# Patient Record
Sex: MALE | Race: WHITE | NOT HISPANIC OR LATINO | Employment: OTHER | URBAN - NONMETROPOLITAN AREA
[De-identification: names, ages, dates, MRNs, and addresses within clinical notes are randomized per-mention and may not be internally consistent; named-entity substitution may affect disease eponyms.]

---

## 2017-06-24 ENCOUNTER — HOSPITAL ENCOUNTER (EMERGENCY)
Facility: HOSPITAL | Age: 42
Discharge: HOME | End: 2017-06-24
Attending: FAMILY MEDICINE | Admitting: FAMILY MEDICINE

## 2017-06-24 VITALS
HEIGHT: 67 IN | SYSTOLIC BLOOD PRESSURE: 122 MMHG | BODY MASS INDEX: 27 KG/M2 | WEIGHT: 172 LBS | RESPIRATION RATE: 18 BRPM | TEMPERATURE: 98.5 F | DIASTOLIC BLOOD PRESSURE: 68 MMHG | OXYGEN SATURATION: 96 % | HEART RATE: 86 BPM

## 2017-06-24 DIAGNOSIS — E87.6 HYPOKALEMIA: Primary | ICD-10-CM

## 2017-06-24 LAB
ANION GAP SERPL CALCULATED.3IONS-SCNC: 4.9 MMOL/L (ref 3.6–11.2)
BASOPHILS # BLD AUTO: 0.05 10*3/MM3 (ref 0–0.3)
BASOPHILS NFR BLD AUTO: 0.6 % (ref 0–2)
BUN BLD-MCNC: 14 MG/DL (ref 7–21)
BUN/CREAT SERPL: 14 (ref 7–25)
CALCIUM SPEC-SCNC: 9 MG/DL (ref 7.7–10)
CHLORIDE SERPL-SCNC: 104 MMOL/L (ref 99–112)
CK SERPL-CCNC: 218 U/L (ref 24–204)
CO2 SERPL-SCNC: 28.1 MMOL/L (ref 24.3–31.9)
CREAT BLD-MCNC: 1 MG/DL (ref 0.43–1.29)
DEPRECATED RDW RBC AUTO: 43.8 FL (ref 37–54)
EOSINOPHIL # BLD AUTO: 0.26 10*3/MM3 (ref 0–0.7)
EOSINOPHIL NFR BLD AUTO: 3 % (ref 0–5)
ERYTHROCYTE [DISTWIDTH] IN BLOOD BY AUTOMATED COUNT: 14.2 % (ref 11.5–14.5)
GFR SERPL CREATININE-BSD FRML MDRD: 82 ML/MIN/1.73
GLUCOSE BLD-MCNC: 117 MG/DL (ref 70–110)
HCT VFR BLD AUTO: 43.6 % (ref 42–52)
HGB BLD-MCNC: 14.1 G/DL (ref 14–18)
IMM GRANULOCYTES # BLD: 0.06 10*3/MM3 (ref 0–0.03)
IMM GRANULOCYTES NFR BLD: 0.7 % (ref 0–0.5)
LYMPHOCYTES # BLD AUTO: 2.49 10*3/MM3 (ref 1–3)
LYMPHOCYTES NFR BLD AUTO: 28.5 % (ref 21–51)
MCH RBC QN AUTO: 27.6 PG (ref 27–33)
MCHC RBC AUTO-ENTMCNC: 32.3 G/DL (ref 33–37)
MCV RBC AUTO: 85.3 FL (ref 80–94)
MONOCYTES # BLD AUTO: 0.94 10*3/MM3 (ref 0.1–0.9)
MONOCYTES NFR BLD AUTO: 10.8 % (ref 0–10)
NEUTROPHILS # BLD AUTO: 4.93 10*3/MM3 (ref 1.4–6.5)
NEUTROPHILS NFR BLD AUTO: 56.4 % (ref 30–70)
OSMOLALITY SERPL CALC.SUM OF ELEC: 275.3 MOSM/KG (ref 273–305)
PLATELET # BLD AUTO: 231 10*3/MM3 (ref 130–400)
PMV BLD AUTO: 10.4 FL (ref 6–10)
POTASSIUM BLD-SCNC: 3.2 MMOL/L (ref 3.5–5.3)
RBC # BLD AUTO: 5.11 10*6/MM3 (ref 4.7–6.1)
SODIUM BLD-SCNC: 137 MMOL/L (ref 135–153)
WBC NRBC COR # BLD: 8.73 10*3/MM3 (ref 4.5–12.5)

## 2017-06-24 PROCEDURE — 36415 COLL VENOUS BLD VENIPUNCTURE: CPT

## 2017-06-24 PROCEDURE — 82550 ASSAY OF CK (CPK): CPT | Performed by: NURSE PRACTITIONER

## 2017-06-24 PROCEDURE — 99283 EMERGENCY DEPT VISIT LOW MDM: CPT

## 2017-06-24 PROCEDURE — 85025 COMPLETE CBC W/AUTO DIFF WBC: CPT | Performed by: NURSE PRACTITIONER

## 2017-06-24 PROCEDURE — 80048 BASIC METABOLIC PNL TOTAL CA: CPT | Performed by: NURSE PRACTITIONER

## 2017-06-24 RX ORDER — POTASSIUM CHLORIDE 20 MEQ/1
20 TABLET, EXTENDED RELEASE ORAL ONCE
Status: COMPLETED | OUTPATIENT
Start: 2017-06-24 | End: 2017-06-24

## 2017-06-24 RX ADMIN — POTASSIUM CHLORIDE 20 MEQ: 1500 TABLET, EXTENDED RELEASE ORAL at 03:35

## 2017-06-24 NOTE — ED PROVIDER NOTES
Subjective   Patient is a 42 y.o. male presenting with lower extremity pain.   History provided by:  Patient   used: No    Lower Extremity Issue   Location:  Leg and foot  Injury: no    Leg location:  L leg, R leg, L upper leg and R upper leg  Foot location:  L foot and R foot  Pain details:     Quality:  Aching and cramping    Severity:  Moderate    Onset quality:  Gradual    Duration:  5 days    Timing:  Intermittent    Progression:  Waxing and waning  Chronicity:  New  Dislocation: no    Foreign body present:  No foreign bodies  Tetanus status:  Unknown  Prior injury to area:  No  Relieved by:  Nothing  Worsened by:  Nothing  Ineffective treatments:  None tried  Associated symptoms: stiffness    Associated symptoms: no back pain, no fever, no neck pain, no swelling and no tingling    Risk factors: no concern for non-accidental trauma, no frequent fractures, no obesity and no recent illness        Review of Systems   Constitutional: Negative.  Negative for fever.   HENT: Negative.    Eyes: Negative.    Respiratory: Negative.    Cardiovascular: Negative.    Gastrointestinal: Negative.    Endocrine: Negative.    Genitourinary: Negative.    Musculoskeletal: Positive for arthralgias, myalgias and stiffness. Negative for back pain and neck pain.   Allergic/Immunologic: Negative.    Neurological: Negative.    Hematological: Negative.    Psychiatric/Behavioral: Negative.    All other systems reviewed and are negative.      Past Medical History:   Diagnosis Date   • Asthma    • Back pain    • COPD (chronic obstructive pulmonary disease)    • Emphysema lung    • Gastritis    • Headache    • Hypertension    • Migraine    • Substance abuse        No Known Allergies    Past Surgical History:   Procedure Laterality Date   • DENTAL PROCEDURE     • LIVER SURGERY         No family history on file.    Social History     Social History   • Marital status: Single     Spouse name: N/A   • Number of children: N/A    • Years of education: N/A     Social History Main Topics   • Smoking status: Never Smoker   • Smokeless tobacco: Current User     Types: Snuff      Comment: Pt uses vapor cigarette   • Alcohol use No      Comment: occassional    • Drug use: No   • Sexual activity: Defer     Other Topics Concern   • Not on file     Social History Narrative           Objective   Physical Exam   Constitutional: He is oriented to person, place, and time. He appears well-developed and well-nourished.   HENT:   Head: Normocephalic.   Nose: Nose normal.   Mouth/Throat: Oropharynx is clear and moist.   Eyes: EOM are normal. Pupils are equal, round, and reactive to light.   Neck: Normal range of motion. Neck supple.   Cardiovascular: Normal rate, regular rhythm, normal heart sounds and intact distal pulses.    Pulmonary/Chest: Effort normal and breath sounds normal.   Abdominal: Soft. Bowel sounds are normal.   Musculoskeletal: He exhibits tenderness.   Generalized muscle tenderness   Neurological: He is alert and oriented to person, place, and time.   Skin: Skin is warm and dry.   Psychiatric: He has a normal mood and affect. His behavior is normal. Judgment and thought content normal.   Nursing note and vitals reviewed.      Procedures         ED Course  ED Course                  MDM  Number of Diagnoses or Management Options  Hypokalemia: new and requires workup     Amount and/or Complexity of Data Reviewed  Clinical lab tests: ordered and reviewed  Tests in the medicine section of CPT®: reviewed and ordered    Risk of Complications, Morbidity, and/or Mortality  Presenting problems: moderate  Diagnostic procedures: moderate  Management options: moderate    Patient Progress  Patient progress: improved      Final diagnoses:   Hypokalemia            Allyn Bettencourt, APRN  06/24/17 0325

## 2017-07-20 ENCOUNTER — APPOINTMENT (OUTPATIENT)
Dept: GENERAL RADIOLOGY | Facility: HOSPITAL | Age: 42
End: 2017-07-20

## 2017-07-20 ENCOUNTER — HOSPITAL ENCOUNTER (EMERGENCY)
Facility: HOSPITAL | Age: 42
Discharge: HOME | End: 2017-07-20
Attending: EMERGENCY MEDICINE | Admitting: EMERGENCY MEDICINE

## 2017-07-20 VITALS
HEART RATE: 93 BPM | TEMPERATURE: 98.6 F | WEIGHT: 172 LBS | BODY MASS INDEX: 27.64 KG/M2 | HEIGHT: 66 IN | OXYGEN SATURATION: 93 % | SYSTOLIC BLOOD PRESSURE: 124 MMHG | DIASTOLIC BLOOD PRESSURE: 71 MMHG | RESPIRATION RATE: 18 BRPM

## 2017-07-20 DIAGNOSIS — J45.901 ASTHMA ATTACK: Primary | ICD-10-CM

## 2017-07-20 LAB
ALBUMIN SERPL-MCNC: 4.8 G/DL (ref 3.5–5)
ALBUMIN/GLOB SERPL: 1.7 G/DL (ref 1.5–2.5)
ALP SERPL-CCNC: 65 U/L (ref 40–129)
ALT SERPL W P-5'-P-CCNC: 21 U/L (ref 10–44)
ANION GAP SERPL CALCULATED.3IONS-SCNC: 5.6 MMOL/L (ref 3.6–11.2)
AST SERPL-CCNC: 16 U/L (ref 10–34)
BASOPHILS # BLD AUTO: 0.07 10*3/MM3 (ref 0–0.3)
BASOPHILS NFR BLD AUTO: 0.8 % (ref 0–2)
BILIRUB SERPL-MCNC: 0.2 MG/DL (ref 0.2–1.8)
BILIRUB UR QL STRIP: NEGATIVE
BUN BLD-MCNC: 21 MG/DL (ref 7–21)
BUN/CREAT SERPL: 20.4 (ref 7–25)
CALCIUM SPEC-SCNC: 9.5 MG/DL (ref 7.7–10)
CHLORIDE SERPL-SCNC: 106 MMOL/L (ref 99–112)
CLARITY UR: ABNORMAL
CO2 SERPL-SCNC: 28.4 MMOL/L (ref 24.3–31.9)
COLOR UR: YELLOW
CREAT BLD-MCNC: 1.03 MG/DL (ref 0.43–1.29)
DEPRECATED RDW RBC AUTO: 45.1 FL (ref 37–54)
EOSINOPHIL # BLD AUTO: 0.38 10*3/MM3 (ref 0–0.7)
EOSINOPHIL NFR BLD AUTO: 4.5 % (ref 0–5)
ERYTHROCYTE [DISTWIDTH] IN BLOOD BY AUTOMATED COUNT: 14.3 % (ref 11.5–14.5)
GFR SERPL CREATININE-BSD FRML MDRD: 80 ML/MIN/1.73
GLOBULIN UR ELPH-MCNC: 2.9 GM/DL
GLUCOSE BLD-MCNC: 102 MG/DL (ref 70–110)
GLUCOSE UR STRIP-MCNC: NEGATIVE MG/DL
HCT VFR BLD AUTO: 48.7 % (ref 42–52)
HGB BLD-MCNC: 15.9 G/DL (ref 14–18)
HGB UR QL STRIP.AUTO: NEGATIVE
IMM GRANULOCYTES # BLD: 0.04 10*3/MM3 (ref 0–0.03)
IMM GRANULOCYTES NFR BLD: 0.5 % (ref 0–0.5)
KETONES UR QL STRIP: ABNORMAL
LEUKOCYTE ESTERASE UR QL STRIP.AUTO: NEGATIVE
LYMPHOCYTES # BLD AUTO: 3.44 10*3/MM3 (ref 1–3)
LYMPHOCYTES NFR BLD AUTO: 40.4 % (ref 21–51)
MCH RBC QN AUTO: 28 PG (ref 27–33)
MCHC RBC AUTO-ENTMCNC: 32.6 G/DL (ref 33–37)
MCV RBC AUTO: 85.7 FL (ref 80–94)
MONOCYTES # BLD AUTO: 0.85 10*3/MM3 (ref 0.1–0.9)
MONOCYTES NFR BLD AUTO: 10 % (ref 0–10)
NEUTROPHILS # BLD AUTO: 3.74 10*3/MM3 (ref 1.4–6.5)
NEUTROPHILS NFR BLD AUTO: 43.8 % (ref 30–70)
NITRITE UR QL STRIP: NEGATIVE
OSMOLALITY SERPL CALC.SUM OF ELEC: 282.6 MOSM/KG (ref 273–305)
PH UR STRIP.AUTO: 5.5 [PH] (ref 5–8)
PLATELET # BLD AUTO: 269 10*3/MM3 (ref 130–400)
PMV BLD AUTO: 10 FL (ref 6–10)
POTASSIUM BLD-SCNC: 3.9 MMOL/L (ref 3.5–5.3)
PROT SERPL-MCNC: 7.7 G/DL (ref 6–8)
PROT UR QL STRIP: NEGATIVE
RBC # BLD AUTO: 5.68 10*6/MM3 (ref 4.7–6.1)
SODIUM BLD-SCNC: 140 MMOL/L (ref 135–153)
SP GR UR STRIP: 1.03 (ref 1–1.03)
TROPONIN I SERPL-MCNC: <0.006 NG/ML
UROBILINOGEN UR QL STRIP: ABNORMAL
WBC NRBC COR # BLD: 8.52 10*3/MM3 (ref 4.5–12.5)

## 2017-07-20 PROCEDURE — 96374 THER/PROPH/DIAG INJ IV PUSH: CPT

## 2017-07-20 PROCEDURE — 94799 UNLISTED PULMONARY SVC/PX: CPT

## 2017-07-20 PROCEDURE — 99284 EMERGENCY DEPT VISIT MOD MDM: CPT

## 2017-07-20 PROCEDURE — 81003 URINALYSIS AUTO W/O SCOPE: CPT | Performed by: PHYSICIAN ASSISTANT

## 2017-07-20 PROCEDURE — 94640 AIRWAY INHALATION TREATMENT: CPT

## 2017-07-20 PROCEDURE — 85025 COMPLETE CBC W/AUTO DIFF WBC: CPT | Performed by: PHYSICIAN ASSISTANT

## 2017-07-20 PROCEDURE — 84484 ASSAY OF TROPONIN QUANT: CPT | Performed by: PHYSICIAN ASSISTANT

## 2017-07-20 PROCEDURE — 71020 HC CHEST PA AND LATERAL: CPT

## 2017-07-20 PROCEDURE — 71020 XR CHEST 2 VW: CPT | Performed by: RADIOLOGY

## 2017-07-20 PROCEDURE — 25010000002 METHYLPREDNISOLONE PER 125 MG: Performed by: PHYSICIAN ASSISTANT

## 2017-07-20 PROCEDURE — 80053 COMPREHEN METABOLIC PANEL: CPT | Performed by: PHYSICIAN ASSISTANT

## 2017-07-20 PROCEDURE — 93005 ELECTROCARDIOGRAM TRACING: CPT | Performed by: PHYSICIAN ASSISTANT

## 2017-07-20 RX ORDER — SODIUM CHLORIDE 0.9 % (FLUSH) 0.9 %
10 SYRINGE (ML) INJECTION AS NEEDED
Status: DISCONTINUED | OUTPATIENT
Start: 2017-07-20 | End: 2017-07-20 | Stop reason: HOSPADM

## 2017-07-20 RX ORDER — METHYLPREDNISOLONE SODIUM SUCCINATE 125 MG/2ML
125 INJECTION, POWDER, LYOPHILIZED, FOR SOLUTION INTRAMUSCULAR; INTRAVENOUS ONCE
Status: COMPLETED | OUTPATIENT
Start: 2017-07-20 | End: 2017-07-20

## 2017-07-20 RX ORDER — IPRATROPIUM BROMIDE AND ALBUTEROL SULFATE 2.5; .5 MG/3ML; MG/3ML
3 SOLUTION RESPIRATORY (INHALATION) ONCE
Status: COMPLETED | OUTPATIENT
Start: 2017-07-20 | End: 2017-07-20

## 2017-07-20 RX ADMIN — IPRATROPIUM BROMIDE AND ALBUTEROL SULFATE 3 ML: .5; 3 SOLUTION RESPIRATORY (INHALATION) at 04:54

## 2017-07-20 RX ADMIN — METHYLPREDNISOLONE SODIUM SUCCINATE 125 MG: 125 INJECTION, POWDER, FOR SOLUTION INTRAMUSCULAR; INTRAVENOUS at 05:05
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
## 2017-07-20 NOTE — ED PROVIDER NOTES
Subjective   Patient is a 42 y.o. male presenting with shortness of breath.   History provided by:  Patient   used: No    Shortness of Breath   Onset quality:  Sudden  Duration:  1 hour  Timing:  Constant  Progression:  Worsening  Chronicity:  New  Relieved by:  Nothing  Worsened by:  Nothing  Ineffective treatments:  None tried  Associated symptoms: wheezing    Associated symptoms: no abdominal pain, no chest pain, no claudication, no cough, no diaphoresis, no ear pain, no fever, no headaches, no hemoptysis, no neck pain, no PND, no rash, no sore throat, no sputum production, no syncope, no swollen glands and no vomiting    Risk factors: no recent alcohol use, no family hx of DVT, no hx of cancer, no hx of PE/DVT, no obesity, no oral contraceptive use, no prolonged immobilization, no recent surgery and no tobacco use        Review of Systems   Constitutional: Negative.  Negative for diaphoresis and fever.   HENT: Negative.  Negative for ear pain and sore throat.    Eyes: Negative.    Respiratory: Positive for shortness of breath and wheezing. Negative for cough, hemoptysis and sputum production.    Cardiovascular: Negative.  Negative for chest pain, claudication, syncope and PND.   Gastrointestinal: Negative.  Negative for abdominal pain and vomiting.   Endocrine: Negative.    Genitourinary: Negative.    Musculoskeletal: Negative.  Negative for neck pain.   Skin: Negative.  Negative for rash.   Allergic/Immunologic: Negative.    Neurological: Negative.  Negative for headaches.   Hematological: Negative.    Psychiatric/Behavioral: Negative.    All other systems reviewed and are negative.      Past Medical History:   Diagnosis Date   • Asthma    • Back pain    • COPD (chronic obstructive pulmonary disease)    • Emphysema lung    • Gastritis    • Headache    • Hypertension    • Migraine    • Substance abuse        No Known Allergies    Past Surgical History:   Procedure Laterality Date   • DENTAL  PROCEDURE     • LIVER SURGERY         History reviewed. No pertinent family history.    Social History     Social History   • Marital status: Single     Spouse name: N/A   • Number of children: N/A   • Years of education: N/A     Social History Main Topics   • Smoking status: Never Smoker   • Smokeless tobacco: Current User     Types: Snuff      Comment: Pt uses vapor cigarette   • Alcohol use No      Comment: occassional    • Drug use: No   • Sexual activity: Defer     Other Topics Concern   • None     Social History Narrative           Objective   Physical Exam   Constitutional: He is oriented to person, place, and time. He appears well-developed and well-nourished.   HENT:   Head: Normocephalic and atraumatic.   Right Ear: External ear normal.   Left Ear: External ear normal.   Nose: Nose normal.   Mouth/Throat: Oropharynx is clear and moist.   Eyes: EOM are normal. Pupils are equal, round, and reactive to light.   Neck: Normal range of motion. Neck supple.   Cardiovascular: Normal rate, regular rhythm, normal heart sounds and intact distal pulses.    Pulmonary/Chest: Effort normal and breath sounds normal.   Abdominal: Soft. Bowel sounds are normal.   Musculoskeletal: Normal range of motion.   Neurological: He is alert and oriented to person, place, and time.   Skin: Skin is warm and dry.   Psychiatric: He has a normal mood and affect. His behavior is normal. Judgment and thought content normal.   Nursing note and vitals reviewed.      Procedures         ED Course  ED Course   Value Comment By Time   ECG 12 Lead 0450- Reviewed by Dr. Whitmore, rate 90, NSR, no ischemia JEAN Waite 07/20 0501    Patient presented to the ED by EMS. Patient ambulated off the truck into the ER.  Patient vitals at time of arrival 142/74 HR 93, SpO2 97%. JEAN Waite 07/20 0516    Patient feeling much better. JEAN Waite 07/20 0600                  MDM    Final diagnoses:   Asthma attack             JEAN Waite  07/20/17 0654

## 2017-07-27 ENCOUNTER — APPOINTMENT (OUTPATIENT)
Dept: GENERAL RADIOLOGY | Facility: HOSPITAL | Age: 42
End: 2017-07-27

## 2017-07-27 ENCOUNTER — HOSPITAL ENCOUNTER (EMERGENCY)
Facility: HOSPITAL | Age: 42
Discharge: HOME | End: 2017-07-27
Attending: EMERGENCY MEDICINE | Admitting: EMERGENCY MEDICINE

## 2017-07-27 VITALS
SYSTOLIC BLOOD PRESSURE: 132 MMHG | DIASTOLIC BLOOD PRESSURE: 78 MMHG | WEIGHT: 172 LBS | HEART RATE: 99 BPM | OXYGEN SATURATION: 98 % | HEIGHT: 66 IN | RESPIRATION RATE: 20 BRPM | BODY MASS INDEX: 27.64 KG/M2 | TEMPERATURE: 97.9 F

## 2017-07-27 DIAGNOSIS — J45.901 ASTHMA EXACERBATION: Primary | ICD-10-CM

## 2017-07-27 LAB
ANION GAP SERPL CALCULATED.3IONS-SCNC: 5.4 MMOL/L (ref 3.6–11.2)
BASOPHILS # BLD AUTO: 0.1 10*3/MM3 (ref 0–0.3)
BASOPHILS NFR BLD AUTO: 1.1 % (ref 0–2)
BUN BLD-MCNC: 10 MG/DL (ref 7–21)
BUN/CREAT SERPL: 9.4 (ref 7–25)
CALCIUM SPEC-SCNC: 9.6 MG/DL (ref 7.7–10)
CHLORIDE SERPL-SCNC: 108 MMOL/L (ref 99–112)
CO2 SERPL-SCNC: 28.6 MMOL/L (ref 24.3–31.9)
CREAT BLD-MCNC: 1.06 MG/DL (ref 0.43–1.29)
DEPRECATED RDW RBC AUTO: 45.6 FL (ref 37–54)
EOSINOPHIL # BLD AUTO: 0.36 10*3/MM3 (ref 0–0.7)
EOSINOPHIL NFR BLD AUTO: 3.9 % (ref 0–5)
ERYTHROCYTE [DISTWIDTH] IN BLOOD BY AUTOMATED COUNT: 14.5 % (ref 11.5–14.5)
GFR SERPL CREATININE-BSD FRML MDRD: 77 ML/MIN/1.73
GLUCOSE BLD-MCNC: 98 MG/DL (ref 70–110)
HCT VFR BLD AUTO: 50 % (ref 42–52)
HGB BLD-MCNC: 16.4 G/DL (ref 14–18)
IMM GRANULOCYTES # BLD: 0.06 10*3/MM3 (ref 0–0.03)
IMM GRANULOCYTES NFR BLD: 0.6 % (ref 0–0.5)
LYMPHOCYTES # BLD AUTO: 3.15 10*3/MM3 (ref 1–3)
LYMPHOCYTES NFR BLD AUTO: 34.1 % (ref 21–51)
MCH RBC QN AUTO: 28 PG (ref 27–33)
MCHC RBC AUTO-ENTMCNC: 32.8 G/DL (ref 33–37)
MCV RBC AUTO: 85.5 FL (ref 80–94)
MONOCYTES # BLD AUTO: 0.88 10*3/MM3 (ref 0.1–0.9)
MONOCYTES NFR BLD AUTO: 9.5 % (ref 0–10)
NEUTROPHILS # BLD AUTO: 4.7 10*3/MM3 (ref 1.4–6.5)
NEUTROPHILS NFR BLD AUTO: 50.8 % (ref 30–70)
OSMOLALITY SERPL CALC.SUM OF ELEC: 282.1 MOSM/KG (ref 273–305)
PLATELET # BLD AUTO: 284 10*3/MM3 (ref 130–400)
PMV BLD AUTO: 9.6 FL (ref 6–10)
POTASSIUM BLD-SCNC: 4.7 MMOL/L (ref 3.5–5.3)
RBC # BLD AUTO: 5.85 10*6/MM3 (ref 4.7–6.1)
SODIUM BLD-SCNC: 142 MMOL/L (ref 135–153)
WBC NRBC COR # BLD: 9.25 10*3/MM3 (ref 4.5–12.5)

## 2017-07-27 PROCEDURE — 93010 ELECTROCARDIOGRAM REPORT: CPT | Performed by: INTERNAL MEDICINE

## 2017-07-27 PROCEDURE — 93005 ELECTROCARDIOGRAM TRACING: CPT | Performed by: EMERGENCY MEDICINE

## 2017-07-27 PROCEDURE — 85025 COMPLETE CBC W/AUTO DIFF WBC: CPT | Performed by: EMERGENCY MEDICINE

## 2017-07-27 PROCEDURE — 25010000002 METHYLPREDNISOLONE PER 125 MG: Performed by: EMERGENCY MEDICINE

## 2017-07-27 PROCEDURE — 96374 THER/PROPH/DIAG INJ IV PUSH: CPT

## 2017-07-27 PROCEDURE — 94799 UNLISTED PULMONARY SVC/PX: CPT

## 2017-07-27 PROCEDURE — 71010 HC CHEST PA OR AP: CPT

## 2017-07-27 PROCEDURE — 71010 XR CHEST 1 VW: CPT | Performed by: RADIOLOGY

## 2017-07-27 PROCEDURE — 80048 BASIC METABOLIC PNL TOTAL CA: CPT | Performed by: EMERGENCY MEDICINE

## 2017-07-27 PROCEDURE — 94640 AIRWAY INHALATION TREATMENT: CPT

## 2017-07-27 PROCEDURE — 99284 EMERGENCY DEPT VISIT MOD MDM: CPT

## 2017-07-27 RX ORDER — PREDNISONE 50 MG/1
50 TABLET ORAL DAILY
Qty: 5 TABLET | Refills: 0 | Status: SHIPPED | OUTPATIENT
Start: 2017-07-27 | End: 2017-10-08

## 2017-07-27 RX ORDER — METHYLPREDNISOLONE SODIUM SUCCINATE 125 MG/2ML
125 INJECTION, POWDER, LYOPHILIZED, FOR SOLUTION INTRAMUSCULAR; INTRAVENOUS ONCE
Status: COMPLETED | OUTPATIENT
Start: 2017-07-27 | End: 2017-07-27

## 2017-07-27 RX ORDER — SODIUM CHLORIDE 0.9 % (FLUSH) 0.9 %
10 SYRINGE (ML) INJECTION AS NEEDED
Status: DISCONTINUED | OUTPATIENT
Start: 2017-07-27 | End: 2017-07-28 | Stop reason: HOSPADM

## 2017-07-27 RX ORDER — AZITHROMYCIN 250 MG/1
TABLET, FILM COATED ORAL
Qty: 6 TABLET | Refills: 0 | Status: SHIPPED | OUTPATIENT
Start: 2017-07-27 | End: 2017-10-17

## 2017-07-27 RX ORDER — IPRATROPIUM BROMIDE AND ALBUTEROL SULFATE 2.5; .5 MG/3ML; MG/3ML
3 SOLUTION RESPIRATORY (INHALATION)
Status: COMPLETED | OUTPATIENT
Start: 2017-07-27 | End: 2017-07-27

## 2017-07-27 RX ADMIN — IPRATROPIUM BROMIDE AND ALBUTEROL SULFATE 3 ML: .5; 3 SOLUTION RESPIRATORY (INHALATION) at 20:00

## 2017-07-27 RX ADMIN — METHYLPREDNISOLONE SODIUM SUCCINATE 125 MG: 125 INJECTION, POWDER, FOR SOLUTION INTRAMUSCULAR; INTRAVENOUS at 19:54

## 2017-07-27 RX ADMIN — IPRATROPIUM BROMIDE AND ALBUTEROL SULFATE 3 ML: .5; 3 SOLUTION RESPIRATORY (INHALATION) at 20:14

## 2017-07-27 RX ADMIN — IPRATROPIUM BROMIDE AND ALBUTEROL SULFATE 3 ML: .5; 3 SOLUTION RESPIRATORY (INHALATION) at 19:45

## 2017-07-27 NOTE — ED PROVIDER NOTES
Subjective   Patient is a 42 y.o. male presenting with shortness of breath.   History provided by:  Patient   used: No    Shortness of Breath   Severity:  Moderate  Onset quality:  Gradual  Duration:  5 days  Timing:  Intermittent  Progression:  Worsening  Chronicity:  Chronic  Context: weather changes    Relieved by:  Nothing  Worsened by:  Nothing  Ineffective treatments:  None tried  Associated symptoms: wheezing    Associated symptoms: no abdominal pain, no chest pain, no cough, no fever, no hemoptysis, no neck pain, no rash, no sore throat, no sputum production, no syncope and no vomiting    Risk factors: no family hx of DVT, no hx of cancer, no hx of PE/DVT, no obesity, no prolonged immobilization, no recent surgery and no tobacco use        Review of Systems   Constitutional: Negative for chills and fever.   HENT: Negative for congestion, rhinorrhea, sore throat and trouble swallowing.    Eyes: Negative for discharge and visual disturbance.   Respiratory: Positive for shortness of breath and wheezing. Negative for cough, hemoptysis, sputum production and chest tightness.    Cardiovascular: Negative for chest pain, palpitations, leg swelling and syncope.   Gastrointestinal: Negative for abdominal pain, constipation, diarrhea, nausea and vomiting.   Genitourinary: Negative for dysuria, flank pain and hematuria.   Musculoskeletal: Negative for back pain, myalgias and neck pain.   Skin: Negative for color change and rash.   Neurological: Negative for dizziness, weakness and numbness.   Psychiatric/Behavioral: Negative for self-injury and suicidal ideas.       Past Medical History:   Diagnosis Date   • Asthma    • Back pain    • COPD (chronic obstructive pulmonary disease)    • Emphysema lung    • Gastritis    • Headache    • Hypertension    • Migraine    • Substance abuse        No Known Allergies    Past Surgical History:   Procedure Laterality Date   • DENTAL PROCEDURE     • LIVER SURGERY          History reviewed. No pertinent family history.    Social History     Social History   • Marital status: Single     Spouse name: N/A   • Number of children: N/A   • Years of education: N/A     Social History Main Topics   • Smoking status: Never Smoker   • Smokeless tobacco: Current User     Types: Snuff      Comment: Pt uses vapor cigarette   • Alcohol use No      Comment: occassional    • Drug use: No   • Sexual activity: Defer     Other Topics Concern   • None     Social History Narrative           Objective   Physical Exam   Constitutional: He is oriented to person, place, and time. He appears well-developed and well-nourished.   HENT:   Head: Normocephalic and atraumatic.   Nose: Nose normal.   Mouth/Throat: Oropharynx is clear and moist.   Eyes: Conjunctivae and EOM are normal. Pupils are equal, round, and reactive to light.   Neck: Normal range of motion. Neck supple.   Cardiovascular: Normal rate, regular rhythm, normal heart sounds and intact distal pulses.    Pulmonary/Chest: Effort normal. No respiratory distress. He has wheezes. He exhibits no tenderness.   Abdominal: Soft. Bowel sounds are normal. There is no tenderness. There is no rebound and no guarding.   Musculoskeletal: Normal range of motion. He exhibits no edema, tenderness or deformity.   Neurological: He is alert and oriented to person, place, and time. No cranial nerve deficit. Coordination normal.   Skin: Skin is warm and dry. No rash noted. No erythema. No pallor.   Psychiatric: He has a normal mood and affect. His behavior is normal. Judgment and thought content normal.   Nursing note and vitals reviewed.      Procedures         ED Course  ED Course                  MDM  Number of Diagnoses or Management Options  Asthma exacerbation:   Diagnosis management comments: EK:55: Ventricular rate 120, SC interval 156, QRS duration 92, , sinus tachycardia    Patient has hx of asthma. Says he's been wheezing x5 days. It's been  worsening. Physical exam notable for wheezing. Given duoneb and solumedrol. On re-evaluation, patient doing better. Vitals stable. Will discharge home.       Final diagnoses:   Asthma exacerbation            Araceli Hanley MD  07/27/17 6698

## 2017-09-10 ENCOUNTER — HOSPITAL ENCOUNTER (EMERGENCY)
Facility: HOSPITAL | Age: 42
Discharge: HOME | End: 2017-09-10
Attending: EMERGENCY MEDICINE | Admitting: EMERGENCY MEDICINE

## 2017-09-10 ENCOUNTER — APPOINTMENT (OUTPATIENT)
Dept: GENERAL RADIOLOGY | Facility: HOSPITAL | Age: 42
End: 2017-09-10

## 2017-09-10 VITALS
OXYGEN SATURATION: 97 % | HEART RATE: 100 BPM | RESPIRATION RATE: 18 BRPM | WEIGHT: 186 LBS | DIASTOLIC BLOOD PRESSURE: 85 MMHG | HEIGHT: 67 IN | SYSTOLIC BLOOD PRESSURE: 113 MMHG | TEMPERATURE: 98.3 F | BODY MASS INDEX: 29.19 KG/M2

## 2017-09-10 DIAGNOSIS — J44.1 COPD EXACERBATION (HCC): Primary | ICD-10-CM

## 2017-09-10 LAB
ANION GAP SERPL CALCULATED.3IONS-SCNC: 3.1 MMOL/L (ref 3.6–11.2)
BASOPHILS # BLD AUTO: 0.08 10*3/MM3 (ref 0–0.3)
BASOPHILS NFR BLD AUTO: 0.8 % (ref 0–2)
BUN BLD-MCNC: 11 MG/DL (ref 7–21)
BUN/CREAT SERPL: 11.1 (ref 7–25)
CALCIUM SPEC-SCNC: 9.4 MG/DL (ref 7.7–10)
CHLORIDE SERPL-SCNC: 112 MMOL/L (ref 99–112)
CO2 SERPL-SCNC: 27.9 MMOL/L (ref 24.3–31.9)
CREAT BLD-MCNC: 0.99 MG/DL (ref 0.43–1.29)
DEPRECATED RDW RBC AUTO: 45.9 FL (ref 37–54)
EOSINOPHIL # BLD AUTO: 0.52 10*3/MM3 (ref 0–0.7)
EOSINOPHIL NFR BLD AUTO: 5.3 % (ref 0–5)
ERYTHROCYTE [DISTWIDTH] IN BLOOD BY AUTOMATED COUNT: 14.7 % (ref 11.5–14.5)
GFR SERPL CREATININE-BSD FRML MDRD: 83 ML/MIN/1.73
GLUCOSE BLD-MCNC: 123 MG/DL (ref 70–110)
HCT VFR BLD AUTO: 43.6 % (ref 42–52)
HGB BLD-MCNC: 14.3 G/DL (ref 14–18)
IMM GRANULOCYTES # BLD: 0.07 10*3/MM3 (ref 0–0.03)
IMM GRANULOCYTES NFR BLD: 0.7 % (ref 0–0.5)
LYMPHOCYTES # BLD AUTO: 3.9 10*3/MM3 (ref 1–3)
LYMPHOCYTES NFR BLD AUTO: 39.7 % (ref 21–51)
MCH RBC QN AUTO: 28.2 PG (ref 27–33)
MCHC RBC AUTO-ENTMCNC: 32.8 G/DL (ref 33–37)
MCV RBC AUTO: 86 FL (ref 80–94)
MONOCYTES # BLD AUTO: 1.1 10*3/MM3 (ref 0.1–0.9)
MONOCYTES NFR BLD AUTO: 11.2 % (ref 0–10)
NEUTROPHILS # BLD AUTO: 4.15 10*3/MM3 (ref 1.4–6.5)
NEUTROPHILS NFR BLD AUTO: 42.3 % (ref 30–70)
OSMOLALITY SERPL CALC.SUM OF ELEC: 285.7 MOSM/KG (ref 273–305)
PLATELET # BLD AUTO: 240 10*3/MM3 (ref 130–400)
PMV BLD AUTO: 10.6 FL (ref 6–10)
POTASSIUM BLD-SCNC: 3.8 MMOL/L (ref 3.5–5.3)
RBC # BLD AUTO: 5.07 10*6/MM3 (ref 4.7–6.1)
SODIUM BLD-SCNC: 143 MMOL/L (ref 135–153)
TROPONIN I SERPL-MCNC: <0.006 NG/ML
WBC NRBC COR # BLD: 9.82 10*3/MM3 (ref 4.5–12.5)

## 2017-09-10 PROCEDURE — 94799 UNLISTED PULMONARY SVC/PX: CPT

## 2017-09-10 PROCEDURE — 96374 THER/PROPH/DIAG INJ IV PUSH: CPT

## 2017-09-10 PROCEDURE — 93010 ELECTROCARDIOGRAM REPORT: CPT | Performed by: INTERNAL MEDICINE

## 2017-09-10 PROCEDURE — 25010000002 METHYLPREDNISOLONE PER 125 MG: Performed by: EMERGENCY MEDICINE

## 2017-09-10 PROCEDURE — 71010 XR CHEST 1 VW: CPT | Performed by: RADIOLOGY

## 2017-09-10 PROCEDURE — 85025 COMPLETE CBC W/AUTO DIFF WBC: CPT | Performed by: EMERGENCY MEDICINE

## 2017-09-10 PROCEDURE — 84484 ASSAY OF TROPONIN QUANT: CPT | Performed by: EMERGENCY MEDICINE

## 2017-09-10 PROCEDURE — 99284 EMERGENCY DEPT VISIT MOD MDM: CPT

## 2017-09-10 PROCEDURE — 94640 AIRWAY INHALATION TREATMENT: CPT

## 2017-09-10 PROCEDURE — 93005 ELECTROCARDIOGRAM TRACING: CPT | Performed by: EMERGENCY MEDICINE

## 2017-09-10 PROCEDURE — 80048 BASIC METABOLIC PNL TOTAL CA: CPT | Performed by: EMERGENCY MEDICINE

## 2017-09-10 PROCEDURE — 71010 HC CHEST PA OR AP: CPT

## 2017-09-10 RX ORDER — ALBUTEROL SULFATE 90 UG/1
2 AEROSOL, METERED RESPIRATORY (INHALATION)
Qty: 1 INHALER | Refills: 0 | Status: SHIPPED | OUTPATIENT
Start: 2017-09-10 | End: 2017-10-17

## 2017-09-10 RX ORDER — METHYLPREDNISOLONE SODIUM SUCCINATE 125 MG/2ML
125 INJECTION, POWDER, LYOPHILIZED, FOR SOLUTION INTRAMUSCULAR; INTRAVENOUS ONCE
Status: COMPLETED | OUTPATIENT
Start: 2017-09-10 | End: 2017-09-10

## 2017-09-10 RX ORDER — IPRATROPIUM BROMIDE AND ALBUTEROL SULFATE 2.5; .5 MG/3ML; MG/3ML
3 SOLUTION RESPIRATORY (INHALATION)
Status: COMPLETED | OUTPATIENT
Start: 2017-09-10 | End: 2017-09-10

## 2017-09-10 RX ADMIN — METHYLPREDNISOLONE SODIUM SUCCINATE 125 MG: 125 INJECTION, POWDER, FOR SOLUTION INTRAMUSCULAR; INTRAVENOUS at 03:27

## 2017-09-10 RX ADMIN — IPRATROPIUM BROMIDE AND ALBUTEROL SULFATE 3 ML: 2.5; .5 SOLUTION RESPIRATORY (INHALATION) at 03:46

## 2017-09-10 RX ADMIN — IPRATROPIUM BROMIDE AND ALBUTEROL SULFATE 3 ML: 2.5; .5 SOLUTION RESPIRATORY (INHALATION) at 03:31

## 2017-09-10 RX ADMIN — IPRATROPIUM BROMIDE AND ALBUTEROL SULFATE 3 ML: 2.5; .5 SOLUTION RESPIRATORY (INHALATION) at 04:01

## 2017-09-10 NOTE — ED PROVIDER NOTES
Subjective   Patient is a 42 y.o. male presenting with wheezing.   History provided by:  Patient   used: No    Wheezing   Severity:  Mild  Duration:  2 days  Timing:  Constant  Progression:  Worsening  Chronicity:  Recurrent  Relieved by:  Nothing  Worsened by:  Nothing  Ineffective treatments:  None tried  Associated symptoms: shortness of breath    Associated symptoms: no chest pain, no chest tightness, no cough, no fatigue, no fever, no rash, no rhinorrhea, no sore throat, no sputum production and no stridor        Review of Systems   Constitutional: Negative for chills, fatigue and fever.   HENT: Negative for congestion, rhinorrhea, sore throat and trouble swallowing.    Eyes: Negative for discharge and visual disturbance.   Respiratory: Positive for shortness of breath and wheezing. Negative for cough, sputum production, chest tightness and stridor.    Cardiovascular: Negative for chest pain, palpitations and leg swelling.   Gastrointestinal: Negative for abdominal pain, constipation, diarrhea, nausea and vomiting.   Genitourinary: Negative for dysuria, flank pain and hematuria.   Musculoskeletal: Negative for back pain, myalgias and neck pain.   Skin: Negative for color change and rash.   Neurological: Negative for dizziness, weakness and numbness.   Psychiatric/Behavioral: Negative for self-injury and suicidal ideas.       Past Medical History:   Diagnosis Date   • Asthma    • Back pain    • COPD (chronic obstructive pulmonary disease)    • Emphysema lung    • Gastritis    • Headache    • Hypertension    • Migraine    • Substance abuse        No Known Allergies    Past Surgical History:   Procedure Laterality Date   • DENTAL PROCEDURE     • LIVER SURGERY         History reviewed. No pertinent family history.    Social History     Social History   • Marital status: Single     Spouse name: N/A   • Number of children: N/A   • Years of education: N/A     Social History Main Topics   • Smoking  status: Never Smoker   • Smokeless tobacco: Current User     Types: Snuff      Comment: Pt uses vapor cigarette   • Alcohol use No      Comment: occassional    • Drug use: No   • Sexual activity: Defer     Other Topics Concern   • None     Social History Narrative           Objective   Physical Exam   Constitutional: He is oriented to person, place, and time. He appears well-developed and well-nourished.   HENT:   Head: Normocephalic and atraumatic.   Nose: Nose normal.   Mouth/Throat: Oropharynx is clear and moist.   Eyes: Conjunctivae and EOM are normal. Pupils are equal, round, and reactive to light.   Neck: Normal range of motion. Neck supple.   Cardiovascular: Normal rate, regular rhythm, normal heart sounds and intact distal pulses.    Pulmonary/Chest: Effort normal. No respiratory distress. He has wheezes. He exhibits no tenderness.   Abdominal: Soft. Bowel sounds are normal. There is no tenderness. There is no rebound and no guarding.   Musculoskeletal: Normal range of motion. He exhibits no edema, tenderness or deformity.   Neurological: He is alert and oriented to person, place, and time. No cranial nerve deficit. Coordination normal.   Skin: Skin is warm and dry. No rash noted. No erythema. No pallor.   Psychiatric: He has a normal mood and affect. His behavior is normal. Judgment and thought content normal.   Nursing note and vitals reviewed.      Procedures         ED Course  ED Course                  MDM  Number of Diagnoses or Management Options  COPD exacerbation:   Diagnosis management comments: EKG: Ventricular rate 87, KS interval 162, QRS duration 92, QTC 4:30, normal sinus rhythm.    42-year-old male with a history of COPD presents emergency department shortness of breath.  Patient states that he is currently on prednisone.  He denies being on any current antibiotics.  He's been using his DuoNeb and albuterol inhaler with minimal relief of symptoms.  Patient states that he is currently out of  his albuterol inhaler.  Patient denies any fever, chills, productive cough.  Physical exam is remarkable for diffuse wheezing.  Labs and imaging obtained.  No acute process identified on the chest x-ray.  We will discharge patient home with a prescription for purulent inhaler.  Patient told to take medication prescribed as directed, to follow-up with his primary care physician this week, and to return to emergency department for worsening symptoms.      Final diagnoses:   COPD exacerbation            Araceli Hanley MD  09/10/17 0432

## 2017-09-15 ENCOUNTER — HOSPITAL ENCOUNTER (EMERGENCY)
Facility: HOSPITAL | Age: 42
Discharge: HOME | End: 2017-09-15
Attending: EMERGENCY MEDICINE | Admitting: EMERGENCY MEDICINE

## 2017-09-15 VITALS
BODY MASS INDEX: 29.19 KG/M2 | HEIGHT: 67 IN | WEIGHT: 186 LBS | OXYGEN SATURATION: 96 % | SYSTOLIC BLOOD PRESSURE: 114 MMHG | HEART RATE: 98 BPM | DIASTOLIC BLOOD PRESSURE: 69 MMHG | RESPIRATION RATE: 20 BRPM | TEMPERATURE: 98.2 F

## 2017-09-15 DIAGNOSIS — J45.41 MODERATE PERSISTENT ASTHMA WITH ACUTE EXACERBATION: Primary | ICD-10-CM

## 2017-09-15 LAB
A-A DO2: 23.4 MMHG (ref 0–300)
ARTERIAL PATENCY WRIST A: ABNORMAL
ATMOSPHERIC PRESS: 728 MMHG
BASE EXCESS BLDA CALC-SCNC: -1.5 MMOL/L
BDY SITE: ABNORMAL
BODY TEMPERATURE: 98.6 C
COHGB MFR BLD: 0.9 % (ref 0–5)
HCO3 BLDA-SCNC: 23.3 MMOL/L (ref 22–26)
HCT VFR BLD CALC: 48 % (ref 42–52)
HGB BLDA-MCNC: 16.3 G/DL (ref 12–16)
HOROWITZ INDEX BLD+IHG-RTO: 21 %
METHGB BLD QL: 0.6 % (ref 0–3)
MODALITY: ABNORMAL
OXYHGB MFR BLDV: 93.1 % (ref 85–100)
PCO2 BLDA: 40.1 MM HG (ref 35–45)
PH BLDA: 7.38 PH UNITS (ref 7.35–7.45)
PO2 BLDA: 71.6 MM HG (ref 80–100)
SAO2 % BLDCOA: 94.5 % (ref 90–100)

## 2017-09-15 PROCEDURE — 94640 AIRWAY INHALATION TREATMENT: CPT

## 2017-09-15 PROCEDURE — 83050 HGB METHEMOGLOBIN QUAN: CPT | Performed by: EMERGENCY MEDICINE

## 2017-09-15 PROCEDURE — 94799 UNLISTED PULMONARY SVC/PX: CPT

## 2017-09-15 PROCEDURE — 99283 EMERGENCY DEPT VISIT LOW MDM: CPT

## 2017-09-15 PROCEDURE — 82375 ASSAY CARBOXYHB QUANT: CPT | Performed by: EMERGENCY MEDICINE

## 2017-09-15 PROCEDURE — 82805 BLOOD GASES W/O2 SATURATION: CPT | Performed by: EMERGENCY MEDICINE

## 2017-09-15 PROCEDURE — 36600 WITHDRAWAL OF ARTERIAL BLOOD: CPT | Performed by: EMERGENCY MEDICINE

## 2017-09-15 RX ORDER — PREDNISONE 20 MG/1
20 TABLET ORAL 2 TIMES DAILY
Qty: 6 TABLET | Refills: 0 | Status: ON HOLD | OUTPATIENT
Start: 2017-09-15 | End: 2018-01-18

## 2017-09-15 RX ORDER — ALBUTEROL SULFATE 2.5 MG/3ML
SOLUTION RESPIRATORY (INHALATION)
Status: DISCONTINUED
Start: 2017-09-15 | End: 2017-09-15 | Stop reason: HOSPADM

## 2017-09-15 RX ORDER — ALBUTEROL SULFATE 2.5 MG/3ML
2.5 SOLUTION RESPIRATORY (INHALATION) ONCE
Status: COMPLETED | OUTPATIENT
Start: 2017-09-15 | End: 2017-09-15

## 2017-09-15 RX ADMIN — ALBUTEROL SULFATE 2.5 MG: 2.5 SOLUTION RESPIRATORY (INHALATION) at 16:24

## 2017-09-15 NOTE — ED PROVIDER NOTES
Subjective   Patient is a 42 y.o. male presenting with shortness of breath.   History provided by:  Patient  Shortness of Breath   Severity:  Moderate  Onset quality:  Gradual  Duration:  2 days  Progression:  Worsening  Chronicity:  Chronic  Context: activity and weather changes    Relieved by:  Rest  Worsened by:  Deep breathing, exertion and movement  Associated symptoms: wheezing    Associated symptoms: no abdominal pain, no chest pain and no fever        Review of Systems   Constitutional: Negative.  Negative for fever.   HENT: Negative.    Respiratory: Positive for shortness of breath and wheezing.    Cardiovascular: Negative.  Negative for chest pain.   Gastrointestinal: Negative.  Negative for abdominal pain.   Endocrine: Negative.    Genitourinary: Negative.  Negative for dysuria.   Skin: Negative.    Neurological: Negative.    Psychiatric/Behavioral: Negative.    All other systems reviewed and are negative.      Past Medical History:   Diagnosis Date   • Asthma    • Back pain    • COPD (chronic obstructive pulmonary disease)    • Emphysema lung    • Gastritis    • Headache    • Hypertension    • Migraine    • Substance abuse        No Known Allergies    Past Surgical History:   Procedure Laterality Date   • DENTAL PROCEDURE     • LIVER SURGERY         History reviewed. No pertinent family history.    Social History     Social History   • Marital status: Single     Spouse name: N/A   • Number of children: N/A   • Years of education: N/A     Social History Main Topics   • Smoking status: Never Smoker   • Smokeless tobacco: Current User     Types: Snuff      Comment: Pt uses vapor cigarette   • Alcohol use No      Comment: occassional    • Drug use: No   • Sexual activity: Defer     Other Topics Concern   • None     Social History Narrative           Objective   Physical Exam   Constitutional: He is oriented to person, place, and time. He appears well-developed and well-nourished. No distress.   HENT:   Head:  Normocephalic and atraumatic.   Right Ear: External ear normal.   Left Ear: External ear normal.   Nose: Nose normal.   Eyes: Conjunctivae and EOM are normal. Pupils are equal, round, and reactive to light.   Neck: Normal range of motion. Neck supple. No JVD present. No tracheal deviation present.   Cardiovascular: Normal rate, regular rhythm and normal heart sounds.    No murmur heard.  Pulmonary/Chest: Effort normal. No respiratory distress. He has wheezes. He exhibits tenderness.   Abdominal: Soft. Bowel sounds are normal. There is no tenderness.   Musculoskeletal: Normal range of motion. He exhibits no edema or deformity.   Neurological: He is alert and oriented to person, place, and time. No cranial nerve deficit.   Skin: Skin is warm and dry. No rash noted. He is not diaphoretic. No erythema. No pallor.   Psychiatric: He has a normal mood and affect. His behavior is normal. Thought content normal.   Nursing note and vitals reviewed.      Procedures         ED Course  ED Course                  MDM    Final diagnoses:   Moderate persistent asthma with acute exacerbation            Gopal Qiu MD  09/18/17 0758

## 2017-10-08 ENCOUNTER — APPOINTMENT (OUTPATIENT)
Dept: GENERAL RADIOLOGY | Facility: HOSPITAL | Age: 42
End: 2017-10-08

## 2017-10-08 ENCOUNTER — HOSPITAL ENCOUNTER (EMERGENCY)
Facility: HOSPITAL | Age: 42
Discharge: HOME | End: 2017-10-08
Attending: INTERNAL MEDICINE | Admitting: INTERNAL MEDICINE

## 2017-10-08 VITALS
TEMPERATURE: 98 F | SYSTOLIC BLOOD PRESSURE: 130 MMHG | RESPIRATION RATE: 20 BRPM | DIASTOLIC BLOOD PRESSURE: 77 MMHG | OXYGEN SATURATION: 96 % | BODY MASS INDEX: 29.73 KG/M2 | WEIGHT: 185 LBS | HEIGHT: 66 IN | HEART RATE: 74 BPM

## 2017-10-08 DIAGNOSIS — J45.41 MODERATE PERSISTENT ASTHMA WITH EXACERBATION: Primary | ICD-10-CM

## 2017-10-08 PROCEDURE — 94799 UNLISTED PULMONARY SVC/PX: CPT

## 2017-10-08 PROCEDURE — 71020 HC CHEST PA AND LATERAL: CPT

## 2017-10-08 PROCEDURE — 96365 THER/PROPH/DIAG IV INF INIT: CPT

## 2017-10-08 PROCEDURE — 94640 AIRWAY INHALATION TREATMENT: CPT

## 2017-10-08 PROCEDURE — 71020 XR CHEST 2 VW: CPT | Performed by: RADIOLOGY

## 2017-10-08 PROCEDURE — 99284 EMERGENCY DEPT VISIT MOD MDM: CPT

## 2017-10-08 PROCEDURE — 25010000002 AZITHROMYCIN: Performed by: INTERNAL MEDICINE

## 2017-10-08 RX ORDER — GUAIFENESIN AND DEXTROMETHORPHAN HYDROBROMIDE 600; 30 MG/1; MG/1
1 TABLET, EXTENDED RELEASE ORAL 2 TIMES DAILY PRN
Qty: 20 TABLET | Refills: 0 | Status: SHIPPED | OUTPATIENT
Start: 2017-10-08 | End: 2017-10-17

## 2017-10-08 RX ORDER — PREDNISONE 50 MG/1
50 TABLET ORAL DAILY
Qty: 5 TABLET | Refills: 0 | Status: SHIPPED | OUTPATIENT
Start: 2017-10-08 | End: 2017-10-17

## 2017-10-08 RX ORDER — ALBUTEROL SULFATE 2.5 MG/3ML
2.5 SOLUTION RESPIRATORY (INHALATION)
Status: COMPLETED | OUTPATIENT
Start: 2017-10-08 | End: 2017-10-08

## 2017-10-08 RX ORDER — AZITHROMYCIN 250 MG/1
TABLET, FILM COATED ORAL
Qty: 6 TABLET | Refills: 0 | Status: SHIPPED | OUTPATIENT
Start: 2017-10-08 | End: 2017-10-17

## 2017-10-08 RX ADMIN — ALBUTEROL SULFATE 2.5 MG: 2.5 SOLUTION RESPIRATORY (INHALATION) at 04:56

## 2017-10-08 RX ADMIN — ALBUTEROL SULFATE 2.5 MG: 2.5 SOLUTION RESPIRATORY (INHALATION) at 04:38

## 2017-10-08 RX ADMIN — AZITHROMYCIN 500 MG: 500 INJECTION, POWDER, LYOPHILIZED, FOR SOLUTION INTRAVENOUS at 04:47

## 2017-10-08 NOTE — ED PROVIDER NOTES
Subjective   Patient is a 42 y.o. male presenting with cough.   Cough   Cough characteristics:  Productive and barking  Sputum characteristics:  White and green  Severity:  Moderate  Onset quality:  Gradual  Duration:  1 week  Timing:  Constant  Progression:  Worsening  Chronicity:  Recurrent  Smoker: no    Context: upper respiratory infection and weather changes    Context: not sick contacts and not smoke exposure    Relieved by:  Home nebulizer  Worsened by:  Nothing  Ineffective treatments:  Beta-agonist inhaler  Associated symptoms: shortness of breath    Associated symptoms: no chest pain, no chills, no diaphoresis, no fever, no sinus congestion, no sore throat and no weight loss        Review of Systems   Constitutional: Negative for chills, diaphoresis, fever and weight loss.   HENT: Negative for sore throat.    Eyes: Negative.    Respiratory: Positive for cough and shortness of breath.    Cardiovascular: Negative for chest pain.   Endocrine: Negative.    Genitourinary: Negative.    Skin: Negative.    Allergic/Immunologic: Negative.    Psychiatric/Behavioral: Negative.    All other systems reviewed and are negative.      Past Medical History:   Diagnosis Date   • Asthma    • Back pain    • COPD (chronic obstructive pulmonary disease)    • Emphysema lung    • Gastritis    • Headache    • Hypertension    • Migraine    • Substance abuse        No Known Allergies    Past Surgical History:   Procedure Laterality Date   • DENTAL PROCEDURE     • LIVER SURGERY         History reviewed. No pertinent family history.    Social History     Social History   • Marital status: Single     Spouse name: N/A   • Number of children: N/A   • Years of education: N/A     Social History Main Topics   • Smoking status: Never Smoker   • Smokeless tobacco: Current User     Types: Snuff      Comment: Pt uses vapor cigarette   • Alcohol use No      Comment: occassional    • Drug use: No   • Sexual activity: Defer     Other Topics Concern    • None     Social History Narrative           Objective   Physical Exam   Constitutional: He appears well-developed.   HENT:   Head: Normocephalic.   Eyes: Conjunctivae and EOM are normal. Pupils are equal, round, and reactive to light.   Cardiovascular: Normal rate and regular rhythm.    Pulmonary/Chest: Effort normal. He has decreased breath sounds. He has no wheezes. He has no rhonchi. He has no rales.   Abdominal: Normal appearance and bowel sounds are normal. There is no tenderness.   Skin: Skin is warm.   Psychiatric: He has a normal mood and affect.   Nursing note and vitals reviewed.      Procedures         ED Course  ED Course   Comment By Time   Patient received a DuoNeb and Solu-Medrol in route.  He has improved. Berto Rodriguez MD 10/08 3877                  [facility]    Final diagnoses:   Moderate persistent asthma with exacerbation            Berto Rodriguez MD  10/08/17 8683

## 2017-10-08 NOTE — ED NOTES
Patient noted to be sitting up in bed at this time watching television. No cough noted at this time. Resps even and unlabored. Skin warm and dry to touch. Updated on POC and patient was up for discharge. Appreciative of care. Call light within reach.      Alissa Gusman RN  10/08/17 1861

## 2017-10-16 ENCOUNTER — APPOINTMENT (OUTPATIENT)
Dept: GENERAL RADIOLOGY | Facility: HOSPITAL | Age: 42
End: 2017-10-16

## 2017-10-16 ENCOUNTER — HOSPITAL ENCOUNTER (INPATIENT)
Facility: HOSPITAL | Age: 42
LOS: 5 days | Discharge: ANOTHER HOSPITAL | End: 2017-10-22
Attending: FAMILY MEDICINE | Admitting: INTERNAL MEDICINE

## 2017-10-16 ENCOUNTER — HOSPITAL ENCOUNTER (EMERGENCY)
Facility: HOSPITAL | Age: 42
Discharge: HOME | End: 2017-10-16
Attending: FAMILY MEDICINE | Admitting: FAMILY MEDICINE

## 2017-10-16 VITALS
WEIGHT: 183 LBS | RESPIRATION RATE: 18 BRPM | TEMPERATURE: 98.1 F | HEIGHT: 67 IN | BODY MASS INDEX: 28.72 KG/M2 | DIASTOLIC BLOOD PRESSURE: 66 MMHG | SYSTOLIC BLOOD PRESSURE: 104 MMHG | OXYGEN SATURATION: 100 % | HEART RATE: 83 BPM

## 2017-10-16 DIAGNOSIS — R77.8 ELEVATED TROPONIN I LEVEL: ICD-10-CM

## 2017-10-16 DIAGNOSIS — R77.8 ELEVATED TROPONIN: ICD-10-CM

## 2017-10-16 DIAGNOSIS — J44.1 ASTHMA EXACERBATION IN COPD (HCC): Primary | ICD-10-CM

## 2017-10-16 DIAGNOSIS — J45.901 ASTHMA EXACERBATION IN COPD (HCC): Primary | ICD-10-CM

## 2017-10-16 DIAGNOSIS — J45.41 MODERATE PERSISTENT ASTHMA WITH EXACERBATION: Primary | ICD-10-CM

## 2017-10-16 LAB
A-A DO2: 40.9 MMHG (ref 0–300)
ALBUMIN SERPL-MCNC: 4.3 G/DL (ref 3.5–5)
ALBUMIN SERPL-MCNC: 4.4 G/DL (ref 3.5–5)
ALBUMIN/GLOB SERPL: 1.4 G/DL (ref 1.5–2.5)
ALBUMIN/GLOB SERPL: 1.5 G/DL (ref 1.5–2.5)
ALP SERPL-CCNC: 59 U/L (ref 40–129)
ALP SERPL-CCNC: 61 U/L (ref 40–129)
ALT SERPL W P-5'-P-CCNC: 32 U/L (ref 10–44)
ALT SERPL W P-5'-P-CCNC: 48 U/L (ref 10–44)
ANION GAP SERPL CALCULATED.3IONS-SCNC: 15.4 MMOL/L (ref 3.6–11.2)
ANION GAP SERPL CALCULATED.3IONS-SCNC: 8 MMOL/L (ref 3.6–11.2)
ARTERIAL PATENCY WRIST A: POSITIVE
ARTERIAL PATENCY WRIST A: POSITIVE
AST SERPL-CCNC: 31 U/L (ref 10–34)
AST SERPL-CCNC: 33 U/L (ref 10–34)
ATMOSPHERIC PRESS: 728 MMHG
ATMOSPHERIC PRESS: 728 MMHG
BASE EXCESS BLDA CALC-SCNC: -2.6 MMOL/L
BASE EXCESS BLDA CALC-SCNC: -8.5 MMOL/L
BASOPHILS # BLD AUTO: 0.03 10*3/MM3 (ref 0–0.3)
BASOPHILS NFR BLD AUTO: 0.2 % (ref 0–2)
BDY SITE: ABNORMAL
BDY SITE: ABNORMAL
BILIRUB SERPL-MCNC: 0.2 MG/DL (ref 0.2–1.8)
BILIRUB SERPL-MCNC: 0.2 MG/DL (ref 0.2–1.8)
BODY TEMPERATURE: 98.6 C
BODY TEMPERATURE: 98.6 C
BUN BLD-MCNC: 16 MG/DL (ref 7–21)
BUN BLD-MCNC: 18 MG/DL (ref 7–21)
BUN/CREAT SERPL: 12.9 (ref 7–25)
BUN/CREAT SERPL: 17.6 (ref 7–25)
CALCIUM SPEC-SCNC: 9 MG/DL (ref 7.7–10)
CALCIUM SPEC-SCNC: 9.2 MG/DL (ref 7.7–10)
CHLORIDE SERPL-SCNC: 102 MMOL/L (ref 99–112)
CHLORIDE SERPL-SCNC: 106 MMOL/L (ref 99–112)
CO2 SERPL-SCNC: 22.6 MMOL/L (ref 24.3–31.9)
CO2 SERPL-SCNC: 26 MMOL/L (ref 24.3–31.9)
COHGB MFR BLD: 0.7 % (ref 0–5)
COHGB MFR BLD: 1.1 % (ref 0–5)
CREAT BLD-MCNC: 0.91 MG/DL (ref 0.43–1.29)
CREAT BLD-MCNC: 1.4 MG/DL (ref 0.43–1.29)
D-LACTATE SERPL-SCNC: 1.8 MMOL/L (ref 0.5–2)
DEPRECATED RDW RBC AUTO: 47.3 FL (ref 37–54)
DEPRECATED RDW RBC AUTO: 48.1 FL (ref 37–54)
EOSINOPHIL # BLD AUTO: 0.03 10*3/MM3 (ref 0–0.7)
EOSINOPHIL # BLD MANUAL: 0.18 10*3/MM3 (ref 0–0.7)
EOSINOPHIL NFR BLD AUTO: 0.2 % (ref 0–5)
EOSINOPHIL NFR BLD MANUAL: 1 % (ref 0–5)
ERYTHROCYTE [DISTWIDTH] IN BLOOD BY AUTOMATED COUNT: 14.7 % (ref 11.5–14.5)
ERYTHROCYTE [DISTWIDTH] IN BLOOD BY AUTOMATED COUNT: 14.9 % (ref 11.5–14.5)
GFR SERPL CREATININE-BSD FRML MDRD: 56 ML/MIN/1.73
GFR SERPL CREATININE-BSD FRML MDRD: 92 ML/MIN/1.73
GLOBULIN UR ELPH-MCNC: 2.9 GM/DL
GLOBULIN UR ELPH-MCNC: 3 GM/DL
GLUCOSE BLD-MCNC: 139 MG/DL (ref 70–110)
GLUCOSE BLD-MCNC: 319 MG/DL (ref 70–110)
HCO3 BLDA-SCNC: 20 MMOL/L (ref 22–26)
HCO3 BLDA-SCNC: 24 MMOL/L (ref 22–26)
HCT VFR BLD AUTO: 47.5 % (ref 42–52)
HCT VFR BLD AUTO: 49.1 % (ref 42–52)
HCT VFR BLD CALC: 44 % (ref 42–52)
HCT VFR BLD CALC: 47 % (ref 42–52)
HGB BLD-MCNC: 15.3 G/DL (ref 14–18)
HGB BLD-MCNC: 15.8 G/DL (ref 14–18)
HGB BLDA-MCNC: 14.9 G/DL (ref 12–16)
HGB BLDA-MCNC: 16.1 G/DL (ref 12–16)
HOROWITZ INDEX BLD+IHG-RTO: 21 %
HOROWITZ INDEX BLD+IHG-RTO: 28 %
IMM GRANULOCYTES # BLD: 0.16 10*3/MM3 (ref 0–0.03)
IMM GRANULOCYTES NFR BLD: 0.9 % (ref 0–0.5)
LYMPHOCYTES # BLD AUTO: 3.11 10*3/MM3 (ref 1–3)
LYMPHOCYTES # BLD MANUAL: 8.6 10*3/MM3 (ref 1–3)
LYMPHOCYTES NFR BLD AUTO: 17.2 % (ref 21–51)
LYMPHOCYTES NFR BLD MANUAL: 48 % (ref 21–51)
LYMPHOCYTES NFR BLD MANUAL: 5 % (ref 0–10)
MCH RBC QN AUTO: 27.7 PG (ref 27–33)
MCH RBC QN AUTO: 28.5 PG (ref 27–33)
MCHC RBC AUTO-ENTMCNC: 32.2 G/DL (ref 33–37)
MCHC RBC AUTO-ENTMCNC: 32.2 G/DL (ref 33–37)
MCV RBC AUTO: 86.1 FL (ref 80–94)
MCV RBC AUTO: 88.6 FL (ref 80–94)
METHGB BLD QL: 0.5 % (ref 0–3)
METHGB BLD QL: 0.5 % (ref 0–3)
MODALITY: ABNORMAL
MODALITY: ABNORMAL
MONOCYTES # BLD AUTO: 0.9 10*3/MM3 (ref 0.1–0.9)
MONOCYTES # BLD AUTO: 1.9 10*3/MM3 (ref 0.1–0.9)
MONOCYTES NFR BLD AUTO: 10.5 % (ref 0–10)
NEUTROPHILS # BLD AUTO: 12.88 10*3/MM3 (ref 1.4–6.5)
NEUTROPHILS # BLD AUTO: 8.24 10*3/MM3 (ref 1.4–6.5)
NEUTROPHILS NFR BLD AUTO: 71 % (ref 30–70)
NEUTROPHILS NFR BLD MANUAL: 42 % (ref 30–70)
NEUTS BAND NFR BLD MANUAL: 4 % (ref 4–12)
OSMOLALITY SERPL CALC.SUM OF ELEC: 282.8 MOSM/KG (ref 273–305)
OSMOLALITY SERPL CALC.SUM OF ELEC: 293.6 MOSM/KG (ref 273–305)
OXYHGB MFR BLDV: 93.5 % (ref 85–100)
OXYHGB MFR BLDV: 95.7 % (ref 85–100)
PCO2 BLDA: 47.9 MM HG (ref 35–45)
PCO2 BLDA: 52.8 MM HG (ref 35–45)
PH BLDA: 7.2 PH UNITS (ref 7.35–7.45)
PH BLDA: 7.32 PH UNITS (ref 7.35–7.45)
PLAT MORPH BLD: NORMAL
PLATELET # BLD AUTO: 274 10*3/MM3 (ref 130–400)
PLATELET # BLD AUTO: 289 10*3/MM3 (ref 130–400)
PMV BLD AUTO: 10.3 FL (ref 6–10)
PMV BLD AUTO: 10.9 FL (ref 6–10)
PO2 BLDA: 82.4 MM HG (ref 80–100)
PO2 BLDA: 93.3 MM HG (ref 80–100)
POTASSIUM BLD-SCNC: 3.5 MMOL/L (ref 3.5–5.3)
POTASSIUM BLD-SCNC: 3.7 MMOL/L (ref 3.5–5.3)
PROT SERPL-MCNC: 7.3 G/DL (ref 6–8)
PROT SERPL-MCNC: 7.3 G/DL (ref 6–8)
RBC # BLD AUTO: 5.52 10*6/MM3 (ref 4.7–6.1)
RBC # BLD AUTO: 5.54 10*6/MM3 (ref 4.7–6.1)
RBC MORPH BLD: NORMAL
SAO2 % BLDCOA: 94.6 % (ref 90–100)
SAO2 % BLDCOA: 97.3 % (ref 90–100)
SCAN SLIDE: NORMAL
SODIUM BLD-SCNC: 140 MMOL/L (ref 135–153)
SODIUM BLD-SCNC: 140 MMOL/L (ref 135–153)
TROPONIN I SERPL-MCNC: 0.05 NG/ML
TROPONIN I SERPL-MCNC: 0.06 NG/ML
TROPONIN I SERPL-MCNC: <0.006 NG/ML
WBC NRBC COR # BLD: 17.92 10*3/MM3 (ref 4.5–12.5)
WBC NRBC COR # BLD: 18.11 10*3/MM3 (ref 4.5–12.5)

## 2017-10-16 PROCEDURE — 36415 COLL VENOUS BLD VENIPUNCTURE: CPT

## 2017-10-16 PROCEDURE — 83605 ASSAY OF LACTIC ACID: CPT | Performed by: FAMILY MEDICINE

## 2017-10-16 PROCEDURE — 71010 XR CHEST 1 VW: CPT | Performed by: RADIOLOGY

## 2017-10-16 PROCEDURE — 94799 UNLISTED PULMONARY SVC/PX: CPT

## 2017-10-16 PROCEDURE — 99284 EMERGENCY DEPT VISIT MOD MDM: CPT

## 2017-10-16 PROCEDURE — 83880 ASSAY OF NATRIURETIC PEPTIDE: CPT | Performed by: FAMILY MEDICINE

## 2017-10-16 PROCEDURE — 84484 ASSAY OF TROPONIN QUANT: CPT | Performed by: FAMILY MEDICINE

## 2017-10-16 PROCEDURE — 87040 BLOOD CULTURE FOR BACTERIA: CPT | Performed by: FAMILY MEDICINE

## 2017-10-16 PROCEDURE — 71010 HC CHEST PA OR AP: CPT

## 2017-10-16 PROCEDURE — 94640 AIRWAY INHALATION TREATMENT: CPT

## 2017-10-16 PROCEDURE — 80053 COMPREHEN METABOLIC PANEL: CPT | Performed by: FAMILY MEDICINE

## 2017-10-16 PROCEDURE — 93005 ELECTROCARDIOGRAM TRACING: CPT | Performed by: FAMILY MEDICINE

## 2017-10-16 PROCEDURE — 85025 COMPLETE CBC W/AUTO DIFF WBC: CPT | Performed by: FAMILY MEDICINE

## 2017-10-16 PROCEDURE — 93010 ELECTROCARDIOGRAM REPORT: CPT | Performed by: INTERNAL MEDICINE

## 2017-10-16 RX ORDER — IPRATROPIUM BROMIDE AND ALBUTEROL SULFATE 2.5; .5 MG/3ML; MG/3ML
3 SOLUTION RESPIRATORY (INHALATION) ONCE
Status: COMPLETED | OUTPATIENT
Start: 2017-10-16 | End: 2017-10-16

## 2017-10-16 RX ORDER — METHYLPREDNISOLONE SODIUM SUCCINATE 125 MG/2ML
125 INJECTION, POWDER, LYOPHILIZED, FOR SOLUTION INTRAMUSCULAR; INTRAVENOUS ONCE
Status: COMPLETED | OUTPATIENT
Start: 2017-10-16 | End: 2017-10-16

## 2017-10-16 RX ORDER — PREDNISONE 20 MG/1
20 TABLET ORAL 2 TIMES DAILY
Qty: 10 TABLET | Refills: 0 | Status: SHIPPED | OUTPATIENT
Start: 2017-10-16 | End: 2017-10-17

## 2017-10-16 RX ORDER — METHYLPREDNISOLONE SODIUM SUCCINATE 125 MG/2ML
125 INJECTION, POWDER, LYOPHILIZED, FOR SOLUTION INTRAMUSCULAR; INTRAVENOUS ONCE
Status: COMPLETED | OUTPATIENT
Start: 2017-10-16 | End: 2017-10-17

## 2017-10-16 RX ORDER — SODIUM CHLORIDE 0.9 % (FLUSH) 0.9 %
10 SYRINGE (ML) INJECTION AS NEEDED
Status: DISCONTINUED | OUTPATIENT
Start: 2017-10-16 | End: 2017-10-22 | Stop reason: HOSPADM

## 2017-10-16 RX ORDER — SODIUM CHLORIDE 0.9 % (FLUSH) 0.9 %
10 SYRINGE (ML) INJECTION AS NEEDED
Status: DISCONTINUED | OUTPATIENT
Start: 2017-10-16 | End: 2017-10-16 | Stop reason: HOSPADM

## 2017-10-16 RX ORDER — METHYLPREDNISOLONE SODIUM SUCCINATE 125 MG/2ML
INJECTION, POWDER, LYOPHILIZED, FOR SOLUTION INTRAMUSCULAR; INTRAVENOUS
Status: COMPLETED
Start: 2017-10-16 | End: 2017-10-16

## 2017-10-16 RX ORDER — FAMOTIDINE 10 MG/ML
20 INJECTION, SOLUTION INTRAVENOUS ONCE
Status: COMPLETED | OUTPATIENT
Start: 2017-10-16 | End: 2017-10-17

## 2017-10-16 RX ADMIN — IPRATROPIUM BROMIDE AND ALBUTEROL SULFATE 3 ML: .5; 3 SOLUTION RESPIRATORY (INHALATION) at 23:55

## 2017-10-16 RX ADMIN — IPRATROPIUM BROMIDE AND ALBUTEROL SULFATE 3 ML: .5; 3 SOLUTION RESPIRATORY (INHALATION) at 08:07

## 2017-10-16 RX ADMIN — IPRATROPIUM BROMIDE AND ALBUTEROL SULFATE 3 ML: .5; 3 SOLUTION RESPIRATORY (INHALATION) at 03:33

## 2017-10-16 RX ADMIN — SODIUM CHLORIDE 500 ML: 9 INJECTION, SOLUTION INTRAVENOUS at 02:53

## 2017-10-16 RX ADMIN — IPRATROPIUM BROMIDE AND ALBUTEROL SULFATE 3 ML: .5; 3 SOLUTION RESPIRATORY (INHALATION) at 01:52

## 2017-10-16 RX ADMIN — METHYLPREDNISOLONE SODIUM SUCCINATE 125 MG: 125 INJECTION, POWDER, FOR SOLUTION INTRAMUSCULAR; INTRAVENOUS at 01:47

## 2017-10-16 RX ADMIN — METHYLPREDNISOLONE SODIUM SUCCINATE 125 MG: 125 INJECTION, POWDER, LYOPHILIZED, FOR SOLUTION INTRAMUSCULAR; INTRAVENOUS at 01:47

## 2017-10-16 NOTE — ED PROVIDER NOTES
Subjective   Patient is a 42 y.o. male presenting with wheezing.   History provided by:  Patient   used: No    Wheezing   Severity:  Severe  Severity compared to prior episodes:  More severe  Onset quality:  Gradual  Timing:  Intermittent  Progression:  Waxing and waning  Chronicity:  Recurrent  Context: not animal exposure, not dust, not emotional upset, not exposure to allergen and not fumes    Relieved by:  Nothing  Worsened by:  Nothing  Ineffective treatments:  Beta-agonist inhaler  Associated symptoms: chest tightness, cough, shortness of breath and stridor    Risk factors: not exposed to toxic fumes, no prior hospitalizations, no prior ICU admissions, no prior intubations and no smoke inhalation    Risk factors comment:  Asthma      Review of Systems   Constitutional: Negative.    Eyes: Negative.    Respiratory: Positive for cough, chest tightness, shortness of breath, wheezing and stridor.    Cardiovascular: Negative.    Gastrointestinal: Negative.    Endocrine: Negative.    Genitourinary: Negative.    Musculoskeletal: Negative.    Skin: Negative.    Allergic/Immunologic: Negative.    Neurological: Negative.    Hematological: Negative.    Psychiatric/Behavioral: Negative.    All other systems reviewed and are negative.      Past Medical History:   Diagnosis Date   • Asthma    • Back pain    • COPD (chronic obstructive pulmonary disease)    • Emphysema lung    • Gastritis    • Headache    • Hypertension    • Migraine    • Substance abuse        No Known Allergies    Past Surgical History:   Procedure Laterality Date   • DENTAL PROCEDURE     • LIVER SURGERY         History reviewed. No pertinent family history.    Social History     Social History   • Marital status: Single     Spouse name: N/A   • Number of children: N/A   • Years of education: N/A     Social History Main Topics   • Smoking status: Never Smoker   • Smokeless tobacco: Current User     Types: Snuff      Comment: Pt uses  vapor cigarette   • Alcohol use No      Comment: occassional    • Drug use: No   • Sexual activity: Defer     Other Topics Concern   • None     Social History Narrative           Objective   Physical Exam   Constitutional: He appears well-developed and well-nourished. No distress.   HENT:   Head: Normocephalic and atraumatic.   Nose: Nose normal.   Eyes: Conjunctivae and EOM are normal. Pupils are equal, round, and reactive to light.   Neck: Normal range of motion. Neck supple. No JVD present. No tracheal deviation present.   Cardiovascular: Normal rate, regular rhythm and normal heart sounds.    No murmur heard.  Pulmonary/Chest: He is in respiratory distress. He has wheezes.   Abdominal: Soft. Bowel sounds are normal. There is no tenderness.   Musculoskeletal: Normal range of motion. He exhibits no edema or deformity.   Neurological: No cranial nerve deficit.   Decreased alertness, lethargic   Skin: Skin is warm and dry. No rash noted. He is not diaphoretic. No erythema. No pallor.   Nursing note and vitals reviewed.      Procedures         ED Course  ED Course   Comment By Time   Patient more alert, says he feels much better. Alert and oriented x 3 Allyn Bettencourt, APRN 10/16 0216   Patient alert and oriented, says he feels much better. Denies any pain. Exhibits readiness to go home. Elsyjasmin HardinBernie Sg, APRN 10/16 9900                  MDM  Number of Diagnoses or Management Options  Elevated troponin: new and requires workup  Moderate persistent asthma with exacerbation: new and requires workup  Diagnosis management comments: The patient has been reevaluated. Wheezing improved significantly. The patient is is no respiratory distress. His symptoms have resolved. I discussed case with Dr. Brooks who says to follow up outpatient and return to ED as needed.        Amount and/or Complexity of Data Reviewed  Clinical lab tests: ordered and reviewed  Tests in the radiology section of CPT®: reviewed and  ordered  Tests in the medicine section of CPT®: ordered and reviewed  Decide to obtain previous medical records or to obtain history from someone other than the patient: yes    Risk of Complications, Morbidity, and/or Mortality  Presenting problems: moderate  Diagnostic procedures: moderate  Management options: moderate    Patient Progress  Patient progress: improved      Final diagnoses:   Moderate persistent asthma with exacerbation   Elevated troponin            Allyn Bettencourt, DION  10/16/17 0750       Allyn Bettencourt, DION  10/16/17 0751       Allyn Bettencourt, APRN  10/16/17 0754

## 2017-10-16 NOTE — ED NOTES
"Pt presents to ED with shortness of breath, wheezes with inspiration and expiration. Pt is pale and diaphoretic upon arrival. EMS states that they were called by the pt for an \"asthma attack.\" EMS reports that the pt oxygen saturation was 33% upon arrival to the pts home and he was unconcious. States that they gave him one duo neb in route.      Parvin Hall RN  10/16/17 0642    "

## 2017-10-16 NOTE — ED NOTES
Provided pt with lunch box as requested after made okay with provider. Pt remains on 2 liters nasal canula. NADN. VS stable.      Parvin Hall RN  10/16/17 1420

## 2017-10-16 NOTE — ED NOTES
EKG performed by me @ 0200 and was shown to Dr. Carmichael @ 0204.     Memo Singer  10/16/17 0203

## 2017-10-17 ENCOUNTER — APPOINTMENT (OUTPATIENT)
Dept: CARDIOLOGY | Facility: HOSPITAL | Age: 42
End: 2017-10-17
Attending: INTERNAL MEDICINE

## 2017-10-17 PROBLEM — J45.901 ASTHMA EXACERBATION IN COPD (HCC): Status: ACTIVE | Noted: 2017-10-17

## 2017-10-17 PROBLEM — J44.1 ASTHMA EXACERBATION IN COPD (HCC): Status: ACTIVE | Noted: 2017-10-17

## 2017-10-17 LAB
A-A DO2: 21.3 MMHG (ref 0–300)
ARTERIAL PATENCY WRIST A: POSITIVE
ATMOSPHERIC PRESS: 728 MMHG
BASE EXCESS BLDA CALC-SCNC: 1 MMOL/L
BDY SITE: ABNORMAL
BH CV ECHO MEAS - % IVS THICK: 27.6 %
BH CV ECHO MEAS - % LVPW THICK: 76.8 %
BH CV ECHO MEAS - ACS: 2.7 CM
BH CV ECHO MEAS - AI DEC SLOPE: 513.8 CM/SEC^2
BH CV ECHO MEAS - AI MAX PG: 78.4 MMHG
BH CV ECHO MEAS - AI MAX VEL: 442.7 CM/SEC
BH CV ECHO MEAS - AI P1/2T: 252.4 MSEC
BH CV ECHO MEAS - AO ROOT AREA (BSA CORRECTED): 2.4
BH CV ECHO MEAS - AO ROOT AREA: 17 CM^2
BH CV ECHO MEAS - AO ROOT DIAM: 4.7 CM
BH CV ECHO MEAS - BSA(HAYCOCK): 2 M^2
BH CV ECHO MEAS - BSA: 2 M^2
BH CV ECHO MEAS - BZI_BMI: 29 KILOGRAMS/M^2
BH CV ECHO MEAS - BZI_METRIC_HEIGHT: 170.2 CM
BH CV ECHO MEAS - BZI_METRIC_WEIGHT: 83.9 KG
BH CV ECHO MEAS - CONTRAST EF 4CH: 59.3 ML/M^2
BH CV ECHO MEAS - EDV(CUBED): 152.7 ML
BH CV ECHO MEAS - EDV(MOD-SP4): 118 ML
BH CV ECHO MEAS - EDV(TEICH): 138 ML
BH CV ECHO MEAS - EF(CUBED): 80.2 %
BH CV ECHO MEAS - EF(TEICH): 72.2 %
BH CV ECHO MEAS - ESV(CUBED): 30.2 ML
BH CV ECHO MEAS - ESV(MOD-SP4): 48 ML
BH CV ECHO MEAS - ESV(TEICH): 38.3 ML
BH CV ECHO MEAS - FS: 41.7 %
BH CV ECHO MEAS - IVS/LVPW: 0.89
BH CV ECHO MEAS - IVSD: 1 CM
BH CV ECHO MEAS - IVSS: 1.3 CM
BH CV ECHO MEAS - LA DIMENSION: 3.6 CM
BH CV ECHO MEAS - LA/AO: 0.77
BH CV ECHO MEAS - LV DIASTOLIC VOL/BSA (35-75): 60.3 ML/M^2
BH CV ECHO MEAS - LV MASS(C)D: 225.1 GRAMS
BH CV ECHO MEAS - LV MASS(C)DI: 115.1 GRAMS/M^2
BH CV ECHO MEAS - LV MASS(C)S: 196.7 GRAMS
BH CV ECHO MEAS - LV MASS(C)SI: 100.5 GRAMS/M^2
BH CV ECHO MEAS - LV SYSTOLIC VOL/BSA (12-30): 24.5 ML/M^2
BH CV ECHO MEAS - LVIDD: 5.3 CM
BH CV ECHO MEAS - LVIDS: 3.1 CM
BH CV ECHO MEAS - LVLD AP4: 8 CM
BH CV ECHO MEAS - LVLS AP4: 7.2 CM
BH CV ECHO MEAS - LVOT AREA (M): 4.9 CM^2
BH CV ECHO MEAS - LVOT AREA: 5 CM^2
BH CV ECHO MEAS - LVOT DIAM: 2.5 CM
BH CV ECHO MEAS - LVPWD: 1.1 CM
BH CV ECHO MEAS - LVPWS: 2 CM
BH CV ECHO MEAS - PA ACC SLOPE: 1377 CM/SEC^2
BH CV ECHO MEAS - PA ACC TIME: 0.1 SEC
BH CV ECHO MEAS - PA PR(ACCEL): 34.6 MMHG
BH CV ECHO MEAS - RVDD: 2.1 CM
BH CV ECHO MEAS - SI(CUBED): 62.6 ML/M^2
BH CV ECHO MEAS - SI(MOD-SP4): 35.8 ML/M^2
BH CV ECHO MEAS - SI(TEICH): 51 ML/M^2
BH CV ECHO MEAS - SV(CUBED): 122.5 ML
BH CV ECHO MEAS - SV(MOD-SP4): 70 ML
BH CV ECHO MEAS - SV(TEICH): 99.7 ML
BILIRUB UR QL STRIP: NEGATIVE
BNP SERPL-MCNC: 66 PG/ML (ref 0–100)
BODY TEMPERATURE: 98.6 C
CLARITY UR: CLEAR
COHGB MFR BLD: 0.9 % (ref 0–5)
COLOR UR: YELLOW
GLUCOSE BLDC GLUCOMTR-MCNC: 245 MG/DL (ref 70–130)
GLUCOSE BLDC GLUCOMTR-MCNC: 254 MG/DL (ref 70–130)
GLUCOSE BLDC GLUCOMTR-MCNC: 281 MG/DL (ref 70–130)
GLUCOSE BLDC GLUCOMTR-MCNC: 353 MG/DL (ref 70–130)
GLUCOSE UR STRIP-MCNC: ABNORMAL MG/DL
HCO3 BLDA-SCNC: 26.2 MMOL/L (ref 22–26)
HCT VFR BLD CALC: 47 % (ref 42–52)
HGB BLDA-MCNC: 15.9 G/DL (ref 12–16)
HGB UR QL STRIP.AUTO: NEGATIVE
HOLD SPECIMEN: NORMAL
HOLD SPECIMEN: NORMAL
HOROWITZ INDEX BLD+IHG-RTO: 21 %
KETONES UR QL STRIP: NEGATIVE
LEUKOCYTE ESTERASE UR QL STRIP.AUTO: NEGATIVE
M PNEUMO IGM SER QL: NEGATIVE
METHGB BLD QL: 0.5 % (ref 0–3)
MODALITY: ABNORMAL
NITRITE UR QL STRIP: NEGATIVE
OXYHGB MFR BLDV: 93.5 % (ref 85–100)
PCO2 BLDA: 43.9 MM HG (ref 35–45)
PH BLDA: 7.39 PH UNITS (ref 7.35–7.45)
PH UR STRIP.AUTO: <=5 [PH] (ref 5–8)
PO2 BLDA: 69.1 MM HG (ref 80–100)
PROT UR QL STRIP: NEGATIVE
SAO2 % BLDCOA: 94.8 % (ref 90–100)
SP GR UR STRIP: 1.03 (ref 1–1.03)
TROPONIN I SERPL-MCNC: 0.11 NG/ML
TROPONIN I SERPL-MCNC: 0.11 NG/ML
TSH SERPL DL<=0.05 MIU/L-ACNC: 0.41 MIU/ML (ref 0.55–4.78)
UROBILINOGEN UR QL STRIP: ABNORMAL
WHOLE BLOOD HOLD SPECIMEN: NORMAL
WHOLE BLOOD HOLD SPECIMEN: NORMAL

## 2017-10-17 PROCEDURE — 82785 ASSAY OF IGE: CPT | Performed by: INTERNAL MEDICINE

## 2017-10-17 PROCEDURE — 82805 BLOOD GASES W/O2 SATURATION: CPT | Performed by: FAMILY MEDICINE

## 2017-10-17 PROCEDURE — 86003 ALLG SPEC IGE CRUDE XTRC EA: CPT | Performed by: INTERNAL MEDICINE

## 2017-10-17 PROCEDURE — 93005 ELECTROCARDIOGRAM TRACING: CPT | Performed by: INTERNAL MEDICINE

## 2017-10-17 PROCEDURE — G0378 HOSPITAL OBSERVATION PER HR: HCPCS

## 2017-10-17 PROCEDURE — 94799 UNLISTED PULMONARY SVC/PX: CPT

## 2017-10-17 PROCEDURE — 25010000002 METHYLPREDNISOLONE PER 40 MG: Performed by: INTERNAL MEDICINE

## 2017-10-17 PROCEDURE — 82375 ASSAY CARBOXYHB QUANT: CPT | Performed by: FAMILY MEDICINE

## 2017-10-17 PROCEDURE — 93010 ELECTROCARDIOGRAM REPORT: CPT | Performed by: INTERNAL MEDICINE

## 2017-10-17 PROCEDURE — 82962 GLUCOSE BLOOD TEST: CPT

## 2017-10-17 PROCEDURE — 63710000001 INSULIN ASPART PER 5 UNITS: Performed by: INTERNAL MEDICINE

## 2017-10-17 PROCEDURE — 36600 WITHDRAWAL OF ARTERIAL BLOOD: CPT | Performed by: FAMILY MEDICINE

## 2017-10-17 PROCEDURE — 93306 TTE W/DOPPLER COMPLETE: CPT | Performed by: INTERNAL MEDICINE

## 2017-10-17 PROCEDURE — 25010000002 METHYLPREDNISOLONE PER 125 MG: Performed by: INTERNAL MEDICINE

## 2017-10-17 PROCEDURE — 25010000002 ENOXAPARIN PER 10 MG: Performed by: INTERNAL MEDICINE

## 2017-10-17 PROCEDURE — 25010000002 METHYLPREDNISOLONE PER 125 MG: Performed by: FAMILY MEDICINE

## 2017-10-17 PROCEDURE — 93306 TTE W/DOPPLER COMPLETE: CPT

## 2017-10-17 PROCEDURE — 87040 BLOOD CULTURE FOR BACTERIA: CPT | Performed by: FAMILY MEDICINE

## 2017-10-17 PROCEDURE — 25010000002 CEFTRIAXONE: Performed by: FAMILY MEDICINE

## 2017-10-17 PROCEDURE — 25010000002 AZITHROMYCIN: Performed by: INTERNAL MEDICINE

## 2017-10-17 PROCEDURE — 36415 COLL VENOUS BLD VENIPUNCTURE: CPT

## 2017-10-17 PROCEDURE — 84484 ASSAY OF TROPONIN QUANT: CPT | Performed by: FAMILY MEDICINE

## 2017-10-17 PROCEDURE — 81003 URINALYSIS AUTO W/O SCOPE: CPT | Performed by: FAMILY MEDICINE

## 2017-10-17 PROCEDURE — 25010000002 CEFTRIAXONE: Performed by: INTERNAL MEDICINE

## 2017-10-17 PROCEDURE — 83050 HGB METHEMOGLOBIN QUAN: CPT | Performed by: FAMILY MEDICINE

## 2017-10-17 PROCEDURE — 84443 ASSAY THYROID STIM HORMONE: CPT | Performed by: INTERNAL MEDICINE

## 2017-10-17 RX ORDER — DEXTROSE MONOHYDRATE 25 G/50ML
25 INJECTION, SOLUTION INTRAVENOUS
Status: DISCONTINUED | OUTPATIENT
Start: 2017-10-17 | End: 2017-10-22 | Stop reason: HOSPADM

## 2017-10-17 RX ORDER — IPRATROPIUM BROMIDE AND ALBUTEROL SULFATE 2.5; .5 MG/3ML; MG/3ML
3 SOLUTION RESPIRATORY (INHALATION)
Status: DISCONTINUED | OUTPATIENT
Start: 2017-10-17 | End: 2017-10-17

## 2017-10-17 RX ORDER — METHYLPREDNISOLONE SODIUM SUCCINATE 125 MG/2ML
60 INJECTION, POWDER, LYOPHILIZED, FOR SOLUTION INTRAMUSCULAR; INTRAVENOUS EVERY 6 HOURS SCHEDULED
Status: DISCONTINUED | OUTPATIENT
Start: 2017-10-17 | End: 2017-10-17

## 2017-10-17 RX ORDER — ALBUTEROL SULFATE 2.5 MG/3ML
2.5 SOLUTION RESPIRATORY (INHALATION)
Status: DISCONTINUED | OUTPATIENT
Start: 2017-10-17 | End: 2017-10-19

## 2017-10-17 RX ORDER — IPRATROPIUM BROMIDE AND ALBUTEROL SULFATE 2.5; .5 MG/3ML; MG/3ML
3 SOLUTION RESPIRATORY (INHALATION) ONCE
Status: COMPLETED | OUTPATIENT
Start: 2017-10-17 | End: 2017-10-17

## 2017-10-17 RX ORDER — ALBUTEROL SULFATE 2.5 MG/3ML
2.5 SOLUTION RESPIRATORY (INHALATION) EVERY 4 HOURS PRN
Status: ON HOLD | COMMUNITY
End: 2020-09-17

## 2017-10-17 RX ORDER — NICOTINE POLACRILEX 4 MG
15 LOZENGE BUCCAL
Status: DISCONTINUED | OUTPATIENT
Start: 2017-10-17 | End: 2017-10-22 | Stop reason: HOSPADM

## 2017-10-17 RX ORDER — FAMOTIDINE 20 MG/1
20 TABLET, FILM COATED ORAL 2 TIMES DAILY
Status: DISCONTINUED | OUTPATIENT
Start: 2017-10-17 | End: 2017-10-22 | Stop reason: HOSPADM

## 2017-10-17 RX ORDER — FAMOTIDINE 20 MG/1
40 TABLET, FILM COATED ORAL 2 TIMES DAILY
Status: CANCELLED | OUTPATIENT
Start: 2017-10-17

## 2017-10-17 RX ORDER — ALBUTEROL SULFATE 2.5 MG/3ML
2.5 SOLUTION RESPIRATORY (INHALATION)
Status: CANCELLED | OUTPATIENT
Start: 2017-10-17

## 2017-10-17 RX ORDER — PREDNISONE 20 MG/1
20 TABLET ORAL 2 TIMES DAILY WITH MEALS
Status: CANCELLED | OUTPATIENT
Start: 2017-10-17

## 2017-10-17 RX ORDER — MONTELUKAST SODIUM 10 MG/1
10 TABLET ORAL DAILY
Status: CANCELLED | OUTPATIENT
Start: 2017-10-17

## 2017-10-17 RX ORDER — SODIUM CHLORIDE 9 MG/ML
75 INJECTION, SOLUTION INTRAVENOUS CONTINUOUS
Status: DISCONTINUED | OUTPATIENT
Start: 2017-10-17 | End: 2017-10-17

## 2017-10-17 RX ORDER — RANITIDINE 300 MG/1
300 TABLET ORAL 2 TIMES DAILY
Status: ON HOLD | COMMUNITY
End: 2020-09-17

## 2017-10-17 RX ORDER — ALBUTEROL SULFATE 2.5 MG/3ML
2.5 SOLUTION RESPIRATORY (INHALATION) 2 TIMES DAILY PRN
Status: CANCELLED | OUTPATIENT
Start: 2017-10-17

## 2017-10-17 RX ORDER — IPRATROPIUM BROMIDE AND ALBUTEROL SULFATE 2.5; .5 MG/3ML; MG/3ML
3 SOLUTION RESPIRATORY (INHALATION) EVERY 4 HOURS PRN
Status: DISCONTINUED | OUTPATIENT
Start: 2017-10-17 | End: 2017-10-22 | Stop reason: HOSPADM

## 2017-10-17 RX ORDER — SODIUM CHLORIDE 0.9 % (FLUSH) 0.9 %
1-10 SYRINGE (ML) INJECTION AS NEEDED
Status: DISCONTINUED | OUTPATIENT
Start: 2017-10-17 | End: 2017-10-22 | Stop reason: HOSPADM

## 2017-10-17 RX ORDER — AZITHROMYCIN 250 MG/1
500 TABLET, FILM COATED ORAL ONCE
Status: COMPLETED | OUTPATIENT
Start: 2017-10-17 | End: 2017-10-17

## 2017-10-17 RX ORDER — PREDNISONE 20 MG/1
20 TABLET ORAL 2 TIMES DAILY WITH MEALS
Status: DISCONTINUED | OUTPATIENT
Start: 2017-10-17 | End: 2017-10-17

## 2017-10-17 RX ORDER — MONTELUKAST SODIUM 10 MG/1
10 TABLET ORAL DAILY
Status: DISCONTINUED | OUTPATIENT
Start: 2017-10-17 | End: 2017-10-22 | Stop reason: HOSPADM

## 2017-10-17 RX ORDER — METHYLPREDNISOLONE SODIUM SUCCINATE 125 MG/2ML
60 INJECTION, POWDER, LYOPHILIZED, FOR SOLUTION INTRAMUSCULAR; INTRAVENOUS EVERY 6 HOURS
Status: DISCONTINUED | OUTPATIENT
Start: 2017-10-17 | End: 2017-10-17

## 2017-10-17 RX ORDER — METHYLPREDNISOLONE SODIUM SUCCINATE 40 MG/ML
20 INJECTION, POWDER, LYOPHILIZED, FOR SOLUTION INTRAMUSCULAR; INTRAVENOUS EVERY 6 HOURS SCHEDULED
Status: DISCONTINUED | OUTPATIENT
Start: 2017-10-17 | End: 2017-10-18

## 2017-10-17 RX ORDER — ASPIRIN 81 MG/1
324 TABLET, CHEWABLE ORAL ONCE
Status: DISCONTINUED | OUTPATIENT
Start: 2017-10-17 | End: 2017-10-17

## 2017-10-17 RX ORDER — IPRATROPIUM BROMIDE AND ALBUTEROL SULFATE 2.5; .5 MG/3ML; MG/3ML
3 SOLUTION RESPIRATORY (INHALATION)
Status: DISCONTINUED | OUTPATIENT
Start: 2017-10-17 | End: 2017-10-17 | Stop reason: SDUPTHER

## 2017-10-17 RX ADMIN — ALBUTEROL SULFATE 2.5 MG: 2.5 SOLUTION RESPIRATORY (INHALATION) at 22:10

## 2017-10-17 RX ADMIN — SODIUM CHLORIDE 1000 ML: 9 INJECTION, SOLUTION INTRAVENOUS at 00:27

## 2017-10-17 RX ADMIN — METHYLPREDNISOLONE SODIUM SUCCINATE 20 MG: 40 INJECTION, POWDER, FOR SOLUTION INTRAMUSCULAR; INTRAVENOUS at 23:59

## 2017-10-17 RX ADMIN — AZITHROMYCIN 500 MG: 250 TABLET, FILM COATED ORAL at 00:42

## 2017-10-17 RX ADMIN — METHYLPREDNISOLONE SODIUM SUCCINATE 125 MG: 125 INJECTION, POWDER, FOR SOLUTION INTRAMUSCULAR; INTRAVENOUS at 00:30

## 2017-10-17 RX ADMIN — CEFTRIAXONE 1 G: 1 INJECTION, POWDER, FOR SOLUTION INTRAMUSCULAR; INTRAVENOUS at 00:41

## 2017-10-17 RX ADMIN — AZITHROMYCIN 500 MG: 500 INJECTION, POWDER, LYOPHILIZED, FOR SOLUTION INTRAVENOUS at 20:57

## 2017-10-17 RX ADMIN — IPRATROPIUM BROMIDE AND ALBUTEROL SULFATE 3 ML: .5; 3 SOLUTION RESPIRATORY (INHALATION) at 00:27

## 2017-10-17 RX ADMIN — ALBUTEROL SULFATE 2.5 MG: 2.5 SOLUTION RESPIRATORY (INHALATION) at 06:53

## 2017-10-17 RX ADMIN — INSULIN ASPART 6 UNITS: 100 INJECTION, SOLUTION INTRAVENOUS; SUBCUTANEOUS at 17:14

## 2017-10-17 RX ADMIN — IPRATROPIUM BROMIDE AND ALBUTEROL SULFATE 3 ML: .5; 3 SOLUTION RESPIRATORY (INHALATION) at 03:03

## 2017-10-17 RX ADMIN — METHYLPREDNISOLONE SODIUM SUCCINATE 60 MG: 125 INJECTION, POWDER, FOR SOLUTION INTRAMUSCULAR; INTRAVENOUS at 08:23

## 2017-10-17 RX ADMIN — METHYLPREDNISOLONE SODIUM SUCCINATE 20 MG: 40 INJECTION, POWDER, FOR SOLUTION INTRAMUSCULAR; INTRAVENOUS at 12:57

## 2017-10-17 RX ADMIN — ALBUTEROL SULFATE 2.5 MG: 2.5 SOLUTION RESPIRATORY (INHALATION) at 14:22

## 2017-10-17 RX ADMIN — ALBUTEROL SULFATE 2.5 MG: 2.5 SOLUTION RESPIRATORY (INHALATION) at 18:41

## 2017-10-17 RX ADMIN — INSULIN ASPART 4 UNITS: 100 INJECTION, SOLUTION INTRAVENOUS; SUBCUTANEOUS at 21:06

## 2017-10-17 RX ADMIN — ALBUTEROL SULFATE 2.5 MG: 2.5 SOLUTION RESPIRATORY (INHALATION) at 10:23

## 2017-10-17 RX ADMIN — METHYLPREDNISOLONE SODIUM SUCCINATE 20 MG: 40 INJECTION, POWDER, FOR SOLUTION INTRAMUSCULAR; INTRAVENOUS at 17:15

## 2017-10-17 RX ADMIN — SODIUM CHLORIDE 1000 ML: 9 INJECTION, SOLUTION INTRAVENOUS at 01:48

## 2017-10-17 RX ADMIN — IPRATROPIUM BROMIDE AND ALBUTEROL SULFATE 3 ML: .5; 3 SOLUTION RESPIRATORY (INHALATION) at 00:52

## 2017-10-17 RX ADMIN — FAMOTIDINE 20 MG: 10 INJECTION, SOLUTION INTRAVENOUS at 00:31

## 2017-10-17 RX ADMIN — MONTELUKAST SODIUM 10 MG: 10 TABLET ORAL at 08:20

## 2017-10-17 RX ADMIN — CEFTRIAXONE 1 G: 1 INJECTION, POWDER, FOR SOLUTION INTRAMUSCULAR; INTRAVENOUS at 23:03

## 2017-10-17 RX ADMIN — FAMOTIDINE 20 MG: 20 TABLET, FILM COATED ORAL at 17:15

## 2017-10-17 RX ADMIN — FAMOTIDINE 20 MG: 20 TABLET, FILM COATED ORAL at 08:20

## 2017-10-17 NOTE — H&P
Chief complaint Shortness of breath    Subjective     Patient is a 42 y.o. male presents with lifelong asthma that presented to the ER at least twice on the day of admission due to acute dyspnea. He relates several day history of increased cough and bronchospasm. He used his albuterol at home and was given steroid in the ER. Despite this, he has had ongoing symptoms and has been referred for admission. He denies productive cough, pleuritic chest pain. Recent exposures to dust or fumes or ill persons. He is unsure if he has had allergy testing or if he has had seasonal symptoms in the past. He is a nonsmoker. He previously used Advair inhaler until no longer covered by his insurance. No known cardiac disease or symptoms.    Review of Systems   Pertinent items are noted in HPI    History  Past Medical History:   Diagnosis Date   • Asthma    • Back pain    • COPD (chronic obstructive pulmonary disease)    • Emphysema lung    • Gastritis    • Headache    • Hypertension    • Migraine    • Substance abuse      Past Surgical History:   Procedure Laterality Date   • DENTAL PROCEDURE     • LIVER SURGERY       History reviewed. No pertinent family history.  Social History   Substance Use Topics   • Smoking status: Never Smoker   • Smokeless tobacco: Current User     Types: Snuff      Comment: Pt uses vapor cigarette   • Alcohol use No      Comment: occassional      Prescriptions Prior to Admission   Medication Sig Dispense Refill Last Dose   • albuterol (PROVENTIL) (2.5 MG/3ML) 0.083% nebulizer solution Take 2.5 mg by nebulization 3 (Three) Times a Day.   10/16/2017 at pm   • montelukast (SINGULAIR) 10 MG tablet Take 10 mg by mouth Daily.   10/16/2017 at am   • predniSONE (DELTASONE) 20 MG tablet Take 1 tablet by mouth 2 (Two) Times a Day. 6 tablet 0 10/16/2017 at am   • raNITIdine (ZANTAC) 300 MG tablet Take 300 mg by mouth 2 (Two) Times a Day.   10/16/2017 at 1400   • albuterol (PROVENTIL HFA;VENTOLIN HFA) 108 (90  BASE) MCG/ACT inhaler Inhale 2 puffs every 4 (four) hours as needed for wheezing. (Patient taking differently: Inhale 2 puffs 2 (Two) Times a Day As Needed for Wheezing or Shortness of Air.) 3.7 g 0 Unknown at Unknown time     Allergies:  Review of patient's allergies indicates no known allergies.    Objective     Vital Signs  Temp:  [97.7 °F (36.5 °C)-99.1 °F (37.3 °C)] 97.7 °F (36.5 °C)  Heart Rate:  [] 111  Resp:  [18-22] 20  BP: ()/(63-85) 108/67    Physical Exam:      General Appearance:    Alert, cooperative, in no acute distress   Head:    Normocephalic, without obvious abnormality, atraumatic   Eyes:            Lids and lashes normal, conjunctivae and sclerae normal, no   icterus, no pallor, corneas clear, PERRLA   Ears:    Ears appear intact with no abnormalities noted   Throat:   No oral lesions, no thrush, oral mucosa moist. Poor dentition   Neck:   No adenopathy, supple, trachea midline, no thyromegaly, no   carotid bruit, no JVD   Back:     No kyphosis present, no scoliosis present, no skin lesions,      erythema or scars, no tenderness to percussion or                   palpation,   range of motion normal   Lungs:     Bilateral wheeze in all lung fields    Heart:    Regular rhythm and normal rate, normal S1 and S2, no            murmur, no gallop, no rub, no click   Chest Wall:    No abnormalities observed   Abdomen:     Normal bowel sounds, no masses, no organomegaly, soft        non-tender, non-distended, no guarding, no rebound                tenderness   Rectal:     Deferred   Extremities:   Moves all extremities well, no edema, no cyanosis, no             redness   Pulses:   Pulses palpable and equal bilaterally   Skin:   No bleeding, bruising or rash   Lymph nodes:   No palpable adenopathy   Neurologic:   Cranial nerves 2 - 12 grossly intact, sensation intact, DTR       present and equal bilaterally       Results Review:    I reviewed the patient's clinical results from  admission:  Imaging Results (last 72 hours)     Procedure Component Value Units Date/Time    XR Chest 1 View [[id number]] Resulted:  10/17/17 0001     Updated:  10/17/17 0001        Lab Results (last 72 hours)     Procedure Component Value Units Date/Time    CBC & Differential [[id number]] Collected:  10/16/17 2255    Specimen:  Blood Updated:  10/16/17 2304    Narrative:       The following orders were created for panel order CBC & Differential.  Procedure                               Abnormality         Status                     ---------                               -----------         ------                     CBC Auto Differential[[id number]]        Abnormal            Final result                 Please view results for these tests on the individual orders.    CBC Auto Differential [[id number]]  (Abnormal) Collected:  10/16/17 2255    Specimen:  Blood from Arm, Right Updated:  10/16/17 2304     WBC 18.11 (H) 10*3/mm3      RBC 5.52 10*6/mm3      Hemoglobin 15.3 g/dL      Hematocrit 47.5 %      MCV 86.1 fL      MCH 27.7 pg      MCHC 32.2 (L) g/dL      RDW 14.9 (H) %      RDW-SD 47.3 fl      MPV 10.3 (H) fL      Platelets 274 10*3/mm3      Neutrophil % 71.0 (H) %      Lymphocyte % 17.2 (L) %      Monocyte % 10.5 (H) %      Eosinophil % 0.2 %      Basophil % 0.2 %      Immature Grans % 0.9 (H) %      Neutrophils, Absolute 12.88 (H) 10*3/mm3      Lymphocytes, Absolute 3.11 (H) 10*3/mm3      Monocytes, Absolute 1.90 (H) 10*3/mm3      Eosinophils, Absolute 0.03 10*3/mm3      Basophils, Absolute 0.03 10*3/mm3      Immature Grans, Absolute 0.16 (H) 10*3/mm3     Lactic Acid, Plasma [[id number]]  (Normal) Collected:  10/16/17 2255    Specimen:  Blood from Arm, Right Updated:  10/16/17 2326     Lactate 1.8 mmol/L     Comprehensive Metabolic Panel [[id number]]  (Abnormal) Collected:  10/16/17 2255    Specimen:  Blood from Arm, Right Updated:  10/16/17 2336     Glucose 139 (H) mg/dL      BUN 16 mg/dL      Creatinine 0.91  mg/dL      Sodium 140 mmol/L      Potassium 3.5 mmol/L      Chloride 106 mmol/L      CO2 26.0 mmol/L      Calcium 9.0 mg/dL      Total Protein 7.3 g/dL      Albumin 4.40 g/dL      ALT (SGPT) 48 (H) U/L      AST (SGOT) 33 U/L      Alkaline Phosphatase 59 U/L       Note New Reference Ranges        Total Bilirubin 0.2 mg/dL      eGFR Non African Amer 92 mL/min/1.73      Globulin 2.9 gm/dL      A/G Ratio 1.5 g/dL      BUN/Creatinine Ratio 17.6     Anion Gap 8.0 mmol/L     Osmolality, Calculated [[id number]]  (Normal) Collected:  10/16/17 2255    Specimen:  Blood from Arm, Right Updated:  10/16/17 2336     Osmolality Calc 282.8 mOsm/kg     Blood Culture - Blood, [[id number]] Collected:  10/16/17 2255    Specimen:  Blood from Arm, Right Updated:  10/16/17 2340    Lavender Top [[id number]] Collected:  10/16/17 2255    Specimen:  Blood from Arm, Right Updated:  10/17/17 0002     Extra Tube hold for add-on      Auto resulted       [place] Draw [[id number]] Collected:  10/16/17 2255    Specimen:  Blood Updated:  10/17/17 0002    Narrative:       The following orders were created for panel order [place] Draw.  Procedure                               Abnormality         Status                     ---------                               -----------         ------                     Light Blue Top[[id number]]                                   Final result               Green Top (Gel)[[id number]]                                  Final result               Lavender Top[[id number]]                                     Final result               Gold Top - SST[[id number]]                                   Final result                 Please view results for these tests on the individual orders.    Light Blue Top [[id number]] Collected:  10/16/17 2255    Specimen:  Blood from Arm, Right Updated:  10/17/17 0002     Extra Tube hold for add-on      Auto resulted       Green Top (Gel) [[id number]] Collected:  10/16/17 2255    Specimen:  Blood from  Arm, Right Updated:  10/17/17 0002     Extra Tube Hold for add-ons.      Auto resulted.       Gold Top - SST [[id number]] Collected:  10/16/17 2255    Specimen:  Blood from Arm, Right Updated:  10/17/17 0002     Extra Tube Hold for add-ons.      Auto resulted.       Blood Gas, Arterial [[id number]]  (Abnormal) Collected:  10/17/17 0009    Specimen:  Arterial Blood Updated:  10/17/17 0013     Site Arterial: right radial     Hank's Test Positive     pH, Arterial 7.394 pH units      pCO2, Arterial 43.9 mm Hg      pO2, Arterial 69.1 (L) mm Hg      HCO3, Arterial 26.2 (H) mmol/L      Base Excess, Arterial 1.0 mmol/L      O2 Saturation, Arterial 94.8 %      Hemoglobin, Blood Gas 15.9 g/dL      Hematocrit, Blood Gas 47.0 %      Oxyhemoglobin 93.5 %      Methemoglobin 0.50 %      Carboxyhemoglobin 0.9 %      A-a Gradiant 21.3 mmHg      Temperature 98.6 C      Barometric Pressure for Blood Gas 728 mmHg      Modality Room Air     FIO2 21 %     Troponin [[id number]]  (Abnormal) Collected:  10/16/17 2255    Specimen:  Blood from Arm, Right Updated:  10/17/17 0017     Troponin I 0.108 (H) ng/mL     Narrative:       Ultra Troponin I Reference Range:         <=0.039 ng/mL: Negative    0.04-0.779 ng/mL: Indeterminate Range. Suspicious of MI.  Clinical correlation required.       >=0.78  ng/mL: Consistent with myocardial injury.  Clinical correlation required.    BNP [[id number]]  (Normal) Collected:  10/16/17 2352    Specimen:  Blood Updated:  10/17/17 0031     BNP 66.0 pg/mL     Blood Culture - Blood, [[id number]] Collected:  10/17/17 0014    Specimen:  Blood from Arm, Right Updated:  10/17/17 0052    Urinalysis With / Culture If Indicated - Urine, Clean Catch [[id number]]  (Abnormal) Collected:  10/17/17 0047    Specimen:  Urine from Urine, Clean Catch Updated:  10/17/17 0053     Color, UA Yellow     Appearance, UA Clear     pH, UA <=5.0     Specific Gravity, UA 1.029     Glucose,  mg/dL (Trace) (A)     Ketones, UA Negative      Bilirubin, UA Negative     Blood, UA Negative     Protein, UA Negative     Leuk Esterase, UA Negative     Nitrite, UA Negative     Urobilinogen, UA 1.0 E.U./dL    Narrative:       Urine microscopic not indicated.    Troponin [[id number]]  (Abnormal) Collected:  10/17/17 0110    Specimen:  Blood Updated:  10/17/17 0148     Troponin I 0.114 (H) ng/mL     Narrative:       Ultra Troponin I Reference Range:         <=0.039 ng/mL: Negative    0.04-0.779 ng/mL: Indeterminate Range. Suspicious of MI.  Clinical correlation required.       >=0.78  ng/mL: Consistent with myocardial injury.  Clinical correlation required.              Assessment/Plan     Active Problems:    Asthma exacerbation in COPD          I discussed the patients findings and my recommendations with patient.     Thomas Lynch MD  10/17/17  6:04 AM    Time: More than 50% of time spent in counseling and coordination of care:  Total face-to-face/floor time 30 min.  Time spent in counseling 10 min. Counseling included the following topics: diagnosis and treatment plans.

## 2017-10-17 NOTE — PROGRESS NOTES
Discharge Planning Assessment  [facility]     Patient Name: Filemon Jj  MRN: [id number]  Today's Date: 10/17/2017    Admit Date: 10/16/2017          Discharge Needs Assessment       10/17/17 1438    Living Environment    Lives With alone   Pt lives alone at [address] [place] [address].     Transportation Available public transportation   [facility]-Carol to be arranged at discharge.     Living Environment    Quality Of Family Relationships supportive    Able to Return to Prior Living Arrangements yes    Discharge Needs Assessment    Outpatient/Agency/Support Group Needs --   Pt does not utilize home health services.     Equipment Currently Used at Home nebulizer   Pt has a nebulizer machine from [facility].     Discharge Disposition home or self-care            Discharge Plan       10/17/17 1440    Case Management/Social Work Plan    Plan Pt lives alone and plans to return home at discharge. Pt does not utilize home health services. [facility]. Pt will need [facility] [phone] arranged at discharge. SS to follow and assist as needed with discharge planning.     Patient/Family In Agreement With Plan yes                  Demographic Summary       10/17/17 1437    Referral Information    Referral Source nursing    Reason For Consult --   SS received consult patient does not have transportation. SS spoke to pt.     Primary Care Physician Information    Name Dr. Jose      Legal       10/17/17 1438    Legal    Legal Comments Pt does not have a POA or advance directive.      Mignon Garces

## 2017-10-17 NOTE — ED PROVIDER NOTES
Subjective   History of Present Illness  41 y/o M w/h/o asthma and COPD p/w continued SOA. Pt was seen earlier on day of presentation and discharged home in good condition. Pt states that he would like a breathing treatment and then to be discharged home. Pt states he did get his medication filled from earlier visit. Pt denies any CP. +wheeze and SOA. During previous ED visit pt was found to have elevated Tn but did not experience any CP.  Review of Systems   Constitutional: Negative for chills, fatigue and fever.   Eyes: Negative for photophobia and visual disturbance.   Respiratory: Positive for cough, shortness of breath and wheezing. Negative for chest tightness.    Cardiovascular: Negative for chest pain, palpitations and leg swelling.   Gastrointestinal: Negative for abdominal distention and abdominal pain.   Genitourinary: Negative for difficulty urinating.   Musculoskeletal: Negative for back pain and neck pain.   Skin: Negative for color change and pallor.   All other systems reviewed and are negative.      Past Medical History:   Diagnosis Date   • Asthma    • Back pain    • COPD (chronic obstructive pulmonary disease)    • Emphysema lung    • Gastritis    • Headache    • Hypertension    • Migraine    • Substance abuse        No Known Allergies    Past Surgical History:   Procedure Laterality Date   • DENTAL PROCEDURE     • LIVER SURGERY         No family history on file.    Social History     Social History   • Marital status: Single     Spouse name: N/A   • Number of children: N/A   • Years of education: N/A     Social History Main Topics   • Smoking status: Never Smoker   • Smokeless tobacco: Current User     Types: Snuff      Comment: Pt uses vapor cigarette   • Alcohol use No      Comment: occassional    • Drug use: No   • Sexual activity: Defer     Other Topics Concern   • Not on file     Social History Narrative           Objective   Physical Exam   Constitutional: He is oriented to person, place,  and time. He appears well-developed and well-nourished. He is active.   HENT:   Head: Normocephalic and atraumatic.   Right Ear: Hearing and external ear normal.   Left Ear: Hearing and external ear normal.   Nose: Nose normal.   Mouth/Throat: Uvula is midline, oropharynx is clear and moist and mucous membranes are normal.   Eyes: Conjunctivae, EOM and lids are normal. Pupils are equal, round, and reactive to light.   Neck: Trachea normal, normal range of motion, full passive range of motion without pain and phonation normal. Neck supple.   Cardiovascular: Normal rate, regular rhythm and normal heart sounds.    Pulmonary/Chest: Effort normal. He has no decreased breath sounds. He has wheezes.   Abdominal: Normal appearance.   Neurological: He is alert and oriented to person, place, and time. GCS eye subscore is 4. GCS verbal subscore is 5. GCS motor subscore is 6.   Skin: Skin is warm, dry and intact.   Psychiatric: He has a normal mood and affect. His speech is normal and behavior is normal. Cognition and memory are normal.   Nursing note and vitals reviewed.      Procedures         ED Course  ED Course   Value Comment By Time   ECG 12 Lead Sinus rhythm, 116 bpm. QTc 450ms. No acute ST segment abnormalities concerning for STEMI. No pathologic blocks or dysrhythmias. Frank Jefferson MD 10/17 0016      Pt improved with IV steroids and duoneb x 3. Pt denies any CP prior to or during ED visit. Pt refuses ASA despite warnings against doing so b/c he states it makes him SOA. Pt has no h/o MI. Pt states that he is willing to stay overnight for continued asthma exacerbation management.            MDM  Number of Diagnoses or Management Options  Asthma exacerbation in COPD: established and worsening  Elevated troponin I level: established and worsening     Amount and/or Complexity of Data Reviewed  Clinical lab tests: reviewed and ordered  Tests in the radiology section of CPT®: reviewed and ordered  Tests in the  medicine section of CPT®: reviewed and ordered  Decide to obtain previous medical records or to obtain history from someone other than the patient: yes  Discuss the patient with other providers: yes  Independent visualization of images, tracings, or specimens: yes    Risk of Complications, Morbidity, and/or Mortality  Presenting problems: high  Diagnostic procedures: high  Management options: high    Patient Progress  Patient progress: stable      Final diagnoses:   Asthma exacerbation in COPD   Elevated troponin I level            Frank Jefferson MD  10/17/17 0113

## 2017-10-17 NOTE — PLAN OF CARE
Problem: Patient Care Overview (Adult)  Goal: Plan of Care Review  Outcome: Ongoing (interventions implemented as appropriate)    10/17/17 0305   Coping/Psychosocial Response Interventions   Plan Of Care Reviewed With patient   Patient Care Overview   Progress no change         Problem: Asthma (Adult)  Goal: Signs and Symptoms of Listed Potential Problems Will be Absent or Manageable (Asthma)  Outcome: Ongoing (interventions implemented as appropriate)    10/17/17 0305   Asthma   Problems Assessed (Asthma) all   Problems Present (Asthma) hypoxia/hypoxemia;recurrent exacerbation         Problem: Fall Risk (Adult)  Goal: Identify Related Risk Factors and Signs and Symptoms  Outcome: Ongoing (interventions implemented as appropriate)  Goal: Absence of Falls  Outcome: Ongoing (interventions implemented as appropriate)    10/17/17 0305   Fall Risk (Adult)   Absence of Falls making progress toward outcome         Problem: Skin Integrity Impairment, Risk/Actual (Adult)  Goal: Identify Related Risk Factors and Signs and Symptoms  Outcome: Ongoing (interventions implemented as appropriate)  Goal: Skin Integrity/Wound Healing  Outcome: Ongoing (interventions implemented as appropriate)    10/17/17 0305   Skin Integrity Impairment, Risk/Actual (Adult)   Skin Integrity/Wound Healing making progress toward outcome

## 2017-10-17 NOTE — PLAN OF CARE
Problem: Patient Care Overview (Adult)  Goal: Discharge Needs Assessment  Outcome: Ongoing (interventions implemented as appropriate)  Discharge Planning Assessment   [facility]     Patient Name: Filemon Jj                     MRN: [id number]  Today's Date: 10/17/2017                   Admit Date: 10/16/2017             Discharge Needs Assessment        10/17/17 1438     Living Environment     Lives With alone   Pt lives alone at [address] [place] [address].      Transportation Available public transportation   R-Carol to be arranged at discharge.      Living Environment     Quality Of Family Relationships supportive     Able to Return to Prior Living Arrangements yes     Discharge Needs Assessment     Outpatient/Agency/Support Group Needs --   Pt does not utilize home health services.      Equipment Currently Used at Home nebulizer   Pt has a nebulizer machine from [employer] [facility].      Discharge Disposition home or self-care       [facility]       Discharge Plan        10/17/17 1440     Case Management/Social Work Plan     Plan Pt lives alone and plans to return home at discharge. Pt does not utilize home health services. Pt has a nebulizer machine from [employer] [facility]. Pt will need R-Carol [phone] arranged at discharge. SS to follow and assist as needed with discharge planning.      Patient/Family In Agreement With Plan yes       [facility]       Demographic Summary        10/17/17 1437     Referral Information     Referral Source nursing     Reason For Consult --   SS received consult patient does not have transportation. SS spoke to pt.      Primary Care Physician Information     Name Dr. Jose       Legal        10/17/17 1438     Legal     Legal Comments Pt does not have a POA or advance directive.        arcadio Garces

## 2017-10-17 NOTE — PROGRESS NOTES
Subjective     Pt feeling better overall and wheezing improved in ER labs ok steroids back to PO     Interval History:       History taken from: patient chart    Review of Systems:       Review of Systems   Constitutional: Positive for activity change.   HENT: Negative.    Eyes: Negative.    Respiratory: Positive for shortness of breath and wheezing.    Cardiovascular: Negative.    Gastrointestinal: Negative.    Endocrine: Negative.    Genitourinary: Negative.    Musculoskeletal: Negative.    Skin: Negative.    Allergic/Immunologic: Negative.    Hematological: Negative.    Psychiatric/Behavioral: Negative.           Objective     Vital Signs  Temp:  [97.7 °F (36.5 °C)-99.1 °F (37.3 °C)] 97.7 °F (36.5 °C)  Heart Rate:  [] 111  Resp:  [18-22] 20  BP: ()/(63-85) 108/67    Physical Exam:    Physical Exam   Constitutional: He is oriented to person, place, and time. He appears well-developed and well-nourished.   HENT:   Head: Normocephalic and atraumatic.   Eyes: EOM are normal. Pupils are equal, round, and reactive to light.   Neck: Normal range of motion. Neck supple.   Cardiovascular: Normal rate and regular rhythm.    Pulmonary/Chest: He has wheezes.   Abdominal: Soft. Bowel sounds are normal.   Musculoskeletal: Normal range of motion.   Neurological: He is alert and oriented to person, place, and time.   Skin: Skin is warm and dry.   Nursing note and vitals reviewed.             Results Review:     I reviewed the patient's new clinical results  : See Below    Medication Review:     Current Facility-Administered Medications:   •  albuterol (PROVENTIL) nebulizer solution 0.083% 2.5 mg/3mL, 2.5 mg, Nebulization, Q4H - RT, Kwadwo Brooks MD  •  enoxaparin (LOVENOX) syringe 40 mg, 40 mg, Subcutaneous, Daily, Kwadwo Brooks MD  •  famotidine (PEPCID) tablet 20 mg, 20 mg, Oral, BID, Kwadwo Brooks MD  •  ipratropium-albuterol (DUO-NEB) nebulizer solution 3 mL, 3 mL, Nebulization, Q4H - RT, Thomas  PREETI Lynch MD, 3 mL at 10/17/17 0303  •  ipratropium-albuterol (DUO-NEB) nebulizer solution 3 mL, 3 mL, Nebulization, Q4H PRN, Thomas Lynch MD  •  montelukast (SINGULAIR) tablet 10 mg, 10 mg, Oral, Daily, Kwadwo Brooks MD  •  predniSONE (DELTASONE) tablet 20 mg, 20 mg, Oral, BID, Kwadwo Brooks MD  •  sodium chloride 0.9 % flush 1-10 mL, 1-10 mL, Intravenous, PRN, Kwadwo Brooks MD  •  Insert peripheral IV, , , Once **AND** sodium chloride 0.9 % flush 10 mL, 10 mL, Intravenous, PRN, Frank Jefferson MD    albuterol 2.5 mg Nebulization Q4H - RT   enoxaparin 40 mg Subcutaneous Daily   famotidine 20 mg Oral BID   ipratropium-albuterol 3 mL Nebulization Q4H - RT   montelukast 10 mg Oral Daily   predniSONE 20 mg Oral BID     ipratropium-albuterol  •  sodium chloride  •  Insert peripheral IV **AND** sodium chloride    Lab Results (last 72 hours)     Procedure Component Value Units Date/Time    CBC & Differential [[id number]] Collected:  10/16/17 2255    Specimen:  Blood Updated:  10/16/17 2304    Narrative:       The following orders were created for panel order CBC & Differential.  Procedure                               Abnormality         Status                     ---------                               -----------         ------                     CBC Auto Differential[[id number]]        Abnormal            Final result                 Please view results for these tests on the individual orders.    CBC Auto Differential [[id number]]  (Abnormal) Collected:  10/16/17 2255    Specimen:  Blood from Arm, Right Updated:  10/16/17 2304     WBC 18.11 (H) 10*3/mm3      RBC 5.52 10*6/mm3      Hemoglobin 15.3 g/dL      Hematocrit 47.5 %      MCV 86.1 fL      MCH 27.7 pg      MCHC 32.2 (L) g/dL      RDW 14.9 (H) %      RDW-SD 47.3 fl      MPV 10.3 (H) fL      Platelets 274 10*3/mm3      Neutrophil % 71.0 (H) %      Lymphocyte % 17.2 (L) %      Monocyte % 10.5 (H) %      Eosinophil % 0.2 %      Basophil % 0.2 %       Immature Grans % 0.9 (H) %      Neutrophils, Absolute 12.88 (H) 10*3/mm3      Lymphocytes, Absolute 3.11 (H) 10*3/mm3      Monocytes, Absolute 1.90 (H) 10*3/mm3      Eosinophils, Absolute 0.03 10*3/mm3      Basophils, Absolute 0.03 10*3/mm3      Immature Grans, Absolute 0.16 (H) 10*3/mm3     Lactic Acid, Plasma [[id number]]  (Normal) Collected:  10/16/17 2255    Specimen:  Blood from Arm, Right Updated:  10/16/17 2326     Lactate 1.8 mmol/L     Comprehensive Metabolic Panel [[id number]]  (Abnormal) Collected:  10/16/17 2255    Specimen:  Blood from Arm, Right Updated:  10/16/17 2336     Glucose 139 (H) mg/dL      BUN 16 mg/dL      Creatinine 0.91 mg/dL      Sodium 140 mmol/L      Potassium 3.5 mmol/L      Chloride 106 mmol/L      CO2 26.0 mmol/L      Calcium 9.0 mg/dL      Total Protein 7.3 g/dL      Albumin 4.40 g/dL      ALT (SGPT) 48 (H) U/L      AST (SGOT) 33 U/L      Alkaline Phosphatase 59 U/L       Note New Reference Ranges        Total Bilirubin 0.2 mg/dL      eGFR Non African Amer 92 mL/min/1.73      Globulin 2.9 gm/dL      A/G Ratio 1.5 g/dL      BUN/Creatinine Ratio 17.6     Anion Gap 8.0 mmol/L     Osmolality, Calculated [[id number]]  (Normal) Collected:  10/16/17 2255    Specimen:  Blood from Arm, Right Updated:  10/16/17 2336     Osmolality Calc 282.8 mOsm/kg     Blood Culture - Blood, [[id number]] Collected:  10/16/17 2255    Specimen:  Blood from Arm, Right Updated:  10/16/17 2340    Lavender Top [[id number]] Collected:  10/16/17 2255    Specimen:  Blood from Arm, Right Updated:  10/17/17 0002     Extra Tube hold for add-on      Auto resulted       [place] Draw [[id number]] Collected:  10/16/17 2255    Specimen:  Blood Updated:  10/17/17 0002    Narrative:       The following orders were created for panel order [place] Draw.  Procedure                               Abnormality         Status                     ---------                               -----------         ------                      Light Blue Top[[id number]]                                   Final result               Green Top (Gel)[[id number]]                                  Final result               Lavender Top[[id number]]                                     Final result               Gold Top - SST[[id number]]                                   Final result                 Please view results for these tests on the individual orders.    Light Blue Top [[id number]] Collected:  10/16/17 2255    Specimen:  Blood from Arm, Right Updated:  10/17/17 0002     Extra Tube hold for add-on      Auto resulted       Green Top (Gel) [[id number]] Collected:  10/16/17 2255    Specimen:  Blood from Arm, Right Updated:  10/17/17 0002     Extra Tube Hold for add-ons.      Auto resulted.       Gold Top - SST [[id number]] Collected:  10/16/17 2255    Specimen:  Blood from Arm, Right Updated:  10/17/17 0002     Extra Tube Hold for add-ons.      Auto resulted.       Blood Gas, Arterial [[id number]]  (Abnormal) Collected:  10/17/17 0009    Specimen:  Arterial Blood Updated:  10/17/17 0013     Site Arterial: right radial     Hank's Test Positive     pH, Arterial 7.394 pH units      pCO2, Arterial 43.9 mm Hg      pO2, Arterial 69.1 (L) mm Hg      HCO3, Arterial 26.2 (H) mmol/L      Base Excess, Arterial 1.0 mmol/L      O2 Saturation, Arterial 94.8 %      Hemoglobin, Blood Gas 15.9 g/dL      Hematocrit, Blood Gas 47.0 %      Oxyhemoglobin 93.5 %      Methemoglobin 0.50 %      Carboxyhemoglobin 0.9 %      A-a Gradiant 21.3 mmHg      Temperature 98.6 C      Barometric Pressure for Blood Gas 728 mmHg      Modality Room Air     FIO2 21 %     Troponin [[id number]]  (Abnormal) Collected:  10/16/17 2255    Specimen:  Blood from Arm, Right Updated:  10/17/17 0017     Troponin I 0.108 (H) ng/mL     Narrative:       Ultra Troponin I Reference Range:         <=0.039 ng/mL: Negative    0.04-0.779 ng/mL: Indeterminate Range. Suspicious of MI.  Clinical correlation required.        >=0.78  ng/mL: Consistent with myocardial injury.  Clinical correlation required.    BNP [[id number]]  (Normal) Collected:  10/16/17 2352    Specimen:  Blood Updated:  10/17/17 0031     BNP 66.0 pg/mL     Blood Culture - Blood, [[id number]] Collected:  10/17/17 0014    Specimen:  Blood from Arm, Right Updated:  10/17/17 0052    Urinalysis With / Culture If Indicated - Urine, Clean Catch [[id number]]  (Abnormal) Collected:  10/17/17 0047    Specimen:  Urine from Urine, Clean Catch Updated:  10/17/17 0053     Color, UA Yellow     Appearance, UA Clear     pH, UA <=5.0     Specific Gravity, UA 1.029     Glucose,  mg/dL (Trace) (A)     Ketones, UA Negative     Bilirubin, UA Negative     Blood, UA Negative     Protein, UA Negative     Leuk Esterase, UA Negative     Nitrite, UA Negative     Urobilinogen, UA 1.0 E.U./dL    Narrative:       Urine microscopic not indicated.    Troponin [[id number]]  (Abnormal) Collected:  10/17/17 0110    Specimen:  Blood Updated:  10/17/17 0148     Troponin I 0.114 (H) ng/mL     Narrative:       Ultra Troponin I Reference Range:         <=0.039 ng/mL: Negative    0.04-0.779 ng/mL: Indeterminate Range. Suspicious of MI.  Clinical correlation required.       >=0.78  ng/mL: Consistent with myocardial injury.  Clinical correlation required.          Imaging Results (last 72 hours)     Procedure Component Value Units Date/Time    XR Chest 1 View [[id number]] Resulted:  10/17/17 0001     Updated:  10/17/17 0001          Assessment/Plan  continue present meds and tx and abx     Active Problems:    Asthma exacerbation in COPD       See orders entered.     Kwadwo Brooks MD  10/17/17  6:00 AM

## 2017-10-17 NOTE — NURSING NOTE
Patients glucose was 245 patient refused to take any insulin. Educated patient about insulin and elevated glucose levels. Despite being educated patient still refused to take the insulin.

## 2017-10-17 NOTE — PLAN OF CARE
Problem: Patient Care Overview (Adult)  Goal: Plan of Care Review  Outcome: Ongoing (interventions implemented as appropriate)  Goal: Adult Individualization and Mutuality  Outcome: Ongoing (interventions implemented as appropriate)    Problem: Asthma (Adult)  Goal: Signs and Symptoms of Listed Potential Problems Will be Absent or Manageable (Asthma)  Outcome: Ongoing (interventions implemented as appropriate)    Problem: Fall Risk (Adult)  Goal: Identify Related Risk Factors and Signs and Symptoms  Outcome: Ongoing (interventions implemented as appropriate)  Goal: Absence of Falls  Outcome: Ongoing (interventions implemented as appropriate)    Problem: Skin Integrity Impairment, Risk/Actual (Adult)  Goal: Identify Related Risk Factors and Signs and Symptoms  Outcome: Ongoing (interventions implemented as appropriate)  Goal: Skin Integrity/Wound Healing  Outcome: Ongoing (interventions implemented as appropriate)

## 2017-10-17 NOTE — H&P
Patient Identification:  Name:  Filemon Jj  Age:  42 y.o.  Sex:  male  :  1975  MRN:  [id number]   Visit Number:  [id number]  Primary Care Physician:  Macho Jose MD    Chief complaint:   Pt presents to ER in acute resp failure from asthma Pt resolved in ER with steroids and admitted for further eval     History of presenting illness:  42 y.o. male   ---------------------------------------------------------------------------------------------------------------------   Review of Systems   Constitutional: Positive for activity change.   HENT: Negative.    Eyes: Negative.    Respiratory: Positive for shortness of breath and wheezing.    Cardiovascular: Negative.    Gastrointestinal: Negative.    Endocrine: Negative.    Genitourinary: Negative.    Musculoskeletal: Negative.    Skin: Negative.    Allergic/Immunologic: Negative.    Hematological: Negative.    Psychiatric/Behavioral: Negative.       ---------------------------------------------------------------------------------------------------------------------   Past Medical History:   Diagnosis Date   • Asthma    • Back pain    • COPD (chronic obstructive pulmonary disease)    • Emphysema lung    • Gastritis    • Headache    • Hypertension    • Migraine    • Substance abuse      Past Surgical History:   Procedure Laterality Date   • DENTAL PROCEDURE     • LIVER SURGERY       History reviewed. No pertinent family history.  Social History     Social History   • Marital status: Single     Spouse name: N/A   • Number of children: N/A   • Years of education: N/A     Social History Main Topics   • Smoking status: Never Smoker   • Smokeless tobacco: Current User     Types: Snuff      Comment: Pt uses vapor cigarette   • Alcohol use No      Comment: occassional    • Drug use: No   • Sexual activity: Defer     Other Topics Concern   • None     Social History Narrative      ---------------------------------------------------------------------------------------------------------------------   Allergies:  Review of patient's allergies indicates no known allergies.  ---------------------------------------------------------------------------------------------------------------------   Prior to Admission Medications     Prescriptions Last Dose Informant Patient Reported? Taking?    albuterol (PROVENTIL) (2.5 MG/3ML) 0.083% nebulizer solution 10/16/2017 Self Yes Yes    Take 2.5 mg by nebulization 3 (Three) Times a Day.    montelukast (SINGULAIR) 10 MG tablet 10/16/2017 Self Yes Yes    Take 10 mg by mouth Daily.    predniSONE (DELTASONE) 20 MG tablet 10/16/2017 Self No Yes    Take 1 tablet by mouth 2 (Two) Times a Day.    raNITIdine (ZANTAC) 300 MG tablet 10/16/2017 Self Yes Yes    Take 300 mg by mouth 2 (Two) Times a Day.    albuterol (PROVENTIL HFA;VENTOLIN HFA) 108 (90 BASE) MCG/ACT inhaler Unknown Self No No    Inhale 2 puffs every 4 (four) hours as needed for wheezing.    Patient taking differently:  Inhale 2 puffs 2 (Two) Times a Day As Needed for Wheezing or Shortness of Air.        Hospital Scheduled Meds:    albuterol 2.5 mg Nebulization Q4H - RT   enoxaparin 40 mg Subcutaneous Daily   famotidine 20 mg Oral BID   ipratropium-albuterol 3 mL Nebulization Q4H - RT   montelukast 10 mg Oral Daily   predniSONE 20 mg Oral BID With Meals        ---------------------------------------------------------------------------------------------------------------------   Vital Signs:  Temp:  [97.7 °F (36.5 °C)-99.1 °F (37.3 °C)] 97.7 °F (36.5 °C)  Heart Rate:  [] 111  Resp:  [18-22] 20  BP: ()/(63-85) 108/67  Last 3 weights    10/16/17  2233 10/17/17  0204   Weight: 180 lb (81.6 kg) 185 lb (83.9 kg)     Body mass index is 28.98 kg/(m^2).  ---------------------------------------------------------------------------------------------------------------------   Physical exam:  Physical  Exam   Constitutional: He is oriented to person, place, and time. He appears well-developed and well-nourished.   HENT:   Head: Normocephalic and atraumatic.   Eyes: EOM are normal. Pupils are equal, round, and reactive to light.   Neck: Normal range of motion. Neck supple.   Cardiovascular: Normal rate and regular rhythm.    Pulmonary/Chest: He has wheezes.   Abdominal: Soft.   Musculoskeletal: Normal range of motion.   Neurological: He is alert and oriented to person, place, and time.   Skin: Skin is dry.   Nursing note and vitals reviewed.      ---------------------------------------------------------------------------------------------------------------------  ---------------------------------------------------------------------------------------------------------------------     Results from last 7 days  Lab Units 10/16/17  2255 10/16/17  0145   LACTATE mmol/L 1.8  --    WBC 10*3/mm3 18.11* 17.92*   HEMOGLOBIN g/dL 15.3 15.8   HEMATOCRIT % 47.5 49.1   MCV fL 86.1 88.6   MCHC g/dL 32.2* 32.2*   PLATELETS 10*3/mm3 274 289       Results from last 7 days  Lab Units 10/17/17  0009   PH, ARTERIAL pH units 7.394   PO2 ART mm Hg 69.1*   PCO2, ARTERIAL mm Hg 43.9   HCO3 ART mmol/L 26.2*       Results from last 7 days  Lab Units 10/16/17  2255 10/16/17  0145   SODIUM mmol/L 140 140   POTASSIUM mmol/L 3.5 3.7   CHLORIDE mmol/L 106 102   CO2 mmol/L 26.0 22.6*   BUN mg/dL 16 18   CREATININE mg/dL 0.91 1.40*   EGFR IF NONAFRICN AM mL/min/1.73 92 56*   CALCIUM mg/dL 9.0 9.2   GLUCOSE mg/dL 139* 319*   ALBUMIN g/dL 4.40 4.30   BILIRUBIN mg/dL 0.2 0.2   ALK PHOS U/L 59 61   AST (SGOT) U/L 33 31   ALT (SGPT) U/L 48* 32   Estimated Creatinine Clearance: 110.6 mL/min (by C-G formula based on Cr of 0.91).  No results found for: AMMONIA    Results from last 7 days  Lab Units 10/17/17  0110 10/16/17  2255 10/16/17  0509   TROPONIN I ng/mL 0.114* 0.108* 0.061*         No results found for: HGBA1C  No results found for: TSH, FREET4  No  results found for: PREGTESTUR, PREGSERUM, HCG, HCGQUANT  Pain Management Panel     Pain Management Panel Latest Ref Rng & Units 7/24/2016 1/4/2015    AMPHETAMINES SCREEN, URINE Negative Negative Negative    BARBITURATES SCREEN Negative Negative Negative    BENZODIAZEPINE SCREEN, URINE Negative Negative Negative    COCAINE SCREEN, URINE Negative Negative Negative    METHADONE SCREEN, URINE Negative Negative Negative                        ---------------------------------------------------------------------------------------------------------------------  Imaging Results (last 7 days)     Procedure Component Value Units Date/Time    XR Chest 1 View [[id number]] Resulted:  10/17/17 0001     Updated:  10/17/17 0001          ---------------------------------------------------------------------------------------------------------------------  Assessment and Plan: acute resp failure due to asthma    Continue steroids abx and pulm tx    Kwadwo Brooks MD  10/17/17  6:02 AM

## 2017-10-18 ENCOUNTER — OFFICE VISIT (OUTPATIENT)
Dept: CARDIAC SURGERY | Facility: CLINIC | Age: 42
End: 2017-10-18

## 2017-10-18 ENCOUNTER — APPOINTMENT (OUTPATIENT)
Dept: CT IMAGING | Facility: HOSPITAL | Age: 42
End: 2017-10-18

## 2017-10-18 ENCOUNTER — APPOINTMENT (OUTPATIENT)
Dept: ULTRASOUND IMAGING | Facility: HOSPITAL | Age: 42
End: 2017-10-18

## 2017-10-18 VITALS
DIASTOLIC BLOOD PRESSURE: 82 MMHG | BODY MASS INDEX: 28.25 KG/M2 | HEART RATE: 85 BPM | OXYGEN SATURATION: 93 % | WEIGHT: 180 LBS | SYSTOLIC BLOOD PRESSURE: 143 MMHG | HEIGHT: 67 IN

## 2017-10-18 DIAGNOSIS — I71.21 ASCENDING AORTIC ANEURYSM (HCC): Primary | ICD-10-CM

## 2017-10-18 LAB
CHOLEST SERPL-MCNC: 198 MG/DL (ref 0–200)
GLUCOSE BLDC GLUCOMTR-MCNC: 170 MG/DL (ref 70–130)
GLUCOSE BLDC GLUCOMTR-MCNC: 185 MG/DL (ref 70–130)
GLUCOSE BLDC GLUCOMTR-MCNC: 206 MG/DL (ref 70–130)
GLUCOSE BLDC GLUCOMTR-MCNC: 224 MG/DL (ref 70–130)
HBA1C MFR BLD: 6.1 % (ref 4.5–5.7)
HDLC SERPL-MCNC: 40 MG/DL (ref 60–100)
L PNEUMO1 AG UR QL IA: NEGATIVE
LDLC SERPL CALC-MCNC: 139 MG/DL (ref 0–100)
LDLC/HDLC SERPL: 3.48 {RATIO}
T4 SERPL-MCNC: 5.4 MCG/DL (ref 4.5–10.9)
TRIGL SERPL-MCNC: 95 MG/DL (ref 0–150)
TSH SERPL DL<=0.05 MIU/L-ACNC: 0.2 MIU/ML (ref 0.55–4.78)
VLDLC SERPL-MCNC: 19 MG/DL

## 2017-10-18 PROCEDURE — 63710000001 INSULIN ASPART PER 5 UNITS: Performed by: INTERNAL MEDICINE

## 2017-10-18 PROCEDURE — 86800 THYROGLOBULIN ANTIBODY: CPT | Performed by: INTERNAL MEDICINE

## 2017-10-18 PROCEDURE — 84443 ASSAY THYROID STIM HORMONE: CPT | Performed by: INTERNAL MEDICINE

## 2017-10-18 PROCEDURE — 25010000002 AZITHROMYCIN: Performed by: INTERNAL MEDICINE

## 2017-10-18 PROCEDURE — 94799 UNLISTED PULMONARY SVC/PX: CPT

## 2017-10-18 PROCEDURE — 84436 ASSAY OF TOTAL THYROXINE: CPT | Performed by: INTERNAL MEDICINE

## 2017-10-18 PROCEDURE — 84432 ASSAY OF THYROGLOBULIN: CPT | Performed by: INTERNAL MEDICINE

## 2017-10-18 PROCEDURE — 25010000002 METHYLPREDNISOLONE PER 40 MG: Performed by: INTERNAL MEDICINE

## 2017-10-18 PROCEDURE — 74175 CTA ABDOMEN W/CONTRAST: CPT | Performed by: RADIOLOGY

## 2017-10-18 PROCEDURE — 87899 AGENT NOS ASSAY W/OPTIC: CPT | Performed by: INTERNAL MEDICINE

## 2017-10-18 PROCEDURE — 25010000002 CEFTRIAXONE: Performed by: INTERNAL MEDICINE

## 2017-10-18 PROCEDURE — 83036 HEMOGLOBIN GLYCOSYLATED A1C: CPT | Performed by: INTERNAL MEDICINE

## 2017-10-18 PROCEDURE — 99205 OFFICE O/P NEW HI 60 MIN: CPT | Performed by: THORACIC SURGERY (CARDIOTHORACIC VASCULAR SURGERY)

## 2017-10-18 PROCEDURE — 63710000001 PREDNISONE PER 5 MG: Performed by: INTERNAL MEDICINE

## 2017-10-18 PROCEDURE — 76536 US EXAM OF HEAD AND NECK: CPT | Performed by: RADIOLOGY

## 2017-10-18 PROCEDURE — 63710000001 PREDNISONE PER 1 MG: Performed by: INTERNAL MEDICINE

## 2017-10-18 PROCEDURE — 82962 GLUCOSE BLOOD TEST: CPT

## 2017-10-18 PROCEDURE — 76536 US EXAM OF HEAD AND NECK: CPT

## 2017-10-18 PROCEDURE — 0 IOPAMIDOL PER 1 ML: Performed by: INTERNAL MEDICINE

## 2017-10-18 PROCEDURE — 74175 CTA ABDOMEN W/CONTRAST: CPT

## 2017-10-18 PROCEDURE — 80061 LIPID PANEL: CPT | Performed by: INTERNAL MEDICINE

## 2017-10-18 PROCEDURE — 86376 MICROSOMAL ANTIBODY EACH: CPT | Performed by: INTERNAL MEDICINE

## 2017-10-18 RX ADMIN — CEFTRIAXONE 1 G: 1 INJECTION, POWDER, FOR SOLUTION INTRAMUSCULAR; INTRAVENOUS at 21:23

## 2017-10-18 RX ADMIN — ALBUTEROL SULFATE 2.5 MG: 2.5 SOLUTION RESPIRATORY (INHALATION) at 02:30

## 2017-10-18 RX ADMIN — ALBUTEROL SULFATE 2.5 MG: 2.5 SOLUTION RESPIRATORY (INHALATION) at 14:25

## 2017-10-18 RX ADMIN — INSULIN ASPART 2 UNITS: 100 INJECTION, SOLUTION INTRAVENOUS; SUBCUTANEOUS at 13:10

## 2017-10-18 RX ADMIN — ALBUTEROL SULFATE 2.5 MG: 2.5 SOLUTION RESPIRATORY (INHALATION) at 06:39

## 2017-10-18 RX ADMIN — MONTELUKAST SODIUM 10 MG: 10 TABLET ORAL at 08:13

## 2017-10-18 RX ADMIN — IOPAMIDOL 100 ML: 755 INJECTION, SOLUTION INTRAVENOUS at 13:28

## 2017-10-18 RX ADMIN — PREDNISONE 30 MG: 10 TABLET ORAL at 08:13

## 2017-10-18 RX ADMIN — FAMOTIDINE 20 MG: 20 TABLET, FILM COATED ORAL at 08:13

## 2017-10-18 RX ADMIN — INSULIN ASPART 3 UNITS: 100 INJECTION, SOLUTION INTRAVENOUS; SUBCUTANEOUS at 20:20

## 2017-10-18 RX ADMIN — ALBUTEROL SULFATE 2.5 MG: 2.5 SOLUTION RESPIRATORY (INHALATION) at 19:29

## 2017-10-18 RX ADMIN — AZITHROMYCIN 500 MG: 500 INJECTION, POWDER, LYOPHILIZED, FOR SOLUTION INTRAVENOUS at 20:20

## 2017-10-18 RX ADMIN — INSULIN ASPART 3 UNITS: 100 INJECTION, SOLUTION INTRAVENOUS; SUBCUTANEOUS at 17:10

## 2017-10-18 RX ADMIN — METHYLPREDNISOLONE SODIUM SUCCINATE 20 MG: 40 INJECTION, POWDER, FOR SOLUTION INTRAMUSCULAR; INTRAVENOUS at 05:05

## 2017-10-18 RX ADMIN — ALBUTEROL SULFATE 2.5 MG: 2.5 SOLUTION RESPIRATORY (INHALATION) at 23:22

## 2017-10-18 RX ADMIN — FAMOTIDINE 20 MG: 20 TABLET, FILM COATED ORAL at 17:24

## 2017-10-18 NOTE — PROGRESS NOTES
10/18/2017  Patient Information  Filemon Jj                                                                                          [address]  [place] [address]   1975  'PCP/Referring Physician'  Macho Jose MD  [phone]  No ref. provider found    Chief Complaint   Patient presents with   • Aortic Aneurysm     Patient is admitted to [facility]. Had echo which showed a thoracic aortic aneurysm   • Cough       History of Present Illness:  Mr. Jj is a 42 year old  male with a history of asthma, hypertension and anxiety who comes in with shortness of breath.  The patient was admitted for an asthma exacerbation and was found to have an incidental ascending aortic aneurysm on echo then CT.  He uses an albuterol nebulizer at home with some improvement in his dyspnea.  The patient describes severe anxiety that limits his daily interactions.  He mostly stays at home alone and actually dropped out of school from anxiety.      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   No current outpatient prescriptions on file.    Facility-Administered Medications Ordered in Other Visits:   •  albuterol (PROVENTIL) nebulizer solution 0.083% 2.5 mg/3mL, 2.5 mg, Nebulization, Q4H - RT, Kwadwo Brooks MD, 2.5 mg at 10/18/17 0639  •  AZITHROMYCIN 500 MG/250 ML 0.9% NS IVPB (MBP), 500 mg, Intravenous, Q24H, Thomas Lynch MD, Stopped at 10/17/17 3601  •  cefTRIAXone (ROCEPHIN) 1 g/100 mL 0.9% NS (MBP), 1 g,  Intravenous, Q24H, Thomas Lynch MD, Stopped at 10/18/17 0107  •  dextrose (D50W) solution 25 g, 25 g, Intravenous, Q15 Min PRN, Thomas Lynch MD  •  dextrose (GLUTOSE) oral gel 15 g, 15 g, Oral, Q15 Min PRN, Thomas Lynch MD  •  enoxaparin (LOVENOX) syringe 40 mg, 40 mg, Subcutaneous, Daily, Kwadwo Brooks MD  •  famotidine (PEPCID) tablet 20 mg, 20 mg, Oral, BID, Kwadwo Brooks MD, 20 mg at 10/18/17 0813  •  glucagon (GLUCAGEN) injection 1 mg, 1 mg, Subcutaneous, Q15 Min PRN, Thomas Lynch MD  •  insulin aspart (novoLOG) injection 0-7 Units, 0-7 Units, Subcutaneous, 4x Daily AC & at Bedtime, Thomas Lynch MD, 4 Units at 10/17/17 2106  •  iopamidol (ISOVUE-370) 76 % injection 100 mL, 100 mL, Intravenous, Once in imaging, Macho Jose MD  •  ipratropium-albuterol (DUO-NEB) nebulizer solution 3 mL, 3 mL, Nebulization, Q4H PRN, Thomas Lynch MD  •  montelukast (SINGULAIR) tablet 10 mg, 10 mg, Oral, Daily, Kwadwo Brooks MD, 10 mg at 10/18/17 0813  •  predniSONE (DELTASONE) tablet 30 mg, 30 mg, Oral, Daily With Breakfast, Thomas Lynch MD, 30 mg at 10/18/17 0813  •  sodium chloride 0.9 % flush 1-10 mL, 1-10 mL, Intravenous, PRN, Kwadwo Brooks MD  •  Insert peripheral IV, , , Once **AND** sodium chloride 0.9 % flush 10 mL, 10 mL, Intravenous, PRN, Frank Jefferson MD  No Known Allergies  Social History     Social History   • Marital status: Single     Spouse name: N/A   • Number of children: 0   • Years of education: N/A     Occupational History   •  Disabled     Social History Main Topics   • Smoking status: Never Smoker   • Smokeless tobacco: Current User     Types: Snuff      Comment: Pt uses vapor cigarette   • Alcohol use No      Comment: occassional    • Drug use: No   • Sexual activity: Defer     Other Topics Concern   • Not on file     Social History Narrative    Lives in [place], [place] alone     Family History   Problem Relation Age of Onset   • COPD Mother      Review of  "Systems   Constitution: Positive for night sweats. Negative for chills, fever, malaise/fatigue and weight loss.   HENT: Negative for hearing loss, odynophagia and sore throat.    Cardiovascular: Positive for chest pain and leg swelling. Negative for dyspnea on exertion, orthopnea and palpitations.   Respiratory: Positive for cough, hemoptysis and shortness of breath.    Endocrine: Negative for cold intolerance, heat intolerance, polydipsia, polyphagia and polyuria.   Hematologic/Lymphatic: Does not bruise/bleed easily.   Skin: Negative for itching and rash.   Musculoskeletal: Positive for joint pain. Negative for joint swelling and myalgias.   Gastrointestinal: Positive for abdominal pain and nausea. Negative for constipation, diarrhea, hematemesis, hematochezia, melena and vomiting.   Genitourinary: Negative for dysuria, frequency and hematuria.   Neurological: Positive for light-headedness. Negative for focal weakness, headaches, numbness and seizures.   Psychiatric/Behavioral: Negative for suicidal ideas. The patient is nervous/anxious.    All other systems reviewed and are negative.    Vitals:    10/18/17 1213   BP: 143/82   BP Location: Left arm   Patient Position: Sitting   Pulse: 85   SpO2: 93%   Weight: 180 lb (81.6 kg)   Height: 67\" (170.2 cm)      Physical Exam   Constitutional: He is oriented to person, place, and time. He appears well-developed and well-nourished. No distress.   HENT:   Head: Normocephalic and atraumatic.   Poor dentition with necrotic teeth.  Extensive amount of chewing tobacco obscuring a full oral exam.     Eyes: Conjunctivae are normal. No scleral icterus.   Neck: Normal range of motion. No JVD present. Carotid bruit is not present. No tracheal deviation present.   Cardiovascular: Normal rate, regular rhythm and normal heart sounds.  Exam reveals no gallop and no friction rub.    No murmur heard.  Pulmonary/Chest: Effort normal and breath sounds normal. No stridor. No respiratory " distress. He has no wheezes. He has no rales.   Abdominal: Soft. He exhibits no distension and no mass. There is no tenderness. There is no rebound and no guarding.   Musculoskeletal: Normal range of motion. He exhibits no edema.   Neurological: He is alert and oriented to person, place, and time.   Skin: Skin is warm and dry. No rash noted. He is not diaphoretic. No erythema.   Psychiatric: He has a normal mood and affect. His behavior is normal. Judgment and thought content normal.   Very anxious       Labs/Imaging:  -CT chest performed 10/18/17, personally reviewed, demonstrates an ascending aortic aneurysm with no dissection or rupture.  This tapers down at the mid ascending aorta on coronal views.  No abdominal aortic aneurysm.    -Echocardiogram performed 10/17/17, personally reviewed, demonstrates EF 60-65%, mild aortic regurgitation, 6 cm ascending aorta and aortic root dilation.      Assessment/Plan:  42 year old  male with a history of asthma, hypertension and anxiety who presents with an asthma exacerbation and an incidental 6 cm ascending aortic aneurysm.  The patient will need to see an oral surgeon for extraction prior to any valve operation.  He will also need a full set of PFTs.  I will talk with Dr. Whiting, his cardiologist, to arrange a cardiac catheterization.  This workup will need to be completed on an outpatient basis.  He is reluctant to have surgery due to anxiety.  We discussed the future risk of aortic rupture and death without repair.  An aortic valve conduit (Bentall) procedure will be needed given his aortic root dilation.  He is higher risk with his asthma requiring hospitalization and severe anxiety.  I am concerned about his compliance with Coumadin and a mechanical valve, but the patient is very young to place a tissue conduit.  The risks and benefits of surgery were discussed with the patient including pain, bleeding, infection, renal failure, stroke, heart block requiring a  pacemaker and death.   He understood these risks.  He is agreeable to proceed with the above plan.  After his teeth extraction and heart cath, we will schedule surgery.      I, Dr. Aaron Lucas, personally performed the services described in the documentation as scribed in my presence and the documentation is both accurate and complete.        Patient Active Problem List   Diagnosis   • Asthma exacerbation in COPD     Signed by: Aaron Lucas MD    10/18/2017    CC:  Macho Jose MD    Scribed for Aaron Lucas MD by Lashanda Canchola. 10/18/2017  12:44 PM

## 2017-10-18 NOTE — PROGRESS NOTES
LOS: 0 days   Interval History: Awake and alert. Hard of hearing . Breathing better. No chest pain. No nausea or vomiting. ECHO noted and discussed with patient. He will be referr to CT surgeon veronica respiratory status permits. TSH low, will evaluate this as well. No other reported acute events.Mul;tiple labs pending.      History taken from: patient chart    Review of Systems:     Review of Systems - Negative except present illness    Objective     Vital Signs  Temp:  [97.8 °F (36.6 °C)-98.8 °F (37.1 °C)] 98 °F (36.7 °C)  Heart Rate:  [] 96  Resp:  [16-24] 18  BP: (114-142)/(62-79) 121/65    Physical Exam:     General Appearance:    Alert, cooperative, in no acute distress   Head:    Normocephalic, without obvious abnormality, atraumatic   Eyes:            Lids and lashes normal, conjunctivae and sclerae normal, no   icterus, no pallor, corneas clear, PERRLA   Ears:    Ears appear intact with no abnormalities noted   Throat:   No oral lesions, no thrush, oral mucosa moist. Poor dentition   Neck:   No adenopathy, supple, trachea midline, no thyromegaly, no   carotid bruit, no JVD   Back:     No kyphosis present, no scoliosis present, no skin lesions,      erythema or scars, no tenderness to percussion or                   palpation,   range of motion normal   Lungs:     Bilateral wheeze in all lung fields    Heart:    Regular rhythm and normal rate, normal S1 and S2, no            murmur, no gallop, no rub, no click   Chest Wall:    No abnormalities observed   Abdomen:     Normal bowel sounds, no masses, no organomegaly, soft        non-tender, non-distended, no guarding, no rebound                tenderness   Rectal:     Deferred   Extremities:   Moves all extremities well, no edema, no cyanosis, no             redness   Pulses:   Pulses palpable and equal bilaterally   Skin:   No bleeding, bruising or rash   Lymph nodes:   No palpable adenopathy   Neurologic:   Cranial nerves 2 - 12 grossly intact,  sensation intact, DTR       present and equal bilaterally              Results Review:     I reviewed the patient's new clinical results  : See Below    Medication Review:     Current Facility-Administered Medications:   •  albuterol (PROVENTIL) nebulizer solution 0.083% 2.5 mg/3mL, 2.5 mg, Nebulization, Q4H - RT, Kwadwo Brooks MD, 2.5 mg at 10/18/17 0230  •  AZITHROMYCIN 500 MG/250 ML 0.9% NS IVPB (MBP), 500 mg, Intravenous, Q24H, Thomas Lynch MD, Stopped at 10/17/17 2307  •  cefTRIAXone (ROCEPHIN) 1 g/100 mL 0.9% NS (MBP), 1 g, Intravenous, Q24H, Thomas Lynch MD, Stopped at 10/18/17 0107  •  dextrose (D50W) solution 25 g, 25 g, Intravenous, Q15 Min PRN, Thomas Lynch MD  •  dextrose (GLUTOSE) oral gel 15 g, 15 g, Oral, Q15 Min PRN, Thomas Lynch MD  •  enoxaparin (LOVENOX) syringe 40 mg, 40 mg, Subcutaneous, Daily, Kwadwo Brooks MD  •  famotidine (PEPCID) tablet 20 mg, 20 mg, Oral, BID, Kwadwo Brooks MD, 20 mg at 10/17/17 1715  •  glucagon (GLUCAGEN) injection 1 mg, 1 mg, Subcutaneous, Q15 Min PRN, Thomas Lynch MD  •  insulin aspart (novoLOG) injection 0-7 Units, 0-7 Units, Subcutaneous, 4x Daily AC & at Bedtime, Thomas Lynch MD, 4 Units at 10/17/17 2106  •  ipratropium-albuterol (DUO-NEB) nebulizer solution 3 mL, 3 mL, Nebulization, Q4H PRN, Thomas Lynch MD  •  methylPREDNISolone sodium succinate (SOLU-Medrol) injection 20 mg, 20 mg, Intravenous, Q6H, Kwadwo Brooks MD, 20 mg at 10/18/17 0505  •  montelukast (SINGULAIR) tablet 10 mg, 10 mg, Oral, Daily, Kwadwo Brooks MD, 10 mg at 10/17/17 0820  •  sodium chloride 0.9 % flush 1-10 mL, 1-10 mL, Intravenous, PRN, Kwadwo rBooks MD  •  Insert peripheral IV, , , Once **AND** sodium chloride 0.9 % flush 10 mL, 10 mL, Intravenous, PRN, Frank Jefferson MD    albuterol 2.5 mg Nebulization Q4H - RT   azithromycin 500 mg Intravenous Q24H   ceftriaxone 1 g Intravenous Q24H   enoxaparin 40 mg Subcutaneous Daily   famotidine 20  mg Oral BID   insulin aspart 0-7 Units Subcutaneous 4x Daily AC & at Bedtime   methylPREDNISolone sodium succinate 20 mg Intravenous Q6H   montelukast 10 mg Oral Daily     dextrose  •  dextrose  •  glucagon  •  ipratropium-albuterol  •  sodium chloride  •  Insert peripheral IV **AND** sodium chloride      Results from last 7 days  Lab Units 10/16/17  2255 10/16/17  0145   WBC 10*3/mm3 18.11* 17.92*   HEMOGLOBIN g/dL 15.3 15.8   PLATELETS 10*3/mm3 274 289           Results from last 7 days  Lab Units 10/16/17  2255 10/16/17  0145   SODIUM mmol/L 140 140   POTASSIUM mmol/L 3.5 3.7   CHLORIDE mmol/L 106 102   CO2 mmol/L 26.0 22.6*   BUN mg/dL 16 18   CREATININE mg/dL 0.91 1.40*   CALCIUM mg/dL 9.0 9.2   GLUCOSE mg/dL 139* 319*         Hemoglobin A1C   Date Value Ref Range Status   10/18/2017 6.10 (H) 4.50 - 5.70 % Final       Results from last 7 days  Lab Units 10/16/17  2255 10/16/17  0145   BILIRUBIN mg/dL 0.2 0.2   ALK PHOS U/L 59 61   AST (SGOT) U/L 33 31   ALT (SGPT) U/L 48* 32       Results from last 7 days  Lab Units 10/17/17  0110 10/16/17  2255 10/16/17  0509   TROPONIN I ng/mL 0.114* 0.108* 0.061*       Results from last 7 days  Lab Units 10/16/17  2352   BNP pg/mL 66.0           Results from last 7 days  Lab Units 10/17/17  0009   PH, ARTERIAL pH units 7.394   PO2 ART mm Hg 69.1*   PCO2, ARTERIAL mm Hg 43.9   HCO3 ART mmol/L 26.2*       Imaging Results (last 72 hours)     Procedure Component Value Units Date/Time    XR Chest 1 View [[id number]] Collected:  10/17/17 0741     Updated:  10/17/17 0743    Narrative:       EXAMINATION: XR CHEST 1 VW-      CLINICAL INDICATION:     Simple Sepsis triage protocol     TECHNIQUE:  XR CHEST 1 VW-      COMPARISON: 10/16/2017      FINDINGS:   Lungs are aerated. COPD is stable.  Mild cardiomegaly appears stable.   No pneumothorax.   No pleural effusion.   No acute osseous findings.            Impression:       Stable chest. No acute changes identified.     This report was  finalized on 10/17/2017 7:41 AM by Dr. Daniel Fulton MD.             Assessment/Plan    Active Problems:    Asthma exacerbation in COPD         See orders entered.     Thomas Lynch MD  10/18/17  6:33 AM

## 2017-10-18 NOTE — PROGRESS NOTES
Discharge Planning Assessment  JAZZMINE Garcia     Patient Name: Filemon Jj  MRN: [id number]  Today's Date: 10/18/2017    Admit Date: 10/16/2017       Discharge Plan       10/18/17 1113    Case Management/Social Work Plan    Plan SS received consult for medication assistance. Discharge planning. Pt has been having issues with an inhaler. SS contacted Pharmacy per Lamar Newton to look into pt's medications. Pt lives at home and plans to return home at discharge. Pt will need [employer] for transportation. SS will follow and assist as needed.    Patient/Family In Agreement With Plan yes        Discharge Placement     No information found            Maris Angulo

## 2017-10-18 NOTE — PAYOR COMM NOTE
"Contact: Lubna Camp RN @ [facility]  Phone: [phone]  Fax: [phone]    Inpatient status  Clinical       Moustapha Escamilla (42 y.o. Male)     Date of Birth Social Security Number Address Home Phone MRN    1975  [address]  [place] [address] [phone] [id number]    [employer] Marital Status          Non-Episcopal Single       Admission Date Admission Type Admitting Provider Attending Provider Department, Room/Bed    10/17/17 Emergency Thomas Lynch MD Morton, Steven E, MD [address], 3341/1S    Discharge Date Discharge Disposition Discharge Destination                      Attending Provider: Macho Jose MD     Allergies:  No Known Allergies    Isolation:  None   Infection:  None   Code Status:  FULL    Ht:  67\" (170.2 cm)   Wt:  185 lb (83.9 kg)    Admission Cmt:  None   Principal Problem:  None                Active Insurance as of 10/16/2017     Primary Coverage     Payor Plan Insurance Group Employer/Plan Group    [facility] [employer] [facility]      Payor Plan Address Payor Plan Phone Number Effective From Effective To    [address] [address]  [address]     [place] [address]       Subscriber Name Subscriber Birth Date Member ID       MOUSTAPHA ESCAMILLA 1975 [id number]                 Emergency Contacts      (Rel.) Home Phone Work Phone Mobile Phone    Alexandra Pena (Mother) [phone] -- --               History & Physical      Kwadwo Brooks MD at 10/17/2017  6:02 AM          Patient Identification:  Name:  Moustapha Escamilla  Age:  42 y.o.  Sex:  male  :  1975  MRN:  [id number]   Visit Number:  [id number]  Primary Care Physician:  Macho Jose MD    Chief complaint:   Pt presents to ER in acute resp failure from asthma Pt resolved in ER with steroids and admitted for further eval     History of presenting illness:  42 y.o. male "   ---------------------------------------------------------------------------------------------------------------------   Review of Systems   Constitutional: Positive for activity change.   HENT: Negative.    Eyes: Negative.    Respiratory: Positive for shortness of breath and wheezing.    Cardiovascular: Negative.    Gastrointestinal: Negative.    Endocrine: Negative.    Genitourinary: Negative.    Musculoskeletal: Negative.    Skin: Negative.    Allergic/Immunologic: Negative.    Hematological: Negative.    Psychiatric/Behavioral: Negative.       ---------------------------------------------------------------------------------------------------------------------   Past Medical History:   Diagnosis Date   • Asthma    • Back pain    • COPD (chronic obstructive pulmonary disease)    • Emphysema lung    • Gastritis    • Headache    • Hypertension    • Migraine    • Substance abuse      Past Surgical History:   Procedure Laterality Date   • DENTAL PROCEDURE     • LIVER SURGERY       History reviewed. No pertinent family history.  Social History     Social History   • Marital status: Single     Spouse name: N/A   • Number of children: N/A   • Years of education: N/A     Social History Main Topics   • Smoking status: Never Smoker   • Smokeless tobacco: Current User     Types: Snuff      Comment: Pt uses vapor cigarette   • Alcohol use No      Comment: occassional    • Drug use: No   • Sexual activity: Defer     Other Topics Concern   • None     Social History Narrative     ---------------------------------------------------------------------------------------------------------------------   Allergies:  Review of patient's allergies indicates no known allergies.  ---------------------------------------------------------------------------------------------------------------------   Prior to Admission Medications     Prescriptions Last Dose Informant Patient Reported? Taking?    albuterol (PROVENTIL) (2.5 MG/3ML) 0.083%  nebulizer solution 10/16/2017 Self Yes Yes    Take 2.5 mg by nebulization 3 (Three) Times a Day.    montelukast (SINGULAIR) 10 MG tablet 10/16/2017 Self Yes Yes    Take 10 mg by mouth Daily.    predniSONE (DELTASONE) 20 MG tablet 10/16/2017 Self No Yes    Take 1 tablet by mouth 2 (Two) Times a Day.    raNITIdine (ZANTAC) 300 MG tablet 10/16/2017 Self Yes Yes    Take 300 mg by mouth 2 (Two) Times a Day.    albuterol (PROVENTIL HFA;VENTOLIN HFA) 108 (90 BASE) MCG/ACT inhaler Unknown Self No No    Inhale 2 puffs every 4 (four) hours as needed for wheezing.    Patient taking differently:  Inhale 2 puffs 2 (Two) Times a Day As Needed for Wheezing or Shortness of Air.        Hospital Scheduled Meds:    albuterol 2.5 mg Nebulization Q4H - RT   enoxaparin 40 mg Subcutaneous Daily   famotidine 20 mg Oral BID   ipratropium-albuterol 3 mL Nebulization Q4H - RT   montelukast 10 mg Oral Daily   predniSONE 20 mg Oral BID With Meals        ---------------------------------------------------------------------------------------------------------------------   Vital Signs:  Temp:  [97.7 °F (36.5 °C)-99.1 °F (37.3 °C)] 97.7 °F (36.5 °C)  Heart Rate:  [] 111  Resp:  [18-22] 20  BP: ()/(63-85) 108/67  Last 3 weights    10/16/17  4503 10/17/17  0204   Weight: 180 lb (81.6 kg) 185 lb (83.9 kg)     Body mass index is 28.98 kg/(m^2).  ---------------------------------------------------------------------------------------------------------------------   Physical exam:  Physical Exam   Constitutional: He is oriented to person, place, and time. He appears well-developed and well-nourished.   HENT:   Head: Normocephalic and atraumatic.   Eyes: EOM are normal. Pupils are equal, round, and reactive to light.   Neck: Normal range of motion. Neck supple.   Cardiovascular: Normal rate and regular rhythm.    Pulmonary/Chest: He has wheezes.   Abdominal: Soft.   Musculoskeletal: Normal range of motion.   Neurological: He is alert and  oriented to person, place, and time.   Skin: Skin is dry.   Nursing note and vitals reviewed.      ---------------------------------------------------------------------------------------------------------------------  ---------------------------------------------------------------------------------------------------------------------     Results from last 7 days  Lab Units 10/16/17  2255 10/16/17  0145   LACTATE mmol/L 1.8  --    WBC 10*3/mm3 18.11* 17.92*   HEMOGLOBIN g/dL 15.3 15.8   HEMATOCRIT % 47.5 49.1   MCV fL 86.1 88.6   MCHC g/dL 32.2* 32.2*   PLATELETS 10*3/mm3 274 289       Results from last 7 days  Lab Units 10/17/17  0009   PH, ARTERIAL pH units 7.394   PO2 ART mm Hg 69.1*   PCO2, ARTERIAL mm Hg 43.9   HCO3 ART mmol/L 26.2*       Results from last 7 days  Lab Units 10/16/17  2255 10/16/17  0145   SODIUM mmol/L 140 140   POTASSIUM mmol/L 3.5 3.7   CHLORIDE mmol/L 106 102   CO2 mmol/L 26.0 22.6*   BUN mg/dL 16 18   CREATININE mg/dL 0.91 1.40*   EGFR IF NONAFRICN AM mL/min/1.73 92 56*   CALCIUM mg/dL 9.0 9.2   GLUCOSE mg/dL 139* 319*   ALBUMIN g/dL 4.40 4.30   BILIRUBIN mg/dL 0.2 0.2   ALK PHOS U/L 59 61   AST (SGOT) U/L 33 31   ALT (SGPT) U/L 48* 32   Estimated Creatinine Clearance: 110.6 mL/min (by C-G formula based on Cr of 0.91).  No results found for: AMMONIA    Results from last 7 days  Lab Units 10/17/17  0110 10/16/17  2255 10/16/17  0509   TROPONIN I ng/mL 0.114* 0.108* 0.061*         No results found for: HGBA1C  No results found for: TSH, FREET4  No results found for: PREGTESTUR, PREGSERUM, HCG, HCGQUANT  Pain Management Panel     Pain Management Panel Latest Ref Rng & Units 7/24/2016 1/4/2015    AMPHETAMINES SCREEN, URINE Negative Negative Negative    BARBITURATES SCREEN Negative Negative Negative    BENZODIAZEPINE SCREEN, URINE Negative Negative Negative    COCAINE SCREEN, URINE Negative Negative Negative    METHADONE SCREEN, URINE Negative Negative Negative                         ---------------------------------------------------------------------------------------------------------------------  Imaging Results (last 7 days)     Procedure Component Value Units Date/Time    XR Chest 1 View [[id number]] Resulted:  10/17/17 0001     Updated:  10/17/17 0001          ---------------------------------------------------------------------------------------------------------------------  Assessment and Plan: acute resp failure due to asthma    Continue steroids abx and pulm tx    Kwadwo Brooks MD  10/17/17  6:02 AM     Electronically signed by Kwadwo Brooks MD at 10/17/2017  6:07 AM      Thomas Lynch MD at 10/17/2017  6:03 AM                Chief complaint Shortness of breath    Subjective     Patient is a 42 y.o. male presents with lifelong asthma that presented to the ER at least twice on the day of admission due to acute dyspnea. He relates several day history of increased cough and bronchospasm. He used his albuterol at home and was given steroid in the ER. Despite this, he has had ongoing symptoms and has been referred for admission. He denies productive cough, pleuritic chest pain. Recent exposures to dust or fumes or ill persons. He is unsure if he has had allergy testing or if he has had seasonal symptoms in the past. He is a nonsmoker. He previously used Advair inhaler until no longer covered by his insurance. No known cardiac disease or symptoms.    Review of Systems   Pertinent items are noted in HPI    History  Past Medical History:   Diagnosis Date   • Asthma    • Back pain    • COPD (chronic obstructive pulmonary disease)    • Emphysema lung    • Gastritis    • Headache    • Hypertension    • Migraine    • Substance abuse      Past Surgical History:   Procedure Laterality Date   • DENTAL PROCEDURE     • LIVER SURGERY       History reviewed. No pertinent family history.  Social History   Substance Use Topics   • Smoking status: Never Smoker   • Smokeless tobacco: Current  User     Types: Snuff      Comment: Pt uses vapor cigarette   • Alcohol use No      Comment: occassional      Prescriptions Prior to Admission   Medication Sig Dispense Refill Last Dose   • albuterol (PROVENTIL) (2.5 MG/3ML) 0.083% nebulizer solution Take 2.5 mg by nebulization 3 (Three) Times a Day.   10/16/2017 at pm   • montelukast (SINGULAIR) 10 MG tablet Take 10 mg by mouth Daily.   10/16/2017 at am   • predniSONE (DELTASONE) 20 MG tablet Take 1 tablet by mouth 2 (Two) Times a Day. 6 tablet 0 10/16/2017 at am   • raNITIdine (ZANTAC) 300 MG tablet Take 300 mg by mouth 2 (Two) Times a Day.   10/16/2017 at 1400   • albuterol (PROVENTIL HFA;VENTOLIN HFA) 108 (90 BASE) MCG/ACT inhaler Inhale 2 puffs every 4 (four) hours as needed for wheezing. (Patient taking differently: Inhale 2 puffs 2 (Two) Times a Day As Needed for Wheezing or Shortness of Air.) 3.7 g 0 Unknown at Unknown time     Allergies:  Review of patient's allergies indicates no known allergies.    Objective     Vital Signs  Temp:  [97.7 °F (36.5 °C)-99.1 °F (37.3 °C)] 97.7 °F (36.5 °C)  Heart Rate:  [] 111  Resp:  [18-22] 20  BP: ()/(63-85) 108/67    Physical Exam:      General Appearance:    Alert, cooperative, in no acute distress   Head:    Normocephalic, without obvious abnormality, atraumatic   Eyes:            Lids and lashes normal, conjunctivae and sclerae normal, no   icterus, no pallor, corneas clear, PERRLA   Ears:    Ears appear intact with no abnormalities noted   Throat:   No oral lesions, no thrush, oral mucosa moist. Poor dentition   Neck:   No adenopathy, supple, trachea midline, no thyromegaly, no   carotid bruit, no JVD   Back:     No kyphosis present, no scoliosis present, no skin lesions,      erythema or scars, no tenderness to percussion or                   palpation,   range of motion normal   Lungs:     Bilateral wheeze in all lung fields    Heart:    Regular rhythm and normal rate, normal S1 and S2, no             murmur, no gallop, no rub, no click   Chest Wall:    No abnormalities observed   Abdomen:     Normal bowel sounds, no masses, no organomegaly, soft        non-tender, non-distended, no guarding, no rebound                tenderness   Rectal:     Deferred   Extremities:   Moves all extremities well, no edema, no cyanosis, no             redness   Pulses:   Pulses palpable and equal bilaterally   Skin:   No bleeding, bruising or rash   Lymph nodes:   No palpable adenopathy   Neurologic:   Cranial nerves 2 - 12 grossly intact, sensation intact, DTR       present and equal bilaterally       Results Review:    I reviewed the patient's clinical results from admission:  Imaging Results (last 72 hours)     Procedure Component Value Units Date/Time    XR Chest 1 View [[id number]] Resulted:  10/17/17 0001     Updated:  10/17/17 0001        Lab Results (last 72 hours)     Procedure Component Value Units Date/Time    CBC & Differential [[id number]] Collected:  10/16/17 2255    Specimen:  Blood Updated:  10/16/17 2304    Narrative:       The following orders were created for panel order CBC & Differential.  Procedure                               Abnormality         Status                     ---------                               -----------         ------                     CBC Auto Differential[[id number]]        Abnormal            Final result                 Please view results for these tests on the individual orders.    CBC Auto Differential [[id number]]  (Abnormal) Collected:  10/16/17 2255    Specimen:  Blood from Arm, Right Updated:  10/16/17 2304     WBC 18.11 (H) 10*3/mm3      RBC 5.52 10*6/mm3      Hemoglobin 15.3 g/dL      Hematocrit 47.5 %      MCV 86.1 fL      MCH 27.7 pg      MCHC 32.2 (L) g/dL      RDW 14.9 (H) %      RDW-SD 47.3 fl      MPV 10.3 (H) fL      Platelets 274 10*3/mm3      Neutrophil % 71.0 (H) %      Lymphocyte % 17.2 (L) %      Monocyte % 10.5 (H) %      Eosinophil % 0.2 %      Basophil % 0.2 %       Immature Grans % 0.9 (H) %      Neutrophils, Absolute 12.88 (H) 10*3/mm3      Lymphocytes, Absolute 3.11 (H) 10*3/mm3      Monocytes, Absolute 1.90 (H) 10*3/mm3      Eosinophils, Absolute 0.03 10*3/mm3      Basophils, Absolute 0.03 10*3/mm3      Immature Grans, Absolute 0.16 (H) 10*3/mm3     Lactic Acid, Plasma [[id number]]  (Normal) Collected:  10/16/17 2255    Specimen:  Blood from Arm, Right Updated:  10/16/17 2326     Lactate 1.8 mmol/L     Comprehensive Metabolic Panel [[id number]]  (Abnormal) Collected:  10/16/17 2255    Specimen:  Blood from Arm, Right Updated:  10/16/17 2336     Glucose 139 (H) mg/dL      BUN 16 mg/dL      Creatinine 0.91 mg/dL      Sodium 140 mmol/L      Potassium 3.5 mmol/L      Chloride 106 mmol/L      CO2 26.0 mmol/L      Calcium 9.0 mg/dL      Total Protein 7.3 g/dL      Albumin 4.40 g/dL      ALT (SGPT) 48 (H) U/L      AST (SGOT) 33 U/L      Alkaline Phosphatase 59 U/L       Note New Reference Ranges        Total Bilirubin 0.2 mg/dL      eGFR Non African Amer 92 mL/min/1.73      Globulin 2.9 gm/dL      A/G Ratio 1.5 g/dL      BUN/Creatinine Ratio 17.6     Anion Gap 8.0 mmol/L     Osmolality, Calculated [[id number]]  (Normal) Collected:  10/16/17 2255    Specimen:  Blood from Arm, Right Updated:  10/16/17 2336     Osmolality Calc 282.8 mOsm/kg     Blood Culture - Blood, [[id number]] Collected:  10/16/17 2255    Specimen:  Blood from Arm, Right Updated:  10/16/17 2340    Lavender Top [[id number]] Collected:  10/16/17 2255    Specimen:  Blood from Arm, Right Updated:  10/17/17 0002     Extra Tube hold for add-on      Auto resulted       [facility] Draw [[id number]] Collected:  10/16/17 2255    Specimen:  Blood Updated:  10/17/17 0002    Narrative:       The following orders were created for panel order [facility] Draw.  Procedure                               Abnormality         Status                     ---------                               -----------         ------                     Light  Blue Top[[id number]]                                   Final result               Green Top (Gel)[[id number]]                                  Final result               Lavender Top[[id number]]                                     Final result               Gold Top - SST[[id number]]                                   Final result                 Please view results for these tests on the individual orders.    Light Blue Top [[id number]] Collected:  10/16/17 2255    Specimen:  Blood from Arm, Right Updated:  10/17/17 0002     Extra Tube hold for add-on      Auto resulted       Green Top (Gel) [[id number]] Collected:  10/16/17 2255    Specimen:  Blood from Arm, Right Updated:  10/17/17 0002     Extra Tube Hold for add-ons.      Auto resulted.       Gold Top - SST [[id number]] Collected:  10/16/17 2255    Specimen:  Blood from Arm, Right Updated:  10/17/17 0002     Extra Tube Hold for add-ons.      Auto resulted.       Blood Gas, Arterial [[id number]]  (Abnormal) Collected:  10/17/17 0009    Specimen:  Arterial Blood Updated:  10/17/17 0013     Site Arterial: right radial     Hank's Test Positive     pH, Arterial 7.394 pH units      pCO2, Arterial 43.9 mm Hg      pO2, Arterial 69.1 (L) mm Hg      HCO3, Arterial 26.2 (H) mmol/L      Base Excess, Arterial 1.0 mmol/L      O2 Saturation, Arterial 94.8 %      Hemoglobin, Blood Gas 15.9 g/dL      Hematocrit, Blood Gas 47.0 %      Oxyhemoglobin 93.5 %      Methemoglobin 0.50 %      Carboxyhemoglobin 0.9 %      A-a Gradiant 21.3 mmHg      Temperature 98.6 C      Barometric Pressure for Blood Gas 728 mmHg      Modality Room Air     FIO2 21 %     Troponin [[id number]]  (Abnormal) Collected:  10/16/17 2255    Specimen:  Blood from Arm, Right Updated:  10/17/17 0017     Troponin I 0.108 (H) ng/mL     Narrative:       Ultra Troponin I Reference Range:         <=0.039 ng/mL: Negative    0.04-0.779 ng/mL: Indeterminate Range. Suspicious of MI.  Clinical correlation required.       >=0.78   ng/mL: Consistent with myocardial injury.  Clinical correlation required.    BNP [[id number]]  (Normal) Collected:  10/16/17 2352    Specimen:  Blood Updated:  10/17/17 0031     BNP 66.0 pg/mL     Blood Culture - Blood, [[id number]] Collected:  10/17/17 0014    Specimen:  Blood from Arm, Right Updated:  10/17/17 0052    Urinalysis With / Culture If Indicated - Urine, Clean Catch [[id number]]  (Abnormal) Collected:  10/17/17 0047    Specimen:  Urine from Urine, Clean Catch Updated:  10/17/17 0053     Color, UA Yellow     Appearance, UA Clear     pH, UA <=5.0     Specific Gravity, UA 1.029     Glucose,  mg/dL (Trace) (A)     Ketones, UA Negative     Bilirubin, UA Negative     Blood, UA Negative     Protein, UA Negative     Leuk Esterase, UA Negative     Nitrite, UA Negative     Urobilinogen, UA 1.0 E.U./dL    Narrative:       Urine microscopic not indicated.    Troponin [[id number]]  (Abnormal) Collected:  10/17/17 0110    Specimen:  Blood Updated:  10/17/17 0148     Troponin I 0.114 (H) ng/mL     Narrative:       Ultra Troponin I Reference Range:         <=0.039 ng/mL: Negative    0.04-0.779 ng/mL: Indeterminate Range. Suspicious of MI.  Clinical correlation required.       >=0.78  ng/mL: Consistent with myocardial injury.  Clinical correlation required.              Assessment/Plan     Active Problems:    Asthma exacerbation in COPD          I discussed the patients findings and my recommendations with patient.     Thomas Lynch MD  10/17/17  6:04 AM    Time: More than 50% of time spent in counseling and coordination of care:  Total face-to-face/floor time 30 min.  Time spent in counseling 10 min. Counseling included the following topics: diagnosis and treatment plans.     Electronically signed by Thomas Lynch MD at 10/17/2017  6:08 AM           Emergency Department Notes      Frank Jefferson MD at 10/16/2017 11:46 PM          Subjective   History of Present Illness  43 y/o M w/h/o asthma and COPD  p/w continued SOA. Pt was seen earlier on day of presentation and discharged home in good condition. Pt states that he would like a breathing treatment and then to be discharged home. Pt states he did get his medication filled from earlier visit. Pt denies any CP. +wheeze and SOA. During previous ED visit pt was found to have elevated Tn but did not experience any CP.  Review of Systems   Constitutional: Negative for chills, fatigue and fever.   Eyes: Negative for photophobia and visual disturbance.   Respiratory: Positive for cough, shortness of breath and wheezing. Negative for chest tightness.    Cardiovascular: Negative for chest pain, palpitations and leg swelling.   Gastrointestinal: Negative for abdominal distention and abdominal pain.   Genitourinary: Negative for difficulty urinating.   Musculoskeletal: Negative for back pain and neck pain.   Skin: Negative for color change and pallor.   All other systems reviewed and are negative.      Past Medical History:   Diagnosis Date   • Asthma    • Back pain    • COPD (chronic obstructive pulmonary disease)    • Emphysema lung    • Gastritis    • Headache    • Hypertension    • Migraine    • Substance abuse        No Known Allergies    Past Surgical History:   Procedure Laterality Date   • DENTAL PROCEDURE     • LIVER SURGERY         No family history on file.    Social History     Social History   • Marital status: Single     Spouse name: N/A   • Number of children: N/A   • Years of education: N/A     Social History Main Topics   • Smoking status: Never Smoker   • Smokeless tobacco: Current User     Types: Snuff      Comment: Pt uses vapor cigarette   • Alcohol use No      Comment: occassional    • Drug use: No   • Sexual activity: Defer     Other Topics Concern   • Not on file     Social History Narrative           Objective   Physical Exam   Constitutional: He is oriented to person, place, and time. He appears well-developed and well-nourished. He is active.    HENT:   Head: Normocephalic and atraumatic.   Right Ear: Hearing and external ear normal.   Left Ear: Hearing and external ear normal.   Nose: Nose normal.   Mouth/Throat: Uvula is midline, oropharynx is clear and moist and mucous membranes are normal.   Eyes: Conjunctivae, EOM and lids are normal. Pupils are equal, round, and reactive to light.   Neck: Trachea normal, normal range of motion, full passive range of motion without pain and phonation normal. Neck supple.   Cardiovascular: Normal rate, regular rhythm and normal heart sounds.    Pulmonary/Chest: Effort normal. He has no decreased breath sounds. He has wheezes.   Abdominal: Normal appearance.   Neurological: He is alert and oriented to person, place, and time. GCS eye subscore is 4. GCS verbal subscore is 5. GCS motor subscore is 6.   Skin: Skin is warm, dry and intact.   Psychiatric: He has a normal mood and affect. His speech is normal and behavior is normal. Cognition and memory are normal.   Nursing note and vitals reviewed.      Procedures        ED Course  ED Course   Value Comment By Time   ECG 12 Lead Sinus rhythm, 116 bpm. QTc 450ms. No acute ST segment abnormalities concerning for STEMI. No pathologic blocks or dysrhythmias. Frank Jefferson MD 10/17 0016      Pt improved with IV steroids and duoneb x 3. Pt denies any CP prior to or during ED visit. Pt refuses ASA despite warnings against doing so b/c he states it makes him SOA. Pt has no h/o MI. Pt states that he is willing to stay overnight for continued asthma exacerbation management.            MDM  Number of Diagnoses or Management Options  Asthma exacerbation in COPD: established and worsening  Elevated troponin I level: established and worsening     Amount and/or Complexity of Data Reviewed  Clinical lab tests: reviewed and ordered  Tests in the radiology section of CPT®:  reviewed and ordered  Tests in the medicine section of CPT®:  reviewed and ordered  Decide to obtain  previous medical records or to obtain history from someone other than the patient: yes  Discuss the patient with other providers: yes  Independent visualization of images, tracings, or specimens: yes    Risk of Complications, Morbidity, and/or Mortality  Presenting problems: high  Diagnostic procedures: high  Management options: high    Patient Progress  Patient progress: stable      Final diagnoses:   Asthma exacerbation in COPD   Elevated troponin I level            Frank Jefferson MD  10/17/17 0113       Electronically signed by Frank Jefferson MD at 10/17/2017  1:13 AM      Miguelangel Amezquita at 10/17/2017 12:16 AM          EKG performed by tech @ 00:11. Given to Dr. Ronny Amezquita  10/17/17 0016       Electronically signed by Miguelangel Amezquita at 10/17/2017 12:16 AM           Physician Progress Notes (last 24 hours) (Notes from 10/17/2017  1:38 PM through 10/18/2017  1:38 PM)      Thomas Lynch MD at 10/18/2017  6:33 AM  Version 1 of 1              LOS: 0 days   Interval History: Awake and alert. Hard of hearing . Breathing better. No chest pain. No nausea or vomiting. ECHO noted and discussed with patient. He will be referr to CT surgeon veronica respiratory status permits. TSH low, will evaluate this as well. No other reported acute events.Mul;tiple labs pending.      History taken from: patient chart    Review of Systems:     Review of Systems - Negative except present illness    Objective     Vital Signs  Temp:  [97.8 °F (36.6 °C)-98.8 °F (37.1 °C)] 98 °F (36.7 °C)  Heart Rate:  [] 96  Resp:  [16-24] 18  BP: (114-142)/(62-79) 121/65    Physical Exam:     General Appearance:    Alert, cooperative, in no acute distress   Head:    Normocephalic, without obvious abnormality, atraumatic   Eyes:            Lids and lashes normal, conjunctivae and sclerae normal, no   icterus, no pallor, corneas clear, PERRLA   Ears:    Ears appear intact with no abnormalities noted   Throat:   No  oral lesions, no thrush, oral mucosa moist. Poor dentition   Neck:   No adenopathy, supple, trachea midline, no thyromegaly, no   carotid bruit, no JVD   Back:     No kyphosis present, no scoliosis present, no skin lesions,      erythema or scars, no tenderness to percussion or                   palpation,   range of motion normal   Lungs:     Bilateral wheeze in all lung fields    Heart:    Regular rhythm and normal rate, normal S1 and S2, no            murmur, no gallop, no rub, no click   Chest Wall:    No abnormalities observed   Abdomen:     Normal bowel sounds, no masses, no organomegaly, soft        non-tender, non-distended, no guarding, no rebound                tenderness   Rectal:     Deferred   Extremities:   Moves all extremities well, no edema, no cyanosis, no             redness   Pulses:   Pulses palpable and equal bilaterally   Skin:   No bleeding, bruising or rash   Lymph nodes:   No palpable adenopathy   Neurologic:   Cranial nerves 2 - 12 grossly intact, sensation intact, DTR       present and equal bilaterally              Results Review:     I reviewed the patient's new clinical results  : See Below    Medication Review:     Current Facility-Administered Medications:   •  albuterol (PROVENTIL) nebulizer solution 0.083% 2.5 mg/3mL, 2.5 mg, Nebulization, Q4H - RT, Kwadwo Brooks MD, 2.5 mg at 10/18/17 0230  •  AZITHROMYCIN 500 MG/250 ML 0.9% NS IVPB (MBP), 500 mg, Intravenous, Q24H, Thomas Lynch MD, Stopped at 10/17/17 2307  •  cefTRIAXone (ROCEPHIN) 1 g/100 mL 0.9% NS (MBP), 1 g, Intravenous, Q24H, Thomas Lynch MD, Stopped at 10/18/17 0107  •  dextrose (D50W) solution 25 g, 25 g, Intravenous, Q15 Min PRN, Thomas Lynch MD  •  dextrose (GLUTOSE) oral gel 15 g, 15 g, Oral, Q15 Min PRN, Thomas Lynch MD  •  enoxaparin (LOVENOX) syringe 40 mg, 40 mg, Subcutaneous, Daily, Kwadwo Brooks MD  •  famotidine (PEPCID) tablet 20 mg, 20 mg, Oral, BID, Kwadwo Brooks MD, 20 mg at 10/17/17  1715  •  glucagon (GLUCAGEN) injection 1 mg, 1 mg, Subcutaneous, Q15 Min PRN, Thomas Lynch MD  •  insulin aspart (novoLOG) injection 0-7 Units, 0-7 Units, Subcutaneous, 4x Daily AC & at Bedtime, Thomas Lynch MD, 4 Units at 10/17/17 2106  •  ipratropium-albuterol (DUO-NEB) nebulizer solution 3 mL, 3 mL, Nebulization, Q4H PRN, Thomas Lynch MD  •  methylPREDNISolone sodium succinate (SOLU-Medrol) injection 20 mg, 20 mg, Intravenous, Q6H, Kwadwo Brooks MD, 20 mg at 10/18/17 0505  •  montelukast (SINGULAIR) tablet 10 mg, 10 mg, Oral, Daily, Kwadwo Brooks MD, 10 mg at 10/17/17 0820  •  sodium chloride 0.9 % flush 1-10 mL, 1-10 mL, Intravenous, PRN, Kwadwo Brooks MD  •  Insert peripheral IV, , , Once **AND** sodium chloride 0.9 % flush 10 mL, 10 mL, Intravenous, PRN, Frank Jefferson MD    albuterol 2.5 mg Nebulization Q4H - RT   azithromycin 500 mg Intravenous Q24H   ceftriaxone 1 g Intravenous Q24H   enoxaparin 40 mg Subcutaneous Daily   famotidine 20 mg Oral BID   insulin aspart 0-7 Units Subcutaneous 4x Daily AC & at Bedtime   methylPREDNISolone sodium succinate 20 mg Intravenous Q6H   montelukast 10 mg Oral Daily     dextrose  •  dextrose  •  glucagon  •  ipratropium-albuterol  •  sodium chloride  •  Insert peripheral IV **AND** sodium chloride      Results from last 7 days  Lab Units 10/16/17  2255 10/16/17  0145   WBC 10*3/mm3 18.11* 17.92*   HEMOGLOBIN g/dL 15.3 15.8   PLATELETS 10*3/mm3 274 289           Results from last 7 days  Lab Units 10/16/17  2255 10/16/17  0145   SODIUM mmol/L 140 140   POTASSIUM mmol/L 3.5 3.7   CHLORIDE mmol/L 106 102   CO2 mmol/L 26.0 22.6*   BUN mg/dL 16 18   CREATININE mg/dL 0.91 1.40*   CALCIUM mg/dL 9.0 9.2   GLUCOSE mg/dL 139* 319*         Hemoglobin A1C   Date Value Ref Range Status   10/18/2017 6.10 (H) 4.50 - 5.70 % Final       Results from last 7 days  Lab Units 10/16/17  9925 10/16/17  0145   BILIRUBIN mg/dL 0.2 0.2   ALK PHOS U/L 59 61   AST  (SGOT) U/L 33 31   ALT (SGPT) U/L 48* 32       Results from last 7 days  Lab Units 10/17/17  0110 10/16/17  2255 10/16/17  0509   TROPONIN I ng/mL 0.114* 0.108* 0.061*       Results from last 7 days  Lab Units 10/16/17  2352   BNP pg/mL 66.0           Results from last 7 days  Lab Units 10/17/17  0009   PH, ARTERIAL pH units 7.394   PO2 ART mm Hg 69.1*   PCO2, ARTERIAL mm Hg 43.9   HCO3 ART mmol/L 26.2*       Imaging Results (last 72 hours)     Procedure Component Value Units Date/Time    XR Chest 1 View [[id number]] Collected:  10/17/17 0741     Updated:  10/17/17 0743    Narrative:       EXAMINATION: XR CHEST 1 VW-      CLINICAL INDICATION:     Simple Sepsis triage protocol     TECHNIQUE:  XR CHEST 1 VW-      COMPARISON: 10/16/2017      FINDINGS:   Lungs are aerated. COPD is stable.  Mild cardiomegaly appears stable.   No pneumothorax.   No pleural effusion.   No acute osseous findings.            Impression:       Stable chest. No acute changes identified.     This report was finalized on 10/17/2017 7:41 AM by Dr. Daniel Fulton MD.             Assessment/Plan    Active Problems:    Asthma exacerbation in COPD         See orders entered.     Thomas Lynch MD  10/18/17  6:33 AM           Electronically signed by Thomas Lynch MD at 10/18/2017  6:35 AM        All medication doses during the admission are shown, including meds that are no longer on order.     Scheduled Meds Sorted by Name for Parviz Filemon as of 10/18/17 1338       1 Day 3 Days 7 Days 10 Days < Today >    Legend:                           Inactive     Active     Other Encounter    Linked               Medications 10/09 10/10 10/11 10/12 10/13 10/14 10/15 10/16 10/17 10/18   albuterol (PROVENTIL) nebulizer solution 0.083% 2.5 mg/3mL  Dose: 2.5 mg Freq: Every 4 Hours - RT Route: NEBULIZATION  Start: 10/17/17 0730            0653       1023       1130       1422       1530       1841       2210        0230       0639       1130       1530        1930       2300          aspirin chewable tablet 324 mg  Dose: 324 mg Freq: Once Route: PO  Start: 10/17/17 0021 End: 10/17/17 0106    Admin Instructions:   Herbal/drug interaction: Avoid use with ginkgo biloba.  Do not exceed 4 grams of aspirin in a 24 hr period.    If given for pain, use the following pain scale:   Mild Pain = Pain Score of 1-3, CPOT 1-2  Moderate Pain = Pain Score of 4-6, CPOT 3-4  Severe Pain = Pain Score of 7-10, CPOT 5-8            (0039) [C]       0106-D/C'd       azithromycin (ZITHROMAX) tablet 500 mg  Dose: 500 mg Freq: Once Route: PO  Indications of Use: SEPSIS  Start: 10/17/17 0022 End: 10/17/17 0042    Admin Instructions:   Do not administer concurrently with aluminum or magnesium antacids. Take with food if GI upset occurs.            0042           AZITHROMYCIN 500 MG/250 ML 0.9% NS IVPB (MBP)  Dose: 500 mg Freq: Every 24 Hours Route: IV  Indications of Use: UPPER RESPIRATORY TRACT INFECTION  Last Dose: Stopped (10/17/17 2307)  Start: 10/17/17 2100            2057       2307        2100          cefTRIAXone (ROCEPHIN) 1 g/100 mL 0.9% NS (MBP)  Dose: 1 g Freq: Every 24 Hours Route: IV  Indications of Use: UPPER RESPIRATORY TRACT INFECTION  Last Dose: Stopped (10/18/17 0107)  Start: 10/17/17 2200    Admin Instructions:   Activate vial before using.            2303 0107 2200          cefTRIAXone (ROCEPHIN) 1 g/100 mL 0.9% NS (MBP)  Dose: 1 g Freq: Once Route: IV  Indications of Use: SEPSIS  Last Dose: Stopped (10/17/17 0142)  Start: 10/17/17 0022 End: 10/17/17 0142    Admin Instructions:   Activate vial before using.            0041       0142           enoxaparin (LOVENOX) syringe 40 mg  Dose: 40 mg Freq: Daily Route: SC  Start: 10/17/17 0900    Admin Instructions:   Give subcutaneous in abdomen only. Do not massage site after injection. Do not change dose time until after 48 hrs.            (6122)        (0814)          enoxaparin (LOVENOX) syringe 60 mg  Dose: 60 mg  Freq: Daily Route: SC  Start: 10/17/17 0900 End: 10/17/17 0600    Admin Instructions:   Give subcutaneous in abdomen only. Do not massage site after injection. Do not change dose time until after 48 hrs.            0600-D/C'd       famotidine (PEPCID) injection 20 mg  Dose: 20 mg Freq: Once Route: IV  Start: 10/16/17 2353 End: 10/17/17 0031    Admin Instructions:   Dilute to 10 mL total volume and give IV push over 2 minutes.  Dilute to 10 mL total volume with 0.9% NaCl and give IV push over 2 minutes.            0031           famotidine (PEPCID) tablet 20 mg  Dose: 20 mg Freq: 2 Times Daily Route: PO  Start: 10/17/17 0900            0820       1715        0813       1800          insulin aspart (novoLOG) injection 0-7 Units  Dose: 0-7 Units Freq: 4 Times Daily Before Meals & Nightly Route: SC  Start: 10/17/17 0730    Admin Instructions:   Correction - Low Dose.  Less than 40 units/day total insulin dose or lean, elderly, renal patients    Blood glucose 150-199 mg/dL - 2 units  Blood glucose 200-249 mg/dL - 3 units  Blood glucose 250-299 mg/dL - 4 units  Blood glucose 300-349 mg/dL - 5 units  Blood glucose 350-400 mg/dL - 6 units  Blood glucose greater than 400 mg/dL - 7 units and call provider              (5382) (6495) [C]       3439 0359 (2497) 0180 1526       2100          iopamidol (ISOVUE-370) 76 % injection 100 mL  Dose: 100 mL Freq: Once in Imaging Route: IV  Start: 10/18/17 1145 End: 10/18/17 1328             1328 [C]          ipratropium-albuterol (DUO-NEB) nebulizer solution 3 mL  Dose: 3 mL Freq: 4 Times Daily - RT Route: NEBULIZATION  Start: 10/17/17 0830 End: 10/17/17 0755            0755-D/C'd  0830           ipratropium-albuterol (DUO-NEB) nebulizer solution 3 mL  Dose: 3 mL Freq: Every 4 Hours - RT Route: NEBULIZATION  Start: 10/17/17 0330 End: 10/17/17 0618            0303       0618-D/C'd       ipratropium-albuterol (DUO-NEB) nebulizer solution 3 mL  Dose: 3 mL  Freq: Once Route: NEBULIZATION  Start: 10/17/17 0052 End: 10/17/17 0052            0052           ipratropium-albuterol (DUO-NEB) nebulizer solution 3 mL  Dose: 3 mL Freq: Once Route: NEBULIZATION  Start: 10/17/17 0022 End: 10/17/17 0027            0027           ipratropium-albuterol (DUO-NEB) nebulizer solution 3 mL  Dose: 3 mL Freq: Once Route: NEBULIZATION  Start: 10/16/17 2353 End: 10/16/17 2355           2355            ipratropium-albuterol (DUO-NEB) nebulizer solution 3 mL  Dose: 3 mL Freq: Once Route: NEBULIZATION  Start: 10/16/17 0750 End: 10/16/17 0807           0807            ipratropium-albuterol (DUO-NEB) nebulizer solution 3 mL  Dose: 3 mL Freq: Once Route: NEBULIZATION  Start: 10/16/17 0324 End: 10/16/17 0333           0333            ipratropium-albuterol (DUO-NEB) nebulizer solution 3 mL  Dose: 3 mL Freq: Once Route: NEBULIZATION  Start: 10/16/17 0148 End: 10/16/17 0152           0152            methylPREDNISolone sodium succinate (SOLU-Medrol) injection 125 mg  Dose: 125 mg Freq: Once Route: IV  Start: 10/16/17 2353 End: 10/17/17 0030            0030           methylPREDNISolone sodium succinate (SOLU-Medrol) injection 125 mg  Dose: 125 mg Freq: Once Route: IV  Start: 10/16/17 0148 End: 10/16/17 0147           0147            methylPREDNISolone sodium succinate (SOLU-Medrol) injection 20 mg  Dose: 20 mg Freq: Every 6 Hours Scheduled Route: IV  Start: 10/17/17 1200 End: 10/18/17 0644            1257       1715       2359        0505       0644-D/C'd      methylPREDNISolone sodium succinate (SOLU-Medrol) injection 60 mg  Dose: 60 mg Freq: Every 6 Hours Scheduled Route: IV  Start: 10/17/17 0730 End: 10/17/17 0749            0749-D/C'd  0823           methylPREDNISolone sodium succinate (SOLU-Medrol) injection 60 mg  Dose: 60 mg Freq: Every 6 Hours Route: IV  Start: 10/17/17 0600 End: 10/17/17 0600            (0600)       0600-D/C'd       montelukast (SINGULAIR) tablet 10 mg  Dose: 10 mg Freq: Daily  Route: PO  Start: 10/17/17 0900            0820        0813          predniSONE (DELTASONE) tablet 20 mg  Dose: 20 mg Freq: 2 Times Daily With Meals Route: PO  Start: 10/17/17 0800 End: 10/17/17 0612    Admin Instructions:   Take with food.            0612-D/C'd       predniSONE (DELTASONE) tablet 30 mg  Dose: 30 mg Freq: Daily With Breakfast Route: PO  Start: 10/18/17 0800    Admin Instructions:   Take with food.             0813          sodium chloride 0.9 % bolus 1,000 mL  Dose: 1,000 mL Freq: Once Route: IV  Last Dose: Stopped (10/17/17 0230)  Start: 10/17/17 0053 End: 10/17/17 0230            0148       0230           sodium chloride 0.9 % bolus 1,000 mL  Dose: 1,000 mL Freq: Once Route: IV  Last Dose: Stopped (10/17/17 0142)  Start: 10/16/17 2353 End: 10/17/17 0142            0027       0142           sodium chloride 0.9 % bolus 500 mL  Dose: 500 mL Freq: Once Route: IV  Last Dose: Stopped (10/16/17 0401)  Start: 10/16/17 0244 End: 10/16/17 0401           0253       0401            Medications 10/09 10/10 10/11 10/12 10/13 10/14 10/15 10/16 10/17 10/18         Continuous Meds Sorted by Name for Filemon Jj as of 10/18/17 1338      Legend:         Inactive     Active     Other Encounter    Linked               Medications 10/09 10/10 10/11 10/12 10/13 10/14 10/15 10/16 10/17 10/18   sodium chloride 0.9 % infusion  Rate: 75 mL/hr Freq: Continuous Route: IV  Last Dose: Stopped (10/17/17 0605)  Start: 10/17/17 0315 End: 10/17/17 0600            0315       0600-D/C'd  0605                 PRN Meds Sorted by Name for Filemon Jj as of 10/18/17 1338      Legend:         Inactive     Active     Other Encounter    Linked               Medications 10/09 10/10 10/11 10/12 10/13 10/14 10/15 10/16 10/17 10/18   dextrose (D50W) solution 25 g  Dose: 25 g Freq: Every 15 Minutes PRN Route: IV  PRN Reason: Low Blood Sugar  PRN Comment: Blood Sugar Less Than 70, Patient Has IV Access - Unresponsive, NPO or Unable To  Safely Swallow  Start: 10/17/17 0612                dextrose (GLUTOSE) oral gel 15 g  Dose: 15 g Freq: Every 15 Minutes PRN Route: PO  PRN Reason: Low Blood Sugar  PRN Comment: Blood Sugar Less Than 70, Patient Alert, Is Not NPO & Can Safely Swallow  Start: 10/17/17 0612                glucagon (GLUCAGEN) injection 1 mg  Dose: 1 mg Freq: Every 15 Minutes PRN Route: SC  PRN Comment: Blood Glucose Less Than 70 - Patient Without IV Access - Unresponsive, NPO or Unable To Safely Swallow  Start: 10/17/17 0619                glucagon (human recombinant) (GLUCAGEN DIAGNOSTIC) injection 1 mg  Dose: 1 mg Freq: Every 15 Minutes PRN Route: SC  PRN Comment: Blood Glucose Less Than 70 - Patient Without IV Access - Unresponsive, NPO or Unable To Safely Swallow  Start: 10/17/17 0612 End: 10/17/17 0618            0618-D/C'd       ipratropium-albuterol (DUO-NEB) nebulizer solution 3 mL  Dose: 3 mL Freq: Every 4 Hours PRN Route: NEBULIZATION  PRN Reason: Shortness of Air  Start: 10/17/17 0239                sodium chloride 0.9 % flush 1-10 mL  Dose: 1-10 mL Freq: As Needed Route: IV  PRN Reason: Line Care  Start: 10/17/17 0558                sodium chloride 0.9 % flush 10 mL  Dose: 10 mL Freq: As Needed Route: IV  PRN Reason: Line Care  Start: 10/16/17 2350                sodium chloride 0.9 % flush 10 mL  Dose: 10 mL Freq: As Needed Route: IV  PRN Reason: Line Care  Start: 10/16/17 0145 End: 10/16/17 1043           1043-D/C'd        Medications 10/09 10/10 10/11 10/12 10/13 10/14 10/15 10/16 10/17 10/18           Consult Notes (last 24 hours) (Notes from 10/17/2017  1:38 PM through 10/18/2017  1:38 PM)     No notes of this type exist for this encounter.

## 2017-10-18 NOTE — PROGRESS NOTES
Dr. Jose agreed to sending a script for Breo inhaler.  Patient asked for script to be sent to apothecary and to be signed up for meds to bed because he doesn't have transportation after he gets home from discharge.    Lamar Anthony [facility]  10/18/17  3:41 PM

## 2017-10-18 NOTE — PLAN OF CARE
Problem: Asthma (Adult)  Intervention: Support Effective Ventilation/Oxygenation    10/18/17 1300 10/18/17 1401   Safety Interventions   Medication Review/Management --  medications reviewed   Activity   Activity Type up ad maria e --        Intervention: Support/Optimize Psychosocial Response to Condition    10/17/17 2000 10/18/17 0800   Coping Strategies   Family/Support System Care --  support provided   Supportive Measures --  active listening utilized;decision-making supported;positive reinforcement provided;verbalization of feelings encouraged   Coping/Psychosocial Interventions   Environmental Support calm environment promoted;distractions minimized --        Intervention: Prevent Recurrent Asthma Exacerbation    10/17/17 2000   Cognitive Interventions   Sensory Stimulation Regulation care clustered;lighting decreased         Goal: Signs and Symptoms of Listed Potential Problems Will be Absent or Manageable (Asthma)  Outcome: Ongoing (interventions implemented as appropriate)    10/18/17 1401   Asthma   Problems Assessed (Asthma) all         Problem: Fall Risk (Adult)  Intervention: Monitor/Assist with Self Care    10/17/17 2000 10/18/17 0800 10/18/17 1300   Monitor/Assist with Self Care   Ambulation --  0-->independent --    Transferring --  0-->independent --    Toileting --  0-->independent --    Bathing --  0-->independent --    Dressing --  0-->independent --    Eating --  0-->independent --    Communication --  0-->understands/communicates without difficulty --    Swallowing (if score 2 or more for any item, consult Rehab Services) --  0-->swallows foods/liquids without difficulty --    Activity   Activity Type --  --  up ad maria e   Activity Level of Assistance --  --  independent   Musculoskeletal Interventions   Self-Care Promotion independence encouraged --  --        Intervention: Reduce Risk/Promote Restraint Free Environment    10/18/17 1300   Safety Interventions   Environmental Safety Modification  assistive device/personal items within reach;clutter free environment maintained   Restraint Interventions   Safety Promotion/Fall Prevention safety round/check completed;fall prevention program maintained;nonskid shoes/slippers when out of bed       Intervention: Review Medications/Identify Contributors to Fall Risk    10/18/17 1401   Safety Interventions   Medication Review/Management medications reviewed         Goal: Identify Related Risk Factors and Signs and Symptoms  Outcome: Ongoing (interventions implemented as appropriate)    10/17/17 1505   Fall Risk   Fall Risk: Related Risk Factors environment unfamiliar   Fall Risk: Signs and Symptoms presence of risk factors       Goal: Absence of Falls  Outcome: Ongoing (interventions implemented as appropriate)    10/18/17 1401   Fall Risk (Adult)   Absence of Falls making progress toward outcome         Problem: Skin Integrity Impairment, Risk/Actual (Adult)  Intervention: Promote/Optimize Nutrition    10/17/17 2000   Nutrition Interventions   Oral Nutrition Promotion rest periods promoted       Intervention: Prevent/Manage Excess Moisture    10/18/17 0800   Skin Interventions   Skin Protection adhesive use limited         Goal: Identify Related Risk Factors and Signs and Symptoms  Outcome: Ongoing (interventions implemented as appropriate)  Goal: Skin Integrity/Wound Healing  Outcome: Ongoing (interventions implemented as appropriate)    Problem: Diabetes, Type 2 (Adult)  Intervention: Support/Optimize Psychosocial Response to Condition    10/17/17 2000 10/18/17 0800   Coping Strategies   Family/Support System Care --  support provided   Supportive Measures --  active listening utilized;decision-making supported;positive reinforcement provided;verbalization of feelings encouraged   Coping/Psychosocial Interventions   Environmental Support calm environment promoted;distractions minimized --        Intervention: Optimize Glycemic Control    10/18/17 1401   Nutrition  Interventions   Glycemic Management blood glucose monitoring         Goal: Signs and Symptoms of Listed Potential Problems Will be Absent or Manageable (Diabetes, Type 2)  Outcome: Ongoing (interventions implemented as appropriate)

## 2017-10-18 NOTE — PHARMACY PATIENT ASSISTANCE
Pharmacy was consulted on medication assistance for Mr. Jj and his inhaler.  He had a prescription for advair 500/50 that was not covered on his insurance.  His insurance will cover Breo inhaler without a copay.  I will discuss with Dr. Jose and see if I can send a script in for the alternative to be covered.    Thank You;  Lamar Anthony RPH  10/18/17  1:56 PM

## 2017-10-18 NOTE — PLAN OF CARE
Problem: Patient Care Overview (Adult)  Goal: Plan of Care Review  Outcome: Ongoing (interventions implemented as appropriate)  Goal: Discharge Needs Assessment  Outcome: Ongoing (interventions implemented as appropriate)    Problem: Asthma (Adult)  Goal: Signs and Symptoms of Listed Potential Problems Will be Absent or Manageable (Asthma)  Outcome: Ongoing (interventions implemented as appropriate)    Problem: Fall Risk (Adult)  Goal: Identify Related Risk Factors and Signs and Symptoms  Outcome: Ongoing (interventions implemented as appropriate)  Goal: Absence of Falls  Outcome: Ongoing (interventions implemented as appropriate)    Problem: Skin Integrity Impairment, Risk/Actual (Adult)  Goal: Identify Related Risk Factors and Signs and Symptoms  Outcome: Ongoing (interventions implemented as appropriate)  Goal: Skin Integrity/Wound Healing  Outcome: Ongoing (interventions implemented as appropriate)

## 2017-10-19 LAB
ANION GAP SERPL CALCULATED.3IONS-SCNC: 3.4 MMOL/L (ref 3.6–11.2)
BUN BLD-MCNC: 16 MG/DL (ref 7–21)
BUN/CREAT SERPL: 18 (ref 7–25)
CALCIUM SPEC-SCNC: 8.9 MG/DL (ref 7.7–10)
CHLORIDE SERPL-SCNC: 109 MMOL/L (ref 99–112)
CO2 SERPL-SCNC: 28.6 MMOL/L (ref 24.3–31.9)
CREAT BLD-MCNC: 0.89 MG/DL (ref 0.43–1.29)
DEPRECATED RDW RBC AUTO: 50.3 FL (ref 37–54)
ERYTHROCYTE [DISTWIDTH] IN BLOOD BY AUTOMATED COUNT: 15.7 % (ref 11.5–14.5)
GFR SERPL CREATININE-BSD FRML MDRD: 94 ML/MIN/1.73
GLUCOSE BLD-MCNC: 94 MG/DL (ref 70–110)
GLUCOSE BLDC GLUCOMTR-MCNC: 112 MG/DL (ref 70–130)
GLUCOSE BLDC GLUCOMTR-MCNC: 134 MG/DL (ref 70–130)
GLUCOSE BLDC GLUCOMTR-MCNC: 168 MG/DL (ref 70–130)
GLUCOSE BLDC GLUCOMTR-MCNC: 80 MG/DL (ref 70–130)
GLUCOSE BLDC GLUCOMTR-MCNC: 99 MG/DL (ref 70–130)
HCT VFR BLD AUTO: 42.5 % (ref 42–52)
HGB BLD-MCNC: 13.6 G/DL (ref 14–18)
MCH RBC QN AUTO: 28.3 PG (ref 27–33)
MCHC RBC AUTO-ENTMCNC: 32 G/DL (ref 33–37)
MCV RBC AUTO: 88.4 FL (ref 80–94)
OSMOLALITY SERPL CALC.SUM OF ELEC: 282.2 MOSM/KG (ref 273–305)
PLATELET # BLD AUTO: 256 10*3/MM3 (ref 130–400)
PMV BLD AUTO: 10.1 FL (ref 6–10)
POTASSIUM BLD-SCNC: 3.8 MMOL/L (ref 3.5–5.3)
RBC # BLD AUTO: 4.81 10*6/MM3 (ref 4.7–6.1)
SODIUM BLD-SCNC: 141 MMOL/L (ref 135–153)
T4 SERPL-MCNC: 4.9 MCG/DL (ref 4.5–10.9)
THYROGLOB AB SERPL-ACNC: <1 IU/ML (ref 0–0.9)
THYROPEROXIDASE AB SERPL-ACNC: 10 IU/ML (ref 0–34)
WBC NRBC COR # BLD: 15.18 10*3/MM3 (ref 4.5–12.5)

## 2017-10-19 PROCEDURE — 25010000002 CEFTRIAXONE: Performed by: INTERNAL MEDICINE

## 2017-10-19 PROCEDURE — 63710000001 PREDNISONE PER 1 MG: Performed by: INTERNAL MEDICINE

## 2017-10-19 PROCEDURE — 86592 SYPHILIS TEST NON-TREP QUAL: CPT | Performed by: INTERNAL MEDICINE

## 2017-10-19 PROCEDURE — 25010000002 AZITHROMYCIN: Performed by: INTERNAL MEDICINE

## 2017-10-19 PROCEDURE — 25010000002 ENOXAPARIN PER 10 MG: Performed by: INTERNAL MEDICINE

## 2017-10-19 PROCEDURE — 82962 GLUCOSE BLOOD TEST: CPT

## 2017-10-19 PROCEDURE — 85027 COMPLETE CBC AUTOMATED: CPT | Performed by: INTERNAL MEDICINE

## 2017-10-19 PROCEDURE — 80048 BASIC METABOLIC PNL TOTAL CA: CPT | Performed by: INTERNAL MEDICINE

## 2017-10-19 PROCEDURE — 94799 UNLISTED PULMONARY SVC/PX: CPT

## 2017-10-19 PROCEDURE — 63710000001 INSULIN ASPART PER 5 UNITS: Performed by: INTERNAL MEDICINE

## 2017-10-19 PROCEDURE — 84436 ASSAY OF TOTAL THYROXINE: CPT | Performed by: INTERNAL MEDICINE

## 2017-10-19 PROCEDURE — 63710000001 PREDNISONE PER 5 MG: Performed by: INTERNAL MEDICINE

## 2017-10-19 RX ORDER — ALBUTEROL SULFATE 2.5 MG/3ML
2.5 SOLUTION RESPIRATORY (INHALATION)
Status: DISCONTINUED | OUTPATIENT
Start: 2017-10-19 | End: 2017-10-22 | Stop reason: HOSPADM

## 2017-10-19 RX ADMIN — PREDNISONE 30 MG: 10 TABLET ORAL at 08:14

## 2017-10-19 RX ADMIN — INSULIN ASPART 2 UNITS: 100 INJECTION, SOLUTION INTRAVENOUS; SUBCUTANEOUS at 12:02

## 2017-10-19 RX ADMIN — ALBUTEROL SULFATE 2.5 MG: 2.5 SOLUTION RESPIRATORY (INHALATION) at 13:57

## 2017-10-19 RX ADMIN — ALBUTEROL SULFATE 2.5 MG: 2.5 SOLUTION RESPIRATORY (INHALATION) at 19:33

## 2017-10-19 RX ADMIN — ENOXAPARIN SODIUM 40 MG: 40 INJECTION SUBCUTANEOUS at 08:14

## 2017-10-19 RX ADMIN — AZITHROMYCIN 500 MG: 500 INJECTION, POWDER, LYOPHILIZED, FOR SOLUTION INTRAVENOUS at 20:43

## 2017-10-19 RX ADMIN — ALBUTEROL SULFATE 2.5 MG: 2.5 SOLUTION RESPIRATORY (INHALATION) at 07:24

## 2017-10-19 RX ADMIN — FAMOTIDINE 20 MG: 20 TABLET, FILM COATED ORAL at 16:59

## 2017-10-19 RX ADMIN — FAMOTIDINE 20 MG: 20 TABLET, FILM COATED ORAL at 08:14

## 2017-10-19 RX ADMIN — CEFTRIAXONE 1 G: 1 INJECTION, POWDER, FOR SOLUTION INTRAMUSCULAR; INTRAVENOUS at 21:39

## 2017-10-19 RX ADMIN — MONTELUKAST SODIUM 10 MG: 10 TABLET ORAL at 08:14

## 2017-10-19 NOTE — PLAN OF CARE
Problem: Asthma (Adult)  Intervention: Support Effective Ventilation/Oxygenation    10/18/17 1401 10/19/17 0700   Safety Interventions   Medication Review/Management medications reviewed --    Activity   Activity Type --  activity adjusted per tolerance;up ad maria e       Intervention: Support/Optimize Psychosocial Response to Condition    10/19/17 0800   Coping Strategies   Family/Support System Care support provided   Supportive Measures active listening utilized   Coping/Psychosocial Interventions   Environmental Support calm environment promoted       Intervention: Prevent Recurrent Asthma Exacerbation    10/17/17 2000   Cognitive Interventions   Sensory Stimulation Regulation care clustered;lighting decreased         Goal: Signs and Symptoms of Listed Potential Problems Will be Absent or Manageable (Asthma)  Outcome: Ongoing (interventions implemented as appropriate)    10/17/17 1505 10/18/17 1401   Asthma   Problems Assessed (Asthma) --  all   Problems Present (Asthma) none --          Problem: Fall Risk (Adult)  Intervention: Monitor/Assist with Self Care    10/19/17 0700 10/19/17 0800   Monitor/Assist with Self Care   Ambulation --  0-->independent   Transferring --  0-->independent   Toileting --  0-->independent   Bathing --  0-->independent   Dressing --  0-->independent   Eating --  0-->independent   Communication --  0-->understands/communicates without difficulty   Swallowing (if score 2 or more for any item, consult Rehab Services) --  0-->swallows foods/liquids without difficulty   Activity   Activity Type activity adjusted per tolerance;up ad maria e --    Activity Level of Assistance independent --    Musculoskeletal Interventions   Self-Care Promotion --  independence encouraged       Intervention: Reduce Risk/Promote Restraint Free Environment    10/19/17 0800 10/19/17 1000   Safety Interventions   Environmental Safety Modification assistive device/personal items within reach --    Restraint  Interventions   Safety Promotion/Fall Prevention --  safety round/check completed       Intervention: Review Medications/Identify Contributors to Fall Risk    10/18/17 1401   Safety Interventions   Medication Review/Management medications reviewed         Goal: Identify Related Risk Factors and Signs and Symptoms  Outcome: Ongoing (interventions implemented as appropriate)    10/17/17 1505 10/19/17 1108   Fall Risk   Fall Risk: Related Risk Factors environment unfamiliar --    Fall Risk: Signs and Symptoms --  presence of risk factors       Goal: Absence of Falls  Outcome: Ongoing (interventions implemented as appropriate)    10/19/17 1108   Fall Risk (Adult)   Absence of Falls making progress toward outcome         Problem: Skin Integrity Impairment, Risk/Actual (Adult)  Intervention: Promote/Optimize Nutrition    10/17/17 1456 10/17/17 2000   Nutrition Interventions   Oral Nutrition Promotion --  rest periods promoted   Hygiene Care Assistance   Oral Care oral care provided --        Intervention: Prevent/Manage Excess Moisture    10/19/17 0800   Skin Interventions   Skin Protection adhesive use limited       Intervention: Prevent/Minimize Sheer/Friction Injuries    10/17/17 2000 10/19/17 0800   Positioning   Positioning/Transfer Devices pillows;in use --    Skin Interventions   Pressure Reduction Techniques --  frequent weight shift encouraged   Pressure Reduction Devices --  pressure-redistributing mattress utilized         Goal: Identify Related Risk Factors and Signs and Symptoms  Outcome: Ongoing (interventions implemented as appropriate)    10/17/17 0305   Skin Integrity Impairment, Risk/Actual   Skin Integrity Impairment, Risk/Actual: Related Risk Factors moisture       Goal: Skin Integrity/Wound Healing  Outcome: Ongoing (interventions implemented as appropriate)    10/19/17 1108   Skin Integrity Impairment, Risk/Actual (Adult)   Skin Integrity/Wound Healing making progress toward outcome         Problem:  Diabetes, Type 2 (Adult)  Intervention: Support/Optimize Psychosocial Response to Condition    10/19/17 0800   Coping Strategies   Family/Support System Care support provided   Supportive Measures active listening utilized   Coping/Psychosocial Interventions   Environmental Support calm environment promoted       Intervention: Optimize Glycemic Control    10/18/17 2020   Nutrition Interventions   Glycemic Management blood glucose monitoring         Goal: Signs and Symptoms of Listed Potential Problems Will be Absent or Manageable (Diabetes, Type 2)  Outcome: Ongoing (interventions implemented as appropriate)    10/19/17 1108   Diabetes, Type 2   Problems Assessed (Type 2 Diabetes) all

## 2017-10-19 NOTE — PROGRESS NOTES
LOS: 2 days   Interval History: Awake and alert. Breathing better, but still with cough and wheeze and dyspnea. CTA reviewed. Discussed with patient need for referral to CT surgery. He says he has no transportation at present and wishes to wait until respiratory status improves further. Aware of potential risks.TSH remains low and thyroid ultrasound neg. Thyroid antibodies pending.      History taken from: patient chart    Review of Systems:     Review of Systems - Negative except present illness    Objective     Vital Signs  Temp:  [97.7 °F (36.5 °C)-98.9 °F (37.2 °C)] 97.7 °F (36.5 °C)  Heart Rate:  [] 79  Resp:  [18-20] 20  BP: (104-144)/(64-91) 104/64    Physical Exam:     General Appearance:    Alert, cooperative, in no acute distress   Head:    Normocephalic, without obvious abnormality, atraumatic   Eyes:            Lids and lashes normal, conjunctivae and sclerae normal, no   icterus, no pallor, corneas clear, PERRLA   Ears:    Ears appear intact with no abnormalities noted   Throat:   No oral lesions, no thrush, oral mucosa moist. Poor dentition   Neck:   No adenopathy, supple, trachea midline, no thyromegaly, no   carotid bruit, no JVD   Back:     No kyphosis present, no scoliosis present, no skin lesions,      erythema or scars, no tenderness to percussion or                   palpation,   range of motion normal   Lungs:     Bilateral wheeze in all lung fields    Heart:    Regular rhythm and normal rate, normal S1 and S2, no            murmur, no gallop, no rub, no click   Chest Wall:    No abnormalities observed   Abdomen:     Normal bowel sounds, no masses, no organomegaly, soft        non-tender, non-distended, no guarding, no rebound                tenderness   Rectal:     Deferred   Extremities:   Moves all extremities well, no edema, no cyanosis, no             redness   Pulses:   Pulses palpable and equal bilaterally   Skin:   No bleeding, bruising or rash   Lymph nodes:   No palpable  adenopathy   Neurologic:   Cranial nerves 2 - 12 grossly intact, sensation intact, DTR       present and equal bilaterally               Results Review:     I reviewed the patient's new clinical results  : See Below    Medication Review:     Current Facility-Administered Medications:   •  albuterol (PROVENTIL) nebulizer solution 0.083% 2.5 mg/3mL, 2.5 mg, Nebulization, Q6H - RT, Macho Jose MD  •  AZITHROMYCIN 500 MG/250 ML 0.9% NS IVPB (MBP), 500 mg, Intravenous, Q24H, Thomas Lynch MD, Stopped at 10/18/17 2123  •  cefTRIAXone (ROCEPHIN) 1 g/100 mL 0.9% NS (MBP), 1 g, Intravenous, Q24H, Thomas Lynch MD, Stopped at 10/18/17 2200  •  dextrose (D50W) solution 25 g, 25 g, Intravenous, Q15 Min PRN, Thomas Lynch MD  •  dextrose (GLUTOSE) oral gel 15 g, 15 g, Oral, Q15 Min PRN, Thomas Lynch MD  •  enoxaparin (LOVENOX) syringe 40 mg, 40 mg, Subcutaneous, Daily, Kwadwo Brooks MD  •  famotidine (PEPCID) tablet 20 mg, 20 mg, Oral, BID, Kwadwo Brooks MD, 20 mg at 10/18/17 1724  •  glucagon (GLUCAGEN) injection 1 mg, 1 mg, Subcutaneous, Q15 Min PRN, Thomas Lynch MD  •  insulin aspart (novoLOG) injection 0-7 Units, 0-7 Units, Subcutaneous, 4x Daily AC & at Bedtime, Thomas Lynch MD, 3 Units at 10/18/17 2020  •  ipratropium-albuterol (DUO-NEB) nebulizer solution 3 mL, 3 mL, Nebulization, Q4H PRN, Thomas Lynch MD  •  montelukast (SINGULAIR) tablet 10 mg, 10 mg, Oral, Daily, Kwadwo Brooks MD, 10 mg at 10/18/17 0813  •  predniSONE (DELTASONE) tablet 30 mg, 30 mg, Oral, Daily With Breakfast, Thomas Lynch MD, 30 mg at 10/18/17 0813  •  sodium chloride 0.9 % flush 1-10 mL, 1-10 mL, Intravenous, PRN, Kwadwo Brooks MD  •  Insert peripheral IV, , , Once **AND** sodium chloride 0.9 % flush 10 mL, 10 mL, Intravenous, PRN, Frank Jefferson MD    albuterol 2.5 mg Nebulization Q6H - RT   azithromycin 500 mg Intravenous Q24H   ceftriaxone 1 g Intravenous Q24H   enoxaparin 40 mg Subcutaneous  Daily   famotidine 20 mg Oral BID   insulin aspart 0-7 Units Subcutaneous 4x Daily AC & at Bedtime   montelukast 10 mg Oral Daily   predniSONE 30 mg Oral Daily With Breakfast     dextrose  •  dextrose  •  glucagon  •  ipratropium-albuterol  •  sodium chloride  •  Insert peripheral IV **AND** sodium chloride      Results from last 7 days  Lab Units 10/16/17  2255 10/16/17  0145   WBC 10*3/mm3 18.11* 17.92*   HEMOGLOBIN g/dL 15.3 15.8   PLATELETS 10*3/mm3 274 289           Results from last 7 days  Lab Units 10/16/17  2255 10/16/17  0145   SODIUM mmol/L 140 140   POTASSIUM mmol/L 3.5 3.7   CHLORIDE mmol/L 106 102   CO2 mmol/L 26.0 22.6*   BUN mg/dL 16 18   CREATININE mg/dL 0.91 1.40*   CALCIUM mg/dL 9.0 9.2   GLUCOSE mg/dL 139* 319*         Hemoglobin A1C   Date Value Ref Range Status   10/18/2017 6.10 (H) 4.50 - 5.70 % Final       Results from last 7 days  Lab Units 10/16/17  2255 10/16/17  0145   BILIRUBIN mg/dL 0.2 0.2   ALK PHOS U/L 59 61   AST (SGOT) U/L 33 31   ALT (SGPT) U/L 48* 32       Results from last 7 days  Lab Units 10/17/17  0110 10/16/17  2255 10/16/17  0509   TROPONIN I ng/mL 0.114* 0.108* 0.061*       Results from last 7 days  Lab Units 10/16/17  2352   BNP pg/mL 66.0           Results from last 7 days  Lab Units 10/17/17  0009   PH, ARTERIAL pH units 7.394   PO2 ART mm Hg 69.1*   PCO2, ARTERIAL mm Hg 43.9   HCO3 ART mmol/L 26.2*       Imaging Results (last 72 hours)     Procedure Component Value Units Date/Time    XR Chest 1 View [[id number]] Collected:  10/17/17 0741     Updated:  10/17/17 0743    Narrative:       EXAMINATION: XR CHEST 1 VW-      CLINICAL INDICATION:     Simple Sepsis triage protocol     TECHNIQUE:  XR CHEST 1 VW-      COMPARISON: 10/16/2017      FINDINGS:   Lungs are aerated. COPD is stable.  Mild cardiomegaly appears stable.   No pneumothorax.   No pleural effusion.   No acute osseous findings.            Impression:       Stable chest. No acute changes identified.     This report  was finalized on 10/17/2017 7:41 AM by Dr. Daniel Fulton MD.       US Thyroid [[id number]] Collected:  10/18/17 1114     Updated:  10/18/17 1117    Narrative:       EXAMINATION: US THYROID-      CLINICAL INDICATION:  hyperthyroid; J44.1-Chronic obstructive pulmonary  disease with (acute) exacerbation; J45.901-Unspecified asthma with  (acute) exacerbation; R74.8-Abnormal levels of other serum enzymes      TECHNIQUE: Multiplanar, bilateral gray scale ultrasound of the thyroid  and neck, with limited Doppler vascular ultrasound.      COMPARISON: None.      FINDINGS:   The thyroid is normal in size and echogenicity.     Right Lobe:  3.9 x 1.4 x 1.4 cm. No discrete nodules or masses.     Left Lobe:  1.3 x 3.5 x 1.3 cm. No discrete nodules or masses.     Isthmus is 0.2 cm.     No adenopathy identified.      Doppler imaging is unremarkable.        Impression:       Unremarkable thyroid ultrasound.      This report was finalized on 10/18/2017 11:15 AM by Dr. Daniel Fulton MD.       CT Angiogram Aorta [[id number]] Collected:  10/18/17 1308     Updated:  10/18/17 1317    Narrative:       EXAMINATION: CT ANGIOGRAM AORTA-      CLINICAL INDICATION:          TECHNIQUE: Multiple axial CT images were obtained through chest,  abdomen, pelvis following administration of IV contrast. Reformatted CTA  images in the coronal and sagittal planes were generated from the axial  data set to facilitate diagnostic accuracy and/or surgical planning.  Volume Rendered 3D or MIP images performed.  Stenosis measurements if  obtained, were performed by the NASCET or similar method.     Radiation dose reduction techniques were utilized per ALARA protocol.  Automated exposure control was initiated through either or [employer] or  [employer] software packages by  protocol.       1731.88 mGy.cm              COMPARISON: None      FINDINGS:      VASCULAR:      6.0 cm aneurysm of the ascending thoracic aorta begins at the level of  sinus of  Valsalva and tapers to normal diameter prior to brachiocephalic  artery origin. There is normal caliber of the aortic arch and descending  thoracic aorta.     The abdominal aorta is of normal caliber. No significant plaque. No  occlusion or dissection.     Celiac axis origin is unremarkable. SMA origin is unremarkable. Solitary  renal artery origins are within normal limits. The CINTHIA is patent. The  aortic bifurcation is unremarkable with no aneurysmal dilatation or  occlusion of the iliac arteries.        NONVASCULAR:      CHEST:   Moderate-advanced cardiomegaly. No pleural or pericardial effusion. No  thoracic adenopathy by CT size criteria. Lung windows demonstrate right  lower lobe airspace disease likely atelectasis. No consolidation. No  fibrosis. No suspicious nodules or masses. No pneumothorax. Bone windows  demonstrate no acute osseous findings.     ABDOMEN: Liver, spleen, and adrenals are unremarkable. Mild fatty  replacement is noted of the more distal portions of the pancreas.  Kidneys are symmetric with no perfusion abnormalities. Moderate stool  burden throughout colon consistent with constipation. No bowel  obstruction. No intra-abdominal free fluid or free air. No adenopathy.  Bone windows demonstrate no acute osseous findings.     PELVIS: Mild urinary bladder wall thickening likely due to incomplete  distention versus secondary changes of underlying prostate hypertrophy.  Pelvic viscera are otherwise unremarkable. Pelvic portions of GI tract  are within normal limits. No free fluid or adenopathy. Bone windows  demonstrate no acute osseous findings.        Impression:       1. 6.0 cm aneurysm of the ascending thoracic aorta. No occlusion and no  levels of significant stenosis are noted.  2. Unremarkable appearance of the major abdominal arterial vasculature.  No abdominal aneurysm.  3. Right lower lobe airspace disease likely atelectasis.  4. Marked cardiomegaly.  5. Moderate constipation. No acute  findings identified within chest,  abdomen, or pelvis.        This report was finalized on 10/18/2017 1:15 PM by Dr. Daniel Fulton MD.             Assessment/Plan    Active Problems:    Asthma exacerbation in COPD         See orders entered.     Thomas Lynch MD  10/19/17  5:56 AM

## 2017-10-19 NOTE — PLAN OF CARE
Problem: Patient Care Overview (Adult)  Goal: Plan of Care Review  Outcome: Ongoing (interventions implemented as appropriate)    10/17/17 1505 10/18/17 2020   Coping/Psychosocial Response Interventions   Plan Of Care Reviewed With --  patient   Patient Care Overview   Progress no change --          Problem: Asthma (Adult)  Goal: Signs and Symptoms of Listed Potential Problems Will be Absent or Manageable (Asthma)  Outcome: Ongoing (interventions implemented as appropriate)    Problem: Fall Risk (Adult)  Goal: Identify Related Risk Factors and Signs and Symptoms  Outcome: Ongoing (interventions implemented as appropriate)    10/17/17 1505   Fall Risk   Fall Risk: Related Risk Factors environment unfamiliar   Fall Risk: Signs and Symptoms presence of risk factors         Problem: Diabetes, Type 2 (Adult)  Goal: Signs and Symptoms of Listed Potential Problems Will be Absent or Manageable (Diabetes, Type 2)  Outcome: Ongoing (interventions implemented as appropriate)    10/19/17 0040   Diabetes, Type 2   Problems Assessed (Type 2 Diabetes) all

## 2017-10-20 ENCOUNTER — APPOINTMENT (OUTPATIENT)
Dept: RESPIRATORY THERAPY | Facility: HOSPITAL | Age: 42
End: 2017-10-20
Attending: INTERNAL MEDICINE

## 2017-10-20 LAB
ARTERIAL PATENCY WRIST A: ABNORMAL
ATMOSPHERIC PRESS: 733 MMHG
BASE EXCESS BLDA CALC-SCNC: 1.1 MMOL/L
BDY SITE: ABNORMAL
BODY TEMPERATURE: 98.6 C
COHGB MFR BLD: 1.1 % (ref 0–5)
GLUCOSE BLDC GLUCOMTR-MCNC: 123 MG/DL (ref 70–130)
GLUCOSE BLDC GLUCOMTR-MCNC: 135 MG/DL (ref 70–130)
GLUCOSE BLDC GLUCOMTR-MCNC: 147 MG/DL (ref 70–130)
GLUCOSE BLDC GLUCOMTR-MCNC: 78 MG/DL (ref 70–130)
HCO3 BLDA-SCNC: 27.4 MMOL/L (ref 22–26)
HCT VFR BLD CALC: 43 % (ref 42–52)
HGB BLDA-MCNC: 14.5 G/DL (ref 12–16)
HOROWITZ INDEX BLD+IHG-RTO: 21 %
METHGB BLD QL: 0.3 % (ref 0–3)
MODALITY: ABNORMAL
OXYHGB MFR BLDV: 95.2 % (ref 85–100)
PCO2 BLDA: 49.7 MM HG (ref 35–45)
PH BLDA: 7.36 PH UNITS (ref 7.35–7.45)
PO2 BLDA: 86.1 MM HG (ref 80–100)
RPR SER QL: NON REACTIVE
SAO2 % BLDCOA: 96.6 % (ref 90–100)

## 2017-10-20 PROCEDURE — 94060 EVALUATION OF WHEEZING: CPT

## 2017-10-20 PROCEDURE — 63710000001 PREDNISONE PER 1 MG: Performed by: INTERNAL MEDICINE

## 2017-10-20 PROCEDURE — 82375 ASSAY CARBOXYHB QUANT: CPT | Performed by: INTERNAL MEDICINE

## 2017-10-20 PROCEDURE — 94799 UNLISTED PULMONARY SVC/PX: CPT

## 2017-10-20 PROCEDURE — 36600 WITHDRAWAL OF ARTERIAL BLOOD: CPT | Performed by: INTERNAL MEDICINE

## 2017-10-20 PROCEDURE — 25010000002 ENOXAPARIN PER 10 MG: Performed by: INTERNAL MEDICINE

## 2017-10-20 PROCEDURE — 83050 HGB METHEMOGLOBIN QUAN: CPT | Performed by: INTERNAL MEDICINE

## 2017-10-20 PROCEDURE — 82805 BLOOD GASES W/O2 SATURATION: CPT | Performed by: INTERNAL MEDICINE

## 2017-10-20 PROCEDURE — 82962 GLUCOSE BLOOD TEST: CPT

## 2017-10-20 PROCEDURE — 94060 EVALUATION OF WHEEZING: CPT | Performed by: INTERNAL MEDICINE

## 2017-10-20 PROCEDURE — 63710000001 PREDNISONE PER 5 MG: Performed by: INTERNAL MEDICINE

## 2017-10-20 RX ORDER — CEFDINIR 300 MG/1
300 CAPSULE ORAL EVERY 12 HOURS SCHEDULED
Status: DISCONTINUED | OUTPATIENT
Start: 2017-10-20 | End: 2017-10-22 | Stop reason: HOSPADM

## 2017-10-20 RX ORDER — AZITHROMYCIN 250 MG/1
500 TABLET, FILM COATED ORAL
Status: DISCONTINUED | OUTPATIENT
Start: 2017-10-20 | End: 2017-10-22 | Stop reason: HOSPADM

## 2017-10-20 RX ADMIN — FAMOTIDINE 20 MG: 20 TABLET, FILM COATED ORAL at 08:44

## 2017-10-20 RX ADMIN — ENOXAPARIN SODIUM 40 MG: 40 INJECTION SUBCUTANEOUS at 08:44

## 2017-10-20 RX ADMIN — FAMOTIDINE 20 MG: 20 TABLET, FILM COATED ORAL at 17:27

## 2017-10-20 RX ADMIN — ALBUTEROL SULFATE 2.5 MG: 2.5 SOLUTION RESPIRATORY (INHALATION) at 13:35

## 2017-10-20 RX ADMIN — CEFDINIR 300 MG: 300 CAPSULE ORAL at 08:44

## 2017-10-20 RX ADMIN — ALBUTEROL SULFATE 2.5 MG: 2.5 SOLUTION RESPIRATORY (INHALATION) at 19:10

## 2017-10-20 RX ADMIN — MONTELUKAST SODIUM 10 MG: 10 TABLET ORAL at 08:44

## 2017-10-20 RX ADMIN — AZITHROMYCIN 500 MG: 250 TABLET, FILM COATED ORAL at 08:44

## 2017-10-20 RX ADMIN — ALBUTEROL SULFATE 2.5 MG: 2.5 SOLUTION RESPIRATORY (INHALATION) at 00:47

## 2017-10-20 RX ADMIN — CEFDINIR 300 MG: 300 CAPSULE ORAL at 20:04

## 2017-10-20 RX ADMIN — PREDNISONE 30 MG: 10 TABLET ORAL at 08:44

## 2017-10-20 NOTE — PROGRESS NOTES
Antimicrobial Length of Therapy:    Day 4 azithromycin  Day 4 cefdinir (with Rocephin)    Thank you,    Deena Bernstein RPH

## 2017-10-20 NOTE — PAYOR COMM NOTE
"[facility]  NPI:[id number]    Utilization Review  Contact: Diana Youngblood RN  Phone: [phone]  Fax:[phone]    INITIATE INPATIENT AUTHORIZATION  CLINICAL UPDATE    Moustapha Escamilla (42 y.o. Male)     Date of Birth Social Security Number Address Home Phone MRN    1975  [address]  [facility] [address] [phone] [id number]    L[employer]-day Marital Status          Non-Congregational Single       Admission Date Admission Type Admitting Provider Attending Provider Department, Room/Bed    10/16/17 Emergency Thomas Lynch MD Morton, Steven E, MD [address], 3341/1S    Discharge Date Discharge Disposition Discharge Destination                      Attending Provider: Macho Jose MD     Allergies:  No Known Allergies    Isolation:  None   Infection:  None   Code Status:  FULL    Ht:  67\" (170.2 cm)   Wt:  185 lb (83.9 kg)    Admission Cmt:  None   Principal Problem:  None                Active Insurance as of 10/16/2017     Primary Coverage     Payor Plan Insurance Group Employer/Plan Group    [employer] [place] [facility]      Payor Plan Address Payor Plan Phone Number Effective From Effective To    [address] [address]  [address]     [place] [address]       Subscriber Name Subscriber Birth Date Member ID       MOUSTAPHA ESCAMILLA 1975 [id number]                 Emergency Contacts      (Rel.) Home Phone Work Phone Mobile Phone    Alexandra Pena (Mother) [phone] -- --            [address]  [facility] [address] [phone]                 Moustapha Escamilla   Echocardiogram   Order# [id number]    Reading physician: Osman Whiting MD   Ordering physician: Thomas Lynch MD   Study date: 10/17/17         Patient Information      Patient Name MRN Sex  (Age)     Moustapha Escamilla [id number] Male 1975 (42 y.o.)       Admission Information      Admission Date/Time Discharge Date/Time Room/Bed     10/16/17  [facility]  3341/1S       Sedation Narrator " "Report      Sedation Narrator Report       Interpretation Summary      · Normal left ventricular cavity size and wall thickness noted. All left ventricular wall segments contract normally.  · Estimated EF appears to be in the range of 61 - 65%  · The aortic valve is grossly normal in structure. Mild aortic valve regurgitation is present. No aortic valve stenosis is present.  · Moderate dilation of the aortic root is present  · The mitral valve is normal in structure. No mitral valve regurgitation is present. No significant mitral valve stenosis is present  · The tricuspid valve is normal. No tricuspid valve stenosis is present. No tricuspid valve regurgitation is present.  · The pulmonic valve is structurally normal. There is no significant pulmonic valve stenosis present. There is no pulmonic valve regurgitation present.  · Severe dilation of the ascending aorta is present measuring 6.0 cm in it's maximum dimension.  · Reccomendation: Referal to a cardiothoracic surgeon.           Patient Height & Weight      Height Weight BSA (Calculated - sq m) BMI (kg/m2) Pulse     67\" (170.2 cm) 180 lb (81.6 kg) 1.93 sq meters 28.25 109       Patient Vitals      BP Pulse     120/68 109       Blood Pressures       Right Arm Left Arm     Blood Pressure               Reason For Exam      Dyspnea       Cardiac History      Diagnosis Date Comment     Hypertension         Study Description      The study is technically good for diagnosis.       Echocardiogram Findings      Left Ventricle  Estimated EF appears to be in the range of 61 - 65%. Normal left ventricular cavity size and wall thickness noted. All left ventricular wall segments contract normally.     Right Ventricle  Normal right ventricular cavity size, wall thickness, systolic function and septal motion noted.     Left Atrium  Normal left atrial size and volume noted.     Right Atrium  Normal right atrial size noted.     Aortic Valve  The aortic valve is grossly normal in " structure. Mild aortic valve regurgitation is present. No aortic valve stenosis is present.     Mitral Valve  The mitral valve is normal in structure. No mitral valve regurgitation is present. No significant mitral valve stenosis is present.     Tricuspid Valve  The tricuspid valve is normal. No tricuspid valve stenosis is present. No tricuspid valve regurgitation is present.     Pulmonic Valve  The pulmonic valve is structurally normal. There is no significant pulmonic valve stenosis present. There is no pulmonic valve regurgitation present.     Greater Vessels  Moderate dilation of the aortic root is present. Severe dilation of the ascending aorta is present. 6.0cm.. There is mild plaque in the ascending aorta present.          LV Measurements      Dimensions   LVIDd 5.3 cm      LVIDs 3.1 cm      IVSd 1.0 cm      LVPWd 1.1 cm      FS 41.7 %      IVS/LVPW 0.89       ESV(cubed) 30.2 ml      LV Systolic corrected for BSA 24.5 ml/m^2      EDV(cubed) 152.7 ml      LV Diastolic corrected for BSA 60.3 ml/m^2      LVOT area 5.0 cm^2       Dimensions   LV mass(C)d 225.1 grams      LVOT diam 2.5 cm      EDV(MOD-sp4) 118.0 ml      ESV(MOD-sp4) 48.0 ml      SV(MOD-sp4) 70.0 ml             LA Measurements      Measurements   LA dimension 3.6 cm      LA/Ao 0.77              Aortic Valve Measurements      Stenosis   LVOT diam 2.5 cm       Regurgitation   AI P1/2t 252.4 msec             Pulmonic Valve Measurements      PA acc slope 1377 cm/sec^2      PA acc time 0.1 sec      PA pr(Accel) 34.6 mmHg             Greater Vessels Measurements      Ao root diam 4.7 cm      Ao root area (BSA corrected) 2.4       ACS 2.7 cm             Study Information      Physician Technologist Supporting Staff      Lillian Barrientos        PACS Images      Show images for Adult Transthoracic Echo Complete W/ Cont if Necessary Per Protocol       Moderate Sedation Charge Report      Open Hard Copy Result Report (Order #[id number] - Adult Transthoracic  Echo Complete W/ Cont if Necessary Per Protocol)       Signed      Electronically signed by Osman Whiting MD on 10/17/17 at 1549 EDT       Printable Result Report      Result Report        Encounter      View Encounter          Results Routing Tracking      View Results Routing Information                Date/Time    Temp    Pulse    Resp    BP    O2 Device    SpO2       10/20/17 1220  98 (36.7)  109  20  120/68  room air  93       10/20/17 0851  --  --  --  --  room air  --       10/20/17 0730  98.2 (36.8)  77  18  102/64  room air  95       10/20/17 0500  --  90  18  132/78  --  --       10/20/17 0047  --  98  18  --  room air  93       10/20/17 0030  98.8 (37.1)  88  18  131/84  --  --       10/19/17 2043  --  --  --  --  room air  --       10/19/17 1943  --  --  --  --  room air  96       10/19/17 1933  --  90  19  --  room air  95       10/19/17 1900  99.2 (37.3)  78  18  127/70  --  --       10/19/17 1500  98.7 (37.1)  97  18  113/65  room air  94       10/19/17 1357  --  95  18  --  room air  98       10/19/17 1130  98.1 (36.7)  84  18  112/69  room air  93       10/19/17 0800  98.4 (36.9)  91  18  131/68  room air  95       10/19/17 0724  --  83  --  --  room air  96       10/19/17 0500  97.7 (36.5)  79  20  104/64  --  --       10/19/17 0030  98 (36.7)  92  20  120/81  --  --       10/18/17 2322  --  94  20  --  room air  95       10/18/17 2020  --  --  --  --  room air  --       10/18/17 1929  --  87  20  --  room air  97       10/18/17 1900  98.4 (36.9)  93  18  118/76  --  --       10/18/17 1513  98.9 (37.2)  109  18  144/91  --  --       10/18/17 1434  --  --  --  --  --  98       10/18/17 1425  --  102  18  --  room air  95       10/18/17 1114  97.9 (36.6)  105  18  133/81  --  --       10/18/17 0713  97.7 (36.5)  70  18  110/69  --  --       10/18/17 0650  --  --  --  --  --  99       10/18/17 0639  --  108  18  --  room air  96       10/18/17 0409  98 (36.7)  96  18  121/65  --  --       10/18/17  0235  --  --  --  --  --  99       10/18/17 0230  --  88  16  --  nasal cannula  96

## 2017-10-20 NOTE — PLAN OF CARE
Problem: Patient Care Overview (Adult)  Goal: Plan of Care Review  Outcome: Ongoing (interventions implemented as appropriate)    10/17/17 1505 10/19/17 2043   Coping/Psychosocial Response Interventions   Plan Of Care Reviewed With --  patient   Patient Care Overview   Progress no change --          Problem: Asthma (Adult)  Goal: Signs and Symptoms of Listed Potential Problems Will be Absent or Manageable (Asthma)  Outcome: Ongoing (interventions implemented as appropriate)    10/20/17 0217   Asthma   Problems Assessed (Asthma) all   Problems Present (Asthma) none         Problem: Fall Risk (Adult)  Goal: Identify Related Risk Factors and Signs and Symptoms  Outcome: Ongoing (interventions implemented as appropriate)    10/17/17 1505 10/19/17 1108   Fall Risk   Fall Risk: Related Risk Factors environment unfamiliar --    Fall Risk: Signs and Symptoms --  presence of risk factors       Goal: Absence of Falls  Outcome: Ongoing (interventions implemented as appropriate)    10/20/17 0217   Fall Risk (Adult)   Absence of Falls making progress toward outcome         Problem: Skin Integrity Impairment, Risk/Actual (Adult)  Goal: Identify Related Risk Factors and Signs and Symptoms  Outcome: Ongoing (interventions implemented as appropriate)  Goal: Skin Integrity/Wound Healing  Outcome: Ongoing (interventions implemented as appropriate)    10/20/17 0217   Skin Integrity Impairment, Risk/Actual (Adult)   Skin Integrity/Wound Healing making progress toward outcome         Problem: Diabetes, Type 2 (Adult)  Goal: Signs and Symptoms of Listed Potential Problems Will be Absent or Manageable (Diabetes, Type 2)  Outcome: Ongoing (interventions implemented as appropriate)    10/20/17 0217   Diabetes, Type 2   Problems Assessed (Type 2 Diabetes) all

## 2017-10-20 NOTE — PROGRESS NOTES
Discharge Planning Assessment   Jose     Patient Name: Filemon Jj  MRN: [id number]  Today's Date: 10/20/2017    Admit Date: 10/16/2017       Discharge Plan       10/20/17 1320    Case Management/Social Work Plan    Plan SS spoke to pt on this date. Pt lives alone in an apartment and plans to return home at discharge. Pt does not utilize home health services. Pt has a nebulizer machine from [facility]. Pt will need [facility] [phone] arranged at discharge. SS to follow.              Expected Discharge Date and Time     Expected Discharge Date Expected Discharge Time    Oct 20, 2017           Mignon Garces

## 2017-10-20 NOTE — PLAN OF CARE
Problem: Patient Care Overview (Adult)  Goal: Plan of Care Review  Outcome: Ongoing (interventions implemented as appropriate)    10/17/17 1505 10/20/17 0851   Coping/Psychosocial Response Interventions   Plan Of Care Reviewed With --  patient   Patient Care Overview   Progress no change --        Goal: Adult Individualization and Mutuality  Outcome: Ongoing (interventions implemented as appropriate)  Goal: Discharge Needs Assessment  Outcome: Ongoing (interventions implemented as appropriate)    Problem: Asthma (Adult)  Goal: Signs and Symptoms of Listed Potential Problems Will be Absent or Manageable (Asthma)  Outcome: Ongoing (interventions implemented as appropriate)    10/20/17 0217   Asthma   Problems Assessed (Asthma) all   Problems Present (Asthma) none         Problem: Fall Risk (Adult)  Goal: Identify Related Risk Factors and Signs and Symptoms  Outcome: Ongoing (interventions implemented as appropriate)    10/17/17 1505 10/19/17 1108   Fall Risk   Fall Risk: Related Risk Factors environment unfamiliar --    Fall Risk: Signs and Symptoms --  presence of risk factors       Goal: Absence of Falls  Outcome: Ongoing (interventions implemented as appropriate)    10/20/17 1013   Fall Risk (Adult)   Absence of Falls making progress toward outcome         Problem: Skin Integrity Impairment, Risk/Actual (Adult)  Goal: Identify Related Risk Factors and Signs and Symptoms  Outcome: Ongoing (interventions implemented as appropriate)    10/17/17 0305   Skin Integrity Impairment, Risk/Actual   Skin Integrity Impairment, Risk/Actual: Related Risk Factors moisture       Goal: Skin Integrity/Wound Healing  Outcome: Ongoing (interventions implemented as appropriate)    10/20/17 1013   Skin Integrity Impairment, Risk/Actual (Adult)   Skin Integrity/Wound Healing making progress toward outcome         Problem: Diabetes, Type 2 (Adult)  Goal: Signs and Symptoms of Listed Potential Problems Will be Absent or Manageable  (Diabetes, Type 2)  Outcome: Ongoing (interventions implemented as appropriate)    10/20/17 0217   Diabetes, Type 2   Problems Assessed (Type 2 Diabetes) all

## 2017-10-20 NOTE — PROGRESS NOTES
LOS: 3 days   Interval History: Awake and alert. Breathing improved. No chest pain. No nausea or vomiting. Transiting to oral meds. SS helping to arrange transportation for CT evaluation. No other complaints. Home soon.      History taken from: patient chart    Review of Systems:     Review of Systems - Negative except present illness    Objective     Vital Signs  Temp:  [98.1 °F (36.7 °C)-99.2 °F (37.3 °C)] 98.8 °F (37.1 °C)  Heart Rate:  [78-98] 90  Resp:  [18-19] 18  BP: (112-132)/(65-84) 132/78    Physical Exam:         General Appearance:    Alert, cooperative, in no acute distress   Head:    Normocephalic, without obvious abnormality, atraumatic   Eyes:            Lids and lashes normal, conjunctivae and sclerae normal, no   icterus, no pallor, corneas clear, PERRLA   Ears:    Ears appear intact with no abnormalities noted   Throat:   No oral lesions, no thrush, oral mucosa moist. Poor dentition   Neck:   No adenopathy, supple, trachea midline, no thyromegaly, no   carotid bruit, no JVD   Back:     No kyphosis present, no scoliosis present, no skin lesions,      erythema or scars, no tenderness to percussion or                   palpation,   range of motion normal   Lungs:     Bilateral wheeze in all lung fields    Heart:    Regular rhythm and normal rate, normal S1 and S2, no            murmur, no gallop, no rub, no click   Chest Wall:    No abnormalities observed   Abdomen:     Normal bowel sounds, no masses, no organomegaly, soft        non-tender, non-distended, no guarding, no rebound                tenderness   Rectal:     Deferred   Extremities:   Moves all extremities well, no edema, no cyanosis, no             redness   Pulses:   Pulses palpable and equal bilaterally   Skin:   No bleeding, bruising or rash   Lymph nodes:   No palpable adenopathy   Neurologic:   Cranial nerves 2 - 12 grossly intact, sensation intact, DTR       present and equal bilaterally              Results Review:     I  reviewed the patient's new clinical results  : See Below    Medication Review:     Current Facility-Administered Medications:   •  albuterol (PROVENTIL) nebulizer solution 0.083% 2.5 mg/3mL, 2.5 mg, Nebulization, Q6H - RT, Macho Jose MD, 2.5 mg at 10/20/17 0047  •  AZITHROMYCIN 500 MG/250 ML 0.9% NS IVPB (MBP), 500 mg, Intravenous, Q24H, Thomas Lynch MD, Last Rate: 0 mL/hr at 10/18/17 2123, 500 mg at 10/19/17 2043  •  cefTRIAXone (ROCEPHIN) 1 g/100 mL 0.9% NS (MBP), 1 g, Intravenous, Q24H, Thomas Lynch MD, Last Rate: 0 mL/hr at 10/18/17 2200, 1 g at 10/19/17 2139  •  dextrose (D50W) solution 25 g, 25 g, Intravenous, Q15 Min PRN, Thomas Lynch MD  •  dextrose (GLUTOSE) oral gel 15 g, 15 g, Oral, Q15 Min PRN, Thomas Lynch MD  •  enoxaparin (LOVENOX) syringe 40 mg, 40 mg, Subcutaneous, Daily, Kwadwo Brooks MD, 40 mg at 10/19/17 0814  •  famotidine (PEPCID) tablet 20 mg, 20 mg, Oral, BID, Kwadwo Brooks MD, 20 mg at 10/19/17 1659  •  glucagon (GLUCAGEN) injection 1 mg, 1 mg, Subcutaneous, Q15 Min PRN, Thomas Lynch MD  •  insulin aspart (novoLOG) injection 0-7 Units, 0-7 Units, Subcutaneous, 4x Daily AC & at Bedtime, Thomas Lynch MD, 2 Units at 10/19/17 1202  •  ipratropium-albuterol (DUO-NEB) nebulizer solution 3 mL, 3 mL, Nebulization, Q4H PRN, Thomas Lynch MD  •  montelukast (SINGULAIR) tablet 10 mg, 10 mg, Oral, Daily, Kwadwo Brooks MD, 10 mg at 10/19/17 0814  •  predniSONE (DELTASONE) tablet 30 mg, 30 mg, Oral, Daily With Breakfast, Thomas Lynch MD, 30 mg at 10/19/17 0814  •  sodium chloride 0.9 % flush 1-10 mL, 1-10 mL, Intravenous, PRN, Kwadwo Brooks MD  •  Insert peripheral IV, , , Once **AND** sodium chloride 0.9 % flush 10 mL, 10 mL, Intravenous, PRN, Frank Jefferson MD    albuterol 2.5 mg Nebulization Q6H - RT   azithromycin 500 mg Intravenous Q24H   ceftriaxone 1 g Intravenous Q24H   enoxaparin 40 mg Subcutaneous Daily   famotidine 20 mg Oral BID   insulin  aspart 0-7 Units Subcutaneous 4x Daily AC & at Bedtime   montelukast 10 mg Oral Daily   predniSONE 30 mg Oral Daily With Breakfast     dextrose  •  dextrose  •  glucagon  •  ipratropium-albuterol  •  sodium chloride  •  Insert peripheral IV **AND** sodium chloride      Results from last 7 days  Lab Units 10/19/17  0714 10/16/17  2255 10/16/17  0145   WBC 10*3/mm3 15.18* 18.11* 17.92*   HEMOGLOBIN g/dL 13.6* 15.3 15.8   PLATELETS 10*3/mm3 256 274 289           Results from last 7 days  Lab Units 10/19/17  0714 10/16/17  2255 10/16/17  0145   SODIUM mmol/L 141 140 140   POTASSIUM mmol/L 3.8 3.5 3.7   CHLORIDE mmol/L 109 106 102   CO2 mmol/L 28.6 26.0 22.6*   BUN mg/dL 16 16 18   CREATININE mg/dL 0.89 0.91 1.40*   CALCIUM mg/dL 8.9 9.0 9.2   GLUCOSE mg/dL 94 139* 319*         Hemoglobin A1C   Date Value Ref Range Status   10/18/2017 6.10 (H) 4.50 - 5.70 % Final       Results from last 7 days  Lab Units 10/16/17  2255 10/16/17  0145   BILIRUBIN mg/dL 0.2 0.2   ALK PHOS U/L 59 61   AST (SGOT) U/L 33 31   ALT (SGPT) U/L 48* 32       Results from last 7 days  Lab Units 10/17/17  0110 10/16/17  2255 10/16/17  0509   TROPONIN I ng/mL 0.114* 0.108* 0.061*       Results from last 7 days  Lab Units 10/16/17  2352   BNP pg/mL 66.0           Results from last 7 days  Lab Units 10/20/17  0443   PH, ARTERIAL pH units 7.359   PO2 ART mm Hg 86.1   PCO2, ARTERIAL mm Hg 49.7*   HCO3 ART mmol/L 27.4*       Imaging Results (last 72 hours)     Procedure Component Value Units Date/Time    XR Chest 1 View [[id number]] Collected:  10/17/17 0741     Updated:  10/17/17 0743    Narrative:       EXAMINATION: XR CHEST 1 VW-      CLINICAL INDICATION:     Simple Sepsis triage protocol     TECHNIQUE:  XR CHEST 1 VW-      COMPARISON: 10/16/2017      FINDINGS:   Lungs are aerated. COPD is stable.  Mild cardiomegaly appears stable.   No pneumothorax.   No pleural effusion.   No acute osseous findings.            Impression:       Stable chest. No acute  changes identified.     This report was finalized on 10/17/2017 7:41 AM by Dr. Daniel Fulton MD.       US Thyroid [[id number]] Collected:  10/18/17 1114     Updated:  10/18/17 1117    Narrative:       EXAMINATION: US THYROID-      CLINICAL INDICATION:  hyperthyroid; J44.1-Chronic obstructive pulmonary  disease with (acute) exacerbation; J45.901-Unspecified asthma with  (acute) exacerbation; R74.8-Abnormal levels of other serum enzymes      TECHNIQUE: Multiplanar, bilateral gray scale ultrasound of the thyroid  and neck, with limited Doppler vascular ultrasound.      COMPARISON: None.      FINDINGS:   The thyroid is normal in size and echogenicity.     Right Lobe:  3.9 x 1.4 x 1.4 cm. No discrete nodules or masses.     Left Lobe:  1.3 x 3.5 x 1.3 cm. No discrete nodules or masses.     Isthmus is 0.2 cm.     No adenopathy identified.      Doppler imaging is unremarkable.        Impression:       Unremarkable thyroid ultrasound.      This report was finalized on 10/18/2017 11:15 AM by Dr. Daniel Fulton MD.       CT Angiogram Aorta [[id number]] Collected:  10/18/17 1308     Updated:  10/18/17 1317    Narrative:       EXAMINATION: CT ANGIOGRAM AORTA-      CLINICAL INDICATION:          TECHNIQUE: Multiple axial CT images were obtained through chest,  abdomen, pelvis following administration of IV contrast. Reformatted CTA  images in the coronal and sagittal planes were generated from the axial  data set to facilitate diagnostic accuracy and/or surgical planning.  Volume Rendered 3D or MIP images performed.  Stenosis measurements if  obtained, were performed by the NASCET or similar method.     Radiation dose reduction techniques were utilized per ALARA protocol.  Automated exposure control was initiated through either or CareDoFerevo or  DoseRight software packages by  protocol.       1731.88 mGy.cm              COMPARISON: None      FINDINGS:      VASCULAR:      6.0 cm aneurysm of the ascending thoracic aorta  begins at the level of  sinus of Valsalva and tapers to normal diameter prior to brachiocephalic  artery origin. There is normal caliber of the aortic arch and descending  thoracic aorta.     The abdominal aorta is of normal caliber. No significant plaque. No  occlusion or dissection.     Celiac axis origin is unremarkable. SMA origin is unremarkable. Solitary  renal artery origins are within normal limits. The CINTHIA is patent. The  aortic bifurcation is unremarkable with no aneurysmal dilatation or  occlusion of the iliac arteries.        NONVASCULAR:      CHEST:   Moderate-advanced cardiomegaly. No pleural or pericardial effusion. No  thoracic adenopathy by CT size criteria. Lung windows demonstrate right  lower lobe airspace disease likely atelectasis. No consolidation. No  fibrosis. No suspicious nodules or masses. No pneumothorax. Bone windows  demonstrate no acute osseous findings.     ABDOMEN: Liver, spleen, and adrenals are unremarkable. Mild fatty  replacement is noted of the more distal portions of the pancreas.  Kidneys are symmetric with no perfusion abnormalities. Moderate stool  burden throughout colon consistent with constipation. No bowel  obstruction. No intra-abdominal free fluid or free air. No adenopathy.  Bone windows demonstrate no acute osseous findings.     PELVIS: Mild urinary bladder wall thickening likely due to incomplete  distention versus secondary changes of underlying prostate hypertrophy.  Pelvic viscera are otherwise unremarkable. Pelvic portions of GI tract  are within normal limits. No free fluid or adenopathy. Bone windows  demonstrate no acute osseous findings.        Impression:       1. 6.0 cm aneurysm of the ascending thoracic aorta. No occlusion and no  levels of significant stenosis are noted.  2. Unremarkable appearance of the major abdominal arterial vasculature.  No abdominal aneurysm.  3. Right lower lobe airspace disease likely atelectasis.  4. Marked cardiomegaly.  5.  Moderate constipation. No acute findings identified within chest,  abdomen, or pelvis.        This report was finalized on 10/18/2017 1:15 PM by Dr. Daniel Fulton MD.             Assessment/Plan    Active Problems:    Asthma exacerbation in COPD         See orders entered.     Thomas Lynch MD  10/20/17  6:20 AM

## 2017-10-21 LAB
A ALTERNATA IGE QN: <0.1 KU/L
A FUMIGATUS IGE QN: <0.1 KU/L
BACTERIA SPEC AEROBE CULT: NORMAL
BERMUDA GRASS IGE QN: 0.19 KU/L
BOXELDER IGE QN: 0.16 KU/L
C HERBARUM IGE QN: <0.1 KU/L
CAT DANDER IGG QN: 4 KU/L
CMN PIGWEED IGE QN: <0.1 KU/L
COMMON RAGWEED IGE QN: 0.78 KU/L
CONV CLASS DESCRIPTION: ABNORMAL
COTTONWOOD IGE QN: <0.1 KU/L
D FARINAE IGE QN: 0.93 KU/L
D PTERONYSS IGE QN: 1.6 KU/L
DEPRECATED RDW RBC AUTO: 48.7 FL (ref 37–54)
DOG DANDER IGE QN: 0.92 KU/L
ERYTHROCYTE [DISTWIDTH] IN BLOOD BY AUTOMATED COUNT: 15.2 % (ref 11.5–14.5)
GLUCOSE BLDC GLUCOMTR-MCNC: 106 MG/DL (ref 70–130)
GLUCOSE BLDC GLUCOMTR-MCNC: 130 MG/DL (ref 70–130)
GLUCOSE BLDC GLUCOMTR-MCNC: 137 MG/DL (ref 70–130)
GLUCOSE BLDC GLUCOMTR-MCNC: 138 MG/DL (ref 70–130)
GLUCOSE BLDC GLUCOMTR-MCNC: 86 MG/DL (ref 70–130)
HCT VFR BLD AUTO: 46.1 % (ref 42–52)
HGB BLD-MCNC: 14.9 G/DL (ref 14–18)
MCH RBC QN AUTO: 28.4 PG (ref 27–33)
MCHC RBC AUTO-ENTMCNC: 32.3 G/DL (ref 33–37)
MCV RBC AUTO: 87.8 FL (ref 80–94)
MOUSE URINE PROT IGE QN: 2.95 KU/L
MT JUNIPER IGE QN: <0.1 KU/L
P NOTATUM IGE QN: <0.1 KU/L
PECAN/HICK TREE IGE QN: <0.1 KU/L
PLATELET # BLD AUTO: 273 10*3/MM3 (ref 130–400)
PMV BLD AUTO: 10.4 FL (ref 6–10)
RBC # BLD AUTO: 5.25 10*6/MM3 (ref 4.7–6.1)
ROACH IGE QN: 0.25 KU/L
SALTWORT IGE QN: <0.1 KU/L
SHEEP SORREL IGE QN: <0.1 KU/L
T003-IGE SILVER BIRCH: <0.1 KU/L
T011-IGE MAPLE LEAF SYCAMORE: <0.1 KU/L
TIMOTHY IGE QN: 0.94 KU/L
TOTAL IGE SMQN RAST: 66 IU/ML (ref 0–100)
WALNUT IGE QN: <0.1 KU/L
WBC NRBC COR # BLD: 17.46 10*3/MM3 (ref 4.5–12.5)
WHITE ASH IGE QN: <0.1 KU/L
WHITE ELM IGE QN: <0.1 KU/L
WHITE MULBERRY IGE QN: <0.1 KU/L
WHITE OAK IGE QN: <0.1 KU/L

## 2017-10-21 PROCEDURE — 82962 GLUCOSE BLOOD TEST: CPT

## 2017-10-21 PROCEDURE — 63710000001 PREDNISONE PER 1 MG: Performed by: INTERNAL MEDICINE

## 2017-10-21 PROCEDURE — 85027 COMPLETE CBC AUTOMATED: CPT | Performed by: INTERNAL MEDICINE

## 2017-10-21 PROCEDURE — 63710000001 PREDNISONE PER 5 MG: Performed by: INTERNAL MEDICINE

## 2017-10-21 PROCEDURE — 94799 UNLISTED PULMONARY SVC/PX: CPT

## 2017-10-21 PROCEDURE — 25010000002 ENOXAPARIN PER 10 MG: Performed by: INTERNAL MEDICINE

## 2017-10-21 RX ORDER — CEFDINIR 300 MG/1
300 CAPSULE ORAL EVERY 12 HOURS SCHEDULED
Qty: 10 CAPSULE | Refills: 0 | Status: ON HOLD | OUTPATIENT
Start: 2017-10-21 | End: 2018-01-18

## 2017-10-21 RX ORDER — AZITHROMYCIN 250 MG/1
TABLET, FILM COATED ORAL
Qty: 3 TABLET | Refills: 0 | Status: ON HOLD | OUTPATIENT
Start: 2017-10-21 | End: 2018-01-18

## 2017-10-21 RX ADMIN — CEFDINIR 300 MG: 300 CAPSULE ORAL at 19:57

## 2017-10-21 RX ADMIN — ALBUTEROL SULFATE 2.5 MG: 2.5 SOLUTION RESPIRATORY (INHALATION) at 18:52

## 2017-10-21 RX ADMIN — CEFDINIR 300 MG: 300 CAPSULE ORAL at 09:25

## 2017-10-21 RX ADMIN — ALBUTEROL SULFATE 2.5 MG: 2.5 SOLUTION RESPIRATORY (INHALATION) at 00:44

## 2017-10-21 RX ADMIN — ENOXAPARIN SODIUM 40 MG: 40 INJECTION SUBCUTANEOUS at 09:25

## 2017-10-21 RX ADMIN — PREDNISONE 30 MG: 10 TABLET ORAL at 09:24

## 2017-10-21 RX ADMIN — MONTELUKAST SODIUM 10 MG: 10 TABLET ORAL at 09:25

## 2017-10-21 RX ADMIN — ALBUTEROL SULFATE 2.5 MG: 2.5 SOLUTION RESPIRATORY (INHALATION) at 06:32

## 2017-10-21 RX ADMIN — FAMOTIDINE 20 MG: 20 TABLET, FILM COATED ORAL at 09:25

## 2017-10-21 RX ADMIN — FAMOTIDINE 20 MG: 20 TABLET, FILM COATED ORAL at 17:01

## 2017-10-21 RX ADMIN — ALBUTEROL SULFATE 2.5 MG: 2.5 SOLUTION RESPIRATORY (INHALATION) at 14:09

## 2017-10-21 RX ADMIN — AZITHROMYCIN 500 MG: 250 TABLET, FILM COATED ORAL at 09:25

## 2017-10-21 NOTE — PLAN OF CARE
Problem: Patient Care Overview (Adult)  Goal: Plan of Care Review  Outcome: Ongoing (interventions implemented as appropriate)    10/17/17 1505 10/20/17 1905   Coping/Psychosocial Response Interventions   Plan Of Care Reviewed With --  patient   Patient Care Overview   Progress no change --        Goal: Adult Individualization and Mutuality  Outcome: Ongoing (interventions implemented as appropriate)  Goal: Discharge Needs Assessment  Outcome: Ongoing (interventions implemented as appropriate)    Problem: Asthma (Adult)  Goal: Signs and Symptoms of Listed Potential Problems Will be Absent or Manageable (Asthma)  Outcome: Ongoing (interventions implemented as appropriate)    10/20/17 0217   Asthma   Problems Assessed (Asthma) all   Problems Present (Asthma) none         Problem: Fall Risk (Adult)  Goal: Identify Related Risk Factors and Signs and Symptoms  Outcome: Ongoing (interventions implemented as appropriate)    10/17/17 1505 10/19/17 1108   Fall Risk   Fall Risk: Related Risk Factors environment unfamiliar --    Fall Risk: Signs and Symptoms --  presence of risk factors       Goal: Absence of Falls  Outcome: Ongoing (interventions implemented as appropriate)    10/21/17 1045   Fall Risk (Adult)   Absence of Falls making progress toward outcome         Problem: Skin Integrity Impairment, Risk/Actual (Adult)  Goal: Identify Related Risk Factors and Signs and Symptoms  Outcome: Ongoing (interventions implemented as appropriate)    10/17/17 0305   Skin Integrity Impairment, Risk/Actual   Skin Integrity Impairment, Risk/Actual: Related Risk Factors moisture       Goal: Skin Integrity/Wound Healing  Outcome: Ongoing (interventions implemented as appropriate)    10/21/17 1045   Skin Integrity Impairment, Risk/Actual (Adult)   Skin Integrity/Wound Healing making progress toward outcome         Problem: Diabetes, Type 2 (Adult)  Goal: Signs and Symptoms of Listed Potential Problems Will be Absent or Manageable  (Diabetes, Type 2)  Outcome: Ongoing (interventions implemented as appropriate)    10/20/17 0217   Diabetes, Type 2   Problems Assessed (Type 2 Diabetes) all

## 2017-10-21 NOTE — PROGRESS NOTES
LOS: 4 days   Interval History:   Mr. Jj is alert and talkative this morning.  He notes his breathing seems to be back at his baseline.  He denies any chest pain, fever, chills, nausea or vomiting.  He continues with some intermittent congestion.  He has no other complaints.  He is walking in the halls.  He is eating well.  He is asking to go home.  Nurses have described an uneventful night with no acute changes or events.  He is asking about the arrangements that was initiated by Dr. Lynch in regards to cardiothoracic surgery evaluation.     History taken from: patient chart    Review of Systems:     Review of Systems - Negative except present illness    Objective     Vital Signs  Temp:  [98 °F (36.7 °C)-98.4 °F (36.9 °C)] 98.4 °F (36.9 °C)  Heart Rate:  [] 94  Resp:  [18-20] 20  BP: (105-127)/(62-75) 127/75    Physical Exam:         General Appearance:    Alert, cooperative, in no acute distress   Head:    Normocephalic, without obvious abnormality, atraumatic   Eyes:            Lids and lashes normal, conjunctivae and sclerae normal, no   icterus, no pallor, corneas clear, PERRLA   Ears:    Ears appear intact with no abnormalities noted   Throat:   No oral lesions, no thrush, oral mucosa moist. Poor dentition   Neck:   No adenopathy, supple, trachea midline, no thyromegaly, no   carotid bruit, no JVD   Back:     No kyphosis present, no scoliosis present, no skin lesions,      erythema or scars, no tenderness to percussion or                   palpation,   range of motion normal   Lungs:     Trace wheezing bilaterally.  Breasts sounds are somewhat diminished bilaterally.  No respiratory distress.      Heart:    Regular rhythm and normal rate, normal S1 and S2, no            murmur, no gallop, no rub, no click   Chest Wall:    No abnormalities observed   Abdomen:     Normal bowel sounds, no masses, no organomegaly, soft        non-tender, non-distended, no guarding, no rebound                 tenderness   Rectal:     Deferred   Extremities:   Moves all extremities well, no edema, no cyanosis, no             redness   Pulses:   Pulses palpable and equal bilaterally   Skin:   No bleeding, bruising or rash   Lymph nodes:   No palpable adenopathy   Neurologic:   Cranial nerves 2 - 12 grossly intact, sensation intact, DTR       present and equal bilaterally              Results Review:     I reviewed the patient's new clinical results  : See Below    Medication Review:     Current Facility-Administered Medications:   •  albuterol (PROVENTIL) nebulizer solution 0.083% 2.5 mg/3mL, 2.5 mg, Nebulization, Q6H - RT, Macho Jose MD, 2.5 mg at 10/21/17 0632  •  azithromycin (ZITHROMAX) tablet 500 mg, 500 mg, Oral, Q24H, Thomas Lynch MD, 500 mg at 10/20/17 0844  •  cefdinir (OMNICEF) capsule 300 mg, 300 mg, Oral, Q12H, Thomas Lynch MD, 300 mg at 10/20/17 2004  •  dextrose (D50W) solution 25 g, 25 g, Intravenous, Q15 Min PRN, Thomas Lynch MD  •  dextrose (GLUTOSE) oral gel 15 g, 15 g, Oral, Q15 Min PRN, Thomas Lynch MD  •  enoxaparin (LOVENOX) syringe 40 mg, 40 mg, Subcutaneous, Daily, Kwadwo Brooks MD, 40 mg at 10/20/17 0844  •  famotidine (PEPCID) tablet 20 mg, 20 mg, Oral, BID, Kwadwo Brooks MD, 20 mg at 10/20/17 1727  •  glucagon (GLUCAGEN) injection 1 mg, 1 mg, Subcutaneous, Q15 Min PRN, Thomas Lynch MD  •  insulin aspart (novoLOG) injection 0-7 Units, 0-7 Units, Subcutaneous, 4x Daily AC & at Bedtime, Thomas Lynch MD, 2 Units at 10/19/17 1202  •  ipratropium-albuterol (DUO-NEB) nebulizer solution 3 mL, 3 mL, Nebulization, Q4H PRN, Thomas Lynch MD  •  montelukast (SINGULAIR) tablet 10 mg, 10 mg, Oral, Daily, Kwadwo Brooks MD, 10 mg at 10/20/17 0844  •  predniSONE (DELTASONE) tablet 30 mg, 30 mg, Oral, Daily With Breakfast, Thomas Lynch MD, 30 mg at 10/20/17 0844  •  sodium chloride 0.9 % flush 1-10 mL, 1-10 mL, Intravenous, PRN, Kwadwo Brooks MD  •   Insert peripheral IV, , , Once **AND** sodium chloride 0.9 % flush 10 mL, 10 mL, Intravenous, PRN, Frank Jefferson MD    albuterol 2.5 mg Nebulization Q6H - RT   azithromycin 500 mg Oral Q24H   cefdinir 300 mg Oral Q12H   enoxaparin 40 mg Subcutaneous Daily   famotidine 20 mg Oral BID   insulin aspart 0-7 Units Subcutaneous 4x Daily AC & at Bedtime   montelukast 10 mg Oral Daily   predniSONE 30 mg Oral Daily With Breakfast     dextrose  •  dextrose  •  glucagon  •  ipratropium-albuterol  •  sodium chloride  •  Insert peripheral IV **AND** sodium chloride      Results from last 7 days  Lab Units 10/21/17  0056 10/19/17  0714 10/16/17  2255 10/16/17  0145   WBC 10*3/mm3 17.46* 15.18* 18.11* 17.92*   HEMOGLOBIN g/dL 14.9 13.6* 15.3 15.8   PLATELETS 10*3/mm3 273 256 274 289           Results from last 7 days  Lab Units 10/19/17  0714 10/16/17  2255 10/16/17  0145   SODIUM mmol/L 141 140 140   POTASSIUM mmol/L 3.8 3.5 3.7   CHLORIDE mmol/L 109 106 102   CO2 mmol/L 28.6 26.0 22.6*   BUN mg/dL 16 16 18   CREATININE mg/dL 0.89 0.91 1.40*   CALCIUM mg/dL 8.9 9.0 9.2   GLUCOSE mg/dL 94 139* 319*         No results found for: HGBA1C    Results from last 7 days  Lab Units 10/16/17  2255 10/16/17  0145   BILIRUBIN mg/dL 0.2 0.2   ALK PHOS U/L 59 61   AST (SGOT) U/L 33 31   ALT (SGPT) U/L 48* 32       Results from last 7 days  Lab Units 10/17/17  0110 10/16/17  2255 10/16/17  0509   TROPONIN I ng/mL 0.114* 0.108* 0.061*       Results from last 7 days  Lab Units 10/16/17  2352   BNP pg/mL 66.0           Results from last 7 days  Lab Units 10/20/17  0443   PH, ARTERIAL pH units 7.359   PO2 ART mm Hg 86.1   PCO2, ARTERIAL mm Hg 49.7*   HCO3 ART mmol/L 27.4*       Imaging Results (last 72 hours)     Procedure Component Value Units Date/Time    XR Chest 1 View [[id number]] Collected:  10/17/17 0741     Updated:  10/17/17 0743    Narrative:       EXAMINATION: XR CHEST 1 VW-      CLINICAL INDICATION:     Simple Sepsis triage  protocol     TECHNIQUE:  XR CHEST 1 VW-      COMPARISON: 10/16/2017      FINDINGS:   Lungs are aerated. COPD is stable.  Mild cardiomegaly appears stable.   No pneumothorax.   No pleural effusion.   No acute osseous findings.            Impression:       Stable chest. No acute changes identified.     This report was finalized on 10/17/2017 7:41 AM by Dr. Daniel Fulton MD.       US Thyroid [[id number]] Collected:  10/18/17 1114     Updated:  10/18/17 1117    Narrative:       EXAMINATION: US THYROID-      CLINICAL INDICATION:  hyperthyroid; J44.1-Chronic obstructive pulmonary  disease with (acute) exacerbation; J45.901-Unspecified asthma with  (acute) exacerbation; R74.8-Abnormal levels of other serum enzymes      TECHNIQUE: Multiplanar, bilateral gray scale ultrasound of the thyroid  and neck, with limited Doppler vascular ultrasound.      COMPARISON: None.      FINDINGS:   The thyroid is normal in size and echogenicity.     Right Lobe:  3.9 x 1.4 x 1.4 cm. No discrete nodules or masses.     Left Lobe:  1.3 x 3.5 x 1.3 cm. No discrete nodules or masses.     Isthmus is 0.2 cm.     No adenopathy identified.      Doppler imaging is unremarkable.        Impression:       Unremarkable thyroid ultrasound.      This report was finalized on 10/18/2017 11:15 AM by Dr. Daniel Fulton MD.       CT Angiogram Aorta [[id number]] Collected:  10/18/17 1308     Updated:  10/18/17 1317    Narrative:       EXAMINATION: CT ANGIOGRAM AORTA-      CLINICAL INDICATION:          TECHNIQUE: Multiple axial CT images were obtained through chest,  abdomen, pelvis following administration of IV contrast. Reformatted CTA  images in the coronal and sagittal planes were generated from the axial  data set to facilitate diagnostic accuracy and/or surgical planning.  Volume Rendered 3D or MIP images performed.  Stenosis measurements if  obtained, were performed by the NASCET or similar method.     Radiation dose reduction techniques were utilized per  ALARA protocol.  Automated exposure control was initiated through either or CareDoSwipeClock or  DoseRight software packages by  protocol.       1731.88 mGy.cm              COMPARISON: None      FINDINGS:      VASCULAR:      6.0 cm aneurysm of the ascending thoracic aorta begins at the level of  sinus of Valsalva and tapers to normal diameter prior to brachiocephalic  artery origin. There is normal caliber of the aortic arch and descending  thoracic aorta.     The abdominal aorta is of normal caliber. No significant plaque. No  occlusion or dissection.     Celiac axis origin is unremarkable. SMA origin is unremarkable. Solitary  renal artery origins are within normal limits. The CINTHIA is patent. The  aortic bifurcation is unremarkable with no aneurysmal dilatation or  occlusion of the iliac arteries.        NONVASCULAR:      CHEST:   Moderate-advanced cardiomegaly. No pleural or pericardial effusion. No  thoracic adenopathy by CT size criteria. Lung windows demonstrate right  lower lobe airspace disease likely atelectasis. No consolidation. No  fibrosis. No suspicious nodules or masses. No pneumothorax. Bone windows  demonstrate no acute osseous findings.     ABDOMEN: Liver, spleen, and adrenals are unremarkable. Mild fatty  replacement is noted of the more distal portions of the pancreas.  Kidneys are symmetric with no perfusion abnormalities. Moderate stool  burden throughout colon consistent with constipation. No bowel  obstruction. No intra-abdominal free fluid or free air. No adenopathy.  Bone windows demonstrate no acute osseous findings.     PELVIS: Mild urinary bladder wall thickening likely due to incomplete  distention versus secondary changes of underlying prostate hypertrophy.  Pelvic viscera are otherwise unremarkable. Pelvic portions of GI tract  are within normal limits. No free fluid or adenopathy. Bone windows  demonstrate no acute osseous findings.        Impression:       1. 6.0 cm aneurysm of  the ascending thoracic aorta. No occlusion and no  levels of significant stenosis are noted.  2. Unremarkable appearance of the major abdominal arterial vasculature.  No abdominal aneurysm.  3. Right lower lobe airspace disease likely atelectasis.  4. Marked cardiomegaly.  5. Moderate constipation. No acute findings identified within chest,  abdomen, or pelvis.        This report was finalized on 10/18/2017 1:15 PM by Dr. Daniel Fulton MD.             Assessment/Plan    Active Problems:   1) Asthma exacerbation -improving  2) bronchitis  3) ascending thoracic aneurysm 6.0 cm in diameter.  4)  ·  abnormal echocardiogram results:Normal left ventricular cavity size and wall thickness noted. All left ventricular wall segments contract normally.  · Estimated EF appears to be in the range of 61 - 65%  · The aortic valve is grossly normal in structure. Mild aortic valve regurgitation is present. No aortic valve stenosis is present.  · Moderate dilation of the aortic root is present  · The mitral valve is normal in structure. No mitral valve regurgitation is present. No significant mitral valve stenosis is present  · The tricuspid valve is normal. No tricuspid valve stenosis is present. No tricuspid valve regurgitation is present.  · The pulmonic valve is structurally normal. There is no significant pulmonic valve stenosis present. There is no pulmonic valve regurgitation present.  · Severe dilation of the ascending aorta is present measuring 6.0 cm in it's maximum dimension.  Reccomendation: Referal to a cardiothoracic surgeon.    I discussed with Dr. Pena on October 21-8:33 AM.  We discussed the need for heart catheterization prior to surgery.  He is recommended patient see him in his office on October 23 at 8:30 AM.         See orders entered.     Macho Jose MD  10/21/17  8:22 AM

## 2017-10-21 NOTE — DISCHARGE SUMMARY
Date of Admission:   10/16/2017 11:45 PM    Date of Discharge:   The 21st 2017     Discharge Diagnosis:   Active Problems:   1) Asthma exacerbation -improving  2) bronchitis  3) ascending thoracic aneurysm 6.0 cm in diameter.  4)  ·  abnormal echocardiogram results:Normal left ventricular cavity size and wall thickness noted. All left ventricular wall segments contract normally.  · Estimated EF appears to be in the range of 61 - 65%  · The aortic valve is grossly normal in structure. Mild aortic valve regurgitation is present. No aortic valve stenosis is present.  · Moderate dilation of the aortic root is present  · The mitral valve is normal in structure. No mitral valve regurgitation is present. No significant mitral valve stenosis is present  · The tricuspid valve is normal. No tricuspid valve stenosis is present. No tricuspid valve regurgitation is present.  · The pulmonic valve is structurally normal. There is no significant pulmonic valve stenosis present. There is no pulmonic valve regurgitation present.  · Severe dilation of the ascending aorta is present measuring 6.0 cm in it's maximum dimension.  Reccomendation: Referal to a cardiothoracic surgeon.     I discussed with Dr. Pena on October 21-8:33 AM.  We discussed the need for heart catheterization prior to surgery.  He is recommended patient see him in his office on October 23 at 8:30 AM.        Presenting Problem/History of Present Illness  See History and Physical for Presenting Problems / Illnesses.       Hospital Course  Patient is a 42 y.o. male presented with asthma exacerbation on 10/16/2017.  He was treated with antibiotics for bronchitis as well as steroids for asthma.  His also received nebulizer treatments.  Due to his ongoing symptoms of congestion he did have a CT scan the chest performed.  CT scan was consistent with ascending aortic aneurysm 6.0 cm in diameter.  Follow-up echocardiogram showed some mild aortic insufficiency related to  the dilation of the aortic root.  I discussed with Dr. Pena.  He is agreed to accept the patient in transfer.  Arrangements are being made..      Procedures Performed         Consults:   Consults     Date and Time Order Name Status Description    10/18/2017 1003 Inpatient Consult to Cardiothoracic Surgery      10/17/2017 0118 Internal Medicine (Syed/Eda/Damon)            Condition on Discharge:   Fair    Vital Signs  Temp:  [98 °F (36.7 °C)-98.4 °F (36.9 °C)] 98.4 °F (36.9 °C)  Heart Rate:  [] 94  Resp:  [18-20] 20  BP: (105-127)/(62-75) 127/75    Physical Exam:   Refer to examination performed in progress of this date    Discharge Disposition  Another Health Care Institution Not Defined    Discharge Medications   Filemon Jj   [facility]ale Medication Instructions JASON:[id number]    Printed on:10/21/17 0846   Medication Information                      albuterol (PROVENTIL HFA;VENTOLIN HFA) 108 (90 BASE) MCG/ACT inhaler  Inhale 2 puffs every 4 (four) hours as needed for wheezing.             albuterol (PROVENTIL) (2.5 MG/3ML) 0.083% nebulizer solution  Take 2.5 mg by nebulization 3 (Three) Times a Day.             azithromycin (ZITHROMAX) 250 MG tablet  Take 2 tablets the first day, then 1 tablet daily for 4 days.  Indications: Upper Respiratory Tract Infection             cefdinir (OMNICEF) 300 MG capsule  Take 1 capsule by mouth Every 12 (Twelve) Hours. Indications: Upper Respiratory Tract Infection             enoxaparin (LOVENOX) 40 MG/0.4ML solution syringe  Inject 0.4 mL under the skin Daily.             Fluticasone Furoate-Vilanterol 100-25 MCG/INH aerosol powder   Inhale 1 puff by mouth Daily.             montelukast (SINGULAIR) 10 MG tablet  Take 10 mg by mouth Daily.             predniSONE (DELTASONE) 20 MG tablet  Take 1 tablet by mouth 2 (Two) Times a Day.             raNITIdine (ZANTAC) 300 MG tablet  Take 300 mg by mouth 2 (Two) Times a Day.                 Discharge Diet:   Diet  Instructions     Advance Diet As Tolerated                     Activity at Discharge:   Activity Instructions     Activity as Tolerated                     Follow-up Appointments  Additional Instructions for the Follow-ups that You Need to Schedule     Discharge Follow-up with PCP    As directed    Follow Up Details:  Dr. Jose-2 weeks             Follow-up Information     Follow up with Macho Jose MD .    Specialty:  Internal Medicine    Why:  Dr. Jose-2 weeks    Contact information:    [address] [place] [id number]  [phone]             Macho Jose MD  10/21/17  8:46 AM    Time: Discharge 35 min

## 2017-10-21 NOTE — PLAN OF CARE
Problem: Patient Care Overview (Adult)  Goal: Plan of Care Review  Outcome: Ongoing (interventions implemented as appropriate)  Goal: Adult Individualization and Mutuality  Outcome: Ongoing (interventions implemented as appropriate)    Problem: Asthma (Adult)  Goal: Signs and Symptoms of Listed Potential Problems Will be Absent or Manageable (Asthma)  Outcome: Ongoing (interventions implemented as appropriate)    Problem: Fall Risk (Adult)  Goal: Identify Related Risk Factors and Signs and Symptoms  Outcome: Ongoing (interventions implemented as appropriate)  Goal: Absence of Falls  Outcome: Ongoing (interventions implemented as appropriate)    Problem: Skin Integrity Impairment, Risk/Actual (Adult)  Goal: Identify Related Risk Factors and Signs and Symptoms  Outcome: Ongoing (interventions implemented as appropriate)    Problem: Diabetes, Type 2 (Adult)  Goal: Signs and Symptoms of Listed Potential Problems Will be Absent or Manageable (Diabetes, Type 2)  Outcome: Ongoing (interventions implemented as appropriate)    10/20/17 0217   Diabetes, Type 2   Problems Assessed (Type 2 Diabetes) all

## 2017-10-22 ENCOUNTER — HOSPITAL ENCOUNTER (INPATIENT)
Facility: HOSPITAL | Age: 42
LOS: 2 days | Discharge: HOME | End: 2017-10-24
Attending: THORACIC SURGERY (CARDIOTHORACIC VASCULAR SURGERY) | Admitting: THORACIC SURGERY (CARDIOTHORACIC VASCULAR SURGERY)

## 2017-10-22 VITALS
BODY MASS INDEX: 29.03 KG/M2 | WEIGHT: 185 LBS | OXYGEN SATURATION: 97 % | HEIGHT: 67 IN | SYSTOLIC BLOOD PRESSURE: 123 MMHG | HEART RATE: 88 BPM | DIASTOLIC BLOOD PRESSURE: 75 MMHG | TEMPERATURE: 97.6 F | RESPIRATION RATE: 20 BRPM

## 2017-10-22 DIAGNOSIS — I71.21 ASCENDING AORTIC ANEURYSM (HCC): Primary | ICD-10-CM

## 2017-10-22 PROBLEM — I72.9 ANEURYSM (HCC): Status: ACTIVE | Noted: 2017-10-22

## 2017-10-22 PROBLEM — J44.1 ASTHMA EXACERBATION IN COPD (HCC): Status: RESOLVED | Noted: 2017-10-17 | Resolved: 2017-10-22

## 2017-10-22 PROBLEM — F79 MENTAL RETARDATION: Status: ACTIVE | Noted: 2017-10-22

## 2017-10-22 PROBLEM — J45.901 ASTHMA EXACERBATION IN COPD (HCC): Status: RESOLVED | Noted: 2017-10-17 | Resolved: 2017-10-22

## 2017-10-22 LAB
BACTERIA SPEC AEROBE CULT: NORMAL
GLUCOSE BLDC GLUCOMTR-MCNC: 89 MG/DL (ref 70–130)
THYROGLOBULIN (TG-RIA): 6.2 NG/ML

## 2017-10-22 PROCEDURE — 63710000001 PREDNISONE PER 1 MG: Performed by: PHYSICIAN ASSISTANT

## 2017-10-22 PROCEDURE — 94799 UNLISTED PULMONARY SVC/PX: CPT

## 2017-10-22 PROCEDURE — 82962 GLUCOSE BLOOD TEST: CPT

## 2017-10-22 PROCEDURE — 25010000002 ENOXAPARIN PER 10 MG: Performed by: INTERNAL MEDICINE

## 2017-10-22 PROCEDURE — 63710000001 PREDNISONE PER 5 MG: Performed by: INTERNAL MEDICINE

## 2017-10-22 PROCEDURE — 94640 AIRWAY INHALATION TREATMENT: CPT

## 2017-10-22 PROCEDURE — 63710000001 PREDNISONE PER 1 MG: Performed by: INTERNAL MEDICINE

## 2017-10-22 RX ORDER — HYDROXYZINE HYDROCHLORIDE 50 MG/ML
25 INJECTION, SOLUTION INTRAMUSCULAR EVERY 6 HOURS PRN
Status: DISCONTINUED | OUTPATIENT
Start: 2017-10-22 | End: 2017-10-22

## 2017-10-22 RX ORDER — ASPIRIN 325 MG
325 TABLET ORAL ONCE
Status: CANCELLED | OUTPATIENT
Start: 2017-10-22 | End: 2017-10-22

## 2017-10-22 RX ORDER — AZITHROMYCIN 250 MG/1
250 TABLET, FILM COATED ORAL
Status: DISCONTINUED | OUTPATIENT
Start: 2017-10-22 | End: 2017-10-22

## 2017-10-22 RX ORDER — CEFDINIR 300 MG/1
300 CAPSULE ORAL EVERY 12 HOURS SCHEDULED
Status: DISCONTINUED | OUTPATIENT
Start: 2017-10-22 | End: 2017-10-22

## 2017-10-22 RX ORDER — MONTELUKAST SODIUM 10 MG/1
10 TABLET ORAL DAILY
Status: DISCONTINUED | OUTPATIENT
Start: 2017-10-22 | End: 2017-10-24 | Stop reason: HOSPADM

## 2017-10-22 RX ORDER — ONDANSETRON 4 MG/1
4 TABLET, FILM COATED ORAL EVERY 6 HOURS PRN
Status: DISCONTINUED | OUTPATIENT
Start: 2017-10-22 | End: 2017-10-24 | Stop reason: HOSPADM

## 2017-10-22 RX ORDER — PREDNISONE 20 MG/1
20 TABLET ORAL 2 TIMES DAILY
Status: DISCONTINUED | OUTPATIENT
Start: 2017-10-22 | End: 2017-10-24 | Stop reason: HOSPADM

## 2017-10-22 RX ORDER — CEFDINIR 300 MG/1
300 CAPSULE ORAL EVERY 12 HOURS SCHEDULED
Status: DISCONTINUED | OUTPATIENT
Start: 2017-10-22 | End: 2017-10-24 | Stop reason: HOSPADM

## 2017-10-22 RX ORDER — HYDROXYZINE PAMOATE 25 MG/1
25 CAPSULE ORAL 2 TIMES DAILY PRN
COMMUNITY
End: 2019-10-14

## 2017-10-22 RX ORDER — ASPIRIN 325 MG
325 TABLET, DELAYED RELEASE (ENTERIC COATED) ORAL DAILY
Status: CANCELLED | OUTPATIENT
Start: 2017-10-23

## 2017-10-22 RX ORDER — SODIUM CHLORIDE 0.9 % (FLUSH) 0.9 %
1-10 SYRINGE (ML) INJECTION AS NEEDED
Status: DISCONTINUED | OUTPATIENT
Start: 2017-10-22 | End: 2017-10-24 | Stop reason: HOSPADM

## 2017-10-22 RX ORDER — FAMOTIDINE 20 MG/1
20 TABLET, FILM COATED ORAL 2 TIMES DAILY
Status: DISCONTINUED | OUTPATIENT
Start: 2017-10-22 | End: 2017-10-22

## 2017-10-22 RX ORDER — ONDANSETRON 2 MG/ML
4 INJECTION INTRAMUSCULAR; INTRAVENOUS EVERY 6 HOURS PRN
Status: DISCONTINUED | OUTPATIENT
Start: 2017-10-22 | End: 2017-10-24 | Stop reason: HOSPADM

## 2017-10-22 RX ORDER — AZITHROMYCIN 250 MG/1
250 TABLET, FILM COATED ORAL
Status: DISCONTINUED | OUTPATIENT
Start: 2017-10-23 | End: 2017-10-24 | Stop reason: HOSPADM

## 2017-10-22 RX ORDER — BUDESONIDE AND FORMOTEROL FUMARATE DIHYDRATE 80; 4.5 UG/1; UG/1
2 AEROSOL RESPIRATORY (INHALATION)
Status: DISCONTINUED | OUTPATIENT
Start: 2017-10-22 | End: 2017-10-24 | Stop reason: HOSPADM

## 2017-10-22 RX ORDER — FAMOTIDINE 20 MG/1
20 TABLET, FILM COATED ORAL 2 TIMES DAILY
Status: DISCONTINUED | OUTPATIENT
Start: 2017-10-22 | End: 2017-10-24 | Stop reason: HOSPADM

## 2017-10-22 RX ORDER — HYDROXYZINE HYDROCHLORIDE 25 MG/1
25 TABLET, FILM COATED ORAL 3 TIMES DAILY PRN
Status: DISCONTINUED | OUTPATIENT
Start: 2017-10-22 | End: 2017-10-24 | Stop reason: HOSPADM

## 2017-10-22 RX ORDER — ALPRAZOLAM 0.25 MG/1
0.25 TABLET ORAL EVERY 8 HOURS PRN
Status: DISCONTINUED | OUTPATIENT
Start: 2017-10-22 | End: 2017-10-24 | Stop reason: HOSPADM

## 2017-10-22 RX ADMIN — CEFDINIR 300 MG: 300 CAPSULE ORAL at 22:24

## 2017-10-22 RX ADMIN — AZITHROMYCIN 500 MG: 250 TABLET, FILM COATED ORAL at 08:09

## 2017-10-22 RX ADMIN — FAMOTIDINE 20 MG: 20 TABLET, FILM COATED ORAL at 17:06

## 2017-10-22 RX ADMIN — ALBUTEROL SULFATE 2.5 MG: 2.5 SOLUTION RESPIRATORY (INHALATION) at 00:56

## 2017-10-22 RX ADMIN — HYDROXYZINE HYDROCHLORIDE 25 MG: 25 TABLET, FILM COATED ORAL at 22:24

## 2017-10-22 RX ADMIN — PREDNISONE 20 MG: 20 TABLET ORAL at 17:06

## 2017-10-22 RX ADMIN — ALBUTEROL SULFATE 2.5 MG: 2.5 SOLUTION RESPIRATORY (INHALATION) at 07:12

## 2017-10-22 RX ADMIN — FAMOTIDINE 20 MG: 20 TABLET, FILM COATED ORAL at 08:09

## 2017-10-22 RX ADMIN — PREDNISONE 30 MG: 10 TABLET ORAL at 08:09

## 2017-10-22 RX ADMIN — CEFDINIR 300 MG: 300 CAPSULE ORAL at 08:09

## 2017-10-22 RX ADMIN — BUDESONIDE AND FORMOTEROL FUMARATE DIHYDRATE 2 PUFF: 80; 4.5 AEROSOL RESPIRATORY (INHALATION) at 19:47

## 2017-10-22 RX ADMIN — MONTELUKAST SODIUM 10 MG: 10 TABLET ORAL at 08:09

## 2017-10-22 RX ADMIN — ENOXAPARIN SODIUM 40 MG: 40 INJECTION SUBCUTANEOUS at 08:09

## 2017-10-22 NOTE — PLAN OF CARE
Problem: Patient Care Overview (Adult)  Goal: Plan of Care Review  Outcome: Ongoing (interventions implemented as appropriate)  Goal: Adult Individualization and Mutuality  Outcome: Ongoing (interventions implemented as appropriate)    Problem: Asthma (Adult)  Goal: Signs and Symptoms of Listed Potential Problems Will be Absent or Manageable (Asthma)  Outcome: Ongoing (interventions implemented as appropriate)    10/20/17 0217   Asthma   Problems Assessed (Asthma) all   Problems Present (Asthma) none         Problem: Fall Risk (Adult)  Goal: Identify Related Risk Factors and Signs and Symptoms  Outcome: Ongoing (interventions implemented as appropriate)  Goal: Absence of Falls  Outcome: Ongoing (interventions implemented as appropriate)    Problem: Skin Integrity Impairment, Risk/Actual (Adult)  Goal: Identify Related Risk Factors and Signs and Symptoms  Outcome: Ongoing (interventions implemented as appropriate)  Goal: Skin Integrity/Wound Healing  Outcome: Ongoing (interventions implemented as appropriate)    Problem: Diabetes, Type 2 (Adult)  Goal: Signs and Symptoms of Listed Potential Problems Will be Absent or Manageable (Diabetes, Type 2)  Outcome: Ongoing (interventions implemented as appropriate)    10/20/17 0217   Diabetes, Type 2   Problems Assessed (Type 2 Diabetes) all

## 2017-10-22 NOTE — NURSING NOTE
ACC REVIEW REPORT: [facility]        PATIENT NAME: Filemon Jj    PATIENT ID: [id number]    BED: S450    BED TYPE: TELEMETRY    BED GIVEN TO: NO HAYS    TIME BED GIVEN: 0600    YOB: 1975    AGE: 42 YEARS OLD    GENDER: MALE    TODAY'S DATE: 10/22/2017    TRANSFER DATE: 10/22/17    TRANSFERRING FACILITY: [facility]    TRANSFERRING FACILITY PHONE # : [phone]    DATE/TIME REQUEST RECEIVED: 10/21/2017 @ [address] [place] [address] RN: MICHELL PRESTON RN    REPORT FROM: NO HAYS    TIME REPORT TAKEN: 0600    DIAGNOSIS: AORTIC STENOSIS    REASON FOR TRANSFER TO [facility]: HIGHER LEVEL OF CARE    TRANSPORTATION: AMBULANCE    CLINICAL REASON FOR TRANSFER TO [facility]: THE PATIENT WAS ADMITTED ON THE 16TH WITH COPD/ASTHMA EXACERBATION AND WAS POSITIVE FOR SEPSIS.  HIS OXYGEN SATURATION AT NIGHT WILL SOMETIMES DROP INTO THE 70s WHEN HE TAKES HIS OXYGEN OFF.  CT OF THE CHEST SHOWED A 6 CM AORTIC ANEURYSM. HE ALSO HAD AN ECHO THAT SHOWED AORTIC STENOSIS.  THE NURSE STATED THAT HE HAS MULTIPLE DENTAL CAVITIES AND CHEWS TOBACCO.      CLINICAL INFORMATION    ALLERGIES: NKDA    LAST VITAL SIGNS:  TIME: 0600  TEMP: 99.1  PULSE: 82  B/P: 112/65  RESP: 20    LAB INFORMATION: H/H 13.6/42.5   WBC 15.18   POTASSIUM 3.8        MEDS/IV FLUIDS: 320 GAUGE IN THE RIGHT AC    RESPIRATORY SYSTEM:    OXYGEN: 2 LITERS    O2 SAT: 98%    ADMINISTRATION ROUTE: PNC    CNS/MUSCULOSKELETAL    ALERT AND ORIENTED:X3    PAST MEDICAL HISTORY: ASTHMA, BACK PAIN, COPD, EMPHYSEMA, SUBSTANCE ABUSE, HA, HTN, AND MIGRAINE    Michell Preston RN  10/22/2017  6:15 AM

## 2017-10-22 NOTE — PLAN OF CARE
Problem: Patient Care Overview (Adult)  Goal: Plan of Care Review  Outcome: Ongoing (interventions implemented as appropriate)    10/17/17 1505 10/22/17 0809   Coping/Psychosocial Response Interventions   Plan Of Care Reviewed With --  patient   Patient Care Overview   Progress no change --          Problem: Asthma (Adult)  Goal: Signs and Symptoms of Listed Potential Problems Will be Absent or Manageable (Asthma)  Outcome: Ongoing (interventions implemented as appropriate)    Problem: Fall Risk (Adult)  Goal: Identify Related Risk Factors and Signs and Symptoms  Outcome: Ongoing (interventions implemented as appropriate)  Goal: Absence of Falls  Outcome: Ongoing (interventions implemented as appropriate)    Problem: Skin Integrity Impairment, Risk/Actual (Adult)  Goal: Identify Related Risk Factors and Signs and Symptoms  Outcome: Ongoing (interventions implemented as appropriate)  Goal: Skin Integrity/Wound Healing  Outcome: Ongoing (interventions implemented as appropriate)    Problem: Diabetes, Type 2 (Adult)  Goal: Signs and Symptoms of Listed Potential Problems Will be Absent or Manageable (Diabetes, Type 2)  Outcome: Ongoing (interventions implemented as appropriate)

## 2017-10-22 NOTE — DISCHARGE SUMMARY
Date of Admission:   10/16/2017 11:45 PM    Date of Discharge:   October 22, 2017     Discharge Diagnosis:   Active Problems:   1) Asthma exacerbation -improving  2) bronchitis  3) ascending thoracic aneurysm 6.0 cm in diameter.  4)  ·  abnormal echocardiogram results:Normal left ventricular cavity size and wall thickness noted. All left ventricular wall segments contract normally.  · Estimated EF appears to be in the range of 61 - 65%  · The aortic valve is grossly normal in structure. Mild aortic valve regurgitation is present. No aortic valve stenosis is present.  · Moderate dilation of the aortic root is present  · The mitral valve is normal in structure. No mitral valve regurgitation is present. No significant mitral valve stenosis is present  · The tricuspid valve is normal. No tricuspid valve stenosis is present. No tricuspid valve regurgitation is present.  · The pulmonic valve is structurally normal. There is no significant pulmonic valve stenosis present. There is no pulmonic valve regurgitation present.  · Severe dilation of the ascending aorta is present measuring 6.0 cm in it's maximum dimension.  Reccomendation: Referal to a cardiothoracic surgeon.     I discussed with Dr. Pena on October 21-8:33 AM.  We discussed the need for heart catheterization prior to surgery.  He is recommended patient see him in his office on October 23 at 8:30 AM.        Presenting Problem/History of Present Illness  See History and Physical for Presenting Problems / Illnesses.       Hospital Course  Patient is a 42 y.o. male presented with asthma exacerbation on 10/16/2017.  He was treated with antibiotics for bronchitis as well as steroids for asthma.  His also received nebulizer treatments.  Due to his ongoing symptoms of congestion he did have a CT scan the chest performed.  CT scan was consistent with ascending aortic aneurysm 6.0 cm in diameter.  Follow-up echocardiogram showed some mild aortic insufficiency  related to the dilation of the aortic root.  I discussed with Dr. Pena.  He is agreed to accept the patient in transfer.  Arrangements are being made..     Mr. Jj has a bed available on October 22.  Ambulance is now here transporting him.      Procedures Performed         Consults:   Consults     Date and Time Order Name Status Description    10/18/2017 1003 Inpatient Consult to Cardiothoracic Surgery      10/17/2017 0118 Internal Medicine (Syed/Eda/Damon)            Condition on Discharge:   Fair    Vital Signs  Temp:  [97.6 °F (36.4 °C)-99.1 °F (37.3 °C)] 97.6 °F (36.4 °C)  Heart Rate:  [] 88  Resp:  [18-22] 20  BP: (106-129)/(65-82) 123/75    Physical Exam:     General Appearance:    Alert, cooperative, in no acute distress   Head:    Normocephalic, without obvious abnormality, atraumatic   Eyes:            Lids and lashes normal, conjunctivae and sclerae normal, no   icterus, no pallor, corneas clear, PERRLA   Ears:    Ears appear intact with no abnormalities noted   Throat:   No oral lesions, no thrush, oral mucosa moist. Poor dentition   Neck:   No adenopathy, supple, trachea midline, no thyromegaly, no   carotid bruit, no JVD   Back:     No kyphosis present, no scoliosis present, no skin lesions,      erythema or scars, no tenderness to percussion or                   palpation,   range of motion normal   Lungs:     Trace wheezing bilaterally.  Breasts sounds are somewhat diminished bilaterally.  No respiratory distress.      Heart:    Regular rhythm and normal rate, normal S1 and S2, no            murmur, no gallop, no rub, no click   Chest Wall:    No abnormalities observed   Abdomen:     Normal bowel sounds, no masses, no organomegaly, soft        non-tender, non-distended, no guarding, no rebound                tenderness   Rectal:     Deferred   Extremities:   Moves all extremities well, no edema, no cyanosis, no             redness   Pulses:   Pulses palpable and equal bilaterally    Skin:   No bleeding, bruising or rash   Lymph nodes:   No palpable adenopathy   Neurologic:   Cranial nerves 2 - 12 grossly intact, sensation intact, DTR       present and equal bilaterally           Discharge Disposition  Another Health Care Institution Not Defined    Discharge Medications   Filemon Jj   Home Medication Instructions JASON:[id number]    Printed on:10/22/17 0916   Medication Information                      albuterol (PROVENTIL HFA;VENTOLIN HFA) 108 (90 BASE) MCG/ACT inhaler  Inhale 2 puffs every 4 (four) hours as needed for wheezing.             albuterol (PROVENTIL) (2.5 MG/3ML) 0.083% nebulizer solution  Take 2.5 mg by nebulization 3 (Three) Times a Day.             azithromycin (ZITHROMAX) 250 MG tablet  Take 2 tablets the first day, then 1 tablet daily for 4 days.  Indications: Upper Respiratory Tract Infection             cefdinir (OMNICEF) 300 MG capsule  Take 1 capsule by mouth Every 12 (Twelve) Hours. Indications: Upper Respiratory Tract Infection             enoxaparin (LOVENOX) 40 MG/0.4ML solution syringe  Inject 0.4 mL under the skin Daily.             Fluticasone Furoate-Vilanterol 100-25 MCG/INH aerosol powder   Inhale 1 puff by mouth Daily.             montelukast (SINGULAIR) 10 MG tablet  Take 10 mg by mouth Daily.             predniSONE (DELTASONE) 20 MG tablet  Take 1 tablet by mouth 2 (Two) Times a Day.             raNITIdine (ZANTAC) 300 MG tablet  Take 300 mg by mouth 2 (Two) Times a Day.                 Discharge Diet:   Diet Instructions     Advance Diet As Tolerated                     Activity at Discharge:   Activity Instructions     Activity as Tolerated                     Follow-up Appointments  Additional Instructions for the Follow-ups that You Need to Schedule     Discharge Follow-up with PCP    As directed    Follow Up Details:  Dr. Jose-2 weeks             Follow-up Information     Follow up with Macho Jose MD .    Specialty:  Internal Medicine     Why:  Dr. Jose-2 weeks    Contact information:    [address] [facility] [place] [address]  [phone]             Macho Jose MD  10/22/17  9:16 AM    Time: Discharge 35 min

## 2017-10-23 ENCOUNTER — APPOINTMENT (OUTPATIENT)
Dept: GENERAL RADIOLOGY | Facility: HOSPITAL | Age: 42
End: 2017-10-23

## 2017-10-23 PROBLEM — F79 MENTAL RETARDATION: Status: RESOLVED | Noted: 2017-10-22 | Resolved: 2017-10-23

## 2017-10-23 LAB
CYTOLOGIST CVX/VAG CYTO: NORMAL
PATH INTERP BLD-IMP: NORMAL

## 2017-10-23 PROCEDURE — B2151ZZ FLUOROSCOPY OF LEFT HEART USING LOW OSMOLAR CONTRAST: ICD-10-PCS | Performed by: INTERNAL MEDICINE

## 2017-10-23 PROCEDURE — 63710000001 PREDNISONE PER 1 MG: Performed by: PHYSICIAN ASSISTANT

## 2017-10-23 PROCEDURE — 25010000002 HEPARIN (PORCINE) PER 1000 UNITS: Performed by: INTERNAL MEDICINE

## 2017-10-23 PROCEDURE — B3101ZZ FLUOROSCOPY OF THORACIC AORTA USING LOW OSMOLAR CONTRAST: ICD-10-PCS | Performed by: INTERNAL MEDICINE

## 2017-10-23 PROCEDURE — C1887 CATHETER, GUIDING: HCPCS | Performed by: INTERNAL MEDICINE

## 2017-10-23 PROCEDURE — C1894 INTRO/SHEATH, NON-LASER: HCPCS | Performed by: INTERNAL MEDICINE

## 2017-10-23 PROCEDURE — 0 IOPAMIDOL PER 1 ML: Performed by: INTERNAL MEDICINE

## 2017-10-23 PROCEDURE — 94640 AIRWAY INHALATION TREATMENT: CPT

## 2017-10-23 PROCEDURE — 99252 IP/OBS CONSLTJ NEW/EST SF 35: CPT | Performed by: INTERNAL MEDICINE

## 2017-10-23 PROCEDURE — 4A023N7 MEASUREMENT OF CARDIAC SAMPLING AND PRESSURE, LEFT HEART, PERCUTANEOUS APPROACH: ICD-10-PCS | Performed by: INTERNAL MEDICINE

## 2017-10-23 PROCEDURE — 93458 L HRT ARTERY/VENTRICLE ANGIO: CPT | Performed by: INTERNAL MEDICINE

## 2017-10-23 PROCEDURE — 93005 ELECTROCARDIOGRAM TRACING: CPT | Performed by: INTERNAL MEDICINE

## 2017-10-23 PROCEDURE — 25010000002 FENTANYL CITRATE (PF) 100 MCG/2ML SOLUTION: Performed by: INTERNAL MEDICINE

## 2017-10-23 PROCEDURE — 99231 SBSQ HOSP IP/OBS SF/LOW 25: CPT | Performed by: THORACIC SURGERY (CARDIOTHORACIC VASCULAR SURGERY)

## 2017-10-23 PROCEDURE — 25010000002 MIDAZOLAM PER 1 MG: Performed by: INTERNAL MEDICINE

## 2017-10-23 PROCEDURE — B2111ZZ FLUOROSCOPY OF MULTIPLE CORONARY ARTERIES USING LOW OSMOLAR CONTRAST: ICD-10-PCS | Performed by: INTERNAL MEDICINE

## 2017-10-23 PROCEDURE — C1769 GUIDE WIRE: HCPCS | Performed by: INTERNAL MEDICINE

## 2017-10-23 PROCEDURE — 71010 HC CHEST PA OR AP: CPT

## 2017-10-23 RX ORDER — ALPRAZOLAM 1 MG/1
1 TABLET ORAL ONCE
Status: CANCELLED | OUTPATIENT
Start: 2017-10-23 | End: 2017-10-23

## 2017-10-23 RX ORDER — MIDAZOLAM HYDROCHLORIDE 1 MG/ML
INJECTION INTRAMUSCULAR; INTRAVENOUS AS NEEDED
Status: DISCONTINUED | OUTPATIENT
Start: 2017-10-23 | End: 2017-10-23 | Stop reason: HOSPADM

## 2017-10-23 RX ORDER — ASPIRIN 81 MG/1
81 TABLET ORAL DAILY
Status: CANCELLED | OUTPATIENT
Start: 2017-10-24

## 2017-10-23 RX ORDER — SODIUM CHLORIDE 9 MG/ML
1-3 INJECTION, SOLUTION INTRAVENOUS CONTINUOUS
Status: CANCELLED | OUTPATIENT
Start: 2017-10-23

## 2017-10-23 RX ORDER — LIDOCAINE HYDROCHLORIDE 10 MG/ML
INJECTION, SOLUTION EPIDURAL; INFILTRATION; INTRACAUDAL; PERINEURAL AS NEEDED
Status: DISCONTINUED | OUTPATIENT
Start: 2017-10-23 | End: 2017-10-23 | Stop reason: HOSPADM

## 2017-10-23 RX ORDER — SODIUM CHLORIDE 9 MG/ML
1.5 INJECTION, SOLUTION INTRAVENOUS CONTINUOUS
Status: ACTIVE | OUTPATIENT
Start: 2017-10-23 | End: 2017-10-23

## 2017-10-23 RX ORDER — ASPIRIN 81 MG/1
324 TABLET, CHEWABLE ORAL ONCE
Status: CANCELLED | OUTPATIENT
Start: 2017-10-23 | End: 2017-10-23

## 2017-10-23 RX ORDER — HEPARIN SODIUM 1000 [USP'U]/ML
INJECTION, SOLUTION INTRAVENOUS; SUBCUTANEOUS AS NEEDED
Status: DISCONTINUED | OUTPATIENT
Start: 2017-10-23 | End: 2017-10-23 | Stop reason: HOSPADM

## 2017-10-23 RX ORDER — FENTANYL CITRATE 50 UG/ML
INJECTION, SOLUTION INTRAMUSCULAR; INTRAVENOUS AS NEEDED
Status: DISCONTINUED | OUTPATIENT
Start: 2017-10-23 | End: 2017-10-23 | Stop reason: HOSPADM

## 2017-10-23 RX ADMIN — BUDESONIDE AND FORMOTEROL FUMARATE DIHYDRATE 2 PUFF: 80; 4.5 AEROSOL RESPIRATORY (INHALATION) at 08:40

## 2017-10-23 RX ADMIN — MONTELUKAST SODIUM 10 MG: 10 TABLET, FILM COATED ORAL at 09:18

## 2017-10-23 RX ADMIN — FAMOTIDINE 20 MG: 20 TABLET, FILM COATED ORAL at 09:17

## 2017-10-23 RX ADMIN — CEFDINIR 300 MG: 300 CAPSULE ORAL at 20:56

## 2017-10-23 RX ADMIN — BUDESONIDE AND FORMOTEROL FUMARATE DIHYDRATE 2 PUFF: 80; 4.5 AEROSOL RESPIRATORY (INHALATION) at 21:20

## 2017-10-23 RX ADMIN — FAMOTIDINE 20 MG: 20 TABLET, FILM COATED ORAL at 17:14

## 2017-10-23 RX ADMIN — CEFDINIR 300 MG: 300 CAPSULE ORAL at 09:18

## 2017-10-23 RX ADMIN — PREDNISONE 20 MG: 20 TABLET ORAL at 17:14

## 2017-10-23 RX ADMIN — AZITHROMYCIN 250 MG: 250 TABLET, FILM COATED ORAL at 09:18

## 2017-10-23 RX ADMIN — PREDNISONE 20 MG: 20 TABLET ORAL at 09:17

## 2017-10-23 RX ADMIN — SODIUM CHLORIDE 1.5 ML/KG/HR: 9 INJECTION, SOLUTION INTRAVENOUS at 17:06

## 2017-10-23 NOTE — H&P
Cardiothoracic Surgery History & Physical           Chief complaint: Shortness of breath    HPI:   Patient is a 42-year-old  male with history of COPD and hypertension who presented to [facility] on 10/17/2017 with acute onset of shortness of breath.  Patient was admitted and underwent echocardiogram which revealed an ascending aortic aneurysm.  Patient subsequently underwent a CT of the chest revealed a 6 cm ascending aortic aneurysm.  Patient was also transferred to [facility] for further evaluation.      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      • albuterol (PROVENTIL HFA;VENTOLIN HFA) 108 (90 BASE) MCG/ACT inhaler Inhale 2 puffs every 4 (four) hours as needed for wheezing. (Patient taking differently: Inhale 2 puffs 2 (Two) Times a Day As Needed for Wheezing or Shortness of Air.) 3.7 g 0 Taking   • albuterol (PROVENTIL) (2.5 MG/3ML) 0.083% nebulizer solution Take 2.5 mg by nebulization 3 (Three) Times a Day.   Taking   • azithromycin (ZITHROMAX) 250 MG tablet Take 2 tablets the first day, then 1 tablet daily for 4 days.  Indications: Upper Respiratory  Tract Infection 3 tablet 0    • cefdinir (OMNICEF) 300 MG capsule Take 1 capsule by mouth Every 12 (Twelve) Hours. Indications: Upper Respiratory Tract Infection 10 capsule 0    • enoxaparin (LOVENOX) 40 MG/0.4ML solution syringe Inject 0.4 mL under the skin Daily. 10 syringe 0    • Fluticasone Furoate-Vilanterol 100-25 MCG/INH aerosol powder  Inhale 1 puff by mouth Daily. 60 each 1    • montelukast (SINGULAIR) 10 MG tablet Take 10 mg by mouth Daily.   Taking   • predniSONE (DELTASONE) 20 MG tablet Take 1 tablet by mouth 2 (Two) Times a Day. 6 tablet 0 Taking   • raNITIdine (ZANTAC) 300 MG tablet Take 300 mg by mouth 2 (Two) Times a Day.   Taking     Allergies:  Review of patient's allergies indicates no known allergies.    Review of Systems:    A comprehensive review of systems was negative except for:   Constitutional: positive for Fatigue  Respiratory: positive for shortness of breath astrointestinal:   Hematologic/lymphatic: Denies history of deep vein thrombosis or pulmonary embolus   Neurological: positive for history of syncope however denies any seizure disorder, transient ischemic attack or cerebral vascular accident    All other systems were reviewed and are negative.    Vital Signs:  Temp:  [98.4 °F (36.9 °C)-98.9 °F (37.2 °C)] 98.4 °F (36.9 °C)  Heart Rate:  [] 64  Resp:  [14-18] 16  BP: (115-135)/(63-80) 115/80      Physical Exam    Gen: Well-developed, well-nourished distress  HEENT: Normocephalic, atraumatic, PERRLA, EOMs intact, mucous members was no scleral icterus  Neck: Supple without bruit  Pulm: Clear to auscultation bilaterally no wheezes, rales or rhonchi  CV: Regular rate and rhythm with no murmurs, rubs or gallops.  Normal S1 and S2 with no jugular venous distention appreciated  Abd: Soft, nontender/nondistended with normoactive bowel sounds with no rebound or guarding or hepatomegaly appreciated  Ext: Warm with good color well-perfused no bilateral lower extremity edema  Neuro: Alert  and oriented ×3 with cranial nerves II-12 grossly intact no motor sensory deficits    Labs:    Results from last 7 days  Lab Units 10/21/17  0056   WBC 10*3/mm3 17.46*   HEMOGLOBIN g/dL 14.9   HEMATOCRIT % 46.1   PLATELETS 10*3/mm3 273       Results from last 7 days  Lab Units 10/19/17  0714   SODIUM mmol/L 141   POTASSIUM mmol/L 3.8   CHLORIDE mmol/L 109   CO2 mmol/L 28.6   BUN mg/dL 16   CREATININE mg/dL 0.89   GLUCOSE mg/dL 94   CALCIUM mg/dL 8.9       Results from last 7 days  Lab Units 10/20/17  0443   PH, ARTERIAL pH units 7.359   PO2 ART mm Hg 86.1   PCO2, ARTERIAL mm Hg 49.7*   HCO3 ART mmol/L 27.4*       Results from last 7 days  Lab Units 10/20/17  0443   BASE EXCESS ART mmol/L 1.1         Assessment:   6 cm thoracic aortic aneurysm  Multiple dental caries  Severe COPD    Plan:   Patient will need to undergo a cardiac workup and a left heart catheterization.  In addition, patient will need to undergo teeth extraction for dental caries.  Patient will likely need to have this done as an outpatient and follow-up for surgery.      JEAN Souza  10/23/17  9:01 AM

## 2017-10-23 NOTE — PAYOR COMM NOTE
"Initial notification of inpt status, member ID [id number]  Asuncion Young RN   Utilization Review  [phone]  (fax) [phone]    Moustapha Escamilla (42 y.o. Male)     Date of Birth Social Security Number Address Home Phone MRN    1975  [address]  [place] [address] [phone] [id number]    [employer] Marital Status          Non-Mormonism Single       Admission Date Admission Type Admitting Provider Attending Provider Department, Room/Bed    10/22/17 Elective Aaron Lucas MD Chaney, John H, MD [address], [address]/1    Discharge Date Discharge Disposition Discharge Destination                      Attending Provider: Aaron Lucas MD     Allergies:  No Known Allergies    Isolation:  None   Infection:  None   Code Status:  FULL    Ht:  67\" (170.2 cm)   Wt:  184 lb (83.5 kg)    Admission Cmt:  None   Principal Problem:  Ascending aortic aneurysm [I71.2] More...                 Active Insurance as of 10/22/2017     Primary Coverage     Payor Plan Insurance Group Employer/Plan Group    [employer] [place] [employer]      Payor Plan Address Payor Plan Phone Number Effective From Effective To    [address] [address]  6/1/2014     [place] [address]       Subscriber Name Subscriber Birth Date Member ID       MOUSTAPHA ESCAMILLA 1975 [id number]                 Emergency Contacts      (Rel.) Home Phone Work Phone Mobile Phone    Alexandra Pena (Mother) [phone] -- --            Insurance Information                [employer] [place] [place] Phone:     Subscriber: Moustapha Escamilla Subscriber#: [id number]    Group#:  Precert#:              History & Physical      JEAN Chapin at 10/23/2017  9:00 AM     Attestation signed by Aaron Lucas MD at 10/23/2017  3:20 PM        Agree with above.  42 year old presents with an asthma exacerbation and an incidental 6 cm ascending aortic aneurysm has been found.  He has poor dentition.  Plan " will be to proceed with cardiac catheterization.  Will need to be discharged for teeth extraction and will need elective ascending aortic aneurysm repair.  Optimize blood pressure control with oral antihypertensives.                                 Cardiothoracic Surgery History & Physical           Chief complaint: Shortness of breath    HPI:   Patient is a 42-year-old  male with history of COPD and hypertension who presented to [facility] on 10/17/2017 with acute onset of shortness of breath.  Patient was admitted and underwent echocardiogram which revealed an ascending aortic aneurysm.  Patient subsequently underwent a CT of the chest revealed a 6 cm ascending aortic aneurysm.  Patient was also transferred to [facility] for further evaluation.      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      • albuterol (PROVENTIL HFA;VENTOLIN HFA) 108 (90 BASE) MCG/ACT inhaler Inhale 2 puffs every 4 (four) hours as needed for wheezing. (Patient taking differently: Inhale 2 puffs 2 (Two) Times a Day As Needed for Wheezing or Shortness of Air.) 3.7 g 0 Taking   •  albuterol (PROVENTIL) (2.5 MG/3ML) 0.083% nebulizer solution Take 2.5 mg by nebulization 3 (Three) Times a Day.   Taking   • azithromycin (ZITHROMAX) 250 MG tablet Take 2 tablets the first day, then 1 tablet daily for 4 days.  Indications: Upper Respiratory Tract Infection 3 tablet 0    • cefdinir (OMNICEF) 300 MG capsule Take 1 capsule by mouth Every 12 (Twelve) Hours. Indications: Upper Respiratory Tract Infection 10 capsule 0    • enoxaparin (LOVENOX) 40 MG/0.4ML solution syringe Inject 0.4 mL under the skin Daily. 10 syringe 0    • Fluticasone Furoate-Vilanterol 100-25 MCG/INH aerosol powder  Inhale 1 puff by mouth Daily. 60 each 1    • montelukast (SINGULAIR) 10 MG tablet Take 10 mg by mouth Daily.   Taking   • predniSONE (DELTASONE) 20 MG tablet Take 1 tablet by mouth 2 (Two) Times a Day. 6 tablet 0 Taking   • raNITIdine (ZANTAC) 300 MG tablet Take 300 mg by mouth 2 (Two) Times a Day.   Taking     Allergies:  Review of patient's allergies indicates no known allergies.    Review of Systems:    A comprehensive review of systems was negative except for:   Constitutional: positive for Fatigue  Respiratory: positive for shortness of breath astrointestinal:   Hematologic/lymphatic: Denies history of deep vein thrombosis or pulmonary embolus   Neurological: positive for history of syncope however denies any seizure disorder, transient ischemic attack or cerebral vascular accident    All other systems were reviewed and are negative.    Vital Signs:  Temp:  [98.4 °F (36.9 °C)-98.9 °F (37.2 °C)] 98.4 °F (36.9 °C)  Heart Rate:  [] 64  Resp:  [14-18] 16  BP: (115-135)/(63-80) 115/80      Physical Exam    Gen: Well-developed, well-nourished distress  HEENT: Normocephalic, atraumatic, PERRLA, EOMs intact, mucous members was no scleral icterus  Neck: Supple without bruit  Pulm: Clear to auscultation bilaterally no wheezes, rales or rhonchi  CV: Regular rate and rhythm with no murmurs, rubs or gallops.  Normal S1 and S2  with no jugular venous distention appreciated  Abd: Soft, nontender/nondistended with normoactive bowel sounds with no rebound or guarding or hepatomegaly appreciated  Ext: Warm with good color well-perfused no bilateral lower extremity edema  Neuro: Alert and oriented ×3 with cranial nerves II-12 grossly intact no motor sensory deficits    Labs:    Results from last 7 days  Lab Units 10/21/17  0056   WBC 10*3/mm3 17.46*   HEMOGLOBIN g/dL 14.9   HEMATOCRIT % 46.1   PLATELETS 10*3/mm3 273       Results from last 7 days  Lab Units 10/19/17  0714   SODIUM mmol/L 141   POTASSIUM mmol/L 3.8   CHLORIDE mmol/L 109   CO2 mmol/L 28.6   BUN mg/dL 16   CREATININE mg/dL 0.89   GLUCOSE mg/dL 94   CALCIUM mg/dL 8.9       Results from last 7 days  Lab Units 10/20/17  0443   PH, ARTERIAL pH units 7.359   PO2 ART mm Hg 86.1   PCO2, ARTERIAL mm Hg 49.7*   HCO3 ART mmol/L 27.4*       Results from last 7 days  Lab Units 10/20/17  0443   BASE EXCESS ART mmol/L 1.1         Assessment:   6 cm thoracic aortic aneurysm  Multiple dental caries  Severe COPD    Plan:   Patient will need to undergo a cardiac workup and a left heart catheterization.  In addition, patient will need to undergo teeth extraction for dental caries.  Patient will likely need to have this done as an outpatient and follow-up for surgery.      JEAN Souza  10/23/17  9:01 AM             Electronically signed by Aaron Lucas MD at 10/23/2017  3:20 PM        Emergency Department Notes     No notes of this type exist for this encounter.        Vital Signs (last 72 hrs)       10/20 0700  -  10/21 0659 10/21 0700  -  10/22 0659 10/22 0700  -  10/23 0659 10/23 0700  -  10/23 1523   Most Recent    Temp (°F) 98 -  98.2    97.9 -  99.1    97.6 -  98.9      98.4     98.4 (36.9)    Heart Rate 77 -  109    81 -  105    68 -  118      64     64    Resp 18 -  20    20 -  22    14 -  20      16     16    /64 -  125/71    106/72 -  129/70    117/80 -  135/74      115/80  "    115/80    SpO2 (%) 93 -  98    95 -  98    94 -  97      92     92          Hospital Medications (all)       Dose Frequency Start End    albuterol (PROVENTIL) nebulizer solution 0.5% 2.5 mg/0.5mL 2.5 mg Every 6 Hours PRN 10/22/2017     Sig - Route: Take 0.5 mL by nebulization Every 6 (Six) Hours As Needed (sob). - Nebulization    ALPRAZolam (XANAX) tablet 0.25 mg 0.25 mg Every 8 Hours PRN 10/22/2017 11/1/2017    Sig - Route: Take 1 tablet by mouth Every 8 (Eight) Hours As Needed for Anxiety. - Oral    Cosign for Ordering: Accepted by Sonny Pena MD on 10/23/2017  6:13 AM    azithromycin (ZITHROMAX) tablet 250 mg 250 mg Every 24 Hours Scheduled 10/23/2017     Sig - Route: Take 1 tablet by mouth Daily. - Oral    budesonide-formoterol (SYMBICORT) 80-4.5 MCG/ACT inhaler 2 puff 2 puff 2 Times Daily - RT 10/22/2017     Sig - Route: Inhale 2 puffs 2 (Two) Times a Day. - Inhalation    cefdinir (OMNICEF) capsule 300 mg 300 mg Every 12 Hours Scheduled 10/22/2017     Sig - Route: Take 1 capsule by mouth Every 12 (Twelve) Hours. - Oral    famotidine (PEPCID) tablet 20 mg 20 mg 2 Times Daily 10/22/2017     Sig - Route: Take 1 tablet by mouth 2 (Two) Times a Day. - Oral    hydrOXYzine (ATARAX) tablet 25 mg 25 mg 3 Times Daily PRN 10/22/2017     Sig - Route: Take 1 tablet by mouth 3 (Three) Times a Day As Needed for Anxiety. - Oral    montelukast (SINGULAIR) tablet 10 mg 10 mg Daily 10/22/2017     Sig - Route: Take 1 tablet by mouth Daily. - Oral    ondansetron (ZOFRAN) injection 4 mg 4 mg Every 6 Hours PRN 10/22/2017     Sig - Route: Infuse 2 mL into a venous catheter Every 6 (Six) Hours As Needed for Nausea or Vomiting. - Intravenous    Linked Group 1:  \"Or\" Linked Group Details        ondansetron (ZOFRAN) tablet 4 mg 4 mg Every 6 Hours PRN 10/22/2017     Sig - Route: Take 1 tablet by mouth Every 6 (Six) Hours As Needed for Nausea or Vomiting. - Oral    Linked Group 1:  \"Or\" Linked Group Details        predniSONE " (DELTASONE) tablet 20 mg 20 mg 2 Times Daily 10/22/2017     Sig - Route: Take 1 tablet by mouth 2 (Two) Times a Day. - Oral    sodium chloride 0.9 % flush 1-10 mL 1-10 mL As Needed 10/22/2017     Sig - Route: Infuse 1-10 mL into a venous catheter As Needed for Line Care. - Intravenous    azithromycin (ZITHROMAX) tablet 250 mg (Discontinued) 250 mg Every 24 Hours Scheduled 10/22/2017 10/22/2017    Sig - Route: Take 1 tablet by mouth Daily. - Oral    cefdinir (OMNICEF) capsule 300 mg (Discontinued) 300 mg Every 12 Hours Scheduled 10/22/2017 10/22/2017    Sig - Route: Take 1 capsule by mouth Every 12 (Twelve) Hours. - Oral    famotidine (PEPCID) tablet 20 mg (Discontinued) 20 mg 2 Times Daily 10/22/2017 10/22/2017    Sig - Route: Take 1 tablet by mouth 2 (Two) Times a Day. - Oral    hydrOXYzine (VISTARIL) injection 25 mg (Discontinued) 25 mg Every 6 Hours PRN 10/22/2017 10/22/2017    Sig - Route: Inject 0.5 mL into the shoulder, thigh, or buttocks Every 6 (Six) Hours As Needed (anxiety). - Intramuscular          Lab Results (last 72 hours)     ** No results found for the last 72 hours. **        Imaging Results (last 72 hours)     Procedure Component Value Units Date/Time    XR Chest 1 View [[id number]] Collected:  10/23/17 0912     Updated:  10/23/17 0928    Narrative:       EXAMINATION: XR CHEST 1 VW-10/23/2017:      INDICATION: Postop; I71.2-Thoracic aortic aneurysm, without rupture.      COMPARISON: NONE.     FINDINGS: Portable chest reveals cardiac and mediastinal silhouettes to  be within normal limits. The lung fields are grossly clear. No focal  parenchymal opacification present.  No pleural effusion or pneumothorax.  The bony structures are unremarkable. Pulmonary vascularity is within  normal limits. Degenerative changes seen within the spine.           Impression:       No acute cardiopulmonary disease.     D:  10/23/2017  E:  10/23/2017     This report was finalized on 10/23/2017 9:26 AM by   Mony Zurita MD.                Physician Progress Notes (last 72 hours) (Notes from 10/20/2017  3:23 PM through 10/23/2017  3:23 PM)      Aaron Lucas MD at 10/23/2017  7:37 AM  Version 1 of 1         Cardiothoracic Surgery Progress Note      Chief complaint: Shortness of breath       LOS: 1 day      Subjective:  Shortness of breath somewhat improved    Objective:  Vital Signs  Temp:  [98.4 °F (36.9 °C)-98.9 °F (37.2 °C)] 98.4 °F (36.9 °C)  Heart Rate:  [] 64  Resp:  [14-18] 16  BP: (115-135)/(63-80) 115/80    Physical Exam:   General Appearance: alert, appears stated age and cooperative   Lungs: clear to auscultation, respirations regular, respirations even and respirations unlabored   Heart: regular rhythm & normal rate, normal S1, S2 and no murmur, no tameka, no rub   Poor dentition     Results:    Results from last 7 days  Lab Units 10/21/17  0056   WBC 10*3/mm3 17.46*   HEMOGLOBIN g/dL 14.9   HEMATOCRIT % 46.1   PLATELETS 10*3/mm3 273       Results from last 7 days  Lab Units 10/19/17  0714   SODIUM mmol/L 141   POTASSIUM mmol/L 3.8   CHLORIDE mmol/L 109   CO2 mmol/L 28.6   BUN mg/dL 16   CREATININE mg/dL 0.89   GLUCOSE mg/dL 94   CALCIUM mg/dL 8.9         Assessment:  42 year old with asthma and 6 cm ascending aortic aneurysm    Plan:  Heart cath as per Dr. Núñez  He will need teeth extraction prior to surgery  When optimized from respiratory standpoint with hospitalists, plan for D/C home and f/u as outpt    Aaron Lucas MD  10/23/17  7:37 AM             Electronically signed by Aaron Lucas MD at 10/23/2017  7:39 AM           Consult Notes (last 72 hours) (Notes from 10/20/2017  3:23 PM through 10/23/2017  3:23 PM)      Rodolfo Núñez MD at 10/23/2017  1:11 PM  Version 1 of 1     Consult Orders:    1. Inpatient Consult to Cardiology [[id number]] ordered by Miguelangel Sheikh PA-C at 10/22/17 1144                [facility]  Consultation  Note      Chief Complaint/Reason for service:    · Ascending aortic aneurysm    Subjective    The patient is a 42-year-old gentleman with no known coronary artery disease who presented to [facility] for recurrent asthma exacerbation.  While there, he underwent an echocardiogram which showed moderate aortic insufficiency.  A CT scan was then performed which showed a 6 cm ascending aortic aneurysm.  He was referred to [place] for further evaluation.  The patient denies chest pain symptoms.  He presently has shortness of breath but this is hard to decipher given his COPD and lung disease.    Past medical, surgical, social and family history reviewed in the patient's electronic medical record.    Review of Systems:   See admission H&P for review of systems    Objective  Vital Sign Min/Max for last 24 hours  Temp  Min: 98.4 °F (36.9 °C)  Max: 98.4 °F (36.9 °C)   BP  Min: 115/80  Max: 135/74   Pulse  Min: 64  Max: 118   Resp  Min: 14  Max: 18   SpO2  Min: 92 %  Max: 94 %   No Data Recorded    No intake or output data in the 24 hours ending 10/23/17 1320        Physical Exam   Constitutional: He is oriented to person, place, and time. He appears well-developed and well-nourished.   HENT:   Head: Normocephalic and atraumatic.   Eyes: No scleral icterus.   Neck: No JVD present.   Cardiovascular: Normal rate and regular rhythm.    Murmur heard.   Diastolic murmur is present with a grade of 2/6   Pulmonary/Chest: Effort normal and breath sounds normal.   Abdominal: Soft.   Neurological: He is alert and oriented to person, place, and time.   Skin: Skin is warm and dry.   Psychiatric: He has a normal mood and affect. His behavior is normal.       Results Review:   I reviewed the patient's recent labs in the electronic medical record.    Lab Results   Component Value Date    GLUCOSE 94 10/19/2017    BUN 16 10/19/2017    CREATININE 0.89 10/19/2017    EGFRIFNONA 94 10/19/2017    EGFRIFAFRI  09/22/2016      Comment:       <15 Indicative of kidney failure.    BCR 18.0 10/19/2017    K 3.8 10/19/2017    CO2 28.6 10/19/2017    CALCIUM 8.9 10/19/2017    ALBUMIN 4.40 10/16/2017    LABIL2 1.5 10/16/2017    AST 33 10/16/2017    ALT 48 (H) 10/16/2017     Lab Results   Component Value Date    WBC 17.46 (H) 10/21/2017    HGB 14.9 10/21/2017    HCT 46.1 10/21/2017    MCV 87.8 10/21/2017     10/21/2017       Assessment    Hospital Problem List     * (Principal)Ascending aortic aneurysm    Overview Signed 10/22/2017 11:42 AM by Rodolfo Núñez MD     · CT chest (10/17): 6 cm ascending aortic aneurysm         Aneurysm            Plan    · Left heart catheterization and ascending aortography via the left radial approach today    Rodolfo Núñez MD  10/23/2017       Electronically signed by Rodolfo Núñez MD at 10/23/2017  1:20 PM

## 2017-10-23 NOTE — PLAN OF CARE
Problem: Fall Risk (Adult)  Goal: Identify Related Risk Factors and Signs and Symptoms  Outcome: Outcome(s) achieved Date Met:  10/23/17    10/23/17 0545   Fall Risk   Fall Risk: Related Risk Factors history of falls;environment unfamiliar   Fall Risk: Signs and Symptoms presence of risk factors       Goal: Absence of Falls  Outcome: Ongoing (interventions implemented as appropriate)    10/23/17 0545   Fall Risk (Adult)   Absence of Falls making progress toward outcome         Problem: Patient Care Overview (Adult)  Goal: Plan of Care Review  Outcome: Ongoing (interventions implemented as appropriate)    10/23/17 0545   Coping/Psychosocial Response Interventions   Plan Of Care Reviewed With patient   Patient Care Overview   Progress no change   Outcome Evaluation   Outcome Summary/Follow up Plan Pt NPO currently for possible heart cath. Rested comfortably throughout night. No acute distress.

## 2017-10-23 NOTE — PAYOR COMM NOTE
"[facility]  NPI:[id number]    Utilization Review  Contact: Diana Youngblood RN  Phone: [phone]  Fax:[phone]    CLINICAL UPDATE  ADDITIONAL CLINICAL PER REQUEST WITH ADMISSION DATE CLARIFICATION AS 10/17/17  Moustapha Escamilla (42 y.o. Male)     Date of Birth Social Security Number Address Home Phone MRN    1975  [address]  [facility] [address] [phone] [id number]    Evangel[employer] Marital Status          Non-Spiritism Single       Admission Date Admission Type Admitting Provider Attending Provider Department, Room/Bed    10/16/17 Emergency Thomas Lynch MD  [address], 3341/1S    Discharge Date Discharge Disposition Discharge Destination        10/22/2017 Another Health Care Institution Not Defined             Attending Provider: (none)    Allergies:  No Known Allergies    Isolation:  None   Infection:  None   Code Status:  Prior    Ht:  67\" (170.2 cm)   Wt:  185 lb (83.9 kg)    Admission Cmt:  None   Principal Problem:  None                Active Insurance as of 10/16/2017     Primary Coverage     Payor Plan Insurance Group Employer/Plan Group    [employer] [employer] [place] [facility]      Payor Plan Address Payor Plan Phone Number Effective From Effective To    [address] [address]  6/1/2014     [place] [address]       Subscriber Name Subscriber Birth Date Member ID       MOUSTAPHA ESCAMILLA 1975 [id number]                 Emergency Contacts      (Rel.) Home Phone Work Phone Mobile Phone    Alexandra Pena (Mother) [phone] -- --            MD Damon Physician Signed Medicine Discharge Summaries Date of Service: 10/22/2017  9:16 AM         Hide copied text  Date of Admission:   10/16/2017 11:45 PM     Date of Discharge:   October 22, 2017      Discharge Diagnosis:   Active Problems:   1) Asthma exacerbation -improving  2) bronchitis  3) ascending thoracic aneurysm 6.0 cm in diameter.  4)  ·  abnormal echocardiogram results:Normal " left ventricular cavity size and wall thickness noted. All left ventricular wall segments contract normally.  · Estimated EF appears to be in the range of 61 - 65%  · The aortic valve is grossly normal in structure. Mild aortic valve regurgitation is present. No aortic valve stenosis is present.  · Moderate dilation of the aortic root is present  · The mitral valve is normal in structure. No mitral valve regurgitation is present. No significant mitral valve stenosis is present  · The tricuspid valve is normal. No tricuspid valve stenosis is present. No tricuspid valve regurgitation is present.  · The pulmonic valve is structurally normal. There is no significant pulmonic valve stenosis present. There is no pulmonic valve regurgitation present.  · Severe dilation of the ascending aorta is present measuring 6.0 cm in it's maximum dimension.  Reccomendation: Referal to a cardiothoracic surgeon.      I discussed with Dr. Pena on October 21-8:33 AM.  We discussed the need for heart catheterization prior to surgery.  He is recommended patient see him in his office on October 23 at 8:30 AM.           Presenting Problem/History of Present Illness  See History and Physical for Presenting Problems / Illnesses.         Hospital Course  Patient is a 42 y.o. male presented with asthma exacerbation on 10/16/2017.  He was treated with antibiotics for bronchitis as well as steroids for asthma.  His also received nebulizer treatments.  Due to his ongoing symptoms of congestion he did have a CT scan the chest performed.  CT scan was consistent with ascending aortic aneurysm 6.0 cm in diameter.  Follow-up echocardiogram showed some mild aortic insufficiency related to the dilation of the aortic root.  I discussed with Dr. Pena.  He is agreed to accept the patient in transfer.  Arrangements are being made..      Mr. Jj has a bed available on October 22.  Ambulance is now here transporting him.       Procedures Performed            Consults:   Consults     Date and Time Order Name Status Description     10/18/2017 1003 Inpatient Consult to Cardiothoracic Surgery         10/17/2017 0118 Internal Medicine (Syed/Eda/Damon)                Condition on Discharge:   Fair     Vital Signs  Temp:  [97.6 °F (36.4 °C)-99.1 °F (37.3 °C)] 97.6 °F (36.4 °C)  Heart Rate:  [] 88  Resp:  [18-22] 20  BP: (106-129)/(65-82) 123/75     Physical Exam:      General Appearance:    Alert, cooperative, in no acute distress   Head:    Normocephalic, without obvious abnormality, atraumatic   Eyes:            Lids and lashes normal, conjunctivae and sclerae normal, no   icterus, no pallor, corneas clear, PERRLA   Ears:    Ears appear intact with no abnormalities noted   Throat:   No oral lesions, no thrush, oral mucosa moist. Poor dentition   Neck:   No adenopathy, supple, trachea midline, no thyromegaly, no   carotid bruit, no JVD   Back:     No kyphosis present, no scoliosis present, no skin lesions,      erythema or scars, no tenderness to percussion or                   palpation,   range of motion normal   Lungs:     Trace wheezing bilaterally.  Breasts sounds are somewhat diminished bilaterally.  No respiratory distress.      Heart:    Regular rhythm and normal rate, normal S1 and S2, no            murmur, no gallop, no rub, no click   Chest Wall:    No abnormalities observed   Abdomen:     Normal bowel sounds, no masses, no organomegaly, soft        non-tender, non-distended, no guarding, no rebound                tenderness   Rectal:     Deferred   Extremities:   Moves all extremities well, no edema, no cyanosis, no             redness   Pulses:   Pulses palpable and equal bilaterally   Skin:   No bleeding, bruising or rash   Lymph nodes:   No palpable adenopathy   Neurologic:   Cranial nerves 2 - 12 grossly intact, sensation intact, DTR       present and equal bilaterally              Discharge Disposition  Another Health Care Institution Not  Defined     Discharge Medications                Filemon Jj   Home Medication Instructions JASON:[id number]     Printed on:10/22/17 2624   Medication Information                                       albuterol (PROVENTIL HFA;VENTOLIN HFA) 108 (90 BASE) MCG/ACT inhaler  Inhale 2 puffs every 4 (four) hours as needed for wheezing.                   albuterol (PROVENTIL) (2.5 MG/3ML) 0.083% nebulizer solution  Take 2.5 mg by nebulization 3 (Three) Times a Day.                   azithromycin (ZITHROMAX) 250 MG tablet  Take 2 tablets the first day, then 1 tablet daily for 4 days.  Indications: Upper Respiratory Tract Infection                   cefdinir (OMNICEF) 300 MG capsule  Take 1 capsule by mouth Every 12 (Twelve) Hours. Indications: Upper Respiratory Tract Infection                   enoxaparin (LOVENOX) 40 MG/0.4ML solution syringe  Inject 0.4 mL under the skin Daily.                   Fluticasone Furoate-Vilanterol 100-25 MCG/INH aerosol powder   Inhale 1 puff by mouth Daily.                   montelukast (SINGULAIR) 10 MG tablet  Take 10 mg by mouth Daily.                   predniSONE (DELTASONE) 20 MG tablet  Take 1 tablet by mouth 2 (Two) Times a Day.                   raNITIdine (ZANTAC) 300 MG tablet  Take 300 mg by mouth 2 (Two) Times a Day.                         Discharge Diet:       Diet Instructions      Advance Diet As Tolerated                            Activity at Discharge:       Activity Instructions      Activity as Tolerated                            Follow-up Appointments       Additional Instructions for the Follow-ups that You Need to Schedule      Discharge Follow-up with PCP    As directed     Follow Up Details:  Dr. Jose-2 weeks                         Follow-up Information      Follow up with Macho Jose MD .     Specialty:  Internal Medicine     Why:  Dr. Jose-2 weeks     Contact information:     [address] [facility] [address] [phone]               Macho Jose MD  10/22/17  9:16 AM     Time: Discharge 35 min                Routing History       Date/Time From To Method     10/22/2017  9:18 AM MD Macho Tracey MD Fax                    Physician Signed Medicine Progress Notes Date of Service: 10/21/2017  8:22 AM      Expand All Collapse All    Hide copied text         LOS: 4 days   Interval History:   Mr. Jj is alert and talkative this morning.  He notes his breathing seems to be back at his baseline.  He denies any chest pain, fever, chills, nausea or vomiting.  He continues with some intermittent congestion.  He has no other complaints.  He is walking in the halls.  He is eating well.  He is asking to go home.  Nurses have described an uneventful night with no acute changes or events.  He is asking about the arrangements that was initiated by Dr. Lynch in regards to cardiothoracic surgery evaluation.      History taken from: patient chart     Review of Systems:      Review of Systems - Negative except present illness        Objective         Vital Signs  Temp:  [98 °F (36.7 °C)-98.4 °F (36.9 °C)] 98.4 °F (36.9 °C)  Heart Rate:  [] 94  Resp:  [18-20] 20  BP: (105-127)/(62-75) 127/75     Physical Exam:          General Appearance:    Alert, cooperative, in no acute distress   Head:    Normocephalic, without obvious abnormality, atraumatic   Eyes:            Lids and lashes normal, conjunctivae and sclerae normal, no   icterus, no pallor, corneas clear, PERRLA   Ears:    Ears appear intact with no abnormalities noted   Throat:   No oral lesions, no thrush, oral mucosa moist. Poor dentition   Neck:   No adenopathy, supple, trachea midline, no thyromegaly, no   carotid bruit, no JVD   Back:     No kyphosis present, no scoliosis present, no skin lesions,      erythema or scars, no tenderness to percussion or                   palpation,   range of motion normal   Lungs:     Trace wheezing bilaterally.  Breasts sounds are  somewhat diminished bilaterally.  No respiratory distress.      Heart:    Regular rhythm and normal rate, normal S1 and S2, no            murmur, no gallop, no rub, no click   Chest Wall:    No abnormalities observed   Abdomen:     Normal bowel sounds, no masses, no organomegaly, soft        non-tender, non-distended, no guarding, no rebound                tenderness   Rectal:     Deferred   Extremities:   Moves all extremities well, no edema, no cyanosis, no             redness   Pulses:   Pulses palpable and equal bilaterally   Skin:   No bleeding, bruising or rash   Lymph nodes:   No palpable adenopathy   Neurologic:   Cranial nerves 2 - 12 grossly intact, sensation intact, DTR       present and equal bilaterally                   Results Review:                          I reviewed the patient's new clinical results  : See Below     Medication Review:      Current Facility-Administered Medications:   •  albuterol (PROVENTIL) nebulizer solution 0.083% 2.5 mg/3mL, 2.5 mg, Nebulization, Q6H - RT, Macho Jose MD, 2.5 mg at 10/21/17 0632  •  azithromycin (ZITHROMAX) tablet 500 mg, 500 mg, Oral, Q24H, Thomas Lynch MD, 500 mg at 10/20/17 0844  •  cefdinir (OMNICEF) capsule 300 mg, 300 mg, Oral, Q12H, Thomas Lynch MD, 300 mg at 10/20/17 2004  •  dextrose (D50W) solution 25 g, 25 g, Intravenous, Q15 Min PRN, Thomas Lynch MD  •  dextrose (GLUTOSE) oral gel 15 g, 15 g, Oral, Q15 Min PRN, Thomas Lynch MD  •  enoxaparin (LOVENOX) syringe 40 mg, 40 mg, Subcutaneous, Daily, Kwadwo Brooks MD, 40 mg at 10/20/17 0844  •  famotidine (PEPCID) tablet 20 mg, 20 mg, Oral, BID, Kwadwo Brooks MD, 20 mg at 10/20/17 1727  •  glucagon (GLUCAGEN) injection 1 mg, 1 mg, Subcutaneous, Q15 Min PRN, Thomas Lynch MD  •  insulin aspart (novoLOG) injection 0-7 Units, 0-7 Units, Subcutaneous, 4x Daily AC & at Bedtime, Thomas Lynch MD, 2 Units at 10/19/17 1202  •  ipratropium-albuterol (DUO-NEB) nebulizer solution 3 mL,  3 mL, Nebulization, Q4H PRN, Thomas Lynch MD  •  montelukast (SINGULAIR) tablet 10 mg, 10 mg, Oral, Daily, Kwadwo Brooks MD, 10 mg at 10/20/17 0844  •  predniSONE (DELTASONE) tablet 30 mg, 30 mg, Oral, Daily With Breakfast, Thomas Lynch MD, 30 mg at 10/20/17 0844  •  sodium chloride 0.9 % flush 1-10 mL, 1-10 mL, Intravenous, PRN, Kwadwo Brooks MD  •  Insert peripheral IV, , , Once **AND** sodium chloride 0.9 % flush 10 mL, 10 mL, Intravenous, PRN, Frank Jefferson MD     albuterol 2.5 mg Nebulization Q6H - RT   azithromycin 500 mg Oral Q24H   cefdinir 300 mg Oral Q12H   enoxaparin 40 mg Subcutaneous Daily   famotidine 20 mg Oral BID   insulin aspart 0-7 Units Subcutaneous 4x Daily AC & at Bedtime   montelukast 10 mg Oral Daily   predniSONE 30 mg Oral Daily With Breakfast      dextrose  •  dextrose  •  glucagon  •  ipratropium-albuterol  •  sodium chloride  •  Insert peripheral IV **AND** sodium chloride        Results from last 7 days  Lab Units 10/21/17  0056 10/19/17  0714 10/16/17  2255 10/16/17  0145   WBC 10*3/mm3 17.46* 15.18* 18.11* 17.92*   HEMOGLOBIN g/dL 14.9 13.6* 15.3 15.8   PLATELETS 10*3/mm3 273 256 274 289             Results from last 7 days  Lab Units 10/19/17  0714 10/16/17  2255 10/16/17  0145   SODIUM mmol/L 141 140 140   POTASSIUM mmol/L 3.8 3.5 3.7   CHLORIDE mmol/L 109 106 102   CO2 mmol/L 28.6 26.0 22.6*   BUN mg/dL 16 16 18   CREATININE mg/dL 0.89 0.91 1.40*   CALCIUM mg/dL 8.9 9.0 9.2   GLUCOSE mg/dL 94 139* 319*          No results found for: HGBA1C     Results from last 7 days  Lab Units 10/16/17  2255 10/16/17  0145   BILIRUBIN mg/dL 0.2 0.2   ALK PHOS U/L 59 61   AST (SGOT) U/L 33 31   ALT (SGPT) U/L 48* 32         Results from last 7 days  Lab Units 10/17/17  0110 10/16/17  2255 10/16/17  0509   TROPONIN I ng/mL 0.114* 0.108* 0.061*         Results from last 7 days  Lab Units 10/16/17  2352   BNP pg/mL 66.0             Results from last 7 days  Lab Units  10/20/17  0443   PH, ARTERIAL pH units 7.359   PO2 ART mm Hg 86.1   PCO2, ARTERIAL mm Hg 49.7*   HCO3 ART mmol/L 27.4*         Imaging Results (last 72 hours)     Procedure Component Value Units Date/Time     XR Chest 1 View [[id number]] Collected:  10/17/17 0741       Updated:  10/17/17 0743     Narrative:        EXAMINATION: XR CHEST 1 VW-       CLINICAL INDICATION:     Simple Sepsis triage protocol      TECHNIQUE:  XR CHEST 1 VW-       COMPARISON: 10/16/2017       FINDINGS:   Lungs are aerated. COPD is stable.  Mild cardiomegaly appears stable.   No pneumothorax.   No pleural effusion.   No acute osseous findings.             Impression:        Stable chest. No acute changes identified.      This report was finalized on 10/17/2017 7:41 AM by Dr. Daniel Fulton MD.         US Thyroid [[id number]] Collected:  10/18/17 1114       Updated:  10/18/17 1117     Narrative:        EXAMINATION: US THYROID-       CLINICAL INDICATION:  hyperthyroid; J44.1-Chronic obstructive pulmonary  disease with (acute) exacerbation; J45.901-Unspecified asthma with  (acute) exacerbation; R74.8-Abnormal levels of other serum enzymes       TECHNIQUE: Multiplanar, bilateral gray scale ultrasound of the thyroid  and neck, with limited Doppler vascular ultrasound.       COMPARISON: None.       FINDINGS:   The thyroid is normal in size and echogenicity.      Right Lobe:  3.9 x 1.4 x 1.4 cm. No discrete nodules or masses.      Left Lobe:  1.3 x 3.5 x 1.3 cm. No discrete nodules or masses.      Isthmus is 0.2 cm.      No adenopathy identified.       Doppler imaging is unremarkable.          Impression:        Unremarkable thyroid ultrasound.       This report was finalized on 10/18/2017 11:15 AM by Dr. Daniel Fulton MD.         CT Angiogram Aorta [[id number]] Collected:  10/18/17 1308       Updated:  10/18/17 1317     Narrative:        EXAMINATION: CT ANGIOGRAM AORTA-       CLINICAL INDICATION:           TECHNIQUE: Multiple axial CT images were  obtained through chest,  abdomen, pelvis following administration of IV contrast. Reformatted CTA  images in the coronal and sagittal planes were generated from the axial  data set to facilitate diagnostic accuracy and/or surgical planning.  Volume Rendered 3D or MIP images performed.  Stenosis measurements if  obtained, were performed by the NASCET or similar method.      Radiation dose reduction techniques were utilized per ALARA protocol.  Automated exposure control was initiated through either or [employer] or  [employer] software packages by  protocol.        1731.88 mGy.cm                  COMPARISON: None       FINDINGS:       VASCULAR:       6.0 cm aneurysm of the ascending thoracic aorta begins at the level of  sinus of Valsalva and tapers to normal diameter prior to brachiocephalic  artery origin. There is normal caliber of the aortic arch and descending  thoracic aorta.      The abdominal aorta is of normal caliber. No significant plaque. No  occlusion or dissection.      Celiac axis origin is unremarkable. SMA origin is unremarkable. Solitary  renal artery origins are within normal limits. The CNITHIA is patent. The  aortic bifurcation is unremarkable with no aneurysmal dilatation or  occlusion of the iliac arteries.          NONVASCULAR:       CHEST:   Moderate-advanced cardiomegaly. No pleural or pericardial effusion. No  thoracic adenopathy by CT size criteria. Lung windows demonstrate right  lower lobe airspace disease likely atelectasis. No consolidation. No  fibrosis. No suspicious nodules or masses. No pneumothorax. Bone windows  demonstrate no acute osseous findings.      ABDOMEN: Liver, spleen, and adrenals are unremarkable. Mild fatty  replacement is noted of the more distal portions of the pancreas.  Kidneys are symmetric with no perfusion abnormalities. Moderate stool  burden throughout colon consistent with constipation. No bowel  obstruction. No intra-abdominal free fluid or free air.  No adenopathy.  Bone windows demonstrate no acute osseous findings.      PELVIS: Mild urinary bladder wall thickening likely due to incomplete  distention versus secondary changes of underlying prostate hypertrophy.  Pelvic viscera are otherwise unremarkable. Pelvic portions of GI tract  are within normal limits. No free fluid or adenopathy. Bone windows  demonstrate no acute osseous findings.          Impression:        1. 6.0 cm aneurysm of the ascending thoracic aorta. No occlusion and no  levels of significant stenosis are noted.  2. Unremarkable appearance of the major abdominal arterial vasculature.  No abdominal aneurysm.  3. Right lower lobe airspace disease likely atelectasis.  4. Marked cardiomegaly.  5. Moderate constipation. No acute findings identified within chest,  abdomen, or pelvis.          This report was finalized on 10/18/2017 1:15 PM by Dr. Daniel Fulton MD.                   Assessment/Plan       Active Problems:   1) Asthma exacerbation -improving  2) bronchitis  3) ascending thoracic aneurysm 6.0 cm in diameter.  4)  ·  abnormal echocardiogram results:Normal left ventricular cavity size and wall thickness noted. All left ventricular wall segments contract normally.  · Estimated EF appears to be in the range of 61 - 65%  · The aortic valve is grossly normal in structure. Mild aortic valve regurgitation is present. No aortic valve stenosis is present.  · Moderate dilation of the aortic root is present  · The mitral valve is normal in structure. No mitral valve regurgitation is present. No significant mitral valve stenosis is present  · The tricuspid valve is normal. No tricuspid valve stenosis is present. No tricuspid valve regurgitation is present.  · The pulmonic valve is structurally normal. There is no significant pulmonic valve stenosis present. There is no pulmonic valve regurgitation present.  · Severe dilation of the ascending aorta is present measuring 6.0 cm in it's maximum  dimension.  Reccomendation: Referal to a cardiothoracic surgeon.     I discussed with Dr. Pena on October 21-8:33 AM.  We discussed the need for heart catheterization prior to surgery.  He is recommended patient see him in his office on October 23 at 8:30 AM.            See orders entered.      Macho Jose MD  10/21/17  8:22 AM                                Physician Signed Medicine Progress Notes Date of Service: 10/21/2017  8:22 AM      Expand All Collapse All    Hide copied text         LOS: 4 days   Interval History:   Mr. Jj is alert and talkative this morning.  He notes his breathing seems to be back at his baseline.  He denies any chest pain, fever, chills, nausea or vomiting.  He continues with some intermittent congestion.  He has no other complaints.  He is walking in the halls.  He is eating well.  He is asking to go home.  Nurses have described an uneventful night with no acute changes or events.  He is asking about the arrangements that was initiated by Dr. Lynch in regards to cardiothoracic surgery evaluation.      History taken from: patient chart     Review of Systems:      Review of Systems - Negative except present illness        Objective         Vital Signs  Temp:  [98 °F (36.7 °C)-98.4 °F (36.9 °C)] 98.4 °F (36.9 °C)  Heart Rate:  [] 94  Resp:  [18-20] 20  BP: (105-127)/(62-75) 127/75     Physical Exam:          General Appearance:    Alert, cooperative, in no acute distress   Head:    Normocephalic, without obvious abnormality, atraumatic   Eyes:            Lids and lashes normal, conjunctivae and sclerae normal, no   icterus, no pallor, corneas clear, PERRLA   Ears:    Ears appear intact with no abnormalities noted   Throat:   No oral lesions, no thrush, oral mucosa moist. Poor dentition   Neck:   No adenopathy, supple, trachea midline, no thyromegaly, no   carotid bruit, no JVD   Back:     No kyphosis present, no scoliosis present, no skin lesions,      erythema or  scars, no tenderness to percussion or                   palpation,   range of motion normal   Lungs:     Trace wheezing bilaterally.  Breasts sounds are somewhat diminished bilaterally.  No respiratory distress.      Heart:    Regular rhythm and normal rate, normal S1 and S2, no            murmur, no gallop, no rub, no click   Chest Wall:    No abnormalities observed   Abdomen:     Normal bowel sounds, no masses, no organomegaly, soft        non-tender, non-distended, no guarding, no rebound                tenderness   Rectal:     Deferred   Extremities:   Moves all extremities well, no edema, no cyanosis, no             redness   Pulses:   Pulses palpable and equal bilaterally   Skin:   No bleeding, bruising or rash   Lymph nodes:   No palpable adenopathy   Neurologic:   Cranial nerves 2 - 12 grossly intact, sensation intact, DTR       present and equal bilaterally                   Results Review:                          I reviewed the patient's new clinical results  : See Below     Medication Review:      Current Facility-Administered Medications:   •  albuterol (PROVENTIL) nebulizer solution 0.083% 2.5 mg/3mL, 2.5 mg, Nebulization, Q6H - RT, Macho Jose MD, 2.5 mg at 10/21/17 0632  •  azithromycin (ZITHROMAX) tablet 500 mg, 500 mg, Oral, Q24H, Thomas Lynch MD, 500 mg at 10/20/17 0844  •  cefdinir (OMNICEF) capsule 300 mg, 300 mg, Oral, Q12H, Thomas Lynch MD, 300 mg at 10/20/17 2004  •  dextrose (D50W) solution 25 g, 25 g, Intravenous, Q15 Min PRN, Thomas Lynch MD  •  dextrose (GLUTOSE) oral gel 15 g, 15 g, Oral, Q15 Min PRN, Thomas Lynch MD  •  enoxaparin (LOVENOX) syringe 40 mg, 40 mg, Subcutaneous, Daily, Kwadwo Brooks MD, 40 mg at 10/20/17 0844  •  famotidine (PEPCID) tablet 20 mg, 20 mg, Oral, BID, Kwadwo Brooks MD, 20 mg at 10/20/17 1727  •  glucagon (GLUCAGEN) injection 1 mg, 1 mg, Subcutaneous, Q15 Min PRN, Thomas Lynch MD  •  insulin aspart (novoLOG) injection 0-7 Units, 0-7  Units, Subcutaneous, 4x Daily AC & at Bedtime, Thomas Lynch MD, 2 Units at 10/19/17 1202  •  ipratropium-albuterol (DUO-NEB) nebulizer solution 3 mL, 3 mL, Nebulization, Q4H PRN, Thomas Lynch MD  •  montelukast (SINGULAIR) tablet 10 mg, 10 mg, Oral, Daily, Kwadwo Brooks MD, 10 mg at 10/20/17 0844  •  predniSONE (DELTASONE) tablet 30 mg, 30 mg, Oral, Daily With Breakfast, Thomas Lynch MD, 30 mg at 10/20/17 0844  •  sodium chloride 0.9 % flush 1-10 mL, 1-10 mL, Intravenous, PRN, Kwadwo Brooks MD  •  Insert peripheral IV, , , Once **AND** sodium chloride 0.9 % flush 10 mL, 10 mL, Intravenous, PRN, Frank Jefferson MD     albuterol 2.5 mg Nebulization Q6H - RT   azithromycin 500 mg Oral Q24H   cefdinir 300 mg Oral Q12H   enoxaparin 40 mg Subcutaneous Daily   famotidine 20 mg Oral BID   insulin aspart 0-7 Units Subcutaneous 4x Daily AC & at Bedtime   montelukast 10 mg Oral Daily   predniSONE 30 mg Oral Daily With Breakfast      dextrose  •  dextrose  •  glucagon  •  ipratropium-albuterol  •  sodium chloride  •  Insert peripheral IV **AND** sodium chloride        Results from last 7 days  Lab Units 10/21/17  0056 10/19/17  0714 10/16/17  2255 10/16/17  0145   WBC 10*3/mm3 17.46* 15.18* 18.11* 17.92*   HEMOGLOBIN g/dL 14.9 13.6* 15.3 15.8   PLATELETS 10*3/mm3 273 256 274 289             Results from last 7 days  Lab Units 10/19/17  0714 10/16/17  2255 10/16/17  0145   SODIUM mmol/L 141 140 140   POTASSIUM mmol/L 3.8 3.5 3.7   CHLORIDE mmol/L 109 106 102   CO2 mmol/L 28.6 26.0 22.6*   BUN mg/dL 16 16 18   CREATININE mg/dL 0.89 0.91 1.40*   CALCIUM mg/dL 8.9 9.0 9.2   GLUCOSE mg/dL 94 139* 319*          No results found for: HGBA1C     Results from last 7 days  Lab Units 10/16/17  2255 10/16/17  0145   BILIRUBIN mg/dL 0.2 0.2   ALK PHOS U/L 59 61   AST (SGOT) U/L 33 31   ALT (SGPT) U/L 48* 32         Results from last 7 days  Lab Units 10/17/17  0110 10/16/17  2255 10/16/17  0509   TROPONIN I ng/mL  0.114* 0.108* 0.061*         Results from last 7 days  Lab Units 10/16/17  2352   BNP pg/mL 66.0             Results from last 7 days  Lab Units 10/20/17  0443   PH, ARTERIAL pH units 7.359   PO2 ART mm Hg 86.1   PCO2, ARTERIAL mm Hg 49.7*   HCO3 ART mmol/L 27.4*         Imaging Results (last 72 hours)     Procedure Component Value Units Date/Time     XR Chest 1 View [[id number]] Collected:  10/17/17 0741       Updated:  10/17/17 0743     Narrative:        EXAMINATION: XR CHEST 1 VW-       CLINICAL INDICATION:     Simple Sepsis triage protocol      TECHNIQUE:  XR CHEST 1 VW-       COMPARISON: 10/16/2017       FINDINGS:   Lungs are aerated. COPD is stable.  Mild cardiomegaly appears stable.   No pneumothorax.   No pleural effusion.   No acute osseous findings.             Impression:        Stable chest. No acute changes identified.      This report was finalized on 10/17/2017 7:41 AM by Dr. Daniel Fulton MD.         US Thyroid [[id number]] Collected:  10/18/17 1114       Updated:  10/18/17 1117     Narrative:        EXAMINATION: US THYROID-       CLINICAL INDICATION:  hyperthyroid; J44.1-Chronic obstructive pulmonary  disease with (acute) exacerbation; J45.901-Unspecified asthma with  (acute) exacerbation; R74.8-Abnormal levels of other serum enzymes       TECHNIQUE: Multiplanar, bilateral gray scale ultrasound of the thyroid  and neck, with limited Doppler vascular ultrasound.       COMPARISON: None.       FINDINGS:   The thyroid is normal in size and echogenicity.      Right Lobe:  3.9 x 1.4 x 1.4 cm. No discrete nodules or masses.      Left Lobe:  1.3 x 3.5 x 1.3 cm. No discrete nodules or masses.      Isthmus is 0.2 cm.      No adenopathy identified.       Doppler imaging is unremarkable.          Impression:        Unremarkable thyroid ultrasound.       This report was finalized on 10/18/2017 11:15 AM by Dr. Daniel Fulton MD.         CT Angiogram Aorta [[id number]] Collected:  10/18/17 1308       Updated:   10/18/17 1317     Narrative:        EXAMINATION: CT ANGIOGRAM AORTA-       CLINICAL INDICATION:           TECHNIQUE: Multiple axial CT images were obtained through chest,  abdomen, pelvis following administration of IV contrast. Reformatted CTA  images in the coronal and sagittal planes were generated from the axial  data set to facilitate diagnostic accuracy and/or surgical planning.  Volume Rendered 3D or MIP images performed.  Stenosis measurements if  obtained, were performed by the NASCET or similar method.      Radiation dose reduction techniques were utilized per ALARA protocol.  Automated exposure control was initiated through either or [employer] or  [employer] software packages by  protocol.        1731.88 mGy.cm                  COMPARISON: None       FINDINGS:       VASCULAR:       6.0 cm aneurysm of the ascending thoracic aorta begins at the level of  sinus of Valsalva and tapers to normal diameter prior to brachiocephalic  artery origin. There is normal caliber of the aortic arch and descending  thoracic aorta.      The abdominal aorta is of normal caliber. No significant plaque. No  occlusion or dissection.      Celiac axis origin is unremarkable. SMA origin is unremarkable. Solitary  renal artery origins are within normal limits. The CINTHIA is patent. The  aortic bifurcation is unremarkable with no aneurysmal dilatation or  occlusion of the iliac arteries.          NONVASCULAR:       CHEST:   Moderate-advanced cardiomegaly. No pleural or pericardial effusion. No  thoracic adenopathy by CT size criteria. Lung windows demonstrate right  lower lobe airspace disease likely atelectasis. No consolidation. No  fibrosis. No suspicious nodules or masses. No pneumothorax. Bone windows  demonstrate no acute osseous findings.      ABDOMEN: Liver, spleen, and adrenals are unremarkable. Mild fatty  replacement is noted of the more distal portions of the pancreas.  Kidneys are symmetric with no perfusion  abnormalities. Moderate stool  burden throughout colon consistent with constipation. No bowel  obstruction. No intra-abdominal free fluid or free air. No adenopathy.  Bone windows demonstrate no acute osseous findings.      PELVIS: Mild urinary bladder wall thickening likely due to incomplete  distention versus secondary changes of underlying prostate hypertrophy.  Pelvic viscera are otherwise unremarkable. Pelvic portions of GI tract  are within normal limits. No free fluid or adenopathy. Bone windows  demonstrate no acute osseous findings.          Impression:        1. 6.0 cm aneurysm of the ascending thoracic aorta. No occlusion and no  levels of significant stenosis are noted.  2. Unremarkable appearance of the major abdominal arterial vasculature.  No abdominal aneurysm.  3. Right lower lobe airspace disease likely atelectasis.  4. Marked cardiomegaly.  5. Moderate constipation. No acute findings identified within chest,  abdomen, or pelvis.          This report was finalized on 10/18/2017 1:15 PM by Dr. Daniel Fulton MD.                   Assessment/Plan       Active Problems:   1) Asthma exacerbation -improving  2) bronchitis  3) ascending thoracic aneurysm 6.0 cm in diameter.  4)  ·  abnormal echocardiogram results:Normal left ventricular cavity size and wall thickness noted. All left ventricular wall segments contract normally.  · Estimated EF appears to be in the range of 61 - 65%  · The aortic valve is grossly normal in structure. Mild aortic valve regurgitation is present. No aortic valve stenosis is present.  · Moderate dilation of the aortic root is present  · The mitral valve is normal in structure. No mitral valve regurgitation is present. No significant mitral valve stenosis is present  · The tricuspid valve is normal. No tricuspid valve stenosis is present. No tricuspid valve regurgitation is present.  · The pulmonic valve is structurally normal. There is no significant pulmonic valve stenosis  present. There is no pulmonic valve regurgitation present.  · Severe dilation of the ascending aorta is present measuring 6.0 cm in it's maximum dimension.  Reccomendation: Referal to a cardiothoracic surgeon.     I discussed with Dr. Pena on October 21-8:33 AM.  We discussed the need for heart catheterization prior to surgery.  He is recommended patient see him in his office on October 23 at 8:30 AM.            See orders entered.      Macho Jose MD  10/21/17  8:22 AM                                 Signed Case Management Progress Notes Date of Service: 10/20/2017  1:21 PM         Hide copied text  Hover for attribution information  Discharge Planning Assessment   Jose     Patient Name: Filemon Jj                                         MRN: [id number]  Today's Date: 10/20/2017                                       Admit Date: 10/16/2017         Discharge Plan        10/20/17 1320          Case Management/Social Work Plan     Plan SS spoke to pt on this date. Pt lives alone in an apartment and plans to return home at discharge. Pt does not utilize home health services. Pt has a nebulizer machine from [facility]. Pt will need [facility] [phone] arranged at discharge. SS to follow.              Expected Discharge Date and Time     Expected Discharge Date Expected Discharge Time     Oct 20, 2017              Mignon Garces                                                         Physician Signed Medicine Progress Notes Date of Service: 10/20/2017  6:20 AM      Expand All Collapse All    Hide copied text         LOS: 3 days   Interval History: Awake and alert. Breathing improved. No chest pain. No nausea or vomiting. Transiting to oral meds. SS helping to arrange transportation for CT evaluation. No other complaints. Home soon.        History taken from: patient chart     Review of Systems:      Review of Systems - Negative except present illness        Objective         Vital  Signs  Temp:  [98.1 °F (36.7 °C)-99.2 °F (37.3 °C)] 98.8 °F (37.1 °C)  Heart Rate:  [78-98] 90  Resp:  [18-19] 18  BP: (112-132)/(65-84) 132/78     Physical Exam:          General Appearance:    Alert, cooperative, in no acute distress   Head:    Normocephalic, without obvious abnormality, atraumatic   Eyes:            Lids and lashes normal, conjunctivae and sclerae normal, no   icterus, no pallor, corneas clear, PERRLA   Ears:    Ears appear intact with no abnormalities noted   Throat:   No oral lesions, no thrush, oral mucosa moist. Poor dentition   Neck:   No adenopathy, supple, trachea midline, no thyromegaly, no   carotid bruit, no JVD   Back:     No kyphosis present, no scoliosis present, no skin lesions,      erythema or scars, no tenderness to percussion or                   palpation,   range of motion normal   Lungs:     Bilateral wheeze in all lung fields    Heart:    Regular rhythm and normal rate, normal S1 and S2, no            murmur, no gallop, no rub, no click   Chest Wall:    No abnormalities observed   Abdomen:     Normal bowel sounds, no masses, no organomegaly, soft        non-tender, non-distended, no guarding, no rebound                tenderness   Rectal:     Deferred   Extremities:   Moves all extremities well, no edema, no cyanosis, no             redness   Pulses:   Pulses palpable and equal bilaterally   Skin:   No bleeding, bruising or rash   Lymph nodes:   No palpable adenopathy   Neurologic:   Cranial nerves 2 - 12 grossly intact, sensation intact, DTR       present and equal bilaterally                   Results Review:                          I reviewed the patient's new clinical results  : See Below     Medication Review:      Current Facility-Administered Medications:   •  albuterol (PROVENTIL) nebulizer solution 0.083% 2.5 mg/3mL, 2.5 mg, Nebulization, Q6H - RT, Macho Jose MD, 2.5 mg at 10/20/17 0047  •  AZITHROMYCIN 500 MG/250 ML 0.9% NS IVPB (MBP), 500 mg, Intravenous,  Q24H, Thomas Lynch MD, Last Rate: 0 mL/hr at 10/18/17 2123, 500 mg at 10/19/17 2043  •  cefTRIAXone (ROCEPHIN) 1 g/100 mL 0.9% NS (MBP), 1 g, Intravenous, Q24H, Thomas Lynch MD, Last Rate: 0 mL/hr at 10/18/17 2200, 1 g at 10/19/17 2139  •  dextrose (D50W) solution 25 g, 25 g, Intravenous, Q15 Min PRN, Thomas Lynch MD  •  dextrose (GLUTOSE) oral gel 15 g, 15 g, Oral, Q15 Min PRN, Thomas Lynch MD  •  enoxaparin (LOVENOX) syringe 40 mg, 40 mg, Subcutaneous, Daily, Kwadwo Brooks MD, 40 mg at 10/19/17 0814  •  famotidine (PEPCID) tablet 20 mg, 20 mg, Oral, BID, Kwadwo Brooks MD, 20 mg at 10/19/17 1659  •  glucagon (GLUCAGEN) injection 1 mg, 1 mg, Subcutaneous, Q15 Min PRN, Thomas Lynch MD  •  insulin aspart (novoLOG) injection 0-7 Units, 0-7 Units, Subcutaneous, 4x Daily AC & at Bedtime, Thomas Lynch MD, 2 Units at 10/19/17 1202  •  ipratropium-albuterol (DUO-NEB) nebulizer solution 3 mL, 3 mL, Nebulization, Q4H PRN, Thomas Lynch MD  •  montelukast (SINGULAIR) tablet 10 mg, 10 mg, Oral, Daily, Kwadwo Brooks MD, 10 mg at 10/19/17 0814  •  predniSONE (DELTASONE) tablet 30 mg, 30 mg, Oral, Daily With Breakfast, Thomas Lynch MD, 30 mg at 10/19/17 0814  •  sodium chloride 0.9 % flush 1-10 mL, 1-10 mL, Intravenous, PRN, Kwadwo Brooks MD  •  Insert peripheral IV, , , Once **AND** sodium chloride 0.9 % flush 10 mL, 10 mL, Intravenous, PRN, Frank Jefferson MD     albuterol 2.5 mg Nebulization Q6H - RT   azithromycin 500 mg Intravenous Q24H   ceftriaxone 1 g Intravenous Q24H   enoxaparin 40 mg Subcutaneous Daily   famotidine 20 mg Oral BID   insulin aspart 0-7 Units Subcutaneous 4x Daily AC & at Bedtime   montelukast 10 mg Oral Daily   predniSONE 30 mg Oral Daily With Breakfast      dextrose  •  dextrose  •  glucagon  •  ipratropium-albuterol  •  sodium chloride  •  Insert peripheral IV **AND** sodium chloride        Results from last 7 days  Lab Units 10/19/17  0714 10/16/17  0918  10/16/17  0145   WBC 10*3/mm3 15.18* 18.11* 17.92*   HEMOGLOBIN g/dL 13.6* 15.3 15.8   PLATELETS 10*3/mm3 256 274 289             Results from last 7 days  Lab Units 10/19/17  0714 10/16/17  2255 10/16/17  0145   SODIUM mmol/L 141 140 140   POTASSIUM mmol/L 3.8 3.5 3.7   CHLORIDE mmol/L 109 106 102   CO2 mmol/L 28.6 26.0 22.6*   BUN mg/dL 16 16 18   CREATININE mg/dL 0.89 0.91 1.40*   CALCIUM mg/dL 8.9 9.0 9.2   GLUCOSE mg/dL 94 139* 319*                Hemoglobin A1C   Date Value Ref Range Status   10/18/2017 6.10 (H) 4.50 - 5.70 % Final         Results from last 7 days  Lab Units 10/16/17  2255 10/16/17  0145   BILIRUBIN mg/dL 0.2 0.2   ALK PHOS U/L 59 61   AST (SGOT) U/L 33 31   ALT (SGPT) U/L 48* 32         Results from last 7 days  Lab Units 10/17/17  0110 10/16/17  2255 10/16/17  0509   TROPONIN I ng/mL 0.114* 0.108* 0.061*         Results from last 7 days  Lab Units 10/16/17  2352   BNP pg/mL 66.0             Results from last 7 days  Lab Units 10/20/17  0443   PH, ARTERIAL pH units 7.359   PO2 ART mm Hg 86.1   PCO2, ARTERIAL mm Hg 49.7*   HCO3 ART mmol/L 27.4*         Imaging Results (last 72 hours)     Procedure Component Value Units Date/Time     XR Chest 1 View [[id number]] Collected:  10/17/17 0741       Updated:  10/17/17 0743     Narrative:        EXAMINATION: XR CHEST 1 VW-       CLINICAL INDICATION:     Simple Sepsis triage protocol      TECHNIQUE:  XR CHEST 1 VW-       COMPARISON: 10/16/2017       FINDINGS:   Lungs are aerated. COPD is stable.  Mild cardiomegaly appears stable.   No pneumothorax.   No pleural effusion.   No acute osseous findings.             Impression:        Stable chest. No acute changes identified.      This report was finalized on 10/17/2017 7:41 AM by Dr. Daniel Fulton MD.         US Thyroid [[id number]] Collected:  10/18/17 1114       Updated:  10/18/17 1117     Narrative:        EXAMINATION: US THYROID-       CLINICAL INDICATION:  hyperthyroid; J44.1-Chronic obstructive  pulmonary  disease with (acute) exacerbation; J45.901-Unspecified asthma with  (acute) exacerbation; R74.8-Abnormal levels of other serum enzymes       TECHNIQUE: Multiplanar, bilateral gray scale ultrasound of the thyroid  and neck, with limited Doppler vascular ultrasound.       COMPARISON: None.       FINDINGS:   The thyroid is normal in size and echogenicity.      Right Lobe:  3.9 x 1.4 x 1.4 cm. No discrete nodules or masses.      Left Lobe:  1.3 x 3.5 x 1.3 cm. No discrete nodules or masses.      Isthmus is 0.2 cm.      No adenopathy identified.       Doppler imaging is unremarkable.          Impression:        Unremarkable thyroid ultrasound.       This report was finalized on 10/18/2017 11:15 AM by Dr. Daniel Fulton MD.         CT Angiogram Aorta [[id number]] Collected:  10/18/17 1308       Updated:  10/18/17 1317     Narrative:        EXAMINATION: CT ANGIOGRAM AORTA-       CLINICAL INDICATION:           TECHNIQUE: Multiple axial CT images were obtained through chest,  abdomen, pelvis following administration of IV contrast. Reformatted CTA  images in the coronal and sagittal planes were generated from the axial  data set to facilitate diagnostic accuracy and/or surgical planning.  Volume Rendered 3D or MIP images performed.  Stenosis measurements if  obtained, were performed by the NASCET or similar method.      Radiation dose reduction techniques were utilized per ALARA protocol.  Automated exposure control was initiated through either or [employer] or  [employer] software packages by  protocol.        1731.88 mGy.cm                  COMPARISON: None       FINDINGS:       VASCULAR:       6.0 cm aneurysm of the ascending thoracic aorta begins at the level of  sinus of Valsalva and tapers to normal diameter prior to brachiocephalic  artery origin. There is normal caliber of the aortic arch and descending  thoracic aorta.      The abdominal aorta is of normal caliber. No significant plaque.  No  occlusion or dissection.      Celiac axis origin is unremarkable. SMA origin is unremarkable. Solitary  renal artery origins are within normal limits. The CINTHIA is patent. The  aortic bifurcation is unremarkable with no aneurysmal dilatation or  occlusion of the iliac arteries.          NONVASCULAR:       CHEST:   Moderate-advanced cardiomegaly. No pleural or pericardial effusion. No  thoracic adenopathy by CT size criteria. Lung windows demonstrate right  lower lobe airspace disease likely atelectasis. No consolidation. No  fibrosis. No suspicious nodules or masses. No pneumothorax. Bone windows  demonstrate no acute osseous findings.      ABDOMEN: Liver, spleen, and adrenals are unremarkable. Mild fatty  replacement is noted of the more distal portions of the pancreas.  Kidneys are symmetric with no perfusion abnormalities. Moderate stool  burden throughout colon consistent with constipation. No bowel  obstruction. No intra-abdominal free fluid or free air. No adenopathy.  Bone windows demonstrate no acute osseous findings.      PELVIS: Mild urinary bladder wall thickening likely due to incomplete  distention versus secondary changes of underlying prostate hypertrophy.  Pelvic viscera are otherwise unremarkable. Pelvic portions of GI tract  are within normal limits. No free fluid or adenopathy. Bone windows  demonstrate no acute osseous findings.          Impression:        1. 6.0 cm aneurysm of the ascending thoracic aorta. No occlusion and no  levels of significant stenosis are noted.  2. Unremarkable appearance of the major abdominal arterial vasculature.  No abdominal aneurysm.  3. Right lower lobe airspace disease likely atelectasis.  4. Marked cardiomegaly.  5. Moderate constipation. No acute findings identified within chest,  abdomen, or pelvis.          This report was finalized on 10/18/2017 1:15 PM by Dr. Daniel Fulton MD.                   Assessment/Plan       Active Problems:    Asthma  exacerbation in COPD            See orders entered.      Thomas Lynch MD  10/20/17  6:20 AM                                   LOS: 2 days   Interval History: Awake and alert. Breathing better, but still with cough and wheeze and dyspnea. CTA reviewed. Discussed with patient need for referral to CT surgery. He says he has no transportation at present and wishes to wait until respiratory status improves further. Aware of potential risks.TSH remains low and thyroid ultrasound neg. Thyroid antibodies pending.        History taken from: patient chart     Review of Systems:      Review of Systems - Negative except present illness        Objective         Vital Signs  Temp:  [97.7 °F (36.5 °C)-98.9 °F (37.2 °C)] 97.7 °F (36.5 °C)  Heart Rate:  [] 79  Resp:  [18-20] 20  BP: (104-144)/(64-91) 104/64     Physical Exam:      General Appearance:    Alert, cooperative, in no acute distress   Head:    Normocephalic, without obvious abnormality, atraumatic   Eyes:            Lids and lashes normal, conjunctivae and sclerae normal, no   icterus, no pallor, corneas clear, PERRLA   Ears:    Ears appear intact with no abnormalities noted   Throat:   No oral lesions, no thrush, oral mucosa moist. Poor dentition   Neck:   No adenopathy, supple, trachea midline, no thyromegaly, no   carotid bruit, no JVD   Back:     No kyphosis present, no scoliosis present, no skin lesions,      erythema or scars, no tenderness to percussion or                   palpation,   range of motion normal   Lungs:     Bilateral wheeze in all lung fields    Heart:    Regular rhythm and normal rate, normal S1 and S2, no            murmur, no gallop, no rub, no click   Chest Wall:    No abnormalities observed   Abdomen:     Normal bowel sounds, no masses, no organomegaly, soft        non-tender, non-distended, no guarding, no rebound                tenderness   Rectal:     Deferred   Extremities:   Moves all extremities well, no edema, no cyanosis, no              redness   Pulses:   Pulses palpable and equal bilaterally   Skin:   No bleeding, bruising or rash   Lymph nodes:   No palpable adenopathy   Neurologic:   Cranial nerves 2 - 12 grossly intact, sensation intact, DTR       present and equal bilaterally                    Results Review:                          I reviewed the patient's new clinical results  : See Below     Medication Review:      Current Facility-Administered Medications:   •  albuterol (PROVENTIL) nebulizer solution 0.083% 2.5 mg/3mL, 2.5 mg, Nebulization, Q6H - RT, Macho Jose MD  •  AZITHROMYCIN 500 MG/250 ML 0.9% NS IVPB (MBP), 500 mg, Intravenous, Q24H, Thomas Lynch MD, Stopped at 10/18/17 2123  •  cefTRIAXone (ROCEPHIN) 1 g/100 mL 0.9% NS (MBP), 1 g, Intravenous, Q24H, Thomas Lynch MD, Stopped at 10/18/17 2200  •  dextrose (D50W) solution 25 g, 25 g, Intravenous, Q15 Min PRN, Thomas Lynch MD  •  dextrose (GLUTOSE) oral gel 15 g, 15 g, Oral, Q15 Min PRN, Thomas Lynch MD  •  enoxaparin (LOVENOX) syringe 40 mg, 40 mg, Subcutaneous, Daily, Kwadwo Brooks MD  •  famotidine (PEPCID) tablet 20 mg, 20 mg, Oral, BID, Kwadwo Brooks MD, 20 mg at 10/18/17 1724  •  glucagon (GLUCAGEN) injection 1 mg, 1 mg, Subcutaneous, Q15 Min PRN, Thomas Lynch MD  •  insulin aspart (novoLOG) injection 0-7 Units, 0-7 Units, Subcutaneous, 4x Daily AC & at Bedtime, Thomas Lynch MD, 3 Units at 10/18/17 2020  •  ipratropium-albuterol (DUO-NEB) nebulizer solution 3 mL, 3 mL, Nebulization, Q4H PRN, Thomas Lynch MD  •  montelukast (SINGULAIR) tablet 10 mg, 10 mg, Oral, Daily, Kwadwo Brooks MD, 10 mg at 10/18/17 0813  •  predniSONE (DELTASONE) tablet 30 mg, 30 mg, Oral, Daily With Breakfast, Thomas Lynch MD, 30 mg at 10/18/17 0813  •  sodium chloride 0.9 % flush 1-10 mL, 1-10 mL, Intravenous, PRN, Kwadwo Brooks MD  •  Insert peripheral IV, , , Once **AND** sodium chloride 0.9 % flush 10 mL, 10 mL, Intravenous, PRN, Frank  Nadeem Jefferson MD     albuterol 2.5 mg Nebulization Q6H - RT   azithromycin 500 mg Intravenous Q24H   ceftriaxone 1 g Intravenous Q24H   enoxaparin 40 mg Subcutaneous Daily   famotidine 20 mg Oral BID   insulin aspart 0-7 Units Subcutaneous 4x Daily AC & at Bedtime   montelukast 10 mg Oral Daily   predniSONE 30 mg Oral Daily With Breakfast      dextrose  •  dextrose  •  glucagon  •  ipratropium-albuterol  •  sodium chloride  •  Insert peripheral IV **AND** sodium chloride        Results from last 7 days  Lab Units 10/16/17  2255 10/16/17  0145   WBC 10*3/mm3 18.11* 17.92*   HEMOGLOBIN g/dL 15.3 15.8   PLATELETS 10*3/mm3 274 289             Results from last 7 days  Lab Units 10/16/17  2255 10/16/17  0145   SODIUM mmol/L 140 140   POTASSIUM mmol/L 3.5 3.7   CHLORIDE mmol/L 106 102   CO2 mmol/L 26.0 22.6*   BUN mg/dL 16 18   CREATININE mg/dL 0.91 1.40*   CALCIUM mg/dL 9.0 9.2   GLUCOSE mg/dL 139* 319*                Hemoglobin A1C   Date Value Ref Range Status   10/18/2017 6.10 (H) 4.50 - 5.70 % Final         Results from last 7 days  Lab Units 10/16/17  2255 10/16/17  0145   BILIRUBIN mg/dL 0.2 0.2   ALK PHOS U/L 59 61   AST (SGOT) U/L 33 31   ALT (SGPT) U/L 48* 32         Results from last 7 days  Lab Units 10/17/17  0110 10/16/17  2255 10/16/17  0509   TROPONIN I ng/mL 0.114* 0.108* 0.061*         Results from last 7 days  Lab Units 10/16/17  2352   BNP pg/mL 66.0             Results from last 7 days  Lab Units 10/17/17  0009   PH, ARTERIAL pH units 7.394   PO2 ART mm Hg 69.1*   PCO2, ARTERIAL mm Hg 43.9   HCO3 ART mmol/L 26.2*         Imaging Results (last 72 hours)     Procedure Component Value Units Date/Time     XR Chest 1 View [[id number]] Collected:  10/17/17 0741       Updated:  10/17/17 0743     Narrative:        EXAMINATION: XR CHEST 1 VW-       CLINICAL INDICATION:     Simple Sepsis triage protocol      TECHNIQUE:  XR CHEST 1 VW-       COMPARISON: 10/16/2017       FINDINGS:   Lungs are aerated. COPD is  stable.  Mild cardiomegaly appears stable.   No pneumothorax.   No pleural effusion.   No acute osseous findings.             Impression:        Stable chest. No acute changes identified.      This report was finalized on 10/17/2017 7:41 AM by Dr. Daniel Fulton MD.         US Thyroid [[id number]] Collected:  10/18/17 1114       Updated:  10/18/17 1117     Narrative:        EXAMINATION: US THYROID-       CLINICAL INDICATION:  hyperthyroid; J44.1-Chronic obstructive pulmonary  disease with (acute) exacerbation; J45.901-Unspecified asthma with  (acute) exacerbation; R74.8-Abnormal levels of other serum enzymes       TECHNIQUE: Multiplanar, bilateral gray scale ultrasound of the thyroid  and neck, with limited Doppler vascular ultrasound.       COMPARISON: None.       FINDINGS:   The thyroid is normal in size and echogenicity.      Right Lobe:  3.9 x 1.4 x 1.4 cm. No discrete nodules or masses.      Left Lobe:  1.3 x 3.5 x 1.3 cm. No discrete nodules or masses.      Isthmus is 0.2 cm.      No adenopathy identified.       Doppler imaging is unremarkable.          Impression:        Unremarkable thyroid ultrasound.       This report was finalized on 10/18/2017 11:15 AM by Dr. Daniel Fulton MD.         CT Angiogram Aorta [[id number]] Collected:  10/18/17 1308       Updated:  10/18/17 1317     Narrative:        EXAMINATION: CT ANGIOGRAM AORTA-       CLINICAL INDICATION:           TECHNIQUE: Multiple axial CT images were obtained through chest,  abdomen, pelvis following administration of IV contrast. Reformatted CTA  images in the coronal and sagittal planes were generated from the axial  data set to facilitate diagnostic accuracy and/or surgical planning.  Volume Rendered 3D or MIP images performed.  Stenosis measurements if  obtained, were performed by the NASCET or similar method.      Radiation dose reduction techniques were utilized per ALARA protocol.  Automated exposure control was initiated through either or  CareDose or  DoseRight software packages by  protocol.        1731.88 mGy.cm                  COMPARISON: None       FINDINGS:       VASCULAR:       6.0 cm aneurysm of the ascending thoracic aorta begins at the level of  sinus of Valsalva and tapers to normal diameter prior to brachiocephalic  artery origin. There is normal caliber of the aortic arch and descending  thoracic aorta.      The abdominal aorta is of normal caliber. No significant plaque. No  occlusion or dissection.      Celiac axis origin is unremarkable. SMA origin is unremarkable. Solitary  renal artery origins are within normal limits. The CINTHIA is patent. The  aortic bifurcation is unremarkable with no aneurysmal dilatation or  occlusion of the iliac arteries.          NONVASCULAR:       CHEST:   Moderate-advanced cardiomegaly. No pleural or pericardial effusion. No  thoracic adenopathy by CT size criteria. Lung windows demonstrate right  lower lobe airspace disease likely atelectasis. No consolidation. No  fibrosis. No suspicious nodules or masses. No pneumothorax. Bone windows  demonstrate no acute osseous findings.      ABDOMEN: Liver, spleen, and adrenals are unremarkable. Mild fatty  replacement is noted of the more distal portions of the pancreas.  Kidneys are symmetric with no perfusion abnormalities. Moderate stool  burden throughout colon consistent with constipation. No bowel  obstruction. No intra-abdominal free fluid or free air. No adenopathy.  Bone windows demonstrate no acute osseous findings.      PELVIS: Mild urinary bladder wall thickening likely due to incomplete  distention versus secondary changes of underlying prostate hypertrophy.  Pelvic viscera are otherwise unremarkable. Pelvic portions of GI tract  are within normal limits. No free fluid or adenopathy. Bone windows  demonstrate no acute osseous findings.          Impression:        1. 6.0 cm aneurysm of the ascending thoracic aorta. No occlusion and no  levels  of significant stenosis are noted.  2. Unremarkable appearance of the major abdominal arterial vasculature.  No abdominal aneurysm.  3. Right lower lobe airspace disease likely atelectasis.  4. Marked cardiomegaly.  5. Moderate constipation. No acute findings identified within chest,  abdomen, or pelvis.          This report was finalized on 10/18/2017 1:15 PM by Dr. Daniel Fulton MD.                   Assessment/Plan       Active Problems:    Asthma exacerbation in COPD            See orders entered.      Thomas Lynch MD  10/19/17  5:56 AM                      Revision History       Date/Time User Provider Type Action     10/19/2017  5:59 AM Thomas Lynch MD Physician Addend     10/19/2017  5:57 AM Thomas Lynch MD Physician Sign     View Details Report

## 2017-10-23 NOTE — PROGRESS NOTES
Discharge Planning Assessment  [facility]     Patient Name: Filemon Jj  MRN: [id number]  Today's Date: 10/23/2017    Admit Date: 10/22/2017          Discharge Needs Assessment       10/23/17 1137    Living Environment    Lives With alone    Living Arrangements apartment   ground floor apartment    Home Accessibility stairs to enter home;bed and bath on same level    Number of Stairs to Enter Home 5    Stair Railings at Home outside, present at both sides    Transportation Available family or friend will provide   pt is going to have brother in law and sister to transport him home.    Living Environment Comment --   Pt lives alone in apartment in [place] but has relatives and friends that are available to assit if needed.    Living Environment    Provides Primary Care For no one, unable/limited ability to care for self    Caregiving Concerns --   pt does have supportive family.    Quality Of Family Relationships supportive    Able to Return to Prior Living Arrangements yes    Discharge Needs Assessment    Concerns To Be Addressed basic needs concerns;adjustment to diagnosis/illness concerns    Readmission Within The Last 30 Days no previous admission in last 30 days    Outpatient/Agency/Support Group Needs --   Pt has a nebulizer machine from [facility].    Equipment Currently Used at Home none;nebulizer    Equipment Needed After Discharge --   Pt states he may need a walker at discharge.    Discharge Disposition home or self-care    Discharge Contact Information if Applicable --   Alexandra Pena (mother)  [phone]            Discharge Plan       10/23/17 1144    Case Management/Social Work Plan    Plan Home.    Patient/Family In Agreement With Plan yes    Additional Comments I met with pt in room he states he lives alone in [place]. Pt was independent with ADL's and mobility prior to this admission. Pt states he may need a walker at discharge. Pt denied using HH. Pt states his brother in law and  siter will be available to transport home at discharge. Pt also has  [facility] for his medication.        Discharge Placement     No information found                Demographic Summary       10/23/17 1129    Referral Information    Admission Type inpatient    Arrived From admitted as an inpatient    Referral Source admission list    Reason For Consult discharge planning    Record Reviewed clinical discipline documentation;history and physical    Contact Information    Permission Granted to Share Information With     Primary Care Physician Information    Name Eduardo Jose            Functional Status       10/23/17 1130    Functional Status Prior    Ambulation 0-->independent    Transferring 0-->independent    Toileting 0-->independent    Bathing 0-->independent    Dressing 0-->independent    Eating 0-->independent    Communication 0-->understands/communicates without difficulty    Swallowing 0-->swallows foods/liquids without difficulty    Prior Functional Level Comment --   independent.    IADL    Medications independent    Meal Preparation independent    Housekeeping independent    Laundry independent    Shopping independent    Oral Care independent    IADL Comments --   independent    Activity Tolerance    Usual Activity Tolerance good            Psychosocial     None            Abuse/Neglect     None            Legal     None            Substance Abuse     None            Patient Forms     None          Rita Choi RN

## 2017-10-23 NOTE — CONSULTS
[facility] at [facility]  Consultation Note      Chief Complaint/Reason for service:    · Ascending aortic aneurysm         The patient is a 42-year-old gentleman with no known coronary artery disease who presented to [facility] for recurrent asthma exacerbation.  While there, he underwent an echocardiogram which showed moderate aortic insufficiency.  A CT scan was then performed which showed a 6 cm ascending aortic aneurysm.  He was referred to [place] for further evaluation.  The patient denies chest pain symptoms.  He presently has shortness of breath but this is hard to decipher given his COPD and lung disease.    Past medical, surgical, social and family history reviewed in the patient's electronic medical record.    Review of Systems:   See admission H&P for review of systems       Vital Sign Min/Max for last 24 hours  Temp  Min: 98.4 °F (36.9 °C)  Max: 98.4 °F (36.9 °C)   BP  Min: 115/80  Max: 135/74   Pulse  Min: 64  Max: 118   Resp  Min: 14  Max: 18   SpO2  Min: 92 %  Max: 94 %   No Data Recorded    No intake or output data in the 24 hours ending 10/23/17 1320        Physical Exam   Constitutional: He is oriented to person, place, and time. He appears well-developed and well-nourished.   HENT:   Head: Normocephalic and atraumatic.   Eyes: No scleral icterus.   Neck: No JVD present.   Cardiovascular: Normal rate and regular rhythm.    Murmur heard.   Diastolic murmur is present with a grade of 2/6   Pulmonary/Chest: Effort normal and breath sounds normal.   Abdominal: Soft.   Neurological: He is alert and oriented to person, place, and time.   Skin: Skin is warm and dry.   Psychiatric: He has a normal mood and affect. His behavior is normal.       Results Review:   I reviewed the patient's recent labs in the electronic medical record.    Lab Results   Component Value Date    GLUCOSE 94 10/19/2017    BUN 16 10/19/2017    CREATININE 0.89 10/19/2017    EGFRIFNONA 94  10/19/2017    EGFRIFAFRI  09/22/2016      Comment:      <15 Indicative of kidney failure.    BCR 18.0 10/19/2017    K 3.8 10/19/2017    CO2 28.6 10/19/2017    CALCIUM 8.9 10/19/2017    ALBUMIN 4.40 10/16/2017    LABIL2 1.5 10/16/2017    AST 33 10/16/2017    ALT 48 (H) 10/16/2017     Lab Results   Component Value Date    WBC 17.46 (H) 10/21/2017    HGB 14.9 10/21/2017    HCT 46.1 10/21/2017    MCV 87.8 10/21/2017     10/21/2017            Hospital Problem List     * (Principal)Ascending aortic aneurysm    Overview Signed 10/22/2017 11:42 AM by Rodolfo Núñez MD     · CT chest (10/17): 6 cm ascending aortic aneurysm         Aneurysm                 · Left heart catheterization and ascending aortography via the left radial approach today    Rodolfo Núñez MD  10/23/2017

## 2017-10-24 VITALS
SYSTOLIC BLOOD PRESSURE: 122 MMHG | HEART RATE: 89 BPM | HEIGHT: 67 IN | RESPIRATION RATE: 18 BRPM | BODY MASS INDEX: 28.88 KG/M2 | OXYGEN SATURATION: 94 % | TEMPERATURE: 97.3 F | DIASTOLIC BLOOD PRESSURE: 71 MMHG | WEIGHT: 184 LBS

## 2017-10-24 LAB — GLUCOSE BLDC GLUCOMTR-MCNC: 185 MG/DL (ref 70–130)

## 2017-10-24 PROCEDURE — 99238 HOSP IP/OBS DSCHRG MGMT 30/<: CPT | Performed by: PHYSICIAN ASSISTANT

## 2017-10-24 PROCEDURE — 63710000001 PREDNISONE PER 1 MG: Performed by: PHYSICIAN ASSISTANT

## 2017-10-24 PROCEDURE — 82962 GLUCOSE BLOOD TEST: CPT

## 2017-10-24 PROCEDURE — 94640 AIRWAY INHALATION TREATMENT: CPT

## 2017-10-24 RX ADMIN — MONTELUKAST SODIUM 10 MG: 10 TABLET, FILM COATED ORAL at 08:28

## 2017-10-24 RX ADMIN — PREDNISONE 20 MG: 20 TABLET ORAL at 08:28

## 2017-10-24 RX ADMIN — CEFDINIR 300 MG: 300 CAPSULE ORAL at 08:28

## 2017-10-24 RX ADMIN — AZITHROMYCIN 250 MG: 250 TABLET, FILM COATED ORAL at 08:28

## 2017-10-24 RX ADMIN — FAMOTIDINE 20 MG: 20 TABLET, FILM COATED ORAL at 08:28

## 2017-10-24 RX ADMIN — BUDESONIDE AND FORMOTEROL FUMARATE DIHYDRATE 2 PUFF: 80; 4.5 AEROSOL RESPIRATORY (INHALATION) at 08:23

## 2017-10-24 NOTE — PLAN OF CARE
Problem: Fall Risk (Adult)  Goal: Absence of Falls  Outcome: Ongoing (interventions implemented as appropriate)    Problem: Patient Care Overview (Adult)  Goal: Plan of Care Review  Outcome: Ongoing (interventions implemented as appropriate)  Goal: Adult Individualization and Mutuality  Outcome: Ongoing (interventions implemented as appropriate)  Goal: Discharge Needs Assessment  Outcome: Ongoing (interventions implemented as appropriate)

## 2017-10-24 NOTE — DISCHARGE INSTR - APPOINTMENTS
You have an appointment with Dieter Yoo MD on October 27, 2017 @ 12:30 PM.   Call them if you have any questions. Phone: [phone] [address] [place] [address]  [place]

## 2017-10-24 NOTE — PROGRESS NOTES
CTS Progress Note      Chief complaint - SOB     LOS: 2 days     Subjective  C yesterday revealed no coronary disease.   No acute events overnight    Objective    Vital Signs  Temp:  [97.5 °F (36.4 °C)-98.2 °F (36.8 °C)] 97.5 °F (36.4 °C)  Heart Rate:  [80-94] 83  Resp:  [14-20] 18  BP: (100-171)/(68-96) 130/72    Physical Exam:   General Appearance: alert, appears stated age and cooperative, poor dentition   Lungs: clear to auscultation     Heart: regular rhythm & normal rate, normal S1, S2 and no murmur, no tameka, no rub       Results     Results from last 7 days  Lab Units 10/21/17  0056   WBC 10*3/mm3 17.46*   HEMOGLOBIN g/dL 14.9   HEMATOCRIT % 46.1   PLATELETS 10*3/mm3 273       Results from last 7 days  Lab Units 10/19/17  0714   SODIUM mmol/L 141   POTASSIUM mmol/L 3.8   CHLORIDE mmol/L 109   CO2 mmol/L 28.6   BUN mg/dL 16   CREATININE mg/dL 0.89   GLUCOSE mg/dL 94   CALCIUM mg/dL 8.9       Imaging Results (last 24 hours)     Procedure Component Value Units Date/Time    XR Chest 1 View [[id number]] Collected:  10/23/17 0912     Updated:  10/23/17 0928    Narrative:       EXAMINATION: XR CHEST 1 VW-10/23/2017:      INDICATION: Postop; I71.2-Thoracic aortic aneurysm, without rupture.      COMPARISON: NONE.     FINDINGS: Portable chest reveals cardiac and mediastinal silhouettes to  be within normal limits. The lung fields are grossly clear. No focal  parenchymal opacification present.  No pleural effusion or pneumothorax.  The bony structures are unremarkable. Pulmonary vascularity is within  normal limits. Degenerative changes seen within the spine.           Impression:       No acute cardiopulmonary disease.     D:  10/23/2017  E:  10/23/2017     This report was finalized on 10/23/2017 9:26 AM by Dr. Mony Zurita MD.           [facility] (10/23/17)  IMPRESSION:  · Normal coronary arteries  · Normal LV systolic function  · Large ascending aortic aneurysm  · Normal aortic  pressure     RECOMMENDATIONS:  · Proceed with Bentall without bypass required.    Assessment  6cm thoracic aortic aneurysm  Multiple dental caries  Severe COPD    Plan   Needs follow-up with oral surgery for teeth extraction  Repeat full PFTs  Schedule elective JEAN Zhao  10/24/17  7:51 AM

## 2017-10-24 NOTE — NURSING NOTE
"Patient has an appointment with an oral surgeon in [place] on Monday, Oct 30 2017. Advised patient to ensure he doesn't miss this appointment and the importance of it. Discussed with the patient, his mother and brother in law. Also, completed education on following a \"DASH\" diet and the importance of smokeless tobacco cessation.       An Collins RN  "

## 2017-10-25 NOTE — PAYOR COMM NOTE
"Moustapha Escamilla (42 y.o. Male)   Id # [id number]  Kandy Mcknight RN, BSN  Phone # [phone]  Fax # [phone]    Date of Birth Social Security Number Address Home Phone MRN    1975  [address]  [facility] [address] [phone] [id number]    Yazidism Marital Status          Non-Shinto Single       Admission Date Admission Type Admitting Provider Attending Provider Department, Room/Bed    10/22/17 Elective Aaron Lucas MD  [facility] 6B, N646/1    Discharge Date Discharge Disposition Discharge Destination        10/24/2017 Home or Self Care Home            Attending Provider: (none)    Allergies:  No Known Allergies    Isolation:  None   Infection:  None   Code Status:  Prior    Ht:  67\" (170.2 cm)   Wt:  184 lb (83.5 kg)    Admission Cmt:  None   Principal Problem:  Ascending aortic aneurysm [I71.2] More...                 Active Insurance as of 10/22/2017     Primary Coverage     Payor Plan Insurance Group Employer/Plan Group    [employer] [facility] [employer] [place]      Payor Plan Address Payor Plan Phone Number Effective From Effective To    [address] [address]  6/1/2014     [place] [address]       Subscriber Name Subscriber Birth Date Member ID       MOUSTAPHA ESCAMILLA 1975 [id number]                 Emergency Contacts      (Rel.) Home Phone Work Phone Mobile Phone    Alexandra Pena (Mother) [phone] -- --            Discharge Summary     No notes of this type exist for this encounter.        Discharge Order     Start     Ordered    10/24/17 1408  Discharge patient  Once     Expected Discharge Date:  10/24/17    Discharge Disposition:  Home or Self Care        10/24/17 1407          "

## 2017-10-26 NOTE — DISCHARGE SUMMARY
CTS Discharge Summary    Patient Care Team:  Macho Jose MD as PCP - General  Macho Jose MD as PCP - Family Medicine      Date of Admission: 10/22/2017 10:55 AM  Date of Discharge:  10/24/17    Discharge Diagnosis  Past Medical History:   Diagnosis Date   • Anxiety    • Asthma    • Back pain    • COPD (chronic obstructive pulmonary disease)    • Emphysema lung    • Gastritis    • GERD (gastroesophageal reflux disease)    • Headache    • Hypertension    • Migraine    • Substance abuse        Principal Problem:    Ascending aortic aneurysm  Active Problems:    Aneurysm      History of Present Illness  Patient is a 42-year-old  male with history of COPD and hypertension who presented to [facility] on 10/17/2017 with acute onset of shortness of breath.  Patient was admitted and underwent echocardiogram which revealed an ascending aortic aneurysm.  Patient subsequently underwent a CT of the chest revealed a 6 cm ascending aortic aneurysm.  Patient was also transferred to [facility] for further evaluation.      Hospital Course  Patient is a 42 y.o. male admitted on 10/23/17 from [facility], where he presented with SOB and diagnosed with incidental ascending aortic aneurysm. Transferred to [facility] for surgical evaluation. Cardiology consulted for LHC, underwent LHC 10/23/17 that revealed no coronary disease. He was discharged home with instructions to follow up with oral surgeon to remove dental caries and repeat PFTs in 30 days, and will be electively scheduled for Bentall procedure.     Procedures Performed  Procedure(s): 10/23/17  Left Heart Cath       Consults:   Consults     Date and Time Order Name Status Description    10/22/2017 1144 Inpatient Consult to Cardiology Completed             Discharge Medications   Filemon Jj   Home Medication Instructions JASON:[id number]    Printed on:10/26/17 0959   Medication Information                      albuterol  (PROVENTIL HFA;VENTOLIN HFA) 108 (90 BASE) MCG/ACT inhaler  Inhale 2 puffs every 4 (four) hours as needed for wheezing.             albuterol (PROVENTIL) (2.5 MG/3ML) 0.083% nebulizer solution  Take 2.5 mg by nebulization 3 (Three) Times a Day.             azithromycin (ZITHROMAX) 250 MG tablet  Take 2 tablets the first day, then 1 tablet daily for 4 days.  Indications: Upper Respiratory Tract Infection             cefdinir (OMNICEF) 300 MG capsule  Take 1 capsule by mouth Every 12 (Twelve) Hours. Indications: Upper Respiratory Tract Infection             Fluticasone Furoate-Vilanterol 100-25 MCG/INH aerosol powder   Inhale 1 puff by mouth Daily.             hydrOXYzine (VISTARIL) 25 MG capsule  Take 25 mg by mouth 2 (Two) Times a Day As Needed for Anxiety (for anxiety).             montelukast (SINGULAIR) 10 MG tablet  Take 10 mg by mouth Daily.             predniSONE (DELTASONE) 20 MG tablet  Take 1 tablet by mouth 2 (Two) Times a Day.             raNITIdine (ZANTAC) 300 MG tablet  Take 300 mg by mouth 2 (Two) Times a Day.                 Discharge Diet:    Cardiac    Activity at Discharge:    Do not drive while taking narcotics    Follow-up Appointments  Future Appointments  Date Time Provider Department Center   11/29/2017 10:20 AM Nabor Aburto MD [facility] None          JEAN Teague  10/26/17  9:59 AM

## 2017-11-15 ENCOUNTER — APPOINTMENT (OUTPATIENT)
Dept: GENERAL RADIOLOGY | Facility: HOSPITAL | Age: 42
End: 2017-11-15

## 2017-11-15 ENCOUNTER — HOSPITAL ENCOUNTER (EMERGENCY)
Facility: HOSPITAL | Age: 42
Discharge: HOME | End: 2017-11-15
Admitting: FAMILY MEDICINE

## 2017-11-15 VITALS
OXYGEN SATURATION: 97 % | SYSTOLIC BLOOD PRESSURE: 131 MMHG | HEIGHT: 67 IN | RESPIRATION RATE: 16 BRPM | HEART RATE: 104 BPM | WEIGHT: 187 LBS | BODY MASS INDEX: 29.35 KG/M2 | TEMPERATURE: 98.2 F | DIASTOLIC BLOOD PRESSURE: 74 MMHG

## 2017-11-15 PROCEDURE — 72110 X-RAY EXAM L-2 SPINE 4/>VWS: CPT | Performed by: RADIOLOGY

## 2017-11-15 PROCEDURE — 99283 EMERGENCY DEPT VISIT LOW MDM: CPT

## 2017-11-15 PROCEDURE — 72110 X-RAY EXAM L-2 SPINE 4/>VWS: CPT

## 2017-11-15 PROCEDURE — 96372 THER/PROPH/DIAG INJ SC/IM: CPT

## 2017-11-15 PROCEDURE — 25010000002 KETOROLAC TROMETHAMINE PER 15 MG

## 2017-11-21 ENCOUNTER — OFFICE VISIT (OUTPATIENT)
Dept: CARDIOLOGY | Facility: CLINIC | Age: 42
End: 2017-11-21

## 2017-11-21 VITALS
WEIGHT: 189 LBS | SYSTOLIC BLOOD PRESSURE: 123 MMHG | OXYGEN SATURATION: 94 % | HEART RATE: 102 BPM | HEIGHT: 67 IN | RESPIRATION RATE: 18 BRPM | DIASTOLIC BLOOD PRESSURE: 74 MMHG | BODY MASS INDEX: 29.66 KG/M2

## 2017-11-21 DIAGNOSIS — R07.89 OTHER CHEST PAIN: ICD-10-CM

## 2017-11-21 DIAGNOSIS — I71.21 ASCENDING AORTIC ANEURYSM (HCC): Primary | ICD-10-CM

## 2017-11-21 PROCEDURE — 99203 OFFICE O/P NEW LOW 30 MIN: CPT | Performed by: INTERNAL MEDICINE

## 2017-11-21 PROCEDURE — 93000 ELECTROCARDIOGRAM COMPLETE: CPT | Performed by: INTERNAL MEDICINE

## 2017-11-21 RX ORDER — IBUPROFEN 800 MG/1
800 TABLET ORAL EVERY 6 HOURS PRN
COMMUNITY
End: 2019-08-14

## 2017-11-21 RX ORDER — CYCLOBENZAPRINE HCL 10 MG
10 TABLET ORAL 3 TIMES DAILY PRN
COMMUNITY
End: 2019-08-14 | Stop reason: SDUPTHER

## 2017-12-19 ENCOUNTER — OFFICE VISIT (OUTPATIENT)
Dept: CARDIOLOGY | Facility: CLINIC | Age: 42
End: 2017-12-19

## 2017-12-19 VITALS
WEIGHT: 183 LBS | HEART RATE: 96 BPM | DIASTOLIC BLOOD PRESSURE: 70 MMHG | HEIGHT: 67 IN | RESPIRATION RATE: 16 BRPM | SYSTOLIC BLOOD PRESSURE: 129 MMHG | BODY MASS INDEX: 28.72 KG/M2

## 2017-12-19 DIAGNOSIS — R07.89 OTHER CHEST PAIN: ICD-10-CM

## 2017-12-19 DIAGNOSIS — I71.21 ASCENDING AORTIC ANEURYSM (HCC): Primary | ICD-10-CM

## 2017-12-19 PROCEDURE — 99213 OFFICE O/P EST LOW 20 MIN: CPT | Performed by: INTERNAL MEDICINE

## 2017-12-19 NOTE — PROGRESS NOTES
Macho Jose MD  Filemon Jj  : 1975  DATE:2017    Patient Active Problem List   Diagnosis   • Ascending aortic aneurysm   • Aneurysm   • Other chest pain       Dear Macho Jose MD:    Nile Jj is a 42 y.o. male with the above medical problems who is being seen for  Follow-up.  According to the patient he has been doing fairly well since his last visit.  He denies any chest pain, shortness of breath, palpitations, orthopnea, PND, lower extremity edema, dizziness or syncope.  He is scheduled to have surgery at [facility] for an ascending aortic aneurysm that was recently diagnosed as being 6 cm in diameter.  The patient is yet to complete the surgery because this has been delayed in order to remove the patient's tachycardia.      Current Outpatient Prescriptions:   •  albuterol (PROVENTIL HFA;VENTOLIN HFA) 108 (90 BASE) MCG/ACT inhaler, Inhale 2 puffs every 4 (four) hours as needed for wheezing. (Patient taking differently: Inhale 2 puffs 2 (Two) Times a Day As Needed for Wheezing or Shortness of Air.), Disp: 3.7 g, Rfl: 0  •  albuterol (PROVENTIL) (2.5 MG/3ML) 0.083% nebulizer solution, Take 2.5 mg by nebulization 3 (Three) Times a Day., Disp: , Rfl:   •  cyclobenzaprine (FLEXERIL) 10 MG tablet, Take 10 mg by mouth 3 (Three) Times a Day As Needed for Muscle Spasms., Disp: , Rfl:   •  hydrOXYzine (VISTARIL) 25 MG capsule, Take 25 mg by mouth 2 (Two) Times a Day As Needed for Anxiety (for anxiety)., Disp: , Rfl:   •  ibuprofen (ADVIL,MOTRIN) 800 MG tablet, Take 800 mg by mouth Every 6 (Six) Hours As Needed for Mild Pain ., Disp: , Rfl:   •  montelukast (SINGULAIR) 10 MG tablet, Take 10 mg by mouth Daily., Disp: , Rfl:   •  predniSONE (DELTASONE) 20 MG tablet, Take 1 tablet by mouth 2 (Two) Times a Day., Disp: 6 tablet, Rfl: 0  •  raNITIdine (ZANTAC) 300 MG tablet, Take 300 mg by mouth 2 (Two) Times a Day., Disp: , Rfl:   •  azithromycin (ZITHROMAX) 250 MG  "tablet, Take 2 tablets the first day, then 1 tablet daily for 4 days.  Indications: Upper Respiratory Tract Infection, Disp: 3 tablet, Rfl: 0  •  cefdinir (OMNICEF) 300 MG capsule, Take 1 capsule by mouth Every 12 (Twelve) Hours. Indications: Upper Respiratory Tract Infection, Disp: 10 capsule, Rfl: 0  •  Fluticasone Furoate-Vilanterol 100-25 MCG/INH aerosol powder , Inhale 1 puff by mouth Daily., Disp: 60 each, Rfl: 1    The following portions of the patient's history were reviewed and updated as appropriate: allergies, current medications, past family history, past medical history, past social history, past surgical history and problem list.    Review of Systems   Constitution: Negative for chills, diaphoresis and fever.   HENT: Negative for congestion, ear pain and sore throat.    Eyes: Negative for blurred vision, pain and redness.   Cardiovascular: Positive for chest pain. Negative for leg swelling, orthopnea, palpitations, paroxysmal nocturnal dyspnea and syncope.   Respiratory: Positive for shortness of breath. Negative for cough and hemoptysis.    Endocrine: Negative for cold intolerance and heat intolerance.   Hematologic/Lymphatic: Does not bruise/bleed easily.   Skin: Negative for rash.   Musculoskeletal: Negative for arthritis, back pain, joint pain, joint swelling, myalgias and stiffness.   Gastrointestinal: Negative for abdominal pain, constipation, diarrhea, hematemesis, hematochezia, melena, nausea and vomiting.   Genitourinary: Negative for dysuria and hematuria.   Neurological: Negative for dizziness, focal weakness and numbness.   Psychiatric/Behavioral: Negative for depression. The patient is nervous/anxious.        Objective   Blood pressure 129/70, pulse 96, resp. rate 16, height 170.2 cm (67\"), weight 83 kg (183 lb).    Physical Exam   Constitutional: He is oriented to person, place, and time. He appears well-developed and well-nourished.   Disheveled WM sitting comfortably on chair.   HENT: "   Mouth/Throat: Oropharynx is clear and moist.   Eyes: EOM are normal. Pupils are equal, round, and reactive to light.   Neck: Neck supple. No JVD present. No tracheal deviation present. No thyromegaly present.   Cardiovascular: Normal rate, regular rhythm, S1 normal and S2 normal.  Exam reveals no gallop and no friction rub.    No murmur heard.  Pulmonary/Chest: Effort normal and breath sounds normal. No respiratory distress. He has no wheezes. He has no rales.   Abdominal: Soft. Bowel sounds are normal. He exhibits no mass. There is no tenderness.   Musculoskeletal: Normal range of motion. He exhibits no edema.   Lymphadenopathy:     He has no cervical adenopathy.   Neurological: He is alert and oriented to person, place, and time.   Skin: Skin is warm and dry. No rash noted.   Psychiatric: He has a normal mood and affect.       Procedures    Assessment/Plan      1.  Ascending aortic aneurysm: Patient with a large ascending aortic aneurysm which is to undergo management by surgery.  I once again explained to the patient the risks of an untreated ascending aortic aneurysm the size of his.  I urged him of the importance of completing the surgery as soon as possible.  Return to the patient the surgery has been scheduled for January 15.  I once again advised him to limit exertion until the surgeries done.  He is also to go to the ER for any significant chest pain or shortness of breath.    2.  Chest pain: Patient with occasional episodes of sharp chest pain that lasts for a few seconds and resolve spontaneously.  These are unchanged from previous.  They do not appear to be related to coronary artery disease.  He recently underwent a cardiac catheterization which showed no evidence of coronary artery disease.       Diagnosis Plan   1. Ascending aortic aneurysm     2. Other chest pain          Return in about 2 months (around 2/19/2018).    I appreciate the opportunity to participate in this patient's cardiovascular  care.    Best Regards    Nabor Jacobsen

## 2018-01-14 ENCOUNTER — APPOINTMENT (OUTPATIENT)
Dept: PREADMISSION TESTING | Facility: HOSPITAL | Age: 43
End: 2018-01-14

## 2018-01-16 ENCOUNTER — APPOINTMENT (OUTPATIENT)
Dept: GENERAL RADIOLOGY | Facility: HOSPITAL | Age: 43
End: 2018-01-16

## 2018-01-16 ENCOUNTER — HOSPITAL ENCOUNTER (INPATIENT)
Facility: HOSPITAL | Age: 43
LOS: 6 days | Discharge: HOME | End: 2018-01-22
Attending: THORACIC SURGERY (CARDIOTHORACIC VASCULAR SURGERY) | Admitting: THORACIC SURGERY (CARDIOTHORACIC VASCULAR SURGERY)

## 2018-01-16 ENCOUNTER — TELEPHONE (OUTPATIENT)
Dept: CARDIAC SURGERY | Facility: CLINIC | Age: 43
End: 2018-01-16

## 2018-01-16 DIAGNOSIS — I71.21 ASCENDING AORTIC ANEURYSM (HCC): ICD-10-CM

## 2018-01-16 DIAGNOSIS — Q25.43 ANEURYSM OF AORTIC SINUS OF VALSALVA WITHOUT RUPTURE: ICD-10-CM

## 2018-01-16 DIAGNOSIS — E78.2 MIXED HYPERLIPIDEMIA: ICD-10-CM

## 2018-01-16 DIAGNOSIS — J44.9 CHRONIC OBSTRUCTIVE PULMONARY DISEASE, UNSPECIFIED COPD TYPE (HCC): ICD-10-CM

## 2018-01-16 DIAGNOSIS — Z74.09 IMPAIRED FUNCTIONAL MOBILITY, BALANCE, GAIT, AND ENDURANCE: Primary | ICD-10-CM

## 2018-01-16 LAB
ABO GROUP BLD: NORMAL
ALBUMIN SERPL-MCNC: 4.1 G/DL (ref 3.2–4.8)
ALBUMIN/GLOB SERPL: 1.8 G/DL (ref 1.5–2.5)
ALP SERPL-CCNC: 75 U/L (ref 25–100)
ALT SERPL W P-5'-P-CCNC: 27 U/L (ref 7–40)
AMPHET+METHAMPHET UR QL: NEGATIVE
AMPHETAMINES UR QL: NEGATIVE
ANION GAP SERPL CALCULATED.3IONS-SCNC: 6 MMOL/L (ref 3–11)
APTT PPP: 28.2 SECONDS (ref 24–31)
ARTICHOKE IGE QN: 163 MG/DL (ref 0–130)
AST SERPL-CCNC: 19 U/L (ref 0–33)
BARBITURATES UR QL SCN: NEGATIVE
BENZODIAZ UR QL SCN: NEGATIVE
BILIRUB SERPL-MCNC: 0.2 MG/DL (ref 0.3–1.2)
BILIRUB UR QL STRIP: NEGATIVE
BLD GP AB SCN SERPL QL: NEGATIVE
BUN BLD-MCNC: 11 MG/DL (ref 9–23)
BUN/CREAT SERPL: 12.2 (ref 7–25)
BUPRENORPHINE SERPL-MCNC: NEGATIVE NG/ML
CALCIUM SPEC-SCNC: 9.4 MG/DL (ref 8.7–10.4)
CANNABINOIDS SERPL QL: NEGATIVE
CHLORIDE SERPL-SCNC: 108 MMOL/L (ref 99–109)
CHOLEST SERPL-MCNC: 206 MG/DL (ref 0–200)
CLARITY UR: CLEAR
CO2 SERPL-SCNC: 24 MMOL/L (ref 20–31)
COCAINE UR QL: NEGATIVE
COLOR UR: YELLOW
CREAT BLD-MCNC: 0.9 MG/DL (ref 0.6–1.3)
DEPRECATED RDW RBC AUTO: 43.8 FL (ref 37–54)
ERYTHROCYTE [DISTWIDTH] IN BLOOD BY AUTOMATED COUNT: 13.8 % (ref 11.3–14.5)
GFR SERPL CREATININE-BSD FRML MDRD: 93 ML/MIN/1.73
GLOBULIN UR ELPH-MCNC: 2.3 GM/DL
GLUCOSE BLD-MCNC: 97 MG/DL (ref 70–100)
GLUCOSE BLDC GLUCOMTR-MCNC: 84 MG/DL (ref 70–130)
GLUCOSE UR STRIP-MCNC: NEGATIVE MG/DL
HBA1C MFR BLD: 6.2 % (ref 4.8–5.6)
HCT VFR BLD AUTO: 45.3 % (ref 38.9–50.9)
HDLC SERPL-MCNC: 33 MG/DL (ref 40–60)
HGB BLD-MCNC: 14.4 G/DL (ref 13.1–17.5)
HGB UR QL STRIP.AUTO: NEGATIVE
INR PPP: 0.96
KETONES UR QL STRIP: NEGATIVE
LEUKOCYTE ESTERASE UR QL STRIP.AUTO: NEGATIVE
MCH RBC QN AUTO: 27.6 PG (ref 27–31)
MCHC RBC AUTO-ENTMCNC: 31.8 G/DL (ref 32–36)
MCV RBC AUTO: 86.9 FL (ref 80–99)
METHADONE UR QL SCN: NEGATIVE
NITRITE UR QL STRIP: NEGATIVE
OPIATES UR QL: NEGATIVE
OXYCODONE UR QL SCN: NEGATIVE
PA ADP PRP-ACNC: 210 PRU
PCP UR QL SCN: NEGATIVE
PH UR STRIP.AUTO: 7 [PH] (ref 5–8)
PLATELET # BLD AUTO: 245 10*3/MM3 (ref 150–450)
PMV BLD AUTO: 10.1 FL (ref 6–12)
POTASSIUM BLD-SCNC: 3.9 MMOL/L (ref 3.5–5.5)
PROPOXYPH UR QL: NEGATIVE
PROT SERPL-MCNC: 6.4 G/DL (ref 5.7–8.2)
PROT UR QL STRIP: NEGATIVE
PROTHROMBIN TIME: 10.5 SECONDS (ref 9.6–11.5)
RBC # BLD AUTO: 5.21 10*6/MM3 (ref 4.2–5.76)
RH BLD: POSITIVE
SODIUM BLD-SCNC: 138 MMOL/L (ref 132–146)
SP GR UR STRIP: 1.01 (ref 1–1.03)
TRICYCLICS UR QL SCN: NEGATIVE
TRIGL SERPL-MCNC: 202 MG/DL (ref 0–150)
UROBILINOGEN UR QL STRIP: NORMAL
WBC NRBC COR # BLD: 6.47 10*3/MM3 (ref 3.5–10.8)

## 2018-01-16 PROCEDURE — 94799 UNLISTED PULMONARY SVC/PX: CPT

## 2018-01-16 PROCEDURE — 85610 PROTHROMBIN TIME: CPT | Performed by: PHYSICIAN ASSISTANT

## 2018-01-16 PROCEDURE — 86923 COMPATIBILITY TEST ELECTRIC: CPT

## 2018-01-16 PROCEDURE — 80061 LIPID PANEL: CPT | Performed by: PHYSICIAN ASSISTANT

## 2018-01-16 PROCEDURE — 81003 URINALYSIS AUTO W/O SCOPE: CPT | Performed by: PHYSICIAN ASSISTANT

## 2018-01-16 PROCEDURE — 71045 X-RAY EXAM CHEST 1 VIEW: CPT

## 2018-01-16 PROCEDURE — 86850 RBC ANTIBODY SCREEN: CPT | Performed by: THORACIC SURGERY (CARDIOTHORACIC VASCULAR SURGERY)

## 2018-01-16 PROCEDURE — 86850 RBC ANTIBODY SCREEN: CPT | Performed by: PHYSICIAN ASSISTANT

## 2018-01-16 PROCEDURE — 86901 BLOOD TYPING SEROLOGIC RH(D): CPT | Performed by: PHYSICIAN ASSISTANT

## 2018-01-16 PROCEDURE — 85576 BLOOD PLATELET AGGREGATION: CPT | Performed by: PHYSICIAN ASSISTANT

## 2018-01-16 PROCEDURE — 86901 BLOOD TYPING SEROLOGIC RH(D): CPT | Performed by: THORACIC SURGERY (CARDIOTHORACIC VASCULAR SURGERY)

## 2018-01-16 PROCEDURE — 85027 COMPLETE CBC AUTOMATED: CPT | Performed by: PHYSICIAN ASSISTANT

## 2018-01-16 PROCEDURE — 80306 DRUG TEST PRSMV INSTRMNT: CPT | Performed by: PHYSICIAN ASSISTANT

## 2018-01-16 PROCEDURE — 85730 THROMBOPLASTIN TIME PARTIAL: CPT | Performed by: PHYSICIAN ASSISTANT

## 2018-01-16 PROCEDURE — 80053 COMPREHEN METABOLIC PANEL: CPT | Performed by: PHYSICIAN ASSISTANT

## 2018-01-16 PROCEDURE — 83036 HEMOGLOBIN GLYCOSYLATED A1C: CPT | Performed by: PHYSICIAN ASSISTANT

## 2018-01-16 PROCEDURE — 82962 GLUCOSE BLOOD TEST: CPT

## 2018-01-16 PROCEDURE — 86900 BLOOD TYPING SEROLOGIC ABO: CPT | Performed by: PHYSICIAN ASSISTANT

## 2018-01-16 PROCEDURE — 86900 BLOOD TYPING SEROLOGIC ABO: CPT | Performed by: THORACIC SURGERY (CARDIOTHORACIC VASCULAR SURGERY)

## 2018-01-16 RX ORDER — CYCLOBENZAPRINE HCL 10 MG
10 TABLET ORAL 3 TIMES DAILY PRN
Status: DISCONTINUED | OUTPATIENT
Start: 2018-01-16 | End: 2018-01-22 | Stop reason: HOSPADM

## 2018-01-16 RX ORDER — MONTELUKAST SODIUM 10 MG/1
10 TABLET ORAL DAILY
Status: DISCONTINUED | OUTPATIENT
Start: 2018-01-16 | End: 2018-01-22 | Stop reason: HOSPADM

## 2018-01-16 RX ORDER — ALBUTEROL SULFATE 2.5 MG/3ML
2.5 SOLUTION RESPIRATORY (INHALATION)
Status: DISCONTINUED | OUTPATIENT
Start: 2018-01-16 | End: 2018-01-19

## 2018-01-16 RX ORDER — CHLORHEXIDINE GLUCONATE 0.12 MG/ML
15 RINSE ORAL EVERY 12 HOURS
Status: COMPLETED | OUTPATIENT
Start: 2018-01-16 | End: 2018-01-17

## 2018-01-16 RX ORDER — SODIUM CHLORIDE 0.9 % (FLUSH) 0.9 %
1-10 SYRINGE (ML) INJECTION AS NEEDED
Status: DISCONTINUED | OUTPATIENT
Start: 2018-01-16 | End: 2018-01-18

## 2018-01-16 RX ORDER — HYDROXYZINE HYDROCHLORIDE 25 MG/1
25 TABLET, FILM COATED ORAL 2 TIMES DAILY PRN
Status: DISCONTINUED | OUTPATIENT
Start: 2018-01-16 | End: 2018-01-22 | Stop reason: HOSPADM

## 2018-01-16 RX ORDER — CHLORHEXIDINE GLUCONATE 500 MG/1
1 CLOTH TOPICAL EVERY 12 HOURS PRN
Status: DISCONTINUED | OUTPATIENT
Start: 2018-01-16 | End: 2018-01-17

## 2018-01-16 RX ORDER — IPRATROPIUM BROMIDE AND ALBUTEROL SULFATE 2.5; .5 MG/3ML; MG/3ML
3 SOLUTION RESPIRATORY (INHALATION) EVERY 4 HOURS PRN
Status: DISCONTINUED | OUTPATIENT
Start: 2018-01-16 | End: 2018-01-18

## 2018-01-16 RX ADMIN — MUPIROCIN 1 APPLICATION: 20 OINTMENT TOPICAL at 21:41

## 2018-01-16 RX ADMIN — ALBUTEROL SULFATE 2.5 MG: 2.5 SOLUTION RESPIRATORY (INHALATION) at 20:54

## 2018-01-16 RX ADMIN — HYDROXYZINE HYDROCHLORIDE 25 MG: 25 TABLET, FILM COATED ORAL at 21:38

## 2018-01-16 RX ADMIN — CYCLOBENZAPRINE HYDROCHLORIDE 10 MG: 10 TABLET, FILM COATED ORAL at 21:38

## 2018-01-16 RX ADMIN — MONTELUKAST SODIUM 10 MG: 10 TABLET, FILM COATED ORAL at 21:38

## 2018-01-16 RX ADMIN — CHLORHEXIDINE GLUCONATE 15 ML: 1.2 RINSE ORAL at 21:41

## 2018-01-16 NOTE — TELEPHONE ENCOUNTER
I called Enrique Kelley back in regards to Mr. Jj Direct admit for 1/16/18. I advised patient is scheduled for inpatient surgery tomorrow 1/17/18.    Enrique stated since patient is having chest pains and syncope they are treating his Direct Admit as Emergent even though he had a prior scheduled surgery for a Bentall.     Enrique advised that he spoke with his supervisor and double checked this information. So Mr. Jj Direct Admit will not required authorization it is being treated as emergent, and enrique will follow-up with [facility] 1/17/18 to get further information.     I have scanned into media, a document Enrique sent me in regards to Mr. Jj.

## 2018-01-17 ENCOUNTER — ANESTHESIA EVENT (OUTPATIENT)
Dept: PERIOP | Facility: HOSPITAL | Age: 43
End: 2018-01-17

## 2018-01-17 ENCOUNTER — TELEPHONE (OUTPATIENT)
Dept: CARDIAC SURGERY | Facility: CLINIC | Age: 43
End: 2018-01-17

## 2018-01-17 ENCOUNTER — ANESTHESIA (OUTPATIENT)
Dept: PERIOP | Facility: HOSPITAL | Age: 43
End: 2018-01-17

## 2018-01-17 ENCOUNTER — APPOINTMENT (OUTPATIENT)
Dept: GENERAL RADIOLOGY | Facility: HOSPITAL | Age: 43
End: 2018-01-17

## 2018-01-17 ENCOUNTER — APPOINTMENT (OUTPATIENT)
Dept: PERIOP | Facility: HOSPITAL | Age: 43
End: 2018-01-17

## 2018-01-17 PROBLEM — F17.200 SMOKER: Status: ACTIVE | Noted: 2018-01-17

## 2018-01-17 PROBLEM — J44.9 COPD (CHRONIC OBSTRUCTIVE PULMONARY DISEASE) (HCC): Status: ACTIVE | Noted: 2018-01-17

## 2018-01-17 PROBLEM — Z86.79 S/P AAA REPAIR: Status: ACTIVE | Noted: 2018-01-17

## 2018-01-17 PROBLEM — Z98.890 S/P AAA REPAIR: Status: ACTIVE | Noted: 2018-01-17

## 2018-01-17 PROBLEM — I10 ESSENTIAL HYPERTENSION: Status: ACTIVE | Noted: 2018-01-17

## 2018-01-17 LAB
ABO GROUP BLD: NORMAL
ACT BLD: 125 SECONDS (ref 82–152)
ACT BLD: 125 SECONDS (ref 82–152)
ACT BLD: 610 SECONDS (ref 82–152)
ACT BLD: 637 SECONDS (ref 82–152)
ACT BLD: 758 SECONDS (ref 82–152)
ACT BLD: 780 SECONDS (ref 82–152)
ACT BLD: 934 SECONDS (ref 82–152)
ALBUMIN SERPL-MCNC: 3.7 G/DL (ref 3.2–4.8)
ALBUMIN SERPL-MCNC: 4.2 G/DL (ref 3.2–4.8)
ANION GAP SERPL CALCULATED.3IONS-SCNC: 10 MMOL/L (ref 3–11)
ANION GAP SERPL CALCULATED.3IONS-SCNC: 8 MMOL/L (ref 3–11)
APTT PPP: 27.4 SECONDS (ref 24–31)
ARTERIAL PATENCY WRIST A: ABNORMAL
ARTERIAL PATENCY WRIST A: ABNORMAL
ATMOSPHERIC PRESS: ABNORMAL MMHG
ATMOSPHERIC PRESS: ABNORMAL MMHG
BACTERIA UR QL AUTO: ABNORMAL /HPF
BASE EXCESS BLDA CALC-SCNC: -1 MMOL/L (ref -5–5)
BASE EXCESS BLDA CALC-SCNC: -1.3 MMOL/L (ref 0–2)
BASE EXCESS BLDA CALC-SCNC: -1.7 MMOL/L (ref 0–2)
BASE EXCESS BLDA CALC-SCNC: 0 MMOL/L (ref -5–5)
BASE EXCESS BLDA CALC-SCNC: 1 MMOL/L (ref -5–5)
BASE EXCESS BLDA CALC-SCNC: 1 MMOL/L (ref -5–5)
BASE EXCESS BLDA CALC-SCNC: 2 MMOL/L (ref -5–5)
BDY SITE: ABNORMAL
BDY SITE: ABNORMAL
BILIRUB UR QL STRIP: NEGATIVE
BLD GP AB SCN SERPL QL: NEGATIVE
BUN BLD-MCNC: 13 MG/DL (ref 9–23)
BUN BLD-MCNC: 14 MG/DL (ref 9–23)
BUN/CREAT SERPL: 11.8 (ref 7–25)
BUN/CREAT SERPL: 14 (ref 7–25)
CA-I BLDA-SCNC: 0.96 MMOL/L (ref 1.2–1.32)
CA-I BLDA-SCNC: 0.98 MMOL/L (ref 1.2–1.32)
CA-I BLDA-SCNC: 1 MMOL/L (ref 1.2–1.32)
CA-I BLDA-SCNC: 1.04 MMOL/L (ref 1.2–1.32)
CA-I BLDA-SCNC: 1.16 MMOL/L (ref 1.2–1.32)
CA-I BLDA-SCNC: 1.16 MMOL/L (ref 1.2–1.32)
CA-I BLDA-SCNC: 1.3 MMOL/L (ref 1.2–1.32)
CA-I SERPL ISE-MCNC: 1.16 MMOL/L (ref 1.12–1.32)
CALCIUM SPEC-SCNC: 8 MG/DL (ref 8.7–10.4)
CALCIUM SPEC-SCNC: 8.2 MG/DL (ref 8.7–10.4)
CHLORIDE SERPL-SCNC: 110 MMOL/L (ref 99–109)
CHLORIDE SERPL-SCNC: 110 MMOL/L (ref 99–109)
CLARITY UR: CLEAR
CO2 BLDA-SCNC: 25 MMOL/L (ref 24–29)
CO2 BLDA-SCNC: 25.3 MMOL/L (ref 22–33)
CO2 BLDA-SCNC: 26 MMOL/L (ref 24–29)
CO2 BLDA-SCNC: 26 MMOL/L (ref 24–29)
CO2 BLDA-SCNC: 26.5 MMOL/L (ref 22–33)
CO2 BLDA-SCNC: 27 MMOL/L (ref 24–29)
CO2 BLDA-SCNC: 27 MMOL/L (ref 24–29)
CO2 BLDA-SCNC: 29 MMOL/L (ref 24–29)
CO2 BLDA-SCNC: 29 MMOL/L (ref 24–29)
CO2 SERPL-SCNC: 24 MMOL/L (ref 20–31)
CO2 SERPL-SCNC: 25 MMOL/L (ref 20–31)
COHGB MFR BLD: 0.8 % (ref 0–2)
COHGB MFR BLD: 0.8 % (ref 0–2)
COLOR UR: YELLOW
CREAT BLD-MCNC: 1 MG/DL (ref 0.6–1.3)
CREAT BLD-MCNC: 1.1 MG/DL (ref 0.6–1.3)
DEPRECATED RDW RBC AUTO: 44.6 FL (ref 37–54)
DEPRECATED RDW RBC AUTO: 45.9 FL (ref 37–54)
ERYTHROCYTE [DISTWIDTH] IN BLOOD BY AUTOMATED COUNT: 14 % (ref 11.3–14.5)
ERYTHROCYTE [DISTWIDTH] IN BLOOD BY AUTOMATED COUNT: 14.2 % (ref 11.3–14.5)
FIBRINOGEN PPP-MCNC: 189 MG/DL (ref 200–400)
FSP PPP LA-ACNC: NORMAL
GFR SERPL CREATININE-BSD FRML MDRD: 74 ML/MIN/1.73
GFR SERPL CREATININE-BSD FRML MDRD: 82 ML/MIN/1.73
GLUCOSE BLD-MCNC: 131 MG/DL (ref 70–100)
GLUCOSE BLD-MCNC: 181 MG/DL (ref 70–100)
GLUCOSE BLDC GLUCOMTR-MCNC: 103 MG/DL (ref 70–130)
GLUCOSE BLDC GLUCOMTR-MCNC: 108 MG/DL (ref 70–130)
GLUCOSE BLDC GLUCOMTR-MCNC: 113 MG/DL (ref 70–130)
GLUCOSE BLDC GLUCOMTR-MCNC: 115 MG/DL (ref 70–130)
GLUCOSE BLDC GLUCOMTR-MCNC: 124 MG/DL (ref 70–130)
GLUCOSE BLDC GLUCOMTR-MCNC: 124 MG/DL (ref 70–130)
GLUCOSE BLDC GLUCOMTR-MCNC: 130 MG/DL (ref 70–130)
GLUCOSE BLDC GLUCOMTR-MCNC: 134 MG/DL (ref 70–130)
GLUCOSE BLDC GLUCOMTR-MCNC: 147 MG/DL (ref 70–130)
GLUCOSE BLDC GLUCOMTR-MCNC: 155 MG/DL (ref 70–130)
GLUCOSE BLDC GLUCOMTR-MCNC: 160 MG/DL (ref 70–130)
GLUCOSE BLDC GLUCOMTR-MCNC: 164 MG/DL (ref 70–130)
GLUCOSE BLDC GLUCOMTR-MCNC: 173 MG/DL (ref 70–130)
GLUCOSE UR STRIP-MCNC: NEGATIVE MG/DL
HCO3 BLDA-SCNC: 23.8 MMOL/L (ref 22–26)
HCO3 BLDA-SCNC: 24.1 MMOL/L (ref 20–26)
HCO3 BLDA-SCNC: 24.9 MMOL/L (ref 20–26)
HCO3 BLDA-SCNC: 25 MMOL/L (ref 22–26)
HCO3 BLDA-SCNC: 25.2 MMOL/L (ref 22–26)
HCO3 BLDA-SCNC: 25.6 MMOL/L (ref 22–26)
HCO3 BLDA-SCNC: 25.7 MMOL/L (ref 22–26)
HCO3 BLDA-SCNC: 27.1 MMOL/L (ref 22–26)
HCO3 BLDA-SCNC: 27.4 MMOL/L (ref 22–26)
HCT VFR BLD AUTO: 31.1 % (ref 38.9–50.9)
HCT VFR BLD AUTO: 32.1 % (ref 38.9–50.9)
HCT VFR BLD AUTO: 32.5 % (ref 38.9–50.9)
HCT VFR BLD CALC: 30.7 %
HCT VFR BLD CALC: 32.6 %
HCT VFR BLDA CALC: 27 % (ref 38–51)
HCT VFR BLDA CALC: 27 % (ref 38–51)
HCT VFR BLDA CALC: 30 % (ref 38–51)
HCT VFR BLDA CALC: 33 % (ref 38–51)
HCT VFR BLDA CALC: 33 % (ref 38–51)
HCT VFR BLDA CALC: 38 % (ref 38–51)
HCT VFR BLDA CALC: 42 % (ref 38–51)
HGB BLD-MCNC: 10 G/DL (ref 13.1–17.5)
HGB BLD-MCNC: 10.2 G/DL (ref 13.1–17.5)
HGB BLD-MCNC: 10.4 G/DL (ref 13.1–17.5)
HGB BLDA-MCNC: 10 G/DL (ref 13.5–17.5)
HGB BLDA-MCNC: 10.2 G/DL (ref 12–17)
HGB BLDA-MCNC: 10.6 G/DL (ref 13.5–17.5)
HGB BLDA-MCNC: 11.2 G/DL (ref 12–17)
HGB BLDA-MCNC: 11.2 G/DL (ref 12–17)
HGB BLDA-MCNC: 12.9 G/DL (ref 12–17)
HGB BLDA-MCNC: 14.3 G/DL (ref 12–17)
HGB BLDA-MCNC: 9.2 G/DL (ref 12–17)
HGB BLDA-MCNC: 9.2 G/DL (ref 12–17)
HGB UR QL STRIP.AUTO: ABNORMAL
HOROWITZ INDEX BLD+IHG-RTO: 100 %
HOROWITZ INDEX BLD+IHG-RTO: 40 %
HYALINE CASTS UR QL AUTO: ABNORMAL /LPF
INR PPP: 1.09
KETONES UR QL STRIP: NEGATIVE
LEUKOCYTE ESTERASE UR QL STRIP.AUTO: NEGATIVE
MAGNESIUM SERPL-MCNC: 2.7 MG/DL (ref 1.3–2.7)
MAGNESIUM SERPL-MCNC: 3.1 MG/DL (ref 1.3–2.7)
MCH RBC QN AUTO: 28 PG (ref 27–31)
MCH RBC QN AUTO: 28.2 PG (ref 27–31)
MCHC RBC AUTO-ENTMCNC: 32.2 G/DL (ref 32–36)
MCHC RBC AUTO-ENTMCNC: 32.4 G/DL (ref 32–36)
MCV RBC AUTO: 86.5 FL (ref 80–99)
MCV RBC AUTO: 87.9 FL (ref 80–99)
METHGB BLD QL: 1.3 % (ref 0–1.5)
METHGB BLD QL: 1.3 % (ref 0–1.5)
MODALITY: ABNORMAL
MODALITY: ABNORMAL
NITRITE UR QL STRIP: NEGATIVE
OXYHGB MFR BLDV: 97.2 % (ref 94–99)
OXYHGB MFR BLDV: 98.2 % (ref 94–99)
PCO2 BLDA: 39.8 MM HG (ref 35–45)
PCO2 BLDA: 40.5 MM HG (ref 35–45)
PCO2 BLDA: 41.9 MM HG (ref 35–48)
PCO2 BLDA: 43.5 MM HG (ref 35–45)
PCO2 BLDA: 43.9 MM HG (ref 35–45)
PCO2 BLDA: 44.1 MM HG (ref 35–45)
PCO2 BLDA: 50 MM HG (ref 35–45)
PCO2 BLDA: 50 MM HG (ref 35–45)
PCO2 BLDA: 50.2 MM HG (ref 35–48)
PH BLDA: 7.3 PH UNITS (ref 7.35–7.45)
PH BLDA: 7.34 PH UNITS (ref 7.35–7.6)
PH BLDA: 7.35 PH UNITS (ref 7.35–7.6)
PH BLDA: 7.37 PH UNITS (ref 7.35–7.45)
PH BLDA: 7.37 PH UNITS (ref 7.35–7.6)
PH BLDA: 7.37 PH UNITS (ref 7.35–7.6)
PH BLDA: 7.38 PH UNITS (ref 7.35–7.6)
PH BLDA: 7.38 PH UNITS (ref 7.35–7.6)
PH BLDA: 7.4 PH UNITS (ref 7.35–7.6)
PH UR STRIP.AUTO: <=5 [PH] (ref 5–8)
PHOSPHATE SERPL-MCNC: 1.9 MG/DL (ref 2.4–5.1)
PHOSPHATE SERPL-MCNC: 3.6 MG/DL (ref 2.4–5.1)
PLATELET # BLD AUTO: 190 10*3/MM3 (ref 150–450)
PLATELET # BLD AUTO: 213 10*3/MM3 (ref 150–450)
PMV BLD AUTO: 10 FL (ref 6–12)
PMV BLD AUTO: 9.4 FL (ref 6–12)
PO2 BLDA: 163 MM HG (ref 83–108)
PO2 BLDA: 233 MMHG (ref 80–105)
PO2 BLDA: 285 MMHG (ref 80–105)
PO2 BLDA: 435 MMHG (ref 80–105)
PO2 BLDA: 453 MM HG (ref 83–108)
PO2 BLDA: 457 MMHG (ref 80–105)
PO2 BLDA: 473 MMHG (ref 80–105)
PO2 BLDA: 481 MMHG (ref 80–105)
PO2 BLDA: 516 MMHG (ref 80–105)
POTASSIUM BLD-SCNC: 3.7 MMOL/L (ref 3.5–5.5)
POTASSIUM BLD-SCNC: 4.6 MMOL/L (ref 3.5–5.5)
POTASSIUM BLD-SCNC: 4.6 MMOL/L (ref 3.5–5.5)
POTASSIUM BLDA-SCNC: 3.6 MMOL/L (ref 3.5–4.9)
POTASSIUM BLDA-SCNC: 3.7 MMOL/L (ref 3.5–4.9)
POTASSIUM BLDA-SCNC: 4.1 MMOL/L (ref 3.5–4.9)
POTASSIUM BLDA-SCNC: 4.1 MMOL/L (ref 3.5–4.9)
POTASSIUM BLDA-SCNC: 4.3 MMOL/L (ref 3.5–4.9)
POTASSIUM BLDA-SCNC: 4.6 MMOL/L (ref 3.5–4.9)
POTASSIUM BLDA-SCNC: 4.9 MMOL/L (ref 3.5–4.9)
PROT UR QL STRIP: NEGATIVE
PROTHROMBIN TIME: 11.9 SECONDS (ref 9.6–11.5)
RBC # BLD AUTO: 3.54 10*6/MM3 (ref 4.2–5.76)
RBC # BLD AUTO: 3.71 10*6/MM3 (ref 4.2–5.76)
RBC # UR: ABNORMAL /HPF
REF LAB TEST METHOD: ABNORMAL
RH BLD: POSITIVE
SAO2 % BLDA: 100 % (ref 95–98)
SAO2 % BLDCOA: 97 %
SODIUM BLD-SCNC: 143 MMOL/L (ref 132–146)
SODIUM BLD-SCNC: 144 MMOL/L (ref 132–146)
SODIUM BLDA-SCNC: 136 MMOL/L (ref 138–146)
SODIUM BLDA-SCNC: 137 MMOL/L (ref 138–146)
SODIUM BLDA-SCNC: 138 MMOL/L (ref 138–146)
SODIUM BLDA-SCNC: 139 MMOL/L (ref 138–146)
SODIUM BLDA-SCNC: 139 MMOL/L (ref 138–146)
SODIUM BLDA-SCNC: 141 MMOL/L (ref 138–146)
SODIUM BLDA-SCNC: 142 MMOL/L (ref 138–146)
SP GR UR STRIP: 1.02 (ref 1–1.03)
SQUAMOUS #/AREA URNS HPF: ABNORMAL /HPF
UROBILINOGEN UR QL STRIP: ABNORMAL
WBC NRBC COR # BLD: 9.72 10*3/MM3 (ref 3.5–10.8)
WBC NRBC COR # BLD: 9.82 10*3/MM3 (ref 3.5–10.8)
WBC UR QL AUTO: ABNORMAL /HPF

## 2018-01-17 PROCEDURE — 94799 UNLISTED PULMONARY SVC/PX: CPT

## 2018-01-17 PROCEDURE — 87086 URINE CULTURE/COLONY COUNT: CPT | Performed by: PHYSICIAN ASSISTANT

## 2018-01-17 PROCEDURE — 84295 ASSAY OF SERUM SODIUM: CPT

## 2018-01-17 PROCEDURE — 85610 PROTHROMBIN TIME: CPT | Performed by: THORACIC SURGERY (CARDIOTHORACIC VASCULAR SURGERY)

## 2018-01-17 PROCEDURE — 93010 ELECTROCARDIOGRAM REPORT: CPT | Performed by: INTERNAL MEDICINE

## 2018-01-17 PROCEDURE — C1751 CATH, INF, PER/CENT/MIDLINE: HCPCS | Performed by: ANESTHESIOLOGY

## 2018-01-17 PROCEDURE — 25010000002 FENTANYL CITRATE (PF) 100 MCG/2ML SOLUTION: Performed by: THORACIC SURGERY (CARDIOTHORACIC VASCULAR SURGERY)

## 2018-01-17 PROCEDURE — 25010000002 FUROSEMIDE PER 20 MG

## 2018-01-17 PROCEDURE — 94640 AIRWAY INHALATION TREATMENT: CPT

## 2018-01-17 PROCEDURE — 02RF08Z REPLACEMENT OF AORTIC VALVE WITH ZOOPLASTIC TISSUE, OPEN APPROACH: ICD-10-PCS | Performed by: THORACIC SURGERY (CARDIOTHORACIC VASCULAR SURGERY)

## 2018-01-17 PROCEDURE — 82805 BLOOD GASES W/O2 SATURATION: CPT | Performed by: PHYSICIAN ASSISTANT

## 2018-01-17 PROCEDURE — 93005 ELECTROCARDIOGRAM TRACING: CPT | Performed by: PHYSICIAN ASSISTANT

## 2018-01-17 PROCEDURE — 25010000003 CEFAZOLIN IN DEXTROSE 2-4 GM/100ML-% SOLUTION: Performed by: PHYSICIAN ASSISTANT

## 2018-01-17 PROCEDURE — 93318 ECHO TRANSESOPHAGEAL INTRAOP: CPT | Performed by: ANESTHESIOLOGY

## 2018-01-17 PROCEDURE — 86927 PLASMA FRESH FROZEN: CPT

## 2018-01-17 PROCEDURE — C1751 CATH, INF, PER/CENT/MIDLINE: HCPCS | Performed by: THORACIC SURGERY (CARDIOTHORACIC VASCULAR SURGERY)

## 2018-01-17 PROCEDURE — 25010000002 MIDAZOLAM PER 1 MG: Performed by: ANESTHESIOLOGY

## 2018-01-17 PROCEDURE — 85014 HEMATOCRIT: CPT

## 2018-01-17 PROCEDURE — 82330 ASSAY OF CALCIUM: CPT | Performed by: PHYSICIAN ASSISTANT

## 2018-01-17 PROCEDURE — C1894 INTRO/SHEATH, NON-LASER: HCPCS | Performed by: THORACIC SURGERY (CARDIOTHORACIC VASCULAR SURGERY)

## 2018-01-17 PROCEDURE — 82803 BLOOD GASES ANY COMBINATION: CPT

## 2018-01-17 PROCEDURE — 36430 TRANSFUSION BLD/BLD COMPNT: CPT

## 2018-01-17 PROCEDURE — 25010000003 CEFAZOLIN PER 500 MG: Performed by: PHYSICIAN ASSISTANT

## 2018-01-17 PROCEDURE — 94002 VENT MGMT INPAT INIT DAY: CPT

## 2018-01-17 PROCEDURE — 25010000002 ALBUMIN HUMAN 5% PER 50 ML

## 2018-01-17 PROCEDURE — 82805 BLOOD GASES W/O2 SATURATION: CPT | Performed by: THORACIC SURGERY (CARDIOTHORACIC VASCULAR SURGERY)

## 2018-01-17 PROCEDURE — P9041 ALBUMIN (HUMAN),5%, 50ML: HCPCS

## 2018-01-17 PROCEDURE — 88305 TISSUE EXAM BY PATHOLOGIST: CPT | Performed by: THORACIC SURGERY (CARDIOTHORACIC VASCULAR SURGERY)

## 2018-01-17 PROCEDURE — P9041 ALBUMIN (HUMAN),5%, 50ML: HCPCS | Performed by: PHYSICIAN ASSISTANT

## 2018-01-17 PROCEDURE — 25010000002 PROPOFOL 10 MG/ML EMULSION: Performed by: ANESTHESIOLOGY

## 2018-01-17 PROCEDURE — 25010000002 INSULIN REGULAR HUMAN PER 5 UNITS: Performed by: PHYSICIAN ASSISTANT

## 2018-01-17 PROCEDURE — 25810000003 DEXTROSE 5 % WITH KCL 20 MEQ 20-5 MEQ/L-% SOLUTION: Performed by: PHYSICIAN ASSISTANT

## 2018-01-17 PROCEDURE — 85018 HEMOGLOBIN: CPT | Performed by: THORACIC SURGERY (CARDIOTHORACIC VASCULAR SURGERY)

## 2018-01-17 PROCEDURE — 71045 X-RAY EXAM CHEST 1 VIEW: CPT

## 2018-01-17 PROCEDURE — 80069 RENAL FUNCTION PANEL: CPT | Performed by: PHYSICIAN ASSISTANT

## 2018-01-17 PROCEDURE — 85362 FIBRIN DEGRADATION PRODUCTS: CPT | Performed by: THORACIC SURGERY (CARDIOTHORACIC VASCULAR SURGERY)

## 2018-01-17 PROCEDURE — 33863 ASCENDING AORTIC GRAFT: CPT | Performed by: THORACIC SURGERY (CARDIOTHORACIC VASCULAR SURGERY)

## 2018-01-17 PROCEDURE — 88304 TISSUE EXAM BY PATHOLOGIST: CPT | Performed by: THORACIC SURGERY (CARDIOTHORACIC VASCULAR SURGERY)

## 2018-01-17 PROCEDURE — P9017 PLASMA 1 DONOR FRZ W/IN 8 HR: HCPCS

## 2018-01-17 PROCEDURE — 84132 ASSAY OF SERUM POTASSIUM: CPT

## 2018-01-17 PROCEDURE — 93318 ECHO TRANSESOPHAGEAL INTRAOP: CPT

## 2018-01-17 PROCEDURE — 85347 COAGULATION TIME ACTIVATED: CPT

## 2018-01-17 PROCEDURE — P9035 PLATELET PHERES LEUKOREDUCED: HCPCS

## 2018-01-17 PROCEDURE — 85384 FIBRINOGEN ACTIVITY: CPT | Performed by: THORACIC SURGERY (CARDIOTHORACIC VASCULAR SURGERY)

## 2018-01-17 PROCEDURE — 85014 HEMATOCRIT: CPT | Performed by: THORACIC SURGERY (CARDIOTHORACIC VASCULAR SURGERY)

## 2018-01-17 PROCEDURE — 81001 URINALYSIS AUTO W/SCOPE: CPT | Performed by: PHYSICIAN ASSISTANT

## 2018-01-17 PROCEDURE — 25010000002 PHENYLEPHRINE PER 1 ML

## 2018-01-17 PROCEDURE — 84132 ASSAY OF SERUM POTASSIUM: CPT | Performed by: THORACIC SURGERY (CARDIOTHORACIC VASCULAR SURGERY)

## 2018-01-17 PROCEDURE — 99291 CRITICAL CARE FIRST HOUR: CPT | Performed by: INTERNAL MEDICINE

## 2018-01-17 PROCEDURE — 25010000002 MAGNESIUM SULFATE PER 500 MG OF MAGNESIUM

## 2018-01-17 PROCEDURE — 5A1221Z PERFORMANCE OF CARDIAC OUTPUT, CONTINUOUS: ICD-10-PCS | Performed by: THORACIC SURGERY (CARDIOTHORACIC VASCULAR SURGERY)

## 2018-01-17 PROCEDURE — 25010000002 PROTAMINE SULFATE PER 10 MG: Performed by: ANESTHESIOLOGY

## 2018-01-17 PROCEDURE — 25010000002 ALBUMIN HUMAN 5% PER 50 ML: Performed by: PHYSICIAN ASSISTANT

## 2018-01-17 PROCEDURE — 82947 ASSAY GLUCOSE BLOOD QUANT: CPT

## 2018-01-17 PROCEDURE — 83735 ASSAY OF MAGNESIUM: CPT | Performed by: PHYSICIAN ASSISTANT

## 2018-01-17 PROCEDURE — C1887 CATHETER, GUIDING: HCPCS | Performed by: THORACIC SURGERY (CARDIOTHORACIC VASCULAR SURGERY)

## 2018-01-17 PROCEDURE — 85027 COMPLETE CBC AUTOMATED: CPT | Performed by: PHYSICIAN ASSISTANT

## 2018-01-17 PROCEDURE — 82330 ASSAY OF CALCIUM: CPT

## 2018-01-17 PROCEDURE — 25010000002 HEPARIN (PORCINE) PER 1000 UNITS: Performed by: ANESTHESIOLOGY

## 2018-01-17 PROCEDURE — 25010000002 MORPHINE SULFATE (PF) 2 MG/ML SOLUTION: Performed by: THORACIC SURGERY (CARDIOTHORACIC VASCULAR SURGERY)

## 2018-01-17 PROCEDURE — 99253 IP/OBS CNSLTJ NEW/EST LOW 45: CPT | Performed by: NURSE PRACTITIONER

## 2018-01-17 PROCEDURE — 25010000002 POTASSIUM CHLORIDE PER 2 MEQ OF POTASSIUM

## 2018-01-17 PROCEDURE — 25010000002 PROPOFOL 1000 MG/ML EMULSION: Performed by: THORACIC SURGERY (CARDIOTHORACIC VASCULAR SURGERY)

## 2018-01-17 PROCEDURE — 85730 THROMBOPLASTIN TIME PARTIAL: CPT | Performed by: THORACIC SURGERY (CARDIOTHORACIC VASCULAR SURGERY)

## 2018-01-17 PROCEDURE — 25010000002 MANNITOL PER 50 ML

## 2018-01-17 PROCEDURE — 25010000002 HEPARIN (PORCINE) PER 1000 UNITS

## 2018-01-17 DEVICE — ADHS SURG BIOGLUE PREF STD/TP 10ML: Type: IMPLANTABLE DEVICE | Status: FUNCTIONAL

## 2018-01-17 DEVICE — IMPLANTABLE DEVICE: Type: IMPLANTABLE DEVICE | Site: AORTA | Status: FUNCTIONAL

## 2018-01-17 RX ORDER — METOPROLOL TARTRATE 5 MG/5ML
2.5 INJECTION INTRAVENOUS EVERY 6 HOURS SCHEDULED
Status: DISCONTINUED | OUTPATIENT
Start: 2018-01-17 | End: 2018-01-18

## 2018-01-17 RX ORDER — DOCUSATE SODIUM 100 MG/1
100 CAPSULE, LIQUID FILLED ORAL 2 TIMES DAILY PRN
Status: DISCONTINUED | OUTPATIENT
Start: 2018-01-17 | End: 2018-01-22 | Stop reason: HOSPADM

## 2018-01-17 RX ORDER — MAGNESIUM SULFATE HEPTAHYDRATE 40 MG/ML
4 INJECTION, SOLUTION INTRAVENOUS AS NEEDED
Status: DISCONTINUED | OUTPATIENT
Start: 2018-01-17 | End: 2018-01-19

## 2018-01-17 RX ORDER — ALBUTEROL SULFATE 2.5 MG/3ML
2.5 SOLUTION RESPIRATORY (INHALATION) EVERY 4 HOURS PRN
Status: ACTIVE | OUTPATIENT
Start: 2018-01-17 | End: 2018-01-18

## 2018-01-17 RX ORDER — FAMOTIDINE 20 MG/1
20 TABLET, FILM COATED ORAL 2 TIMES DAILY
Status: DISCONTINUED | OUTPATIENT
Start: 2018-01-17 | End: 2018-01-22 | Stop reason: HOSPADM

## 2018-01-17 RX ORDER — BISACODYL 10 MG
10 SUPPOSITORY, RECTAL RECTAL DAILY PRN
Status: DISCONTINUED | OUTPATIENT
Start: 2018-01-18 | End: 2018-01-19

## 2018-01-17 RX ORDER — POTASSIUM CHLORIDE 29.8 MG/ML
20 INJECTION INTRAVENOUS
Status: DISCONTINUED | OUTPATIENT
Start: 2018-01-17 | End: 2018-01-19

## 2018-01-17 RX ORDER — CEFAZOLIN SODIUM 2 G/100ML
2 INJECTION, SOLUTION INTRAVENOUS EVERY 8 HOURS
Status: DISCONTINUED | OUTPATIENT
Start: 2018-01-17 | End: 2018-01-17 | Stop reason: SDUPTHER

## 2018-01-17 RX ORDER — AMINOCAPROIC ACID 250 MG/ML
INJECTION, SOLUTION INTRAVENOUS AS NEEDED
Status: DISCONTINUED | OUTPATIENT
Start: 2018-01-17 | End: 2018-01-17 | Stop reason: SURG

## 2018-01-17 RX ORDER — MAGNESIUM HYDROXIDE 1200 MG/15ML
LIQUID ORAL AS NEEDED
Status: DISCONTINUED | OUTPATIENT
Start: 2018-01-17 | End: 2018-01-17 | Stop reason: HOSPADM

## 2018-01-17 RX ORDER — CHLORHEXIDINE GLUCONATE 0.12 MG/ML
15 RINSE ORAL EVERY 12 HOURS SCHEDULED
Status: DISCONTINUED | OUTPATIENT
Start: 2018-01-17 | End: 2018-01-18

## 2018-01-17 RX ORDER — SODIUM CHLORIDE, SODIUM LACTATE, POTASSIUM CHLORIDE, CALCIUM CHLORIDE 600; 310; 30; 20 MG/100ML; MG/100ML; MG/100ML; MG/100ML
9 INJECTION, SOLUTION INTRAVENOUS CONTINUOUS
Status: DISCONTINUED | OUTPATIENT
Start: 2018-01-17 | End: 2018-01-17

## 2018-01-17 RX ORDER — PROPOFOL 10 MG/ML
VIAL (ML) INTRAVENOUS CONTINUOUS PRN
Status: DISCONTINUED | OUTPATIENT
Start: 2018-01-17 | End: 2018-01-17 | Stop reason: SURG

## 2018-01-17 RX ORDER — SENNA AND DOCUSATE SODIUM 50; 8.6 MG/1; MG/1
2 TABLET, FILM COATED ORAL NIGHTLY PRN
Status: DISCONTINUED | OUTPATIENT
Start: 2018-01-17 | End: 2018-01-17

## 2018-01-17 RX ORDER — ATORVASTATIN CALCIUM 40 MG/1
40 TABLET, FILM COATED ORAL NIGHTLY
Status: DISCONTINUED | OUTPATIENT
Start: 2018-01-17 | End: 2018-01-18

## 2018-01-17 RX ORDER — CEFAZOLIN SODIUM 2 G/100ML
2 INJECTION, SOLUTION INTRAVENOUS EVERY 8 HOURS
Status: COMPLETED | OUTPATIENT
Start: 2018-01-17 | End: 2018-01-19

## 2018-01-17 RX ORDER — VECURONIUM BROMIDE 1 MG/ML
INJECTION, POWDER, LYOPHILIZED, FOR SOLUTION INTRAVENOUS AS NEEDED
Status: DISCONTINUED | OUTPATIENT
Start: 2018-01-17 | End: 2018-01-17 | Stop reason: SURG

## 2018-01-17 RX ORDER — SODIUM CHLORIDE 9 MG/ML
INJECTION, SOLUTION INTRAVENOUS AS NEEDED
Status: DISCONTINUED | OUTPATIENT
Start: 2018-01-17 | End: 2018-01-17 | Stop reason: HOSPADM

## 2018-01-17 RX ORDER — MIDAZOLAM HYDROCHLORIDE 1 MG/ML
INJECTION INTRAMUSCULAR; INTRAVENOUS AS NEEDED
Status: DISCONTINUED | OUTPATIENT
Start: 2018-01-17 | End: 2018-01-17 | Stop reason: SURG

## 2018-01-17 RX ORDER — SODIUM CHLORIDE 0.9 % (FLUSH) 0.9 %
1-10 SYRINGE (ML) INJECTION AS NEEDED
Status: DISCONTINUED | OUTPATIENT
Start: 2018-01-17 | End: 2018-01-17 | Stop reason: HOSPADM

## 2018-01-17 RX ORDER — ROCURONIUM BROMIDE 10 MG/ML
INJECTION, SOLUTION INTRAVENOUS AS NEEDED
Status: DISCONTINUED | OUTPATIENT
Start: 2018-01-17 | End: 2018-01-17 | Stop reason: SURG

## 2018-01-17 RX ORDER — OXYCODONE HYDROCHLORIDE AND ACETAMINOPHEN 5; 325 MG/1; MG/1
2 TABLET ORAL EVERY 4 HOURS PRN
Status: DISCONTINUED | OUTPATIENT
Start: 2018-01-17 | End: 2018-01-22 | Stop reason: HOSPADM

## 2018-01-17 RX ORDER — FAMOTIDINE 10 MG/ML
20 INJECTION, SOLUTION INTRAVENOUS 2 TIMES DAILY
Status: DISCONTINUED | OUTPATIENT
Start: 2018-01-17 | End: 2018-01-18

## 2018-01-17 RX ORDER — SODIUM CHLORIDE 9 MG/ML
INJECTION, SOLUTION INTRAVENOUS CONTINUOUS PRN
Status: DISCONTINUED | OUTPATIENT
Start: 2018-01-17 | End: 2018-01-17 | Stop reason: SURG

## 2018-01-17 RX ORDER — FAMOTIDINE 20 MG/1
20 TABLET, FILM COATED ORAL ONCE
Status: COMPLETED | OUTPATIENT
Start: 2018-01-17 | End: 2018-01-17

## 2018-01-17 RX ORDER — BISACODYL 5 MG/1
10 TABLET, DELAYED RELEASE ORAL DAILY PRN
Status: DISCONTINUED | OUTPATIENT
Start: 2018-01-17 | End: 2018-01-22 | Stop reason: HOSPADM

## 2018-01-17 RX ORDER — FAMOTIDINE 10 MG/ML
20 INJECTION, SOLUTION INTRAVENOUS ONCE
Status: DISCONTINUED | OUTPATIENT
Start: 2018-01-17 | End: 2018-01-17 | Stop reason: HOSPADM

## 2018-01-17 RX ORDER — ACETAMINOPHEN 325 MG/1
650 TABLET ORAL EVERY 4 HOURS PRN
Status: DISCONTINUED | OUTPATIENT
Start: 2018-01-17 | End: 2018-01-22 | Stop reason: HOSPADM

## 2018-01-17 RX ORDER — SODIUM CHLORIDE 9 MG/ML
30 INJECTION, SOLUTION INTRAVENOUS CONTINUOUS PRN
Status: DISCONTINUED | OUTPATIENT
Start: 2018-01-17 | End: 2018-01-18

## 2018-01-17 RX ORDER — NITROGLYCERIN 20 MG/100ML
5-200 INJECTION INTRAVENOUS CONTINUOUS PRN
Status: DISCONTINUED | OUTPATIENT
Start: 2018-01-17 | End: 2018-01-19

## 2018-01-17 RX ORDER — MAGNESIUM SULFATE HEPTAHYDRATE 40 MG/ML
2 INJECTION, SOLUTION INTRAVENOUS AS NEEDED
Status: DISCONTINUED | OUTPATIENT
Start: 2018-01-17 | End: 2018-01-19

## 2018-01-17 RX ORDER — SODIUM CHLORIDE 0.9 % (FLUSH) 0.9 %
30 SYRINGE (ML) INJECTION ONCE AS NEEDED
Status: DISCONTINUED | OUTPATIENT
Start: 2018-01-17 | End: 2018-01-18

## 2018-01-17 RX ORDER — MORPHINE SULFATE 2 MG/ML
2 INJECTION, SOLUTION INTRAMUSCULAR; INTRAVENOUS
Status: DISCONTINUED | OUTPATIENT
Start: 2018-01-17 | End: 2018-01-18

## 2018-01-17 RX ORDER — ALBUMIN, HUMAN INJ 5% 5 %
SOLUTION INTRAVENOUS
Status: DISPENSED
Start: 2018-01-17 | End: 2018-01-18

## 2018-01-17 RX ORDER — SUFENTANIL CITRATE 50 UG/ML
INJECTION EPIDURAL; INTRAVENOUS AS NEEDED
Status: DISCONTINUED | OUTPATIENT
Start: 2018-01-17 | End: 2018-01-17 | Stop reason: SURG

## 2018-01-17 RX ORDER — POTASSIUM CHLORIDE 1.5 G/1.77G
40 POWDER, FOR SOLUTION ORAL AS NEEDED
Status: DISCONTINUED | OUTPATIENT
Start: 2018-01-17 | End: 2018-01-19

## 2018-01-17 RX ORDER — POTASSIUM CHLORIDE, DEXTROSE MONOHYDRATE 150; 5 MG/100ML; G/100ML
30 INJECTION, SOLUTION INTRAVENOUS CONTINUOUS
Status: DISCONTINUED | OUTPATIENT
Start: 2018-01-17 | End: 2018-01-18

## 2018-01-17 RX ORDER — PROPOFOL 10 MG/ML
VIAL (ML) INTRAVENOUS AS NEEDED
Status: DISCONTINUED | OUTPATIENT
Start: 2018-01-17 | End: 2018-01-17 | Stop reason: SURG

## 2018-01-17 RX ORDER — PHENYLEPHRINE HCL IN 0.9% NACL 0.5 MG/5ML
.5-3 SYRINGE (ML) INTRAVENOUS CONTINUOUS PRN
Status: DISCONTINUED | OUTPATIENT
Start: 2018-01-17 | End: 2018-01-19

## 2018-01-17 RX ORDER — LIDOCAINE HYDROCHLORIDE 10 MG/ML
0.5 INJECTION, SOLUTION EPIDURAL; INFILTRATION; INTRACAUDAL; PERINEURAL ONCE AS NEEDED
Status: DISCONTINUED | OUTPATIENT
Start: 2018-01-17 | End: 2018-01-17 | Stop reason: HOSPADM

## 2018-01-17 RX ORDER — PROTAMINE SULFATE 10 MG/ML
50 INJECTION, SOLUTION INTRAVENOUS ONCE
Status: DISCONTINUED | OUTPATIENT
Start: 2018-01-17 | End: 2018-01-17

## 2018-01-17 RX ORDER — DOPAMINE HYDROCHLORIDE 160 MG/100ML
2-20 INJECTION, SOLUTION INTRAVENOUS CONTINUOUS PRN
Status: DISCONTINUED | OUTPATIENT
Start: 2018-01-17 | End: 2018-01-19

## 2018-01-17 RX ORDER — POTASSIUM CHLORIDE 750 MG/1
40 CAPSULE, EXTENDED RELEASE ORAL AS NEEDED
Status: DISCONTINUED | OUTPATIENT
Start: 2018-01-17 | End: 2018-01-19

## 2018-01-17 RX ORDER — DOBUTAMINE HYDROCHLORIDE 100 MG/100ML
2-20 INJECTION INTRAVENOUS CONTINUOUS PRN
Status: DISCONTINUED | OUTPATIENT
Start: 2018-01-17 | End: 2018-01-18

## 2018-01-17 RX ORDER — NALOXONE HCL 0.4 MG/ML
0.4 VIAL (ML) INJECTION
Status: DISCONTINUED | OUTPATIENT
Start: 2018-01-17 | End: 2018-01-18

## 2018-01-17 RX ORDER — MEPERIDINE HYDROCHLORIDE 25 MG/ML
25 INJECTION INTRAMUSCULAR; INTRAVENOUS; SUBCUTANEOUS EVERY 4 HOURS PRN
Status: DISCONTINUED | OUTPATIENT
Start: 2018-01-17 | End: 2018-01-18

## 2018-01-17 RX ORDER — HEPARIN SODIUM 1000 [USP'U]/ML
INJECTION, SOLUTION INTRAVENOUS; SUBCUTANEOUS AS NEEDED
Status: DISCONTINUED | OUTPATIENT
Start: 2018-01-17 | End: 2018-01-17 | Stop reason: SURG

## 2018-01-17 RX ORDER — PROTAMINE SULFATE 10 MG/ML
INJECTION, SOLUTION INTRAVENOUS AS NEEDED
Status: DISCONTINUED | OUTPATIENT
Start: 2018-01-17 | End: 2018-01-17 | Stop reason: SURG

## 2018-01-17 RX ORDER — HYDROCODONE BITARTRATE AND ACETAMINOPHEN 7.5; 325 MG/1; MG/1
1 TABLET ORAL EVERY 4 HOURS PRN
Status: DISCONTINUED | OUTPATIENT
Start: 2018-01-17 | End: 2018-01-22 | Stop reason: HOSPADM

## 2018-01-17 RX ORDER — DEXMEDETOMIDINE HYDROCHLORIDE 4 UG/ML
.2-1.5 INJECTION, SOLUTION INTRAVENOUS CONTINUOUS PRN
Status: DISCONTINUED | OUTPATIENT
Start: 2018-01-17 | End: 2018-01-18

## 2018-01-17 RX ORDER — ALBUMIN, HUMAN INJ 5% 5 %
500 SOLUTION INTRAVENOUS AS NEEDED
Status: COMPLETED | OUTPATIENT
Start: 2018-01-17 | End: 2018-01-17

## 2018-01-17 RX ORDER — SENNA AND DOCUSATE SODIUM 50; 8.6 MG/1; MG/1
2 TABLET, FILM COATED ORAL 2 TIMES DAILY
Status: DISCONTINUED | OUTPATIENT
Start: 2018-01-17 | End: 2018-01-22 | Stop reason: HOSPADM

## 2018-01-17 RX ORDER — FENTANYL CITRATE 50 UG/ML
25 INJECTION, SOLUTION INTRAMUSCULAR; INTRAVENOUS
Status: DISCONTINUED | OUTPATIENT
Start: 2018-01-17 | End: 2018-01-18

## 2018-01-17 RX ORDER — MIDAZOLAM HYDROCHLORIDE 1 MG/ML
2 INJECTION INTRAMUSCULAR; INTRAVENOUS
Status: DISCONTINUED | OUTPATIENT
Start: 2018-01-17 | End: 2018-01-17 | Stop reason: HOSPADM

## 2018-01-17 RX ORDER — ONDANSETRON 2 MG/ML
4 INJECTION INTRAMUSCULAR; INTRAVENOUS EVERY 6 HOURS PRN
Status: DISCONTINUED | OUTPATIENT
Start: 2018-01-17 | End: 2018-01-22 | Stop reason: HOSPADM

## 2018-01-17 RX ADMIN — MUPIROCIN 1 APPLICATION: 20 OINTMENT TOPICAL at 06:19

## 2018-01-17 RX ADMIN — AMINOCAPROIC ACID 10 G: 250 INJECTION, SOLUTION INTRAVENOUS at 11:24

## 2018-01-17 RX ADMIN — SODIUM CHLORIDE, POTASSIUM CHLORIDE, SODIUM LACTATE AND CALCIUM CHLORIDE 9 ML/HR: 600; 310; 30; 20 INJECTION, SOLUTION INTRAVENOUS at 06:59

## 2018-01-17 RX ADMIN — VECURONIUM BROMIDE 12 MG: 1 INJECTION, POWDER, LYOPHILIZED, FOR SOLUTION INTRAVENOUS at 08:53

## 2018-01-17 RX ADMIN — PROPOFOL 25 MCG/KG/MIN: 10 INJECTION, EMULSION INTRAVENOUS at 12:17

## 2018-01-17 RX ADMIN — PROTAMINE SULFATE 450 MG: 10 INJECTION, SOLUTION INTRAVENOUS at 11:15

## 2018-01-17 RX ADMIN — ALBUTEROL SULFATE 2.5 MG: 2.5 SOLUTION RESPIRATORY (INHALATION) at 21:44

## 2018-01-17 RX ADMIN — CEFAZOLIN SODIUM 2 G: 2 INJECTION, SOLUTION INTRAVENOUS at 19:50

## 2018-01-17 RX ADMIN — PROPOFOL 25 MCG/KG/MIN: 10 INJECTION, EMULSION INTRAVENOUS at 15:42

## 2018-01-17 RX ADMIN — FENTANYL CITRATE 25 MCG: 50 INJECTION, SOLUTION INTRAMUSCULAR; INTRAVENOUS at 22:16

## 2018-01-17 RX ADMIN — MORPHINE SULFATE 2 MG: 25 INJECTION, SOLUTION, CONCENTRATE INTRAVENOUS at 17:43

## 2018-01-17 RX ADMIN — VECURONIUM BROMIDE 8 MG: 1 INJECTION, POWDER, LYOPHILIZED, FOR SOLUTION INTRAVENOUS at 10:47

## 2018-01-17 RX ADMIN — SODIUM CHLORIDE, POTASSIUM CHLORIDE, SODIUM LACTATE AND CALCIUM CHLORIDE: 600; 310; 30; 20 INJECTION, SOLUTION INTRAVENOUS at 07:08

## 2018-01-17 RX ADMIN — OXYCODONE AND ACETAMINOPHEN 2 TABLET: 5; 325 TABLET ORAL at 19:41

## 2018-01-17 RX ADMIN — ROCURONIUM BROMIDE 100 MG: 10 SOLUTION INTRAVENOUS at 07:12

## 2018-01-17 RX ADMIN — SODIUM CHLORIDE 2 UNITS/HR: 9 INJECTION, SOLUTION INTRAVENOUS at 17:41

## 2018-01-17 RX ADMIN — OXYCODONE AND ACETAMINOPHEN 2 TABLET: 5; 325 TABLET ORAL at 15:34

## 2018-01-17 RX ADMIN — SUFENTANIL CITRATE 100 MCG: 50 INJECTION EPIDURAL; INTRAVENOUS at 07:12

## 2018-01-17 RX ADMIN — MIDAZOLAM HYDROCHLORIDE 2 MG: 1 INJECTION, SOLUTION INTRAMUSCULAR; INTRAVENOUS at 07:00

## 2018-01-17 RX ADMIN — FAMOTIDINE 20 MG: 20 TABLET ORAL at 06:59

## 2018-01-17 RX ADMIN — MIDAZOLAM HYDROCHLORIDE 2 MG: 1 INJECTION, SOLUTION INTRAMUSCULAR; INTRAVENOUS at 08:53

## 2018-01-17 RX ADMIN — CEFAZOLIN SODIUM 2 G: 1 INJECTION, POWDER, FOR SOLUTION INTRAMUSCULAR; INTRAVENOUS at 11:24

## 2018-01-17 RX ADMIN — HYDROXYZINE HYDROCHLORIDE 25 MG: 25 TABLET, FILM COATED ORAL at 22:16

## 2018-01-17 RX ADMIN — ALBUMIN HUMAN 500 ML: 0.05 INJECTION, SOLUTION INTRAVENOUS at 16:11

## 2018-01-17 RX ADMIN — HEPARIN SODIUM 34000 UNITS: 1000 INJECTION, SOLUTION INTRAVENOUS; SUBCUTANEOUS at 08:28

## 2018-01-17 RX ADMIN — NITROGLYCERIN 5 MCG/MIN: 5 INJECTION, SOLUTION INTRAVENOUS at 11:11

## 2018-01-17 RX ADMIN — CEFAZOLIN SODIUM 2 G: 1 INJECTION, POWDER, FOR SOLUTION INTRAMUSCULAR; INTRAVENOUS at 07:45

## 2018-01-17 RX ADMIN — SUFENTANIL CITRATE 100 MCG: 50 INJECTION EPIDURAL; INTRAVENOUS at 08:53

## 2018-01-17 RX ADMIN — ALBUMIN HUMAN 500 ML: 0.05 INJECTION, SOLUTION INTRAVENOUS at 13:25

## 2018-01-17 RX ADMIN — FAMOTIDINE 20 MG: 10 INJECTION, SOLUTION INTRAVENOUS at 20:02

## 2018-01-17 RX ADMIN — CHLORHEXIDINE GLUCONATE 15 ML: 1.2 RINSE ORAL at 06:19

## 2018-01-17 RX ADMIN — SUFENTANIL CITRATE 50 MCG: 50 INJECTION EPIDURAL; INTRAVENOUS at 08:05

## 2018-01-17 RX ADMIN — METOPROLOL TARTRATE 12.5 MG: 25 TABLET, FILM COATED ORAL at 05:18

## 2018-01-17 RX ADMIN — POTASSIUM CHLORIDE AND DEXTROSE MONOHYDRATE 30 ML/HR: 150; 5 INJECTION, SOLUTION INTRAVENOUS at 13:25

## 2018-01-17 RX ADMIN — MIDAZOLAM HYDROCHLORIDE 3 MG: 1 INJECTION, SOLUTION INTRAMUSCULAR; INTRAVENOUS at 07:12

## 2018-01-17 RX ADMIN — SODIUM CHLORIDE: 9 INJECTION, SOLUTION INTRAVENOUS at 07:08

## 2018-01-17 RX ADMIN — ATORVASTATIN CALCIUM 40 MG: 40 TABLET, FILM COATED ORAL at 20:01

## 2018-01-17 RX ADMIN — AMINOCAPROIC ACID 10 G: 250 INJECTION, SOLUTION INTRAVENOUS at 08:15

## 2018-01-17 RX ADMIN — FENTANYL CITRATE 25 MCG: 50 INJECTION, SOLUTION INTRAMUSCULAR; INTRAVENOUS at 19:41

## 2018-01-17 RX ADMIN — PROPOFOL 50 MG: 10 INJECTION, EMULSION INTRAVENOUS at 07:12

## 2018-01-17 NOTE — ANESTHESIA PREPROCEDURE EVALUATION
Anesthesia Evaluation     Patient summary reviewed and Nursing notes reviewed   NPO Solid Status: > 8 hours  NPO Liquid Status: > 8 hours     Airway   Mallampati: I  TM distance: <3 FB  Neck ROM: full  no difficulty expected  Dental - normal exam     Pulmonary - normal exam   (+) a smoker Former, COPD, asthma,   Cardiovascular - normal exam    (+) hypertension, PVD (ASC AORTIC ANEURYSM),       Neuro/Psych  (+) headaches, psychiatric history Anxiety,     GI/Hepatic/Renal/Endo - negative ROS     Musculoskeletal     (+) back pain,   Abdominal  - normal exam    Bowel sounds: normal.   Substance History - negative use     OB/GYN negative ob/gyn ROS         Other                                                Anesthesia Plan    ASA 4     general   (JEAN CORNEJO CATH, AMBER)  intravenous induction   Anesthetic plan and risks discussed with patient.

## 2018-01-17 NOTE — ANESTHESIA PROCEDURE NOTES
Airway  Urgency: elective    Airway not difficult    General Information and Staff    Patient location during procedure: OR  Anesthesiologist: BARBARA VILLATORO    Indications and Patient Condition  Indications for airway management: airway protection    Preoxygenated: yes  MILS not maintained throughout  Mask difficulty assessment: 1 - vent by mask    Final Airway Details  Final airway type: endotracheal airway      Successful airway: ETT  Cuffed: yes   Successful intubation technique: direct laryngoscopy  Endotracheal tube insertion site: oral  Blade: Scott  Blade size: #2  ETT size: 7.0 mm  Cormack-Lehane Classification: grade I - full view of glottis  Placement verified by: chest auscultation and capnometry   Measured from: lips  ETT to lips (cm): 20  Number of attempts at approach: 1    Additional Comments  Negative epigastric sounds, Breath sound equal bilaterally with symmetric chest rise and fall, 7.5 EVAC, EASY

## 2018-01-17 NOTE — ANESTHESIA PROCEDURE NOTES
Procedure Performed: Emergent/Open-Heart Anesthesia AMBER     Start Time:        End Time:      Preanesthesia Checklist:  Patient identified, IV assessed, risks and benefits discussed, monitors and equipment assessed, procedure being performed at surgeon's request and anesthesia consent obtained.    General Procedure Information  Diagnostic Indications for Echo:  assessment of ascending aorta, assessment of surgical repair, defect repair evaluation and hemodynamic monitoring  Physician Requesting Echo: JARETT COLLAZO  Location performed:  OR  Intubated  Bite block not placed  Heart visualized  Probe Insertion:  Easy  Probe Type:  Multiplane  Modalities:  2D only, color flow mapping, continuous wave Doppler and pulse wave Doppler    Echocardiographic and Doppler Measurements    Ventricles    Right Ventricle:  Thrombus not present.    Left Ventricle:  Cavity size dilated.  Thrombus not present.  Global Function mildly impaired.  Ejection Fraction 50%.      Ventricular Regional Function:    Wall Motion Comments:       Mild global HK, EF 50%    Valves    Aortic Valve:  Annulus dilated.  Stenosis not present.  Area: 3.3 cm².  Regurgitation moderate.  Leaflets normal.  Leaflet motions normal.      Mitral Valve:  Annulus normal.  Stenosis not present.  Regurgitation absent.  Leaflets normal.  Leaflet motions normal.      Tricuspid Valve:  Annulus normal.  Stenosis not present.  Regurgitation absent.  Leaflets normal.  Leaflet motions normal.    Other Valve Findings:       DILATED AV, ANNULUS 28, MODERATE AI BY P1/2 [id number]    Aorta    Ascending Aorta:  Size aneurysmal.  Diameter 6 cm.  Dissection not present.  Plaque thickness less than 3 mm.  Mobile plaque not present.    Aortic Arch:  Size normal.  Diameter 2.77 cm.  Dissection not present.  Plaque thickness less than 3 mm.  Mobile plaque not present.    Descending Aorta:  Size normal.  Diameter 2.07 cm.  Dissection not present.  Plaque thickness less than 3 mm.  Mobile  plaque not present.          Atria    Right Atrium:  Size normal.  Spontaneous echo contrast not present.  Thrombus not present.  Tumor not present.  Device not present.      Left Atrium:  Size normal.  Spontaneous echo contrast not present.  Thrombus not present.  Tumor not present.  Device not present.    Left atrial appendage normal.              Other Findings  Pericardium:  pericardial effusion      Anesthesia Information  Performed Personally  Anesthesiologist:  BARBARA VILLATORO      Echocardiogram Comments:       Post Bentall:  Bileaflet mechanical valve conduit, both leaflets open and coapt, washing jets seen, AVG 12 mean 7; EF 45 % with inf HK-post performed by Reji

## 2018-01-17 NOTE — ANESTHESIA PROCEDURE NOTES
Arterial Line    Patient location during procedure: pre-op   Line placed for hemodynamic monitoring.  Performed By   Anesthesiologist: BARBARA VILLATORO  Preanesthetic Checklist  Completed: patient identified, site marked, surgical consent, pre-op evaluation, timeout performed, IV checked, risks and benefits discussed and monitors and equipment checked  Arterial Line Prep   Sterile Tech: cap, gloves and sterile barriers  Prep: ChloraPrep  Patient monitoring: blood pressure monitoring, continuous pulse oximetry and EKG  Arterial Line Procedure   Laterality:right  Location:  radial artery  Catheter size: 20 G   Guidance: palpation technique  Number of attempts: 1  Successful placement: yes          Post Assessment   Dressing Type: line sutured, occlusive dressing applied, secured with tape and wrist guard applied.   Complications no  Circ/Move/Sens Assessment: normal and unchanged.   Patient Tolerance: patient tolerated the procedure well with no apparent complications

## 2018-01-17 NOTE — ANESTHESIA PROCEDURE NOTES
Central Line    Patient location during procedure: OR  Indications: vascular access  Staff  Anesthesiologist: BARBARA VILLATORO  Preanesthetic Checklist  Completed: patient identified, site marked, surgical consent, pre-op evaluation, timeout performed, IV checked, risks and benefits discussed and monitors and equipment checked  Central Line Prep  Sterile Tech:cap, gloves, gown, mask and sterile barriers  Prep: chloraprep  Patient monitoring: blood pressure monitoring, continuous pulse oximetry and EKG  Central Line Procedure  Laterality:right  Location:internal jugular  Catheter Type:Cordis and [place]-Deondre  Catheter Size:9 Fr  Guidance:ultrasound guided  PROCEDURE NOTE/ULTRASOUND INTERPRETATION.  Using ultrasound guidance the potential vascular sites for insertion of the catheter were visualized to determine the patency of the vessel to be used for vascular access.  After selecting the appropriate site for insertion, the needle was visualized under ultrasound being inserted into the internal jugular vein, followed by ultrasound confirmation of wire and catheter placement. There were no abnormalities seen on ultrasound; an image was taken; and the patient tolerated the procedure with no complications.   Assessment  Post procedure:biopatch applied, line sutured, occlusive dressing applied and secured with tape  Assessement:blood return through all ports, free fluid flow and chest x-ray ordered  Complications:no  Patient Tolerance:patient tolerated the procedure well with no apparent complications

## 2018-01-17 NOTE — PLAN OF CARE
Problem: Patient Care Overview (Adult)  Goal: Plan of Care Review  Outcome: Ongoing (interventions implemented as appropriate)   01/17/18 0119   Coping/Psychosocial Response Interventions   Plan Of Care Reviewed With patient   Patient Care Overview   Progress progress toward functional goals as expected   Outcome Evaluation   Outcome Summary/Follow up Plan new admit vss, pt scheduled for surgery in am, no issues overnight will monitor       Problem: Aortic Aneurysm/Dissection (Adult)  Goal: Signs and Symptoms of Listed Potential Problems Will be Absent or Manageable (Aortic Aneurysm/Dissection)  Outcome: Ongoing (interventions implemented as appropriate)

## 2018-01-17 NOTE — PROGRESS NOTES
INTENSIVIST   PROGRESS NOTE     Hospital:  LOS: 1 day   Subjective   Mr. Filemon Jj, 42 y.o. 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     S     Interval History:  POD: Day of Surgery    Seen in 2H ICU after his surgery.  Starting to awake up.  No issues in the immediate post op period.    He has difficulty walking due to lower extremities weakness.       The patient's relevant past medical, surgical and social history were reviewed and updated in Epic as appropriate.      ROS:   ROS cannot be reliably obtained from the patient due to his Acuity of condition, Altered Mental Status and Intubated.    Objective   O     Vitals:  Temp: 98.1 °F (36.7 °C) Temp  Min: 97.2 °F (36.2 °C)  Max: 98.1 °F (36.7 °C)   BP: 98/63 BP  Min: 90/63  Max: 145/87   Pulse: 91 Pulse  Min: 64  Max: 102   Resp: 10 Resp  Min: 10  Max: 20   SpO2: 100 % SpO2  Min: 93 %  Max: 100 %   Device: mechanical ventilator    Flow Rate:   No Data Recorded     Intake/Ouptut 24 hrs (7:00AM - 6:59 AM)  Intake & Output (last 3 days)       01/14 0701 - 01/15 0700 01/15 0701 - 01/16 0700 01/16 0701 - 01/17 0700 01/17 0701 - 01/18 0700    P.O.   240     I.V. (mL/kg)    750 (8.8)    Blood    986    Total Intake(mL/kg)   240 (2.8) 1736 (20.5)    Urine (mL/kg/hr)   900 2350 (2.8)    Other    760 (0.9)    Total Output     900 3110    Net     -660 -1374            Unmeasured Urine Occurrence   1 x           Medications (drips):    dexmedetomidine    dextrose 5 % with KCl 20 mEq Last Rate: 30 mL/hr (01/17/18 1325)   DOBUTamine    DOPamine    EPINEPHrine    insulin regular infusion 1 unit/mL (CCU use)     niCARdipine    nitroglycerin Last Rate: 30 mcg/min (01/17/18 1548)   norepinephrine    phenylephrine    propofol Last Rate: Stopped (01/17/18 1636)   sodium chloride    vasopressin        Mechanical Ventilator Settings:  Vent Mode: (S) PS    Vt (Set, L): 0.5 L    Resp Rate (Set): (S) 6 Resp Rate (Observed) Vent: 23   FiO2 (%): 40 %    PEEP/CPAP (cm H2O): 12 cm H20      Minute Ventilation (L/min) (Obs): 10.8 L/min    I:E Ratio (Obs): 1.10:1     Physical Examination  Telemetry:  Sinus Rhythm: normal sinus rhythm         Constitutional:  No acute distress.   Cardiovascular: Normal rate, regular and rhythm. Normal heart sounds.  No murmurs, gallop or rub.   Respiratory: No respiratory distress. Normal respiratory effort.  Normal breath sounds  Clear to auscultation and percussion.    Abdominal:  Soft. No masses. Non-tender. No distension. No HSM.   Extremities: No digital cyanosis. No clubbing.  No peripheral edema.   Neurological:   Sedated post anesthesia               Lines/Drains/Airways: RIJ Cordis  R Radial Arterial Line  O ETT  NG  Velásquez   CT X 4       Hematology:    Results from last 7 days  Lab Units 01/17/18  1318 01/17/18  1131 01/17/18  1040  01/16/18  1930   WBC 10*3/mm3 9.82  --   --   --  6.47   HEMOGLOBIN g/dL 10.4*  --   --   --  14.4   HEMOGLOBIN, POC g/dL  --  9.2* 11.2*  < >  --    MCV fL 86.5  --   --   --  86.9   PLATELETS 10*3/mm3 190  --   --   --  245   < > = values in this interval not displayed.  Electrolytes, Magnesium and Phosphorus:    Results from last 7 days  Lab Units 01/17/18  1408 01/16/18  1930   SODIUM mmol/L 144 138   POTASSIUM mmol/L 3.7 3.9   CO2 mmol/L 24.0 24.0   MAGNESIUM mg/dL 3.1*  --    PHOSPHORUS mg/dL 1.9*  --      Renal:    Results from last 7 days  Lab Units 01/17/18  1408 01/16/18  1930   CREATININE mg/dL 1.00 0.90   BUN mg/dL 14 11     Estimated Creatinine Clearance: 101.2 mL/min (by C-G formula based on Cr of 1).    Arterial Blood Gases:    Results from last 7  days  Lab Units 01/17/18  1307 01/17/18  1131 01/17/18  1040 01/17/18  1019 01/17/18  0944   PH, ARTERIAL pH units 7.367 7.37 7.38 7.40 7.38   PCO2, ARTERIAL mm Hg 41.9  --   --   --   --    PO2 ART mm Hg 453.0*  --   --   --   --    FIO2 % 100  --   --   --   --        Images:  CXR 1/17/2018 NAD. Lines OK. No PTX     Echo:  Results for orders placed in visit on 01/17/18   Intra-Operative Transesophageal Echocardiogram    Narrative Eric Horner MD     1/17/2018 11:16 AM    Ascending Aorta:  Size aneurysmal.  Diameter 6 cm.  Dissection not present.  Plaque   thickness less than 3 mm.  Mobile plaque not present.    Aortic Arch:  Size normal.  Diameter 2.77 cm.  Dissection not present.  Plaque thickness   less than 3 mm.  Mobile plaque not present.    Descending Aorta:  Size normal.  Diameter 2.07 cm.  Dissection not present.  Plaque thickness   less than 3 mm.  Mobile plaque not present.      Anesthesiologist:  BARBARA VILLATORO    Echocardiogram Comments:       Post Bentall:  Bileaflet mechanical valve conduit, both leaflets open   and coapt, washing jets seen, AVG 12 mean 7; EF 45 % with inf HK-post   performed by Reji       Results: Reviewed.  I reviewed the patient's new laboratory and imaging results.  I independently reviewed the patient's new images.    Medications: Reviewed.    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 Aaron Lucas (Cardiothoracic Surgery)  1/17/18  T CPB: 136 min.    2. Antibiotics: Cefazolin  3. COPD  1. [facility] Rx: FEV1= 1.23L  (32% of predicted) Ratio 0.47 c/w Severe OAD, and a restrictive component can not be ruled out.  2. Tobacco use  4. HTN  5. S/P Multiple tooth  tony (NOV 2017) in preparation for this surgery.    Nutrition:  NPO Diet   Advance Directives: Full Code     Assessment / Plan:    1. ICU post operative management.  2. Hemodynamic support    3. Watch for arrhythmias.  4. Watch for bleeeding.  5. Wean Mechanical Ventilator to extubate, per protocol.  6. Perioperative Prophylactic Antibiotics.    7. Glucose management with Continuous infusion of insulin. Target glucose  < or = 180 mg/dl.  8. Mechanical VTE prophylaxis.  9. Stress Ulcer Prophylaxis.    Plan of care and goals reviewed during interdisciplinary rounds.  I discussed the patient's findings and my recommendations with nursing staff    Time: was greater than 32 minutes.    (This excludes time spent performing separately reportable procedures and services).  Patient was at high risk of imminent or life-threatening deterioration in his condition due to mechanical ventilator.     Tres Doan MD, FACP, FCCP, CNSC  Intensive Care Medicine, Nutrition Support and Pulmonary Medicine

## 2018-01-17 NOTE — H&P
Cardiothoracic Surgery History & Physical           Chief complaint: Infrequent chest pain and shortness of breath    HPI:   Patient is a 42-year-old  male with history of severe chronic obstructive pulmonary disease who was admitted to [facility] on 10/17/2017 for shortness of breath and found to have a 6 cm ascending aortic aneurysm.  Patient underwent an extensive workup.  In addition, he was found to have poor dentition and was discharged to have multiple tooth extractions as an outpatient, which was performed in November of 2017.    Patient reports he experiences mild and infrequent epigastric pain lasting only seconds however states this has been occurring more frequently, once every few weeks.  He also reports persistent dyspnea on exertion and bilateral lower extremity weakness which has resulted in an inability to stand and walk great distances until his legs give out beneath him.  This resulted in a visit to the emergency department 2 to 3 weeks ago with him being discharged home from the emergency department after an unremarkable workup, having been treated with nebulizer treatments with no official diagnosis rendered.  The patient reports this has been going on for months.  Otherwise, the patient states there has been no change in his overall health status since his previous hospitalization.  Currently, he denies any chest pain, back pain, epigastric pain, lower extremity pain or dyspnea.      Past Medical History:   Diagnosis Date   • Anxiety    • Asthma    • Back pain    • COPD (chronic obstructive pulmonary disease)    • Emphysema lung    • Gastritis    • GERD (gastroesophageal reflux disease)    • Headache    • Hypertension    • Migraine    • Substance abuse      Past Surgical History:   Procedure Laterality Date   • CARDIAC CATHETERIZATION N/A 10/23/2017    Procedure: Left Heart Cath;  Surgeon: Rodolfo Núñez MD;  Location: [facility] CATH INVASIVE LOCATION;  Service:     • DENTAL PROCEDURE     • LIVER SURGERY      tumor removed from liver     Family History   Problem Relation Age of Onset   • COPD Mother      Social History   Substance Use Topics   • Smoking status: Former Smoker     Packs/day: 1.00     Years: 4.00   • Smokeless tobacco: Current User     Types: Snuff      Comment: Pt uses vapor cigarette   • Alcohol use No      Comment: occassional        Lives in [place] alone   Prescriptions Prior to Admission   Medication Sig Dispense Refill Last Dose   • albuterol (PROVENTIL) (2.5 MG/3ML) 0.083% nebulizer solution Take 2.5 mg by nebulization 3 (Three) Times a Day.   1/16/2018 at Unknown time   • cyclobenzaprine (FLEXERIL) 10 MG tablet Take 10 mg by mouth 3 (Three) Times a Day As Needed for Muscle Spasms.   Past Week at Unknown time   • hydrOXYzine (VISTARIL) 25 MG capsule Take 25 mg by mouth 2 (Two) Times a Day As Needed for Anxiety (for anxiety).   Past Month at Unknown time   • predniSONE (DELTASONE) 20 MG tablet Take 1 tablet by mouth 2 (Two) Times a Day. 6 tablet 0 Past Week at Unknown time   • raNITIdine (ZANTAC) 300 MG tablet Take 300 mg by mouth 2 (Two) Times a Day.   Past Week at Unknown time   • albuterol (PROVENTIL HFA;VENTOLIN HFA) 108 (90 BASE) MCG/ACT inhaler Inhale 2 puffs every 4 (four) hours as needed for wheezing. (Patient taking differently: Inhale 2 puffs 2 (Two) Times a Day As Needed for Wheezing or Shortness of Air.) 3.7 g 0 Taking   • azithromycin (ZITHROMAX) 250 MG tablet Take 2 tablets the first day, then 1 tablet daily for 4 days.  Indications: Upper Respiratory Tract Infection 3 tablet 0 Not Taking   • cefdinir (OMNICEF) 300 MG capsule Take 1 capsule by mouth Every 12 (Twelve) Hours. Indications: Upper Respiratory Tract Infection 10 capsule 0 Not Taking   • Fluticasone Furoate-Vilanterol 100-25 MCG/INH aerosol powder  Inhale 1 puff by mouth Daily. 60 each 1 Not Taking   • ibuprofen (ADVIL,MOTRIN) 800 MG tablet Take 800 mg by mouth Every 6 (Six) Hours  As Needed for Mild Pain .   Taking   • montelukast (SINGULAIR) 10 MG tablet Take 10 mg by mouth Daily.   More than a month at Unknown time     Allergies:  Aspirin    Review of Systems:    A comprehensive review of systems was negative except for:   Constitutional: Admits to worsening fatigue and infrequent chills, denies any fevers or recent weight loss or weight gain.   Respiratory: Admits to dyspnea, dyspnea on exertion and a nonproductive cough and a history of COPD, denies recent pneumonia or bronchitis  Cardiovascular: Admits to stable infrequent epigastric pain or denies any pedal edema, orthopnea, irregular heart rhythm or paroxysmal nocturnal dyspnea  Gastrointestinal: Denies nausea vomiting, abdominal pain, history of peptic ulcer disease, hematemesis, hematochezia or melena   Hematologic/lymphatic: Denies history of coagulopathy, deep vein thrombosis or pulmonary embolus  Musculoskeletal: Admits to diffuse arthralgias however these are stable no change  Neurological: Denies history of headache, seizure disorder, transient ischemic attack or cerebral vascular accident    All other systems were reviewed and are negative.    Vital Signs:      Physical Exam  Gen: Well-developed, well-nourished in no acute distress  HEENT: Normocephalic, atraumatic, PERRLA, EOMs intact mucous membranes moist with no scleral icterus  Neck: Supple without bruit  Pulm: Bilateral mild short wheezes with no rales or rhonchi  CV: Regular rate and rhythm with no murmurs, rubs or gallops.  Normal S1-S2.  Abd: Soft, nontender/nondistended with normoactive bowel sounds with no rebound or guarding and no organomegaly appreciated  Neuro: Alert awake ×3 with cranial nerves II 12 grossly intact no motor sensory deficits  Pulses: Symmetric, 2+ carotid pulses bilaterally 2+ radial pulses bilaterally 2+ femoral pulses bilaterally 2+ dorsalis pedis and posterior tibialis pulses bilaterally  Ext: Warm with good color well-perfused no bilateral  lower extremity edema  Int: No clubbing or cyanosis with no lesions or rashes appreciated there is a well-healed transverse laparotomy incision        Labs:  Pending      Imaging:   Pending    Assessment:   6 cm Ascending Aortic Aneurysm  Severe Chronic Obstructive Pulmonary Disease    Plan:   Will order routine preoperative laboratory testing and imaging and will plan to proceed with Bentall procedure with aortic valve [facility]  01/16/18  7:19 PM

## 2018-01-17 NOTE — CONSULTS
Inpatient Consult to Cardiology  Consult performed by: MARTITA GUDINO  Consult ordered by: IKE VELÁSQUEZ Cardiology at [facility]  Cardiovascular Consultation Note         Patient is a 42-year-old male who was referred for a cardiac catheterization with Dr. Néstor Núñez last October prior to proceeding with repair of his large ascending aortic aneurysm.  The patient was found to have normal coronary arteries and was cleared for a Bentall procedure without bypass required.  He underwent the procedure earlier today and we are being asked to consult to assist in his post operative care for management of his cardiac risk factors and history of hypertension.  The patient is currently intubated and sedated with Diprivan.  He is requiring no pressors for hemodynamic support.  His mediastinal tubes are patent and draining. No family is currently present and all information is obtained from the electronic medical record and through physical examination      Past medical and surgical history, social and family history reviewed in EMR.    REVIEW OF SYSTEMS:   H&P ROS reviewed and pertinent CV ROS as noted in HPI.         Vital Sign Min/Max for last 24 hours  Temp  Min: 97.2 °F (36.2 °C)  Max: 97.9 °F (36.6 °C)   BP  Min: 90/63  Max: 145/87   Pulse  Min: 64  Max: 102   Resp  Min: 16  Max: 20   SpO2  Min: 93 %  Max: 100 %   No Data Recorded      Intake/Output Summary (Last 24 hours) at 01/17/18 1515  Last data filed at 01/17/18 1400   Gross per 24 hour   Intake             1976 ml   Output             3540 ml   Net            -1564 ml           Physical Exam   Constitutional: He appears well-developed and well-nourished. He is sedated and intubated.   HENT:   Head: Normocephalic and atraumatic.   Eyes: Pupils are equal, round, and reactive to light. No scleral icterus.   Neck: No JVD present. Carotid bruit is not present. No thyromegaly present.   Cardiovascular: Normal rate, regular  rhythm, S1 normal and S2 normal.  Exam reveals no gallop.    No murmur heard.  [place] click   Pulmonary/Chest: Effort normal and breath sounds normal. He is intubated.   Abdominal: Soft. There is no hepatosplenomegaly. There is no tenderness.   Skin: Skin is warm and dry. No cyanosis. Nails show no clubbing.   Psychiatric: He has a normal mood and affect. His behavior is normal.       EKG: NSR    Lab Review:   Labs reviewed in the electronic medical record.  Pertinent findings include:  Lab Results   Component Value Date    GLUCOSE 131 (H) 01/17/2018    BUN 14 01/17/2018    CREATININE 1.00 01/17/2018    EGFRIFNONA 82 01/17/2018    EGFRIFAFRI  09/22/2016      Comment:      <15 Indicative of kidney failure.    BCR 14.0 01/17/2018    K 3.7 01/17/2018    CO2 24.0 01/17/2018    CALCIUM 8.0 (L) 01/17/2018    ALBUMIN 3.70 01/17/2018    LABIL2 1.8 01/16/2018    AST 19 01/16/2018    ALT 27 01/16/2018     Lab Results   Component Value Date    WBC 9.82 01/17/2018    HGB 10.4 (L) 01/17/2018    HCT 32.1 (L) 01/17/2018    MCV 86.5 01/17/2018     01/17/2018     Lab Results   Component Value Date    CHOL 206 (H) 01/16/2018    TRIG 202 (H) 01/16/2018    HDL 33 (L) 01/16/2018    LDLCALC 139 (H) 10/18/2017    LDLDIRECT 163 (H) 01/16/2018       Results from last 7 days  Lab Units 01/16/18  1930   HEMOGLOBIN A1C % 6.20*             Hospital Problem List     * (Principal)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

## 2018-01-17 NOTE — TELEPHONE ENCOUNTER
Diane with [employer] called to provide the reference number for patients Direct admit.    Reference number: [id number]  Vaild until: 1/22/18  Will need new clinical review: 1/23/18 Fax number for concurrent review is [phone] ATTN: Enrique

## 2018-01-17 NOTE — ANESTHESIA POSTPROCEDURE EVALUATION
Patient: Filemon Jj    Procedure Summary     Date Anesthesia Start Anesthesia Stop Room / Location    01/17/18 0708 1323 [facility] / [facility]       Procedure Diagnosis Surgeon Provider    MEDIAN STERNOTOMY, AORTIC VALVE CONDUIT, BENTAL, TRANSESOPHAGEAL ECHOCARDIOGRAM WITH ANESTHESIA (N/A Chest) Ascending aortic aneurysm  (Ascending aortic aneurysm [I71.2]) MD Eric Greco MD          Anesthesia Type: general  Last vitals  BP   94/64 (01/17/18 1300)   Temp   97.3 °F (36.3 °C) (01/17/18 0619)   Pulse   94 (01/17/18 1300)   Resp   16 (01/17/18 0619)     SpO2   99 % (01/17/18 1300)     Post Anesthesia Care and Evaluation    Patient location during evaluation: ICU  Patient participation: complete - patient cannot participate  Pain score: 0  Pain management: adequate  Airway patency: patent  Anesthetic complications: No anesthetic complications  PONV Status: none  Cardiovascular status: hemodynamically stable and acceptable  Respiratory status: acceptable, intubated, ETT and ventilator  Hydration status: acceptable    Comments: To ICU on O2/ambu/monitor; VSS; report to RN

## 2018-01-18 ENCOUNTER — APPOINTMENT (OUTPATIENT)
Dept: GENERAL RADIOLOGY | Facility: HOSPITAL | Age: 43
End: 2018-01-18

## 2018-01-18 LAB
ABO + RH BLD: NORMAL
ALBUMIN SERPL-MCNC: 4 G/DL (ref 3.2–4.8)
ANION GAP SERPL CALCULATED.3IONS-SCNC: 5 MMOL/L (ref 3–11)
BASOPHILS # BLD AUTO: 0.05 10*3/MM3 (ref 0–0.2)
BASOPHILS NFR BLD AUTO: 0.4 % (ref 0–1)
BH BB BLOOD EXPIRATION DATE: NORMAL
BH BB BLOOD TYPE BARCODE: 6200
BH BB BLOOD TYPE BARCODE: 8400
BH BB DISPENSE STATUS: NORMAL
BH BB PRODUCT CODE: NORMAL
BH BB UNIT NUMBER: NORMAL
BUN BLD-MCNC: 16 MG/DL (ref 9–23)
BUN/CREAT SERPL: 14.5 (ref 7–25)
CALCIUM SPEC-SCNC: 8.1 MG/DL (ref 8.7–10.4)
CHLORIDE SERPL-SCNC: 106 MMOL/L (ref 99–109)
CO2 SERPL-SCNC: 24 MMOL/L (ref 20–31)
CREAT BLD-MCNC: 1.1 MG/DL (ref 0.6–1.3)
CYTO UR: NORMAL
DEPRECATED RDW RBC AUTO: 47.6 FL (ref 37–54)
EOSINOPHIL # BLD AUTO: 0.08 10*3/MM3 (ref 0–0.3)
EOSINOPHIL NFR BLD AUTO: 0.7 % (ref 0–3)
ERYTHROCYTE [DISTWIDTH] IN BLOOD BY AUTOMATED COUNT: 14.7 % (ref 11.3–14.5)
GFR SERPL CREATININE-BSD FRML MDRD: 74 ML/MIN/1.73
GLUCOSE BLD-MCNC: 148 MG/DL (ref 70–100)
GLUCOSE BLDC GLUCOMTR-MCNC: 105 MG/DL (ref 70–130)
GLUCOSE BLDC GLUCOMTR-MCNC: 114 MG/DL (ref 70–130)
GLUCOSE BLDC GLUCOMTR-MCNC: 121 MG/DL (ref 70–130)
GLUCOSE BLDC GLUCOMTR-MCNC: 123 MG/DL (ref 70–130)
GLUCOSE BLDC GLUCOMTR-MCNC: 124 MG/DL (ref 70–130)
GLUCOSE BLDC GLUCOMTR-MCNC: 130 MG/DL (ref 70–130)
GLUCOSE BLDC GLUCOMTR-MCNC: 139 MG/DL (ref 70–130)
GLUCOSE BLDC GLUCOMTR-MCNC: 142 MG/DL (ref 70–130)
GLUCOSE BLDC GLUCOMTR-MCNC: 143 MG/DL (ref 70–130)
GLUCOSE BLDC GLUCOMTR-MCNC: 151 MG/DL (ref 70–130)
GLUCOSE BLDC GLUCOMTR-MCNC: 167 MG/DL (ref 70–130)
GLUCOSE BLDC GLUCOMTR-MCNC: 66 MG/DL (ref 70–130)
GLUCOSE BLDC GLUCOMTR-MCNC: 92 MG/DL (ref 70–130)
GLUCOSE BLDC GLUCOMTR-MCNC: 98 MG/DL (ref 70–130)
HCT VFR BLD AUTO: 32 % (ref 38.9–50.9)
HGB BLD-MCNC: 9.9 G/DL (ref 13.1–17.5)
IMM GRANULOCYTES # BLD: 0.06 10*3/MM3 (ref 0–0.03)
IMM GRANULOCYTES NFR BLD: 0.5 % (ref 0–0.6)
INR PPP: 1.01
LAB AP CASE REPORT: NORMAL
LAB AP CLINICAL INFORMATION: NORMAL
LYMPHOCYTES # BLD AUTO: 1.05 10*3/MM3 (ref 0.6–4.8)
LYMPHOCYTES NFR BLD AUTO: 9.1 % (ref 24–44)
Lab: NORMAL
MAGNESIUM SERPL-MCNC: 2.5 MG/DL (ref 1.3–2.7)
MCH RBC QN AUTO: 27.5 PG (ref 27–31)
MCHC RBC AUTO-ENTMCNC: 30.9 G/DL (ref 32–36)
MCV RBC AUTO: 88.9 FL (ref 80–99)
MONOCYTES # BLD AUTO: 1.23 10*3/MM3 (ref 0–1)
MONOCYTES NFR BLD AUTO: 10.7 % (ref 0–12)
NEUTROPHILS # BLD AUTO: 9.01 10*3/MM3 (ref 1.5–8.3)
NEUTROPHILS NFR BLD AUTO: 78.6 % (ref 41–71)
PATH REPORT.FINAL DX SPEC: NORMAL
PATH REPORT.GROSS SPEC: NORMAL
PHOSPHATE SERPL-MCNC: 4.2 MG/DL (ref 2.4–5.1)
PLATELET # BLD AUTO: 229 10*3/MM3 (ref 150–450)
PMV BLD AUTO: 10.5 FL (ref 6–12)
POTASSIUM BLD-SCNC: 4.9 MMOL/L (ref 3.5–5.5)
PROTHROMBIN TIME: 11 SECONDS (ref 9.6–11.5)
RBC # BLD AUTO: 3.6 10*6/MM3 (ref 4.2–5.76)
SODIUM BLD-SCNC: 135 MMOL/L (ref 132–146)
UNIT  ABO: NORMAL
UNIT  RH: NORMAL
WBC NRBC COR # BLD: 11.48 10*3/MM3 (ref 3.5–10.8)

## 2018-01-18 PROCEDURE — 71045 X-RAY EXAM CHEST 1 VIEW: CPT

## 2018-01-18 PROCEDURE — 93010 ELECTROCARDIOGRAM REPORT: CPT | Performed by: INTERNAL MEDICINE

## 2018-01-18 PROCEDURE — 97162 PT EVAL MOD COMPLEX 30 MIN: CPT

## 2018-01-18 PROCEDURE — 85025 COMPLETE CBC W/AUTO DIFF WBC: CPT | Performed by: PHYSICIAN ASSISTANT

## 2018-01-18 PROCEDURE — 25010000002 ONDANSETRON PER 1 MG: Performed by: PHYSICIAN ASSISTANT

## 2018-01-18 PROCEDURE — 25010000002 MORPHINE SULFATE (PF) 2 MG/ML SOLUTION: Performed by: THORACIC SURGERY (CARDIOTHORACIC VASCULAR SURGERY)

## 2018-01-18 PROCEDURE — 94799 UNLISTED PULMONARY SVC/PX: CPT

## 2018-01-18 PROCEDURE — 80069 RENAL FUNCTION PANEL: CPT | Performed by: PHYSICIAN ASSISTANT

## 2018-01-18 PROCEDURE — 94640 AIRWAY INHALATION TREATMENT: CPT

## 2018-01-18 PROCEDURE — 25010000002 FENTANYL CITRATE (PF) 100 MCG/2ML SOLUTION: Performed by: THORACIC SURGERY (CARDIOTHORACIC VASCULAR SURGERY)

## 2018-01-18 PROCEDURE — 63710000001 INSULIN DETEMIR PER 5 UNITS: Performed by: INTERNAL MEDICINE

## 2018-01-18 PROCEDURE — 93005 ELECTROCARDIOGRAM TRACING: CPT | Performed by: PHYSICIAN ASSISTANT

## 2018-01-18 PROCEDURE — 99232 SBSQ HOSP IP/OBS MODERATE 35: CPT | Performed by: NURSE PRACTITIONER

## 2018-01-18 PROCEDURE — 99233 SBSQ HOSP IP/OBS HIGH 50: CPT | Performed by: INTERNAL MEDICINE

## 2018-01-18 PROCEDURE — 83735 ASSAY OF MAGNESIUM: CPT | Performed by: PHYSICIAN ASSISTANT

## 2018-01-18 PROCEDURE — 97110 THERAPEUTIC EXERCISES: CPT

## 2018-01-18 PROCEDURE — 25010000003 CEFAZOLIN IN DEXTROSE 2-4 GM/100ML-% SOLUTION: Performed by: PHYSICIAN ASSISTANT

## 2018-01-18 PROCEDURE — 82962 GLUCOSE BLOOD TEST: CPT

## 2018-01-18 PROCEDURE — 99024 POSTOP FOLLOW-UP VISIT: CPT | Performed by: PHYSICIAN ASSISTANT

## 2018-01-18 PROCEDURE — 85610 PROTHROMBIN TIME: CPT | Performed by: PHYSICIAN ASSISTANT

## 2018-01-18 PROCEDURE — 63710000001 INSULIN LISPRO (HUMAN) PER 5 UNITS: Performed by: INTERNAL MEDICINE

## 2018-01-18 RX ORDER — MORPHINE SULFATE 2 MG/ML
2 INJECTION, SOLUTION INTRAMUSCULAR; INTRAVENOUS
Status: DISCONTINUED | OUTPATIENT
Start: 2018-01-18 | End: 2018-01-22 | Stop reason: HOSPADM

## 2018-01-18 RX ADMIN — MONTELUKAST SODIUM 10 MG: 10 TABLET, FILM COATED ORAL at 08:35

## 2018-01-18 RX ADMIN — DOCUSATE SODIUM AND SENNOSIDES 2 TABLET: 8.6; 5 TABLET, FILM COATED ORAL at 08:34

## 2018-01-18 RX ADMIN — FAMOTIDINE 20 MG: 20 TABLET, FILM COATED ORAL at 08:34

## 2018-01-18 RX ADMIN — HYDROCODONE BITARTRATE AND ACETAMINOPHEN 1 TABLET: 7.5; 325 TABLET ORAL at 19:52

## 2018-01-18 RX ADMIN — FENTANYL CITRATE 25 MCG: 50 INJECTION, SOLUTION INTRAMUSCULAR; INTRAVENOUS at 02:20

## 2018-01-18 RX ADMIN — DOCUSATE SODIUM AND SENNOSIDES 2 TABLET: 8.6; 5 TABLET, FILM COATED ORAL at 21:29

## 2018-01-18 RX ADMIN — ONDANSETRON 4 MG: 2 INJECTION INTRAMUSCULAR; INTRAVENOUS at 08:34

## 2018-01-18 RX ADMIN — HYDROCODONE BITARTRATE AND ACETAMINOPHEN 1 TABLET: 7.5; 325 TABLET ORAL at 12:47

## 2018-01-18 RX ADMIN — MORPHINE SULFATE 2 MG: 25 INJECTION, SOLUTION, CONCENTRATE INTRAVENOUS at 04:30

## 2018-01-18 RX ADMIN — MORPHINE SULFATE 2 MG: 25 INJECTION, SOLUTION, CONCENTRATE INTRAVENOUS at 05:51

## 2018-01-18 RX ADMIN — OXYCODONE AND ACETAMINOPHEN 2 TABLET: 5; 325 TABLET ORAL at 05:50

## 2018-01-18 RX ADMIN — CEFAZOLIN SODIUM 2 G: 2 INJECTION, SOLUTION INTRAVENOUS at 04:10

## 2018-01-18 RX ADMIN — ALBUTEROL SULFATE 2.5 MG: 2.5 SOLUTION RESPIRATORY (INHALATION) at 20:54

## 2018-01-18 RX ADMIN — CEFAZOLIN SODIUM 2 G: 2 INJECTION, SOLUTION INTRAVENOUS at 11:30

## 2018-01-18 RX ADMIN — CEFAZOLIN SODIUM 2 G: 2 INJECTION, SOLUTION INTRAVENOUS at 19:49

## 2018-01-18 RX ADMIN — OXYCODONE AND ACETAMINOPHEN 2 TABLET: 5; 325 TABLET ORAL at 01:15

## 2018-01-18 RX ADMIN — CHLORHEXIDINE GLUCONATE 15 ML: 1.2 RINSE ORAL at 08:34

## 2018-01-18 RX ADMIN — METOPROLOL TARTRATE 2.5 MG: 1 INJECTION, SOLUTION INTRAVENOUS at 00:20

## 2018-01-18 RX ADMIN — ALBUTEROL SULFATE 2.5 MG: 2.5 SOLUTION RESPIRATORY (INHALATION) at 07:44

## 2018-01-18 RX ADMIN — OXYCODONE AND ACETAMINOPHEN 2 TABLET: 5; 325 TABLET ORAL at 14:07

## 2018-01-18 RX ADMIN — ALBUTEROL SULFATE 2.5 MG: 2.5 SOLUTION RESPIRATORY (INHALATION) at 15:03

## 2018-01-18 RX ADMIN — ONDANSETRON 4 MG: 2 INJECTION INTRAMUSCULAR; INTRAVENOUS at 02:18

## 2018-01-18 RX ADMIN — NITROGLYCERIN 50 MCG/MIN: 20 INJECTION INTRAVENOUS at 03:10

## 2018-01-18 RX ADMIN — INSULIN DETEMIR 12 UNITS: 100 INJECTION, SOLUTION SUBCUTANEOUS at 11:30

## 2018-01-18 RX ADMIN — FAMOTIDINE 20 MG: 20 TABLET, FILM COATED ORAL at 21:29

## 2018-01-18 RX ADMIN — HYDROCODONE BITARTRATE AND ACETAMINOPHEN 1 TABLET: 7.5; 325 TABLET ORAL at 08:34

## 2018-01-18 RX ADMIN — METOPROLOL TARTRATE 2.5 MG: 1 INJECTION, SOLUTION INTRAVENOUS at 05:51

## 2018-01-18 RX ADMIN — METOPROLOL TARTRATE 12.5 MG: 25 TABLET, FILM COATED ORAL at 11:33

## 2018-01-18 RX ADMIN — HYDROCODONE BITARTRATE AND ACETAMINOPHEN 1 TABLET: 7.5; 325 TABLET ORAL at 04:30

## 2018-01-18 RX ADMIN — INSULIN LISPRO 2 UNITS: 100 INJECTION, SOLUTION INTRAVENOUS; SUBCUTANEOUS at 21:30

## 2018-01-18 NOTE — PROGRESS NOTES
Multidisciplinary Rounds    Time: 20min  Patient Name: Filemon Jj  Date of Encounter: 01/18/18 10:12 AM  MRN: [id number]  Admission date: 1/16/2018      Reason for visit: NAMAN. RD to continue to follow per protocol.     Pt resting in bed, a bit lethargic, reports he wants to advance diet    Per RN: pt tolerating clears, no teeth    Current diet: Diet Soft Texture; Whole Foods; Cardiac, Consistent Carbohydrate      Intervention:  Follow treatment plan  Care plan reviewed  Diet advanced,menu adjusted    Follow up:   Per protocol      Lubna Tate RD  10:12 AM

## 2018-01-18 NOTE — PLAN OF CARE
Problem: Patient Care Overview (Adult)  Goal: Plan of Care Review  Outcome: Ongoing (interventions implemented as appropriate)   01/18/18 1119   Coping/Psychosocial Response Interventions   Plan Of Care Reviewed With patient   Outcome Evaluation   Outcome Summary/Follow up Plan PT initial evaluation completed for pt s/p aortic valve conduit and bentall presenting with generalized weakness, increased pain, and additional evolving symptoms. Pt required modA to ambulate 25ft with RW. Pt's decreased functional independence warrants PT skilled care. Recommend D/C home with assistance and home health.        Problem: Inpatient Physical Therapy  Goal: Bed Mobility Goal LTG- PT  Outcome: Ongoing (interventions implemented as appropriate)   01/18/18 1119   Bed Mobility PT LTG   Bed Mobility PT LTG, Date Established 01/18/18   Bed Mobility PT LTG, Time to Achieve 2 wks   Bed Mobility PT LTG, Activity Type all bed mobility   Bed Mobility PT LTG, [place] Level independent   Bed Mobility PT LTG, Outcome goal ongoing     Goal: Transfer Training Goal 1 LTG- PT  Outcome: Ongoing (interventions implemented as appropriate)   01/18/18 1119   Transfer Training PT LTG   Transfer Training PT LTG, Date Established 01/18/18   Transfer Training PT LTG, Time to Achieve 2 wks   Transfer Training PT LTG, Activity Type sit to stand/stand to sit   Transfer Training PT LTG, [place] Level conditional independence   Transfer Training PT LTG, Assist Device walker, rolling   Transfer Training PT LTG, Outcome goal ongoing     Goal: Gait Training Goal LTG- PT  Outcome: Ongoing (interventions implemented as appropriate)   01/18/18 1119   Gait Training PT LTG   Gait Training Goal PT LTG, Date Established 01/18/18   Gait Training Goal PT LTG, Time to Achieve 2 wks   Gait Training Goal PT LTG, [place] Level conditional independence   Gait Training Goal PT LTG, Assist Device walker, rolling   Gait Training Goal PT LTG, Distance to Achieve 400  feet   Gait Training Goal PT LTG, Outcome goal ongoing     Goal: Stair Training Goal LTG- PT  Outcome: Ongoing (interventions implemented as appropriate)   01/18/18 1119   Stair Training PT LTG   Stair Training Goal PT LTG, Date Established 01/18/18   Stair Training Goal PT LTG, Time to Achieve 2 wks   Stair Training Goal PT LTG, Number of Steps 6   Stair Training Goal PT LTG, [place] Level conditional independence   Stair Training Goal PT LTG, Assist Device 2 handrails   Stair Training Goal PT LTG, Outcome goal ongoing

## 2018-01-18 NOTE — PROGRESS NOTES
[place] [facility] at [facility]  Cardiology Progress Note      Chief Complaint/Reason for service:    · Hypertension  · Cardiac Risk Factors         Patient is sitting up in bed eating lunch.  Reports chest soreness and sternotomy site but otherwise doing well. NTG drip infusing.  Past medical, surgical, social and family history reviewed in the patient's electronic medical record.         Vital Sign Min/Max for last 24 hours  Temp  Min: 97.2 °F (36.2 °C)  Max: 99.7 °F (37.6 °C)   BP  Min: 84/63  Max: 116/70   Pulse  Min: 79  Max: 102   Resp  Min: 10  Max: 28   SpO2  Min: 91 %  Max: 100 %   Flow (L/min)  Min: 2  Max: 3      Intake/Output Summary (Last 24 hours) at 01/18/18 1130  Last data filed at 01/18/18 1000   Gross per 24 hour   Intake           4337.2 ml   Output             3370 ml   Net            967.2 ml           Physical Exam   Constitutional: He is oriented to person, place, and time. He appears well-developed and well-nourished.   HENT:   Head: Normocephalic.   Neck: No JVD present. Carotid bruit is not present.   Cardiovascular: Normal rate, regular rhythm and normal heart sounds.  Exam reveals no gallop and no friction rub.    No murmur heard.  Click   Pulmonary/Chest: Effort normal and breath sounds normal.   Abdominal: Soft. Bowel sounds are normal. He exhibits no distension.   Neurological: He is alert and oriented to person, place, and time.   Skin: Skin is warm and dry.   Psychiatric: He has a normal mood and affect.       Results Review:   I reviewed the patient's recent labs in the electronic medical record.      Lab Results   Component Value Date    GLUCOSE 148 (H) 01/18/2018    CALCIUM 8.1 (L) 01/18/2018     01/18/2018    K 4.9 01/18/2018    CO2 24.0 01/18/2018     01/18/2018    BUN 16 01/18/2018    CREATININE 1.10 01/18/2018    EGFRIFAFRI  09/22/2016      Comment:      <15 Indicative of kidney failure.    EGFRIFNONA 74 01/18/2018    BCR 14.5 01/18/2018     ANIONGAP 5.0 01/18/2018       Lab Results   Component Value Date    WBC 11.48 (H) 01/18/2018    HGB 9.9 (L) 01/18/2018    HCT 32.0 (L) 01/18/2018    MCV 88.9 01/18/2018     01/18/2018       Lab Results   Component Value Date    HGBA1C 6.20 (H) 01/16/2018       Lab Results   Component Value Date    CHOL 206 (H) 01/16/2018    TRIG 202 (H) 01/16/2018    HDL 33 (L) 01/16/2018    LDLCALC 139 (H) 10/18/2017    LDLDIRECT 163 (H) 01/16/2018       EKG:  NSR 1/18/2018    Tele:  [facility]         Hospital Problem List     * (Principal)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

## 2018-01-18 NOTE — THERAPY EVALUATION
"Acute Care - Physical Therapy Initial Evaluation  [facility]     Patient Name: Filemon Jj  : 1975  MRN: [id number]  Today's Date: 2018   Onset of Illness/Injury or Date of Surgery Date: 18  Date of Referral to PT: 18  Referring Physician: ELICIA Walton      Admit Date: 2018     Visit Dx:    ICD-10-CM ICD-9-CM   1. Impaired functional mobility, balance, gait, and endurance Z74.09 V49.89   2. 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   • Smoker   • COPD (chronic obstructive pulmonary disease)   • Essential hypertension     Past Medical History:   Diagnosis Date   • Anxiety    • Asthma    • Back pain    • COPD (chronic obstructive pulmonary disease)    • Emphysema lung    • Gastritis    • GERD (gastroesophageal reflux disease)    • Headache    • Hypertension    • Migraine    • Substance abuse      Past Surgical History:   Procedure Laterality Date   • CARDIAC CATHETERIZATION N/A 10/23/2017    Procedure: Left Heart Cath;  Surgeon: Rodolfo Núñez MD;  Location: [facility] CATH INVASIVE LOCATION;  Service:    • DENTAL PROCEDURE     • LIVER SURGERY      tumor removed from liver   • OTHER SURGICAL HISTORY      \"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\"   • THORACIC AORTIC ANEURYSM ASCENDING/ARCH REPAIR N/A 2018    Procedure: MEDIAN STERNOTOMY, AORTIC VALVE CONDUIT, BENTAL, TRANSESOPHAGEAL ECHOCARDIOGRAM WITH ANESTHESIA;  Surgeon: Aaron Lucas MD;  Location: [facility];  Service:           PT ASSESSMENT (last 72 hours)      PT Evaluation       18 0837 18 0400    Rehab Evaluation    Document Type evaluation  -KR     Subjective Information agree to therapy;complains of;pain  -KR     Patient Effort, Rehab Treatment good  -KR     Symptoms Noted During/After Treatment increased pain  -KR     General Information    " Patient Profile Review yes  -KR     Onset of Illness/Injury or Date of Surgery Date 01/16/18  -KR     Referring Physician ELICIA Walton  -KR     Pertinent History Of Current Problem Pt with COPD and AAA complaining of mild epigastric pain occurring more frequently. Pt also reports persistent dyspnea upon exertion. Aortic valve conduit, bentall and AMBER on 1/17/18.  -KR     Precautions/Limitations cardiac precautions;fall precautions;oxygen therapy device and L/min;sternal precautions  -KR     Prior Level of Function independent:;all household mobility;gait;transfer;ADL's;dressing;bathing  -KR     Equipment Currently Used at Home grab bar  -KR nebulizer  -TA    Plans/Goals Discussed With patient;agreed upon  -KR     Risks Reviewed patient:;LOB;increased discomfort;change in vital signs  -KR     Benefits Reviewed patient:;improve function;increase independence;increase balance;increase strength  -KR     Barriers to Rehab none identified  -KR     Living Environment    Lives With alone  -KR     Living Arrangements apartment  -KR     Home Accessibility bed and bath on same level;stairs to enter home;tub/shower is not walk in  -KR     Number of Stairs to Enter Home 6  -KR     Stair Railings at Home outside, present at both sides  -KR     Type of Financial/Environmental Concern none  -KR     Transportation Available family or friend will provide  -KR     Clinical Impression    Date of Referral to PT 01/17/18  -KR     PT Diagnosis impaired functional mobility  -KR     Patient/Family Goals Statement return to PLOF  -KR     Criteria for Skilled Therapeutic Interventions Met yes;treatment indicated  -KR     Rehab Potential good, to achieve stated therapy goals  -KR     Vital Signs    Pre Systolic BP Rehab 99  -KR     Pre Treatment Diastolic BP 64  -KR     Post Systolic BP Rehab 105  -KR     Post Treatment Diastolic BP 55  -KR     Pretreatment Heart Rate (beats/min) 88  -KR     Posttreatment Heart Rate (beats/min) 95  -KR      Pre SpO2 (%) 93  -KR     O2 Delivery Pre Treatment supplemental O2  -KR     Post SpO2 (%) 88  -KR     O2 Delivery Post Treatment supplemental O2  -KR     Pre Patient Position Sitting  -KR     Intra Patient Position Standing  -KR     Post Patient Position Supine  -KR     Pain Assessment    Pain Assessment 0-10  -KR     Pain Score 8  -KR     Post Pain Score 9  -KR     Pain Type Surgical pain  -KR     Pain Location Mediastinum  -KR     Pain Intervention(s) Repositioned;Ambulation/increased activity  -KR     Response to Interventions tolerated  -KR     Cognitive Assessment/Intervention    Current Cognitive/Communication Assessment impaired  -KR     Orientation Status oriented to;person;place;time   intermittent confusion; confusion during conversation  -KR     Follows Commands/Answers Questions able to follow single-step instructions;50% of the time;needs cueing;needs increased time;needs repetition  -KR     Personal Safety moderate impairment;decreased awareness, need for assist;decreased awareness, need for safety;decreased insight to deficits  -KR     Personal Safety Interventions fall prevention program maintained;gait belt;nonskid shoes/slippers when out of bed  -KR     ROM (Range of Motion)    General ROM no range of motion deficits identified  -KR     General ROM Detail BLE WFL  -KR     MMT (Manual Muscle Testing)    General MMT Assessment lower extremity strength deficits identified  -KR     General MMT Assessment Detail BLE grossly 3+/5 during functional activity  -KR     Bed Mobility, Assessment/Treatment    Bed Mob, Supine to Sit, [place] not tested  -KR     Bed Mob, Sit to Supine, [place] maximum assist (25% patient effort);2 person assist required;verbal cues required  -KR     Bed Mobility, Safety Issues decreased use of arms for pushing/pulling  -KR     Bed Mobility, Impairments strength decreased;impaired balance;pain  -KR     Bed Mobility, Comment VC's for sequencing to maintain sternal  precautions. Pt required assistance at trunk and BLE.  -KR     Transfer Assessment/Treatment    Transfers, Sit-Stand [place] minimum assist (75% patient effort);2 person assist required;verbal cues required  -KR     Transfers, Stand-Sit [place] minimum assist (75% patient effort);2 person assist required;verbal cues required  -KR     Transfer, Safety Issues step length decreased;weight-shifting ability decreased  -KR     Transfer, Impairments strength decreased;impaired balance;pain  -KR     Transfer, Comment VC's for proper sequencing and hand placement. Cueing to maintain sternal precautions.   -KR     Gait Assessment/Treatment    Gait, [place] Level moderate assist (50% patient effort);1 person + 1 person to manage equipment;verbal cues required  -KR     Gait, Assistive Device rolling walker  -KR     Gait, Distance (Feet) 25  -KR     Gait, Gait Pattern Analysis swing-to gait  -KR     Gait, Gait Deviations [place] decreased;step length decreased;weight-shifting ability decreased  -KR     Gait, Safety Issues step length decreased;weight-shifting ability decreased;supplemental O2;sequencing ability decreased;balance decreased during turns  -KR     Gait, Impairments strength decreased;impaired balance;pain;coordination impaired  -KR     Gait, Comment Pt demonstrated small steps with very slow gait speed. Pt required max cueing to continue taking steps. Pt benefitted from use of RW for increased stability, but had increased difficulty keeping RW close and keeping LEs inside RW. Pt c/o increased pain and frequently stopped ambulating until encouraged to continue.   -KR     Motor Skills/Interventions    Additional Documentation Balance Skills Training (Group)  -KR     Balance Skills Training    Sitting-Level of Assistance Contact guard  -KR     Sitting-Balance Support Feet supported  -KR     Standing-Level of Assistance Minimum assistance  -KR     Static Standing Balance Support assistive device  -KR      Gait Balance-Level of Assistance Moderate assistance  -KR     Gait Balance Support assistive device  -KR     Sensory Assessment/Intervention    Light Touch LLE;RLE  -KR     LLE Light Touch WNL  -KR     RLE Light Touch WNL  -KR     Positioning and Restraints    Pre-Treatment Position sitting in chair/recliner  -KR     Post Treatment Position bed  -KR     In Bed supine;call light within reach;encouraged to call for assist;with nsg;side rails up x3  -KR       01/16/18 1909       Living Environment    Lives With alone  -KH     Living Arrangements apartment  -KH     Home Accessibility bed and bath on same level  -KH     Stair Railings at Home none  -     Type of Financial/Environmental Concern none  -KH     Transportation Available family or friend will provide  -KH     Living Environment Comment no concerns   -KH       User Key  (r) = Recorded By, (t) = Taken By, (c) = Cosigned By    Initials Name Provider Type    NOEMY Noriega, RN Registered Nurse    FLORENCIO Montes, PT Physical Therapist    ROGER Locke RN Registered Nurse          Physical Therapy Education     Title: PT OT SLP Therapies (Active)     Topic: Physical Therapy (Active)     Point: Mobility training (Active)    Learning Progress Summary    Learner Readiness Method Response Comment Documented by Status   Patient Acceptance E NR  KR 01/18/18 1118 Active               Point: Body mechanics (Active)    Learning Progress Summary    Learner Readiness Method Response Comment Documented by Status   Patient Acceptance E NR  KR 01/18/18 1118 Active               Point: Precautions (Active)    Learning Progress Summary    Learner Readiness Method Response Comment Documented by Status   Patient Acceptance E NR  KR 01/18/18 1118 Active                      User Key     Initials Effective Dates Name Provider Type Discipline    KR 09/25/17 -  Rhina Montes, PT Physical Therapist PT                PT Recommendation and Plan  Anticipated Discharge  Disposition: home with assist, home with home health  PT Frequency: daily  Plan of Care Review  Plan Of Care Reviewed With: patient  Outcome Summary/Follow up Plan: PT initial evaluation completed for pt s/p aortic valve conduit and bentall presenting with generalized weakness, increased pain, and additional evolving symptoms. Pt required modA to ambulate 25ft with RW. Pt's decreased functional independence warrants PT skilled care. Recommend D/C home with assistance and home health.           IP PT Goals       01/18/18 1119          Bed Mobility PT LTG    Bed Mobility PT LTG, Date Established 01/18/18  -KR      Bed Mobility PT LTG, Time to Achieve 2 wks  -KR      Bed Mobility PT LTG, Activity Type all bed mobility  -KR      Bed Mobility PT LTG, [place] Level independent  -KR      Bed Mobility PT LTG, Outcome goal ongoing  -KR      Transfer Training PT LTG    Transfer Training PT LTG, Date Established 01/18/18  -KR      Transfer Training PT LTG, Time to Achieve 2 wks  -KR      Transfer Training PT LTG, Activity Type sit to stand/stand to sit  -KR      Transfer Training PT LTG, [place] Level conditional independence  -KR      Transfer Training PT LTG, Assist Device walker, rolling  -KR      Transfer Training PT LTG, Outcome goal ongoing  -KR      Gait Training PT LTG    Gait Training Goal PT LTG, Date Established 01/18/18  -KR      Gait Training Goal PT LTG, Time to Achieve 2 wks  -KR      Gait Training Goal PT LTG, [place] Level conditional independence  -KR      Gait Training Goal PT LTG, Assist Device walker, rolling  -KR      Gait Training Goal PT LTG, Distance to Achieve 400 feet  -KR      Gait Training Goal PT LTG, Outcome goal ongoing  -KR      Stair Training PT LTG    Stair Training Goal PT LTG, Date Established 01/18/18  -KR      Stair Training Goal PT LTG, Time to Achieve 2 wks  -KR      Stair Training Goal PT LTG, Number of Steps 6  -KR      Stair Training Goal PT LTG, [place] Level  conditional independence  -KR      Stair Training Goal PT LTG, Assist Device 2 handrails  -KR      Stair Training Goal PT LTG, Outcome goal ongoing  -KR        User Key  (r) = Recorded By, (t) = Taken By, (c) = Cosigned By    Initials Name Provider Type    FLORENCIO Montes PT Physical Therapist                Outcome Measures       01/18/18 0837          How much help from another person do you currently need...    Turning from your back to your side while in flat bed without using bedrails? 2  -KR      Moving from lying on back to sitting on the side of a flat bed without bedrails? 2  -KR      Moving to and from a bed to a chair (including a wheelchair)? 2  -KR      Standing up from a chair using your arms (e.g., wheelchair, bedside chair)? 3  -KR      Climbing 3-5 steps with a railing? 1  -KR      To walk in hospital room? 2  -KR      AM-PAC 6 Clicks Score 12  -KR      Functional Assessment    Outcome Measure Options AM-PAC 6 Clicks Basic Mobility (PT)  -KR        User Key  (r) = Recorded By, (t) = Taken By, (c) = Cosigned By    Initials Name Provider Type    FLORENCIO Montes PT Physical Therapist           Time Calculation:         PT Charges       01/18/18 1126          Time Calculation    Start Time 0837  -KR      PT Received On 01/18/18  -KR      PT Goal Re-Cert Due Date 01/28/18  -KR      Time Calculation- PT    Total Timed Code Minutes- PT 9 minute(s)  -KR        User Key  (r) = Recorded By, (t) = Taken By, (c) = Cosigned By    Initials Name Provider Type    FLORENCIO Montes PT Physical Therapist          Therapy Charges for Today     Code Description Service Date Service Provider Modifiers Qty    77013707699 HC PT EVAL MOD COMPLEXITY 4 1/18/2018 Rhina Montes, PT GP 1    96269276232 HC PT THER PROC EA 15 MIN 1/18/2018 Rhina Montes, PT GP 1    57929233765 HC PT THER SUPP EA 15 MIN 1/18/2018 Rhina Montes, PT GP 3          PT G-Codes  Outcome Measure Options: AM-PAC 6 Clicks Basic Mobility  (PT)      Nan Montes, PT  1/18/2018

## 2018-01-18 NOTE — PROGRESS NOTES
"INTENSIVIST   PROGRESS NOTE     Hospital:  LOS: 2 days   Subjective   Mr. Filemon Jj, 42 y.o. 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     S     Interval History:  POD: 1 Day Post-Op    He feels \"he is coming back to himself\"  Sore, surgical sites as expected.  Extubated yesterday pm without complications.       The patient's relevant past medical, surgical and social history were reviewed and updated in Epic as appropriate.      ROS:   ROS cannot be reliably obtained from the patient due to his Acuity of condition, Altered Mental Status and Intubated.    Objective   O     Vitals:  Temp: 98.1 °F (36.7 °C) Temp  Min: 97.5 °F (36.4 °C)  Max: 99.7 °F (37.6 °C)   BP: 101/71 BP  Min: 84/59  Max: 116/70   Pulse: 78 Pulse  Min: 78  Max: 102   Resp: 16 Resp  Min: 10  Max: 28   SpO2: 96 % SpO2  Min: 91 %  Max: 100 %   Device: nasal cannula with humidification    Flow Rate: 3 Flow (L/min)  Min: 2  Max: 3     Intake/Ouptut 24 hrs (7:00AM - 6:59 AM)  Intake & Output (last 3 days)       01/15 0701 - 01/16 0700 01/16 0701 - 01/17 0700 01/17 0701 - 01/18 0700 01/18 0701 - 01/19 0700    P.O.  240  640    I.V. (mL/kg)   2526.2 (29.8) 135 (1.6)    Blood   986     IV Piggyback   1000     Total Intake(mL/kg)  240 (2.8) 4512.2 (53.2) 775 (9.1)    Urine (mL/kg/hr)  900 2890 (1.4) 60 (0.1)    Other   1180 (0.6) 90 (0.1)    Total Output   900 4070 150    Net   -660 +442.2 +625            Unmeasured Urine Occurrence  1 x            Medications (drips):    DOPamine    niCARdipine    nitroglycerin Last Rate: 25 mcg/min (01/18/18 1132)   norepinephrine    phenylephrine  "       Physical Examination  Telemetry:  Sinus Rhythm: normal sinus rhythm     Constitutional:  No acute distress.   Cardiovascular: Normal rate, regular and rhythm. Normal heart sounds.  No murmurs, gallop or rub.   Respiratory: No respiratory distress. Normal respiratory effort.  Normal breath sounds  Clear to auscultation and percussion.    Abdominal:  Soft. No masses. Non-tender. No distension. No HSM.   Extremities: No digital cyanosis. No clubbing.  No peripheral edema.   Neurological:   Alert. Up in a chair. No deficits.               Lines/Drains/Airways: RIJ Cordis  R Radial Arterial Line  Velásquez   CT X 4       Hematology:    Results from last 7 days  Lab Units 01/18/18  0340 01/17/18  2139 01/17/18  1750 01/17/18  1318   WBC 10*3/mm3 11.48*  --  9.72 9.82   HEMOGLOBIN g/dL 9.9* 10.2* 10.0* 10.4*   MCV fL 88.9  --  87.9 86.5   PLATELETS 10*3/mm3 229  --  213 190     Electrolytes, Magnesium and Phosphorus:    Results from last 7 days  Lab Units 01/18/18  0340 01/17/18 2138 01/17/18  1750 01/17/18  1408   SODIUM mmol/L 135  --  143 144   POTASSIUM mmol/L 4.9 4.6 4.6 3.7   CO2 mmol/L 24.0  --  25.0 24.0   MAGNESIUM mg/dL 2.5  --  2.7 3.1*   PHOSPHORUS mg/dL 4.2  --  3.6 1.9*     Renal:    Results from last 7 days  Lab Units 01/18/18  0340 01/17/18  1750 01/17/18  1408 01/16/18  1930   CREATININE mg/dL 1.10 1.10 1.00 0.90   BUN mg/dL 16 13 14 11     Estimated Creatinine Clearance: 91.9 mL/min (by C-G formula based on Cr of 1.1).      Images:  CXR 1/18/2018 Patchy bibasilar airspace disease.     Echo:  Results for orders placed in visit on 01/17/18   Intra-Operative Transesophageal Echocardiogram    Narrative Eric Horner MD     1/17/2018 11:16 AM    Ascending Aorta:  Size aneurysmal.  Diameter 6 cm.  Dissection not present.  Plaque   thickness less than 3 mm.  Mobile plaque not present.    Aortic Arch:  Size normal.  Diameter 2.77 cm.  Dissection not present.  Plaque thickness   less than 3 mm.  Mobile  plaque not present.    Descending Aorta:  Size normal.  Diameter 2.07 cm.  Dissection not present.  Plaque thickness   less than 3 mm.  Mobile plaque not present.      Anesthesiologist:  BARBARA VILLATORO    Echocardiogram Comments:       Post Bentall:  Bileaflet mechanical valve conduit, both leaflets open   and coapt, washing jets seen, AVG 12 mean 7; EF 45 % with inf HK-post   performed by Reji       Results: Reviewed.  I reviewed the patient's new laboratory and imaging results.  I independently reviewed the patient's new images.    Medications: Reviewed.    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 Aaron Lucas (Cardiothoracic Surgery)  1/17/18  2. COPD  1. Ji Post Rx: FEV1= 1.23L  (32% of predicted) Ratio 0.47 c/w Severe OAD, and a restrictive component can not be ruled out.  2. Tobacco use  3. HTN  4. S/P Multiple tooth extractions (NOV 2017) in preparation for this surgery.    Nutrition:  Diet Soft Texture; Whole Foods; Cardiac, Consistent Carbohydrate   Advance Directives: Full Code     Assessment / Plan:    1. Transition IV to SQ insulin  2. Discontinue SG, arterial line, Velásquez  3. Continue ICU care    Plan of care and goals reviewed during interdisciplinary rounds.  I discussed the patient's findings and my recommendations with nursing staff      Tres Doan MD, FACP, FCCP, [facility]  Intensive Care Medicine, Nutrition Support and Pulmonary Medicine

## 2018-01-18 NOTE — PLAN OF CARE
Problem: Patient Care Overview (Adult)  Goal: Plan of Care Review  Outcome: Ongoing (interventions implemented as appropriate)   01/18/18 0339   Coping/Psychosocial Response Interventions   Plan Of Care Reviewed With patient   Patient Care Overview   Progress progress toward functional goals is gradual   Outcome Evaluation   Outcome Summary/Follow up Plan Pt passed his dysphagia eval, NG removed, on nitro gtt for BP management, and insulin gtt titrated per protocol. Pt has severe anxiety issues and is reluctant to take pain medication. Pt was caught using his loose tobacco after being instructed no to dip. Pt refused a nicotine patch. Pulmonary toileting promoted, on 3L NC, and scheduled nebs continued.     Goal: Discharge Needs Assessment  Outcome: Ongoing (interventions implemented as appropriate)   01/18/18 0339   Discharge Needs Assessment   Discharge Disposition still a patient       Problem: Aortic Aneurysm/Dissection (Adult)  Goal: Signs and Symptoms of Listed Potential Problems Will be Absent or Manageable (Aortic Aneurysm/Dissection)  Outcome: Ongoing (interventions implemented as appropriate)   01/18/18 0339   Aortic Aneurysm/Dissection   Problems Assessed (Aortic Aneurysm/Dissection) all   Problems Present (Aortic Aneurysm/Dissection) pain;hemodynamic instability;situational response       Problem: Perioperative Period (Adult)  Goal: Signs and Symptoms of Listed Potential Problems Will be Absent or Manageable (Perioperative Period)  Outcome: Ongoing (interventions implemented as appropriate)   01/18/18 0339   Perioperative Period   Problems Assessed (Perioperative Period) all   Problems Present (Perioperative Period) pain;hypoxia/hypoxemia;situational response      01/18/18 0339   Perioperative Period   Problems Assessed (Perioperative Period) all   Problems Present (Perioperative Period) pain;hypoxia/hypoxemia;situational response       Problem: Mechanical Ventilation, Invasive (Adult)  Goal: Signs and  Symptoms of Listed Potential Problems Will be Absent or Manageable (Mechanical Ventilation, Invasive)  Outcome: Ongoing (interventions implemented as appropriate)      Problem: Pressure Ulcer Risk (Eusebio Scale) (Adult,Obstetrics,Pediatric)  Goal: Identify Related Risk Factors and Signs and Symptoms  Outcome: Ongoing (interventions implemented as appropriate)   01/18/18 0339   Pressure Ulcer Risk (Eusebio Scale)   Related Risk Factors (Pressure Ulcer Risk (Eusebio Scale)) cognitive impairment;critical care admission;fluid intake inadequate;medication;mental impairment;mobility impaired     Goal: Skin Integrity  Outcome: Ongoing (interventions implemented as appropriate)   01/18/18 0339   Pressure Ulcer Risk (Eusebio Scale) (Adult,Obstetrics,Pediatric)   Skin Integrity making progress toward outcome

## 2018-01-18 NOTE — PROGRESS NOTES
Discharge Planning Assessment  [facility]     Patient Name: Filemon Jj  MRN: [id number]  Today's Date: 1/18/2018    Admit Date: 1/16/2018          Discharge Needs Assessment       01/18/18 1347    Living Environment    Lives With alone    Living Arrangements apartment    Home Accessibility no concerns    Type of Financial/Environmental Concern none    Transportation Available car;family or friend will provide    Living Environment Comment Resides in apartment, alone.    Living Environment    Provides Primary Care For no one    Quality Of Family Relationships supportive    Able to Return to Prior Living Arrangements yes    Discharge Needs Assessment    Concerns To Be Addressed discharge planning concerns    Readmission Within The Last 30 Days no previous admission in last 30 days    Anticipated Changes Related to Illness none    Equipment Currently Used at Home nebulizer    Equipment Needed After Discharge none    Discharge Disposition home or self-care;home healthcare service    Discharge Contact Information if Applicable Mother:  Alexandra Pena, [phone]    Discharge Planning Comments Nebulizer arranged through [facility]/Jose [phone]            Discharge Plan       01/18/18 1350    Case Management/Social Work Plan    Plan Home    Additional Comments Currently in ICU, planning discharge home when medically ready.  PT recommending home health services.  Case Management will continue to follow for all needs.        Discharge Placement     No information found                Demographic Summary       01/18/18 1340    Referral Information    Admission Type inpatient    Arrived From admitted as an inpatient    Referral Source admission list;interdisciplinary rounds    Reason For Consult discharge planning    Record Reviewed clinical discipline documentation;history and physical;medical record;patient profile;plan of care    Contact Information    Permission Granted to Share Information With      [facility] Obtained no    Primary Care Physician Information    Name Macho Lazo MD    Phone [phone]            Functional Status     None            Psychosocial     None            Abuse/Neglect     None            Legal     None            Substance Abuse     None            Patient Forms     None          LA Martinez

## 2018-01-18 NOTE — OP NOTE
DATE OF PROCEDURE: 1/17/2018    PREOPERATIVE DIAGNOSES:  1. Ascending aortic aneurysm  2. Moderate aortic regurgitation  3. Hypertension    4. Asthma    5. Severe anxiety      POSTOPERATIVE DIAGNOSES:    1. Ascending aortic aneurysm  2. Moderate aortic regurgitation  3. Hypertension    4. Asthma    5. Severe anxiety      PROCEDURES PERFORMED:    1. Aortic valve conduit (Bentall procedure with a 27 mm St Tony mechanical valve conduit)     SURGEON: Aaron Lucas MD      ASSISTANTS:    1. Luís Esqueda MD  2. Meme Walton PA-C     ANESTHESIA: General endotracheal anesthesia with Dr. David Hayes MD    ESTIMATED BLOOD LOSS: 750 mL.      CROSSCLAMP TIME: 105 minutes.      TOTAL CARDIOPULMONARY BYPASS TIME: 136 minutes.       INDICATIONS: 42 year old  male with a history of hypertension, asthma and severe anxiety who presented with an asthma exacerbation and an incidental 6 cm ascending aortic aneurysm.  He underwent recent teeth extraction for poor dentition secondary to chewing tobacco use.  His aneurysm was localized to the proximal ascending aorta and he had moderate aortic regurgitation.  He was felt to be a reasonable candidate for an aortic valve conduit using a mechanical valve secondary to his young age.  No underlying connective tissue disorder is known.  The risks and benefits of surgery were discussed with the patient including pain, bleeding, infection, renal failure, stroke, heart block and death.  The patient understood these risks and wished to proceed with surgery.     DESCRIPTION OF PROCEDURE: The patient was taken to the operating room and placed under general endotracheal anesthesia. A central line, [place]ink[place]-Deondre catheter, radial arterial line, and Velásquez catheter were placed. The patient was prepped and draped in the usual sterile fashion and a timeout was performed, including patient's name, procedure, consent, beta blockade administration, and antibiotics were verified.    A median sternotomy  incision was made and electrocautery was utilized to gain access to the sternum. A midline sternotomy was performed after lung desufflation and hemostasis was achieved with electrocautery.    The pericardium was opened and stay sutures were placed to create a pericardial well. The aorta was significantly dilated but tapered down proximal to the innominate artery.  Next, 3-0 Prolene sutures were placed in the ascending aorta at the level of the innominate artery and systemic heparin was administered. Additional cannulation sutures were placed in the right atrial appendage, ascending aorta, and right atrium. After verification of satisfactory activated clotting time, the arterial cannula was placed and connected to the cardiopulmonary bypass circuit after being de-aired. The line was tested and a wrap was performed. The venous cannula was inserted followed by antegrade and retrograde cardioplegia lines. Cardiopulmonary bypass was initiated and the patient was cooled to 28 degrees. Bypass flow was dropped and the aortic crossclamp was applied just below the innominate artery. Cardioplegia was administered in a retrograde fashion with a delayed cessation of cardiac activity. Plegia was then transitioned to antegrade flow with cessation of activity.  An LV vent was placed via the right superior pulmonary vein. The root vent suction was turned on high and a transverse aortotomy was made.  The aorta was excised just proximal to the innominate artery and sent for permanent pathology.     The aortic valve was examined and found to be tricuspid with normal gross appearance.  The valve was sharply excised and sent for permanent pathology.  The left and right coronary buttons were fashioned.  Three pledgeted valve sutures were placed at the commissures.  The valve was sized at 27 mm. The valve was opened and the remaining circumferential pledgeted valve sutures were placed with the pledgets above the annulus and connected to  the valve prosthesis.  The valve was lowered into position with adequate seating.  The valve sutures were tied down with proper seating of the valve.  The tube graft was then trimmed wit ha bevel to the appropriate length.  The tube graft was then opened for the left coronary button using cautery.  An end to side anastomosis was performed using a running 6-0 Prolene suture to reimplant the left coronary ostium.  Bioglue was then applied to the aortic annulus and left coronary artery.  The right coronary button ostium was created in the tube graft using cautery.  An end to side anastomosis was performed using a running 6-0 Prolene suture to reimplant the right coronary ostium.  The LV vent was turned off and the tube graft was anastomosed to the remaining native aorta just below the innominate artery using a running 4-0 Prolene suture.  Bioglue was then applied to the right coronary anastomosis and the aortic anastomosis.  The root vent catheter was then inserted into the tube graft and a hotshot of cardioplegia was given down the new ascending aorta and the patient was placed in steep Trendelenburg position. The root vent was turned on high suction and cardiopulmonary bypass flow was turned down with the crossclamp subsequently removed. Bypass flows were returned to normal and the heart returned to spontaneous sinus rhythm.  A few pleats on the distal aortic anastomosis were repaired with additional 4-0 Prolene sutures.  Otherwise, hemostasis at all suture lines was excellent.  The patient was warmed and ventilation resumed.  The patient subsequently had a brief single episode of ventricular fibrillation likely secondary to air that responded to immediate defibrillation.     The patient was subsequently weaned from cardiopulmonary bypass after warming and decannulation was successfully carried out. Two units of platelets and fresh frozen plasma were administered.  All cannulation sites were reinforced with  additional 4-0 Prolene suture and inspected for hemostasis. Atrial and ventricular pacing wires were placed and secured using 0 silk suture. Four chest tubes were then placed within the left and right pleural spaces and mediastinum. These were secured using a 0 Ethibond suture. The sternum was reapproximated with #7 stainless steel wire and the linea alba was closed with a running 0 Vicryl suture. Subcutaneous tissues were closed with a 2-0 Vicryl suture and the inferior aspect of the incision was closed with additional interrupted 2-0 Vicryl sutures in the dermal layer. The skin was reapproximated with a 4-0 Monocryl subcuticular stitch and overlying skin glue was applied to the incision. Gauze and tape were applied to the chest tube sites and the patient was subsequently transported to the cardiac ICU in stable condition, intubated.

## 2018-01-18 NOTE — PLAN OF CARE
Problem: Patient Care Overview (Adult)  Goal: Plan of Care Review  Outcome: Ongoing (interventions implemented as appropriate)   01/18/18 1810   Coping/Psychosocial Response Interventions   Plan Of Care Reviewed With patient   Patient Care Overview   Progress improving   Outcome Evaluation   Outcome Summary/Follow up Plan remains on low dose ntg; unable to ambulate very far today; pain control an issue; good appetite despite c/o nausea; DC SG and derik       Problem: Aortic Aneurysm/Dissection (Adult)  Goal: Signs and Symptoms of Listed Potential Problems Will be Absent or Manageable (Aortic Aneurysm/Dissection)  Outcome: Ongoing (interventions implemented as appropriate)   01/18/18 1810   Aortic Aneurysm/Dissection   Problems Assessed (Aortic Aneurysm/Dissection) all   Problems Present (Aortic Aneurysm/Dissection) pain;situational response       Problem: Perioperative Period (Adult)  Goal: Signs and Symptoms of Listed Potential Problems Will be Absent or Manageable (Perioperative Period)  Outcome: Ongoing (interventions implemented as appropriate)   01/18/18 1810   Perioperative Period   Problems Assessed (Perioperative Period) all   Problems Present (Perioperative Period) pain;physiologic stress response;situational response       Problem: Pressure Ulcer Risk (Eusebio Scale) (Adult,Obstetrics,Pediatric)  Goal: Identify Related Risk Factors and Signs and Symptoms  Outcome: Ongoing (interventions implemented as appropriate)   01/18/18 1810   Pressure Ulcer Risk (Eusebio Scale)   Related Risk Factors (Pressure Ulcer Risk (Eusebio Scale)) cognitive impairment;critical care admission;medication;mobility impaired;tissue perfusion altered

## 2018-01-18 NOTE — PROGRESS NOTES
"CTS Progress Note      POD 1 s/p Ryan    CC: sore     LOS: 2 days   Patient Care Team:  Macho Jose MD as PCP - General  Macho Jose MD as PCP - Family Medicine  Nabor Aburto MD as Consulting Physician (Cardiology)    Subjective  Awake alert, wants his \"Dip\"  No pressors, on NTG  Cooperative, but very anxious        Objective    Vital Signs  Temp:  [97.2 °F (36.2 °C)-99.7 °F (37.6 °C)] 99.2 °F (37.3 °C)  Heart Rate:  [] 82  Resp:  [10-28] 20  BP: ()/(56-75) 90/60  Arterial Line BP: ()/(50-67) 96/56  FiO2 (%):  [40 %-100 %] 40 %    Physical Exam:   General Appearance: alert, appears stated age and sitting up in bed   Lungs: clear to auscultation, respirations regular, respirations even and respirations unlabored   Heart: regular rhythm & normal rate, normal S1, S2, no murmur, no tameka, no rub and no click   Skin:  Incision c/d/i     Results     Results from last 7 days  Lab Units 01/18/18  0340   WBC 10*3/mm3 11.48*   HEMOGLOBIN g/dL 9.9*   HEMATOCRIT % 32.0*   PLATELETS 10*3/mm3 229       Results from last 7 days  Lab Units 01/18/18  0340   SODIUM mmol/L 135   POTASSIUM mmol/L 4.9   CHLORIDE mmol/L 106   CO2 mmol/L 24.0   BUN mg/dL 16   CREATININE mg/dL 1.10   GLUCOSE mg/dL 148*   CALCIUM mg/dL 8.1*           Imaging Results (last 24 hours)     Procedure Component Value Units Date/Time    XR Chest 1 View [[id number]] Collected:  01/17/18 1536     Updated:  01/17/18 1723    Narrative:       EXAMINATION: XR CHEST 1 VW- 01/17/2018     INDICATION: Post-Op Check Line & Tube Placement; I71.2-Thoracic  aortic aneurysm, without rupture      COMPARISON: 01/16/2018     FINDINGS: There are postoperative cardiac changes. An endotracheal tube  is well-positioned as are bilateral chest tubes and a Kenvir-Deondre  catheter. There is no pulmonary airspace disease or pneumothorax.           Impression:       Postoperatively there is no significant airspace disease.  There is no " pneumothorax and the support tubes are well-positioned.     D:  01/17/2018  E:  01/17/2018     This report was finalized on 1/17/2018 5:21 PM by Dr. Duy Lay MD.       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

## 2018-01-19 ENCOUNTER — APPOINTMENT (OUTPATIENT)
Dept: GENERAL RADIOLOGY | Facility: HOSPITAL | Age: 43
End: 2018-01-19

## 2018-01-19 LAB
ANION GAP SERPL CALCULATED.3IONS-SCNC: 9 MMOL/L (ref 3–11)
BACTERIA SPEC AEROBE CULT: NORMAL
BUN BLD-MCNC: 21 MG/DL (ref 9–23)
BUN/CREAT SERPL: 16.2 (ref 7–25)
CALCIUM SPEC-SCNC: 9.1 MG/DL (ref 8.7–10.4)
CHLORIDE SERPL-SCNC: 96 MMOL/L (ref 99–109)
CO2 SERPL-SCNC: 25 MMOL/L (ref 20–31)
CREAT BLD-MCNC: 1.3 MG/DL (ref 0.6–1.3)
DEPRECATED RDW RBC AUTO: 48.3 FL (ref 37–54)
ERYTHROCYTE [DISTWIDTH] IN BLOOD BY AUTOMATED COUNT: 14.6 % (ref 11.3–14.5)
GFR SERPL CREATININE-BSD FRML MDRD: 61 ML/MIN/1.73
GLUCOSE BLD-MCNC: 143 MG/DL (ref 70–100)
GLUCOSE BLDC GLUCOMTR-MCNC: 114 MG/DL (ref 70–130)
GLUCOSE BLDC GLUCOMTR-MCNC: 136 MG/DL (ref 70–130)
GLUCOSE BLDC GLUCOMTR-MCNC: 149 MG/DL (ref 70–130)
GLUCOSE BLDC GLUCOMTR-MCNC: 166 MG/DL (ref 70–130)
HCT VFR BLD AUTO: 33.4 % (ref 38.9–50.9)
HGB BLD-MCNC: 10.4 G/DL (ref 13.1–17.5)
MCH RBC QN AUTO: 28.1 PG (ref 27–31)
MCHC RBC AUTO-ENTMCNC: 31.1 G/DL (ref 32–36)
MCV RBC AUTO: 90.3 FL (ref 80–99)
PLATELET # BLD AUTO: 197 10*3/MM3 (ref 150–450)
PMV BLD AUTO: 10.8 FL (ref 6–12)
POTASSIUM BLD-SCNC: 4.9 MMOL/L (ref 3.5–5.5)
RBC # BLD AUTO: 3.7 10*6/MM3 (ref 4.2–5.76)
SODIUM BLD-SCNC: 130 MMOL/L (ref 132–146)
WBC NRBC COR # BLD: 12.17 10*3/MM3 (ref 3.5–10.8)

## 2018-01-19 PROCEDURE — 25010000002 MORPHINE SULFATE (PF) 2 MG/ML SOLUTION: Performed by: THORACIC SURGERY (CARDIOTHORACIC VASCULAR SURGERY)

## 2018-01-19 PROCEDURE — 82962 GLUCOSE BLOOD TEST: CPT

## 2018-01-19 PROCEDURE — 93010 ELECTROCARDIOGRAM REPORT: CPT | Performed by: INTERNAL MEDICINE

## 2018-01-19 PROCEDURE — 94799 UNLISTED PULMONARY SVC/PX: CPT

## 2018-01-19 PROCEDURE — 80048 BASIC METABOLIC PNL TOTAL CA: CPT | Performed by: PHYSICIAN ASSISTANT

## 2018-01-19 PROCEDURE — 97110 THERAPEUTIC EXERCISES: CPT

## 2018-01-19 PROCEDURE — 25010000003 CEFAZOLIN IN DEXTROSE 2-4 GM/100ML-% SOLUTION: Performed by: PHYSICIAN ASSISTANT

## 2018-01-19 PROCEDURE — 94640 AIRWAY INHALATION TREATMENT: CPT

## 2018-01-19 PROCEDURE — 99232 SBSQ HOSP IP/OBS MODERATE 35: CPT | Performed by: NURSE PRACTITIONER

## 2018-01-19 PROCEDURE — 99024 POSTOP FOLLOW-UP VISIT: CPT | Performed by: PHYSICIAN ASSISTANT

## 2018-01-19 PROCEDURE — 25010000002 FUROSEMIDE PER 20 MG: Performed by: NURSE PRACTITIONER

## 2018-01-19 PROCEDURE — 71045 X-RAY EXAM CHEST 1 VIEW: CPT

## 2018-01-19 PROCEDURE — 99232 SBSQ HOSP IP/OBS MODERATE 35: CPT | Performed by: INTERNAL MEDICINE

## 2018-01-19 PROCEDURE — 94760 N-INVAS EAR/PLS OXIMETRY 1: CPT

## 2018-01-19 PROCEDURE — 85027 COMPLETE CBC AUTOMATED: CPT | Performed by: PHYSICIAN ASSISTANT

## 2018-01-19 PROCEDURE — 93005 ELECTROCARDIOGRAM TRACING: CPT | Performed by: PHYSICIAN ASSISTANT

## 2018-01-19 RX ORDER — SODIUM CHLORIDE 0.9 % (FLUSH) 0.9 %
1-10 SYRINGE (ML) INJECTION EVERY 8 HOURS
Status: DISCONTINUED | OUTPATIENT
Start: 2018-01-19 | End: 2018-01-22 | Stop reason: HOSPADM

## 2018-01-19 RX ORDER — HYDROCODONE BITARTRATE AND ACETAMINOPHEN 7.5; 325 MG/1; MG/1
1 TABLET ORAL EVERY 4 HOURS PRN
Qty: 20 TABLET | Refills: 0 | Status: SHIPPED | OUTPATIENT
Start: 2018-01-19 | End: 2018-01-27

## 2018-01-19 RX ORDER — FUROSEMIDE 10 MG/ML
40 INJECTION INTRAMUSCULAR; INTRAVENOUS ONCE
Status: COMPLETED | OUTPATIENT
Start: 2018-01-19 | End: 2018-01-19

## 2018-01-19 RX ADMIN — INSULIN LISPRO 3 UNITS: 100 INJECTION, SOLUTION INTRAVENOUS; SUBCUTANEOUS at 08:38

## 2018-01-19 RX ADMIN — METOPROLOL TARTRATE 12.5 MG: 25 TABLET, FILM COATED ORAL at 20:42

## 2018-01-19 RX ADMIN — FUROSEMIDE 40 MG: 10 INJECTION, SOLUTION INTRAMUSCULAR; INTRAVENOUS at 08:38

## 2018-01-19 RX ADMIN — MONTELUKAST SODIUM 10 MG: 10 TABLET, FILM COATED ORAL at 08:39

## 2018-01-19 RX ADMIN — FAMOTIDINE 20 MG: 20 TABLET, FILM COATED ORAL at 08:38

## 2018-01-19 RX ADMIN — CEFAZOLIN SODIUM 2 G: 2 INJECTION, SOLUTION INTRAVENOUS at 03:26

## 2018-01-19 RX ADMIN — OXYCODONE AND ACETAMINOPHEN 2 TABLET: 5; 325 TABLET ORAL at 10:37

## 2018-01-19 RX ADMIN — DOCUSATE SODIUM AND SENNOSIDES 2 TABLET: 8.6; 5 TABLET, FILM COATED ORAL at 20:41

## 2018-01-19 RX ADMIN — METOPROLOL TARTRATE 12.5 MG: 25 TABLET, FILM COATED ORAL at 08:39

## 2018-01-19 RX ADMIN — FAMOTIDINE 20 MG: 20 TABLET, FILM COATED ORAL at 20:41

## 2018-01-19 RX ADMIN — Medication 10 ML: at 20:45

## 2018-01-19 RX ADMIN — OXYCODONE AND ACETAMINOPHEN 2 TABLET: 5; 325 TABLET ORAL at 03:23

## 2018-01-19 RX ADMIN — OXYCODONE AND ACETAMINOPHEN 2 TABLET: 5; 325 TABLET ORAL at 16:09

## 2018-01-19 RX ADMIN — HYDROCODONE BITARTRATE AND ACETAMINOPHEN 1 TABLET: 7.5; 325 TABLET ORAL at 20:48

## 2018-01-19 RX ADMIN — ALBUTEROL SULFATE 2.5 MG: 2.5 SOLUTION RESPIRATORY (INHALATION) at 07:25

## 2018-01-19 RX ADMIN — HYDROCODONE BITARTRATE AND ACETAMINOPHEN 1 TABLET: 7.5; 325 TABLET ORAL at 01:28

## 2018-01-19 RX ADMIN — ALBUTEROL SULFATE 2.5 MG: 2.5 SOLUTION RESPIRATORY (INHALATION) at 13:06

## 2018-01-19 RX ADMIN — HYDROCODONE BITARTRATE AND ACETAMINOPHEN 1 TABLET: 7.5; 325 TABLET ORAL at 06:22

## 2018-01-19 RX ADMIN — DOCUSATE SODIUM AND SENNOSIDES 2 TABLET: 8.6; 5 TABLET, FILM COATED ORAL at 08:38

## 2018-01-19 RX ADMIN — MORPHINE SULFATE 2 MG: 25 INJECTION, SOLUTION, CONCENTRATE INTRAVENOUS at 01:28

## 2018-01-19 NOTE — PLAN OF CARE
Problem: Patient Care Overview (Adult)  Goal: Plan of Care Review  Outcome: Ongoing (interventions implemented as appropriate)   01/19/18 1203   Coping/Psychosocial Response Interventions   Plan Of Care Reviewed With patient   Patient Care Overview   Progress improving   Outcome Evaluation   Outcome Summary/Follow up Plan patient able to increase ambulation to 100 ft with walker stable vitals some increase in pain       Problem: Inpatient Physical Therapy  Goal: Bed Mobility Goal LTG- PT  Outcome: Ongoing (interventions implemented as appropriate)   01/18/18 1119   Bed Mobility PT LTG   Bed Mobility PT LTG, Date Established 01/18/18   Bed Mobility PT LTG, Time to Achieve 2 wks   Bed Mobility PT LTG, Activity Type all bed mobility   Bed Mobility PT LTG, [place] Level independent   Bed Mobility PT LTG, Outcome goal ongoing     Goal: Transfer Training Goal 1 LTG- PT  Outcome: Ongoing (interventions implemented as appropriate)   01/18/18 1119   Transfer Training PT LTG   Transfer Training PT LTG, Date Established 01/18/18   Transfer Training PT LTG, Time to Achieve 2 wks   Transfer Training PT LTG, Activity Type sit to stand/stand to sit   Transfer Training PT LTG, [place] Level conditional independence   Transfer Training PT LTG, Assist Device walker, rolling   Transfer Training PT LTG, Outcome goal ongoing     Goal: Gait Training Goal LTG- PT  Outcome: Ongoing (interventions implemented as appropriate)   01/18/18 1119   Gait Training PT LTG   Gait Training Goal PT LTG, Date Established 01/18/18   Gait Training Goal PT LTG, Time to Achieve 2 wks   Gait Training Goal PT LTG, [place] Level conditional independence   Gait Training Goal PT LTG, Assist Device walker, rolling   Gait Training Goal PT LTG, Distance to Achieve 400 feet   Gait Training Goal PT LTG, Outcome goal ongoing     Goal: Stair Training Goal LTG- PT  Outcome: Ongoing (interventions implemented as appropriate)   01/18/18 1119   Stair  Training PT LTG   Stair Training Goal PT LTG, Date Established 01/18/18   Stair Training Goal PT LTG, Time to Achieve 2 wks   Stair Training Goal PT LTG, Number of Steps 6   Stair Training Goal PT LTG, Mobile Level conditional independence   Stair Training Goal PT LTG, Assist Device 2 handrails   Stair Training Goal PT LTG, Outcome goal ongoing

## 2018-01-19 NOTE — PROGRESS NOTES
INTENSIVIST   PROGRESS NOTE     Hospital:  LOS: 3 days   Subjective   Mr. Filemon Jj, 42 y.o. 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     S     Interval History:  POD: 2 Days Post-Op    Doing better.  Still sore from surgery.       The patient's relevant past medical, surgical and social history were reviewed and updated in Epic as appropriate.      ROS:   ROS cannot be reliably obtained from the patient due to his Acuity of condition, Altered Mental Status and Intubated.    Objective   O     Vitals:  Temp: 98.4 °F (36.9 °C) Temp  Min: 97.7 °F (36.5 °C)  Max: 98.8 °F (37.1 °C)   BP: 112/61 BP  Min: 84/59  Max: 125/76   Pulse: 81 Pulse  Min: 70  Max: 84   Resp: 18 Resp  Min: 12  Max: 20   SpO2: 94 % SpO2  Min: 71 %  Max: 99 %   Device: nasal cannula with humidification    Flow Rate: 3 Flow (L/min)  Min: 3  Max: 12     Intake/Ouptut 24 hrs (7:00AM - 6:59 AM)  Intake & Output (last 3 days)       01/16 0701 - 01/17 0700 01/17 0701 - 01/18 0700 01/18 0701 - 01/19 0700 01/19 0701 - 01/20 0700    P.O. 240  1000 240    I.V. (mL/kg)  2526.2 (29.8) 256 (3)     Blood  986      IV Piggyback  1000      Total Intake(mL/kg) 240 (2.8) 4512.2 (53.2) 1256 (14.8) 240 (2.8)    Urine (mL/kg/hr) 900 2890 (1.4) 510 (0.3) 350 (1.1)    Other  1180 (0.6) 560 (0.3) 80 (0.2)    Total Output 900 4070 1070 430    Net -660 +442.2 +186 -190            Unmeasured Urine Occurrence 1 x             Physical Examination  Telemetry:  Sinus Rhythm: normal sinus rhythm     Constitutional:  No acute distress.   Cardiovascular: Normal rate, regular and rhythm. Normal heart sounds.  No murmurs,  gallop or rub.   Respiratory: No respiratory distress. Normal respiratory effort.  Normal breath sounds  Clear to auscultation and percussion.    Abdominal:  Soft. No masses. Non-tender. No distension. No HSM.   Extremities: No digital cyanosis. No clubbing.  No peripheral edema.   Neurological:   Alert. Up in a chair. No deficits.   Lines/Drains/Airways: RI Cordis  CT X 4       Hematology:    Results from last 7 days  Lab Units 01/19/18  0335 01/18/18  0340 01/17/18 2139 01/17/18  1750   WBC 10*3/mm3 12.17* 11.48*  --  9.72   HEMOGLOBIN g/dL 10.4* 9.9* 10.2* 10.0*   MCV fL 90.3 88.9  --  87.9   PLATELETS 10*3/mm3 197 229  --  213     Electrolytes, Magnesium and Phosphorus:    Results from last 7 days  Lab Units 01/19/18  0335 01/18/18 0340 01/17/18 2138 01/17/18  1750 01/17/18  1408   SODIUM mmol/L 130* 135  --  143 144   POTASSIUM mmol/L 4.9 4.9 4.6 4.6 3.7   CO2 mmol/L 25.0 24.0  --  25.0 24.0   MAGNESIUM mg/dL  --  2.5  --  2.7 3.1*   PHOSPHORUS mg/dL  --  4.2  --  3.6 1.9*     Renal:    Results from last 7 days  Lab Units 01/19/18  0335 01/18/18 0340 01/17/18  1750 01/17/18  1408 01/16/18  1930   CREATININE mg/dL 1.30 1.10 1.10 1.00 0.90   BUN mg/dL 21 16 13 14 11     Estimated Creatinine Clearance: 77.7 mL/min (by C-G formula based on Cr of 1.3).      Images:  CXR 1/19/2018 Mild basilar atelectasis.     Echo:  Results for orders placed in visit on 01/17/18   Intra-Operative Transesophageal Echocardiogram    Narrative Eric Horner MD     1/17/2018 11:16 AM    Ascending Aorta:  Size aneurysmal.  Diameter 6 cm.  Dissection not present.  Plaque   thickness less than 3 mm.  Mobile plaque not present.    Aortic Arch:  Size normal.  Diameter 2.77 cm.  Dissection not present.  Plaque thickness   less than 3 mm.  Mobile plaque not present.    Descending Aorta:  Size normal.  Diameter 2.07 cm.  Dissection not present.  Plaque thickness   less than 3 mm.  Mobile plaque not present.      Anesthesiologist:   BARBARA VILLATORO    Echocardiogram Comments:       Post Bentall:  Bileaflet mechanical valve conduit, both leaflets open   and coapt, washing jets seen, AVG 12 mean 7; EF 45 % with inf HK-post   performed by Reji       Results: Reviewed.  I reviewed the patient's new laboratory and imaging results.  I independently reviewed the patient's new images.    Medications: Reviewed.    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 Aaron Lucas (Cardiothoracic Surgery)  1/17/18  2. COPD  1. Ji Post Rx: FEV1= 1.23L  (32% of predicted) Ratio 0.47 c/w Severe OAD, and a restrictive component can not be ruled out.  2. Tobacco use  3. HTN  4. S/P Multiple tooth extractions (NOV 2017) in preparation for this surgery.  5. At risk for DM based on his A1c    Results from last 7 days  Lab Units 01/19/18  0751 01/18/18  2017 01/18/18  1616 01/18/18  1138 01/18/18  1019 01/18/18  0912   GLUCOSE mg/dL 166* 167* 143* 130 124 92       Lab Results  Lab Value Date/Time   HGBA1C 6.20 (H) 01/16/2018 1930   HGBA1C 6.10 (H) 10/18/2017 0107        Nutrition:  Diet Soft Texture; Whole Foods; Cardiac, Consistent Carbohydrate   Advance Directives: Full Code     Assessment / Plan:    1. To tele with his chest tubes as per CT Sx.  2. Glucose OK. Continue Correction insulin only, and reassess needs.  3. Start Coumadin tomorrow.    Plan of care and goals reviewed during interdisciplinary rounds.  I discussed the patient's findings and my recommendations with nursing staff    OK to floor.  We will follow as needed once out of the Intensive Care Unit.  Hospitalist Team will assist with medical management once on the  Floor.    Thank you.    Tres Doan MD, FACP, FCCP, [facility]  Intensive Care Medicine, Nutrition Support and Pulmonary Medicine     Interpretation  HbA1C Est. Mean Glucose  (mg/dL) Fasting Glucose  (mg/dL) Interpretation   < 5.7% < 117 < 100 Normal   5.7%-6.4% 117-139 100 - 125 At risk for DM   >= 6.5% >= 140 > = 126 DM

## 2018-01-19 NOTE — PLAN OF CARE
Problem: Patient Care Overview (Adult)  Goal: Plan of Care Review  Outcome: Ongoing (interventions implemented as appropriate)   01/19/18 1812   Coping/Psychosocial Response Interventions   Plan Of Care Reviewed With patient   Patient Care Overview   Progress improving   Outcome Evaluation   Outcome Summary/Follow up Plan transfer orders received; ambulated better today; will transfer with chest tubes and cordis in place; conts to received pain meds as needed       Problem: Aortic Aneurysm/Dissection (Adult)  Goal: Signs and Symptoms of Listed Potential Problems Will be Absent or Manageable (Aortic Aneurysm/Dissection)  Outcome: Ongoing (interventions implemented as appropriate)   01/19/18 1812   Aortic Aneurysm/Dissection   Problems Assessed (Aortic Aneurysm/Dissection) all   Problems Present (Aortic Aneurysm/Dissection) pain;situational response       Problem: Perioperative Period (Adult)  Goal: Signs and Symptoms of Listed Potential Problems Will be Absent or Manageable (Perioperative Period)  Outcome: Ongoing (interventions implemented as appropriate)   01/19/18 1812   Perioperative Period   Problems Assessed (Perioperative Period) all   Problems Present (Perioperative Period) pain;situational response       Problem: Pressure Ulcer Risk (Eusebio Scale) (Adult,Obstetrics,Pediatric)  Goal: Identify Related Risk Factors and Signs and Symptoms  Outcome: Ongoing (interventions implemented as appropriate)   01/19/18 1812   Pressure Ulcer Risk (Eusebio Scale)   Related Risk Factors (Pressure Ulcer Risk (Eusebio Scale)) critical care admission;mechanical forces;medical devices;mobility impaired       Problem: Fall Risk (Adult)  Goal: Identify Related Risk Factors and Signs and Symptoms  Outcome: Ongoing (interventions implemented as appropriate)   01/19/18 1812   Fall Risk   Fall Risk: Related Risk Factors fatigue/slow reaction;gait/mobility problems;objects hard to reach;environment unfamiliar   Fall Risk: Signs and  Symptoms presence of risk factors

## 2018-01-19 NOTE — PROGRESS NOTES
Multidisciplinary Rounds    Time: 20min  Patient Name: Filemon Jj  Date of Encounter: 01/19/18 12:09 PM  MRN: [id number]  Admission date: 1/16/2018      Reason for visit: MDR. RD to continue to follow per protocol.     Pt resting in bed, reports excellent appetite, no complaints    Current diet: Diet Soft Texture; Whole Foods; Cardiac, Consistent Carbohydrate      Intervention:  Follow treatment plan  Care plan reviewed    Follow up:   Per protocol      Lubna Tate RD  12:09 PM

## 2018-01-19 NOTE — PROGRESS NOTES
"CTS Progress Note      POD 2 s/p Ryan    CC: sore     LOS: 3 days   Patient Care Team:  Macho Jose MD as PCP - General  Macho Jose MD as PCP - Family Medicine  Nabor Aburto MD as Consulting Physician (Cardiology)    Subjective  Wants his \"Dip\"  No pressors, on NTG  Cooperative, but very anxious    Objective    Vital Signs  Temp:  [97.7 °F (36.5 °C)-98.8 °F (37.1 °C)] 98.8 °F (37.1 °C)  Heart Rate:  [70-88] 77  Resp:  [12-20] 20  BP: ()/(46-77) 106/75  Arterial Line BP: ()/(53-90) 113/75    Physical Exam:   General Appearance: alert, appears stated age and sitting up in bed   Lungs: clear to auscultation, respirations regular, respirations even and respirations unlabored   Heart: regular rhythm & normal rate, normal S1, S2, no murmur, no tameka, no rub and no click   Skin:  Incision c/d/i     Results     Results from last 7 days  Lab Units 01/19/18  0335   WBC 10*3/mm3 12.17*   HEMOGLOBIN g/dL 10.4*   HEMATOCRIT % 33.4*   PLATELETS 10*3/mm3 197       Results from last 7 days  Lab Units 01/19/18  0335   SODIUM mmol/L 130*   POTASSIUM mmol/L 4.9   CHLORIDE mmol/L 96*   CO2 mmol/L 25.0   BUN mg/dL 21   CREATININE mg/dL 1.30   GLUCOSE mg/dL 143*   CALCIUM mg/dL 9.1           Imaging Results (last 24 hours)     Procedure Component Value Units Date/Time    XR Chest 1 View [[id number]] Collected:  01/18/18 0933     Updated:  01/18/18 1156    Narrative:       EXAMINATION: XR CHEST 1 VW- 01/18/2018     INDICATION: Post-Op Heart Surgery; I71.2-Thoracic aortic aneurysm,  without rupture      COMPARISON: 01/17/2018     FINDINGS: The heart is slightly large. There are postoperative cardiac  changes. There is bibasilar airspace disease. There is no pneumothorax.  Bilateral chest tubes are noted as is a [place] catheter.           Impression:       There is patchy bibasilar airspace disease. Support tubes  are well positioned. There is no pneumothorax.     D:  01/18/2018  E:  " 01/18/2018     This report was finalized on 1/18/2018 11:53 AM by Dr. Duy Lay MD.       XR Chest 1 View [[id number]] Updated:  01/19/18 0526        CT 560cc out last 12 hours    Assessment  POD 2 s/p Bentall, expected recovery  Principal Problem:    Aneurysm of aortic sinus of Valsalva without rupture  Active Problems:    Ascending aortic aneurysm    Smoker    COPD (chronic obstructive pulmonary disease)    Essential hypertension  CV: stable      Plan   Keep chest tubes   D/C wires today  Transfer to telemetry  Pulmonary toilet  Ambulate  Start Coumadin tomorrow

## 2018-01-19 NOTE — PROGRESS NOTES
Continued Stay Note   [facility]     Patient Name: Filemon Jj  MRN: [id number]  Today's Date: 1/19/2018    Admit Date: 1/16/2018          Discharge Plan       01/19/18 1002    Case Management/Social Work Plan    Plan Home    Patient/Family In Agreement With Plan --   Patient    Additional Comments Remains in ICU with plan to transfer to floor bed today.  Met with patient at bedside, reports his neighbor will help him post d/c-lives alone.  Plan home health (PT/OT, medication box assistance), and rolling walker (fixed wheels) for home use.  Case Management will continue to follow for all needs.  Daisy Flores, ext. [phone]              Discharge Codes     None            LA Martinez

## 2018-01-19 NOTE — PROGRESS NOTES
[facility] at [facility]  Cardiology Progress Note      Chief Complaint/Reason for service:    · Hypertension  · Cardiac Risk Factors         Patient is sitting up in chair.  Reports pain fairly well controlled.  Would like to have a breathing treatment. All vasopressor drips off. Mediastinal drains patent.      Past medical, surgical, social and family history reviewed in the patient's electronic medical record.         Vital Sign Min/Max for last 24 hours  Temp  Min: 97.7 °F (36.5 °C)  Max: 98.8 °F (37.1 °C)   BP  Min: 84/59  Max: 120/74   Pulse  Min: 70  Max: 88   Resp  Min: 12  Max: 20   SpO2  Min: 82 %  Max: 99 %   Flow (L/min)  Min: 2  Max: 12      Intake/Output Summary (Last 24 hours) at 01/19/18 0707  Last data filed at 01/19/18 0622   Gross per 24 hour   Intake             1256 ml   Output             1070 ml   Net              186 ml           Physical Exam   Constitutional: He is oriented to person, place, and time. He appears well-developed and well-nourished.   HENT:   Head: Normocephalic.   Neck: No JVD present. Carotid bruit is not present.   Cardiovascular: Normal rate and regular rhythm.  Exam reveals no gallop and no friction rub.    Click   Pulmonary/Chest: Effort normal. He has decreased breath sounds in the right lower field and the left lower field.   Abdominal: Soft. Bowel sounds are normal. He exhibits no distension.   Neurological: He is alert and oriented to person, place, and time.   Skin: Skin is warm and dry.   Psychiatric: He has a normal mood and affect.       Results Review:   I reviewed the patient's recent labs in the electronic medical record.      Lab Results   Component Value Date    GLUCOSE 143 (H) 01/19/2018    CALCIUM 9.1 01/19/2018     (L) 01/19/2018    K 4.9 01/19/2018    CO2 25.0 01/19/2018    CL 96 (L) 01/19/2018    BUN 21 01/19/2018    CREATININE 1.30 01/19/2018    EGFRIFAFRI  09/22/2016      Comment:      <15 Indicative of kidney failure.     EGFRIFNONA 61 01/19/2018    BCR 16.2 01/19/2018    ANIONGAP 9.0 01/19/2018       Lab Results   Component Value Date    WBC 12.17 (H) 01/19/2018    HGB 10.4 (L) 01/19/2018    HCT 33.4 (L) 01/19/2018    MCV 90.3 01/19/2018     01/19/2018       Lab Results   Component Value Date    HGBA1C 6.20 (H) 01/16/2018       Lab Results   Component Value Date    CHOL 206 (H) 01/16/2018    TRIG 202 (H) 01/16/2018    HDL 33 (L) 01/16/2018    LDLCALC 139 (H) 10/18/2017    LDLDIRECT 163 (H) 01/16/2018       EKG:  NSR 1/18/2018    Tele:  [facility]         Hospital Problem List     * (Principal)Aneurysm of aortic sinus of Valsalva without rupture    Ascending aortic aneurysm    Overview Addendum 1/17/2018  2:41 PM by DION Hoffman     · CT chest (10/17): 6 cm ascending aortic aneurysm  · Bentall procedure (01/17/2018) with Dr Aaron Lucas         Smoker    COPD (chronic obstructive pulmonary disease)    Essential hypertension        Chest xray shows bilateral pulmonary edema.  Breath sounds decreased bilaterally.  Benefit from diuresis.         · Breathing treatment  · Lasix 40 mg IVP x 1  · Use incentive spirometer  · Continue BB  · Making progress.  We will revisit on MOnday please call with questions over the weekend    DION Chang  1/19/2018

## 2018-01-19 NOTE — THERAPY TREATMENT NOTE
Acute Care - Physical Therapy Treatment Note  [facility]     Patient Name: Filemon Jj  : 1975  MRN: [id number]  Today's Date: 2018  Onset of Illness/Injury or Date of Surgery Date: 18  Date of Referral to PT: 18  Referring Physician: ELICIA Walton    Admit Date: 2018    Visit Dx:    ICD-10-CM ICD-9-CM   1. Impaired functional mobility, balance, gait, and endurance Z74.09 V49.89   2. 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   • Smoker   • COPD (chronic obstructive pulmonary disease)   • Essential hypertension               Adult Rehabilitation Note       18 0950          Rehab Assessment/Intervention    Discipline physical therapist  -YVONNE      Document Type therapy note (daily note)  -YVONNE      Subjective Information no complaints;agree to therapy  -YVONNE      Patient Effort, Rehab Treatment good  -YVONNE      Symptoms Noted During/After Treatment increased pain  -YVONNE      Precautions/Limitations cardiac precautions;oxygen therapy device and L/min;sternal precautions  -YVONNE      Recorded by [YVONNE] Cora Gupta, PT      Vital Signs    Pre Systolic BP Rehab 128  -YVONNE      Pre Treatment Diastolic BP 90  -YVONNE      Post Systolic BP Rehab 133  -YVONNE      Post Treatment Diastolic BP 86  -YVONNE      Pretreatment Heart Rate (beats/min) 80  -YVONNE      Posttreatment Heart Rate (beats/min) 88  -YVONNE      Pre SpO2 (%) 94  -YVONNE      O2 Delivery Pre Treatment supplemental O2  -YVONNE      Intra SpO2 (%) 88  -YVONNE      O2 Delivery Intra Treatment supplemental O2  -YVONNE      Post SpO2 (%) 93  -YVONNE      O2 Delivery Post Treatment supplemental O2  -YVONNE      Pre Patient Position Sitting  -YVONNE      Intra Patient Position Standing  -YVONNE      Post Patient Position Supine  -YVONNE      Recorded by [YVONNE] Cora Gupta, PT      Pain Assessment    Pain Score 5  -YVONNE      Post Pain Score 6   -YVONNE      Pain Type Surgical pain  -YVONNE      Pain Location Mediastinum  -YVONNE      Pain Intervention(s) Repositioned;Ambulation/increased activity;Splinting  -YVONNE      Recorded by [YVONNE] Cora Gupta PT      Cognitive Assessment/Intervention    Current Cognitive/Communication Assessment impaired  -YVONNE      Orientation Status oriented to;person;place  -YVONNE      Follows Commands/Answers Questions 100% of the time;able to follow single-step instructions  -YVONNE      Personal Safety mild impairment;decreased awareness, need for assist;decreased awareness, need for safety;decreased insight to deficits  -YVONNE      Personal Safety Interventions fall prevention program maintained;gait belt;nonskid shoes/slippers when out of bed  -YVONNE      Recorded by [YVONNE] Cora Gupta PT      Bed Mobility, Assessment/Treatment    Bed Mob, Sit to Supine, [place] minimum assist (75% patient effort)  -YVONNE      Bed Mobility, Safety Issues decreased use of arms for pushing/pulling  -YVONNE      Bed Mobility, Impairments strength decreased;impaired balance  -YVONNE      Recorded by [YVONNE] Cora Gupta PT      Transfer Assessment/Treatment    Transfers, Bed-Chair [place] minimum assist (75% patient effort)  -YVONNE      Transfers, Sit-Stand [place] minimum assist (75% patient effort)  -YVONNE      Transfers, Stand-Sit [place] minimum assist (75% patient effort)  -YVONNE      Transfer, Safety Issues step length decreased;balance decreased during turns  -YVONNE      Transfer, Comment cues for posture and sternal precautions  -YVONNE      Recorded by [YVONNE] Cora Gupta PT      Gait Assessment/Treatment    Gait, [place] Level minimum assist (75% patient effort)  -YVONNE      Gait, Assistive Device rolling walker  -YVONNE      Gait, Distance (Feet) 100  -YVONNE      Gait, Gait Pattern Analysis swing-to gait  -YVONNE      Gait, Gait Deviations step length decreased  -YVONNE      Gait, Comment patient required frequent standing rests wants to lean over on walker needs  cues for safety  -YVONNE      Recorded by [YVONNE] Cora Gupta PT      Balance Skills Training    Sitting-Level of Assistance Close supervision  -YVONNE      Sitting-Balance Support Feet supported  -YVONNE      Standing-Level of Assistance Minimum assistance  -YVONNE      Static Standing Balance Support assistive device  -YVONNE      Gait Balance-Level of Assistance Minimum assistance  -YVONNE      Gait Balance Support assistive device  -YVONNE      Recorded by [YVONNE] Cora Gupta PT      Therapy Exercises    Bilateral Lower Extremities AROM:;10 reps;sitting;ankle pumps/circles;hip flexion;LAQ  -YVONNE      Recorded by [YVONNE] Cora Gupta PT      Positioning and Restraints    Pre-Treatment Position sitting in chair/recliner  -YVONNE      Post Treatment Position bed  -YVONNE      In Bed notified nsg;supine;call light within reach;exit alarm on;encouraged to call for assist  -YVONNE      Recorded by [YVONNE] Cora Gupta PT        User Key  (r) = Recorded By, (t) = Taken By, (c) = Cosigned By    Initials Name Effective Dates    YVONNE Cora Gupta, PT 06/19/15 -                 IP PT Goals       01/18/18 1119          Bed Mobility PT LTG    Bed Mobility PT LTG, Date Established 01/18/18  -KR      Bed Mobility PT LTG, Time to Achieve 2 wks  -KR      Bed Mobility PT LTG, Activity Type all bed mobility  -KR      Bed Mobility PT LTG, [place] Level independent  -KR      Bed Mobility PT LTG, Outcome goal ongoing  -KR      Transfer Training PT LTG    Transfer Training PT LTG, Date Established 01/18/18  -KR      Transfer Training PT LTG, Time to Achieve 2 wks  -KR      Transfer Training PT LTG, Activity Type sit to stand/stand to sit  -KR      Transfer Training PT LTG, [place] Level conditional independence  -KR      Transfer Training PT LTG, Assist Device walker, rolling  -KR      Transfer Training PT LTG, Outcome goal ongoing  -KR      Gait Training PT LTG    Gait Training Goal PT LTG, Date Established 01/18/18  -KR      Gait Training Goal PT  LTG, Time to Achieve 2 wks  -KR      Gait Training Goal PT LTG, [place] Level conditional independence  -KR      Gait Training Goal PT LTG, Assist Device walker, rolling  -KR      Gait Training Goal PT LTG, Distance to Achieve 400 feet  -KR      Gait Training Goal PT LTG, Outcome goal ongoing  -KR      Stair Training PT LTG    Stair Training Goal PT LTG, Date Established 01/18/18  -KR      Stair Training Goal PT LTG, Time to Achieve 2 wks  -KR      Stair Training Goal PT LTG, Number of Steps 6  -KR      Stair Training Goal PT LTG, [place] Level conditional independence  -KR      Stair Training Goal PT LTG, Assist Device 2 handrails  -KR      Stair Training Goal PT LTG, Outcome goal ongoing  -KR        User Key  (r) = Recorded By, (t) = Taken By, (c) = Cosigned By    Initials Name Provider Type    FLORENCIO Montes, PT Physical Therapist          Physical Therapy Education     Title: PT OT SLP Therapies (Active)     Topic: Physical Therapy (Active)     Point: Mobility training (Active)    Learning Progress Summary    Learner Readiness Method Response Comment Documented by Status   Patient Acceptance E NR  YVONNE 01/19/18 1203 Active    Acceptance E NR  KR 01/18/18 1118 Active               Point: Home exercise program (Active)    Learning Progress Summary    Learner Readiness Method Response Comment Documented by Status   Patient Acceptance E NR  YVONNE 01/19/18 1203 Active               Point: Body mechanics (Active)    Learning Progress Summary    Learner Readiness Method Response Comment Documented by Status   Patient Acceptance E NR  YVONNE 01/19/18 1203 Active    Acceptance E NR  KR 01/18/18 1118 Active               Point: Precautions (Active)    Learning Progress Summary    Learner Readiness Method Response Comment Documented by Status   Patient Acceptance E NR  YVONNE 01/19/18 1203 Active    Acceptance E NR  KR 01/18/18 1118 Active                      User Key     Initials Effective Dates Name Provider Type  Discipline    YVONNE 06/19/15 -  Cora Gupta, PT Physical Therapist PT    KR 09/25/17 -  Rhina Montes PT Physical Therapist PT                    PT Recommendation and Plan  Anticipated Discharge Disposition: home with assist, home with home health  PT Frequency: daily  Plan of Care Review  Plan Of Care Reviewed With: patient  Progress: improving  Outcome Summary/Follow up Plan: patient able to increase ambulation to 100 ft with walker stable vitals some increase in pain          Outcome Measures       01/19/18 0950 01/18/18 0837       How much help from another person do you currently need...    Turning from your back to your side while in flat bed without using bedrails? 3  -YVONNE 2  -KR     Moving from lying on back to sitting on the side of a flat bed without bedrails? 3  -YVONNE 2  -KR     Moving to and from a bed to a chair (including a wheelchair)? 3  -YVONNE 2  -KR     Standing up from a chair using your arms (e.g., wheelchair, bedside chair)? 3  -YVONNE 3  -KR     Climbing 3-5 steps with a railing? 2  -YVONNE 1  -KR     To walk in hospital room? 2  -YVONNE 2  -KR     AM-PAC 6 Clicks Score 16  -YVONNE 12  -KR     Functional Assessment    Outcome Measure Options  AM-PAC 6 Clicks Basic Mobility (PT)  -KR       User Key  (r) = Recorded By, (t) = Taken By, (c) = Cosigned By    Initials Name Provider Type    YVONNE Gupta PT Physical Therapist    KR Rhina Montes, PT Physical Therapist           Time Calculation:         PT Charges       01/19/18 1205          Time Calculation    Start Time 0950  -YVONNE      PT Received On 01/19/18  -YVONNE      PT Goal Re-Cert Due Date 01/28/18  -YVONNE      Time Calculation- PT    Total Timed Code Minutes- PT 24 minute(s)  -YVONNE        User Key  (r) = Recorded By, (t) = Taken By, (c) = Cosigned By    Initials Name Provider Type    YVONNE Gupta PT Physical Therapist          Therapy Charges for Today     Code Description Service Date Service Provider Modifiers Qty    [id number]  PT THER PROC  EA 15 MIN 1/19/2018 Cora Gupta, PT GP 2          PT G-Codes  Outcome Measure Options: AM-PAC 6 Clicks Basic Mobility (PT)    Cora Gupta, PT  1/19/2018

## 2018-01-20 ENCOUNTER — APPOINTMENT (OUTPATIENT)
Dept: GENERAL RADIOLOGY | Facility: HOSPITAL | Age: 43
End: 2018-01-20

## 2018-01-20 LAB
ABO + RH BLD: NORMAL
ABO + RH BLD: NORMAL
ANION GAP SERPL CALCULATED.3IONS-SCNC: 6 MMOL/L (ref 3–11)
BH BB BLOOD EXPIRATION DATE: NORMAL
BH BB BLOOD EXPIRATION DATE: NORMAL
BH BB BLOOD TYPE BARCODE: 6200
BH BB BLOOD TYPE BARCODE: 6200
BH BB DISPENSE STATUS: NORMAL
BH BB DISPENSE STATUS: NORMAL
BH BB PRODUCT CODE: NORMAL
BH BB PRODUCT CODE: NORMAL
BH BB UNIT NUMBER: NORMAL
BH BB UNIT NUMBER: NORMAL
BUN BLD-MCNC: 19 MG/DL (ref 9–23)
BUN/CREAT SERPL: 19 (ref 7–25)
CALCIUM SPEC-SCNC: 9.6 MG/DL (ref 8.7–10.4)
CHLORIDE SERPL-SCNC: 101 MMOL/L (ref 99–109)
CO2 SERPL-SCNC: 29 MMOL/L (ref 20–31)
CREAT BLD-MCNC: 1 MG/DL (ref 0.6–1.3)
DEPRECATED RDW RBC AUTO: 47.2 FL (ref 37–54)
ERYTHROCYTE [DISTWIDTH] IN BLOOD BY AUTOMATED COUNT: 14.4 % (ref 11.3–14.5)
GFR SERPL CREATININE-BSD FRML MDRD: 82 ML/MIN/1.73
GLUCOSE BLD-MCNC: 120 MG/DL (ref 70–100)
GLUCOSE BLDC GLUCOMTR-MCNC: 101 MG/DL (ref 70–130)
GLUCOSE BLDC GLUCOMTR-MCNC: 104 MG/DL (ref 70–130)
GLUCOSE BLDC GLUCOMTR-MCNC: 79 MG/DL (ref 70–130)
GLUCOSE BLDC GLUCOMTR-MCNC: 94 MG/DL (ref 70–130)
HCT VFR BLD AUTO: 32.1 % (ref 38.9–50.9)
HGB BLD-MCNC: 10 G/DL (ref 13.1–17.5)
INR PPP: 0.93
MCH RBC QN AUTO: 27.8 PG (ref 27–31)
MCHC RBC AUTO-ENTMCNC: 31.2 G/DL (ref 32–36)
MCV RBC AUTO: 89.2 FL (ref 80–99)
PLATELET # BLD AUTO: 188 10*3/MM3 (ref 150–450)
PMV BLD AUTO: 11.1 FL (ref 6–12)
POTASSIUM BLD-SCNC: 3.8 MMOL/L (ref 3.5–5.5)
PROTHROMBIN TIME: 10.1 SECONDS (ref 9.6–11.5)
RBC # BLD AUTO: 3.6 10*6/MM3 (ref 4.2–5.76)
SODIUM BLD-SCNC: 136 MMOL/L (ref 132–146)
UNIT  ABO: NORMAL
UNIT  ABO: NORMAL
UNIT  RH: NORMAL
UNIT  RH: NORMAL
WBC NRBC COR # BLD: 8.39 10*3/MM3 (ref 3.5–10.8)

## 2018-01-20 PROCEDURE — 94799 UNLISTED PULMONARY SVC/PX: CPT

## 2018-01-20 PROCEDURE — 85610 PROTHROMBIN TIME: CPT | Performed by: PHYSICIAN ASSISTANT

## 2018-01-20 PROCEDURE — 94640 AIRWAY INHALATION TREATMENT: CPT

## 2018-01-20 PROCEDURE — 99024 POSTOP FOLLOW-UP VISIT: CPT | Performed by: PHYSICIAN ASSISTANT

## 2018-01-20 PROCEDURE — 85027 COMPLETE CBC AUTOMATED: CPT | Performed by: PHYSICIAN ASSISTANT

## 2018-01-20 PROCEDURE — 99232 SBSQ HOSP IP/OBS MODERATE 35: CPT | Performed by: HOSPITALIST

## 2018-01-20 PROCEDURE — 71045 X-RAY EXAM CHEST 1 VIEW: CPT

## 2018-01-20 PROCEDURE — 82962 GLUCOSE BLOOD TEST: CPT

## 2018-01-20 PROCEDURE — 80048 BASIC METABOLIC PNL TOTAL CA: CPT | Performed by: PHYSICIAN ASSISTANT

## 2018-01-20 RX ORDER — BUMETANIDE 0.25 MG/ML
2 INJECTION INTRAMUSCULAR; INTRAVENOUS ONCE
Status: COMPLETED | OUTPATIENT
Start: 2018-01-20 | End: 2018-01-20

## 2018-01-20 RX ORDER — WARFARIN SODIUM 5 MG/1
5 TABLET ORAL
Status: DISCONTINUED | OUTPATIENT
Start: 2018-01-20 | End: 2018-01-22 | Stop reason: HOSPADM

## 2018-01-20 RX ADMIN — FAMOTIDINE 20 MG: 20 TABLET, FILM COATED ORAL at 20:19

## 2018-01-20 RX ADMIN — ALBUTEROL SULFATE 2.5 MG: 2.5 SOLUTION RESPIRATORY (INHALATION) at 00:40

## 2018-01-20 RX ADMIN — HYDROCODONE BITARTRATE AND ACETAMINOPHEN 1 TABLET: 7.5; 325 TABLET ORAL at 07:24

## 2018-01-20 RX ADMIN — FAMOTIDINE 20 MG: 20 TABLET, FILM COATED ORAL at 08:44

## 2018-01-20 RX ADMIN — Medication 10 ML: at 20:22

## 2018-01-20 RX ADMIN — WARFARIN SODIUM 5 MG: 5 TABLET ORAL at 17:17

## 2018-01-20 RX ADMIN — DOCUSATE SODIUM AND SENNOSIDES 2 TABLET: 8.6; 5 TABLET, FILM COATED ORAL at 20:19

## 2018-01-20 RX ADMIN — METOPROLOL TARTRATE 12.5 MG: 25 TABLET, FILM COATED ORAL at 08:44

## 2018-01-20 RX ADMIN — HYDROCODONE BITARTRATE AND ACETAMINOPHEN 1 TABLET: 7.5; 325 TABLET ORAL at 17:18

## 2018-01-20 RX ADMIN — DOCUSATE SODIUM AND SENNOSIDES 2 TABLET: 8.6; 5 TABLET, FILM COATED ORAL at 08:44

## 2018-01-20 RX ADMIN — METOPROLOL TARTRATE 12.5 MG: 25 TABLET, FILM COATED ORAL at 20:19

## 2018-01-20 RX ADMIN — ALBUTEROL SULFATE 2.5 MG: 2.5 SOLUTION RESPIRATORY (INHALATION) at 15:40

## 2018-01-20 RX ADMIN — BUMETANIDE 2 MG: 0.25 INJECTION INTRAMUSCULAR; INTRAVENOUS at 20:26

## 2018-01-20 RX ADMIN — HYDROCODONE BITARTRATE AND ACETAMINOPHEN 1 TABLET: 7.5; 325 TABLET ORAL at 13:49

## 2018-01-20 RX ADMIN — MONTELUKAST SODIUM 10 MG: 10 TABLET, FILM COATED ORAL at 08:44

## 2018-01-20 RX ADMIN — HYDROCODONE BITARTRATE AND ACETAMINOPHEN 1 TABLET: 7.5; 325 TABLET ORAL at 22:02

## 2018-01-20 RX ADMIN — ALBUTEROL SULFATE 2.5 MG: 2.5 SOLUTION RESPIRATORY (INHALATION) at 10:30

## 2018-01-20 RX ADMIN — HYDROCODONE BITARTRATE AND ACETAMINOPHEN 1 TABLET: 7.5; 325 TABLET ORAL at 01:12

## 2018-01-20 NOTE — PLAN OF CARE
Problem: Patient Care Overview (Adult)  Goal: Plan of Care Review  Outcome: Ongoing (interventions implemented as appropriate)   01/20/18 0340   Coping/Psychosocial Response Interventions   Plan Of Care Reviewed With patient   Patient Care Overview   Progress progress toward functional goals as expected   Outcome Evaluation   Outcome Summary/Follow up Plan sleeping at intervals with pain meds as needed, vss       Problem: Perioperative Period (Adult)  Goal: Signs and Symptoms of Listed Potential Problems Will be Absent or Manageable (Perioperative Period)  Outcome: Ongoing (interventions implemented as appropriate)      Problem: Pressure Ulcer Risk (Eusebio Scale) (Adult,Obstetrics,Pediatric)  Goal: Skin Integrity  Outcome: Ongoing (interventions implemented as appropriate)      Problem: Fall Risk (Adult)  Goal: Absence of Falls  Outcome: Ongoing (interventions implemented as appropriate)

## 2018-01-20 NOTE — PROGRESS NOTES
"Pharmacy Consult  -  Warfarin    Filemon Jj is a  42 y.o. male   Height - 170 cm (66.93\")  Weight - 88.3 kg (194 lb 9.6 oz)    Consulting Provider: - RUSSELL Sheikh (ELICIA)  Indication: - [facility] aortic valve  Goal INR: -   Home Regimen:   -new start    Bridge Therapy: No      Drug-Drug Interactions with current regimen:   No major    Warfarin Dosing During Admission:    Date  1/20           INR  0.93           Dose  5 mg                Education Provided:    Labs:      Results from last 7 days     Lab Units 01/20/18  1159 01/20/18  0517 01/19/18  0335 01/18/18  0340 01/17/18  2139 01/17/18  1750 01/17/18  1318 01/17/18  1317 01/17/18  1131  01/16/18  1930   INR  0.93 --  --  1.01 --  --  --  1.09 --  --  0.96   APTT seconds --  --  --  --  --  --  --  27.4 --  --  28.2   HEMOGLOBIN g/dL --  10.0* 10.4* 9.9* 10.2* 10.0* 10.4* --  --  --  14.4   HEMOGLOBIN, POC g/dL --  --  --  --  --  --  --  --  9.2* < > --    HEMATOCRIT % --  32.1* 33.4* 32.0* 32.5* 31.1* 32.1* --  --  --  45.3   HEMATOCRIT POC % --  --  --  --  --  --  --  --  27* < > --    PLATELETS 10*3/mm3 --  188 197 229 --  213 190 --  --  --  245   < > = values in this interval not displayed.       Results from last 7 days     Lab Units 01/20/18  0517 01/19/18  0335 01/18/18  0340 01/16/18  1930   SODIUM mmol/L 136 130* 135 < > 138   POTASSIUM mmol/L 3.8 4.9 4.9 < > 3.9   CHLORIDE mmol/L 101 96* 106 < > 108   CO2 mmol/L 29.0 25.0 24.0 < > 24.0   BUN mg/dL 19 21 16 < > 11   CREATININE mg/dL 1.00 1.30 1.10 < > 0.90   CALCIUM mg/dL 9.6 9.1 8.1* < > 9.4   BILIRUBIN mg/dL --  --  --  --  0.2*   ALK PHOS U/L --  --  --  --  75   ALT (SGPT) U/L --  --  --  --  27   AST (SGOT) U/L --  --  --  --  19   GLUCOSE mg/dL 120* 143* 148* < > 97   < > = values in this interval not displayed.       Current dietary intake: % meals      Assessment/Plan:   Patient being initiated on warfarin today.  Denies having used in the past.    Will plan to begin with warfarin 5 mg " daily and monitor daily PT/INR.    Pharmacy will follow.      Thank you for this consult,  Wilma Calderón, PharmD  01/20/18  3:13 PM

## 2018-01-20 NOTE — PROGRESS NOTES
CTS Progress Note      POD 3 s/p Ryan    CC: sore     LOS: 4 days   Patient Care Team:  Macho Jose MD as PCP - General  Macho Jose MD as PCP - Family Medicine  Nabor Aburto MD as Consulting Physician (Cardiology)    Subjective  No new complaints  Had a good day yesterday  No BM yet    Objective    Vital Signs  Temp:  [98.1 °F (36.7 °C)-99.2 °F (37.3 °C)] 99 °F (37.2 °C)  Heart Rate:  [80-96] 96  Resp:  [16-24] 16  BP: ()/(56-75) 97/61    Physical Exam:   General Appearance: alert, appears stated age and sitting up in bed   Lungs: clear to auscultation, respirations regular, respirations even and respirations unlabored   Heart: regular rhythm & normal rate, normal S1, S2, no murmur, no tameka, no rub and no click   Skin:  Incision c/d/i     Results     Results from last 7 days  Lab Units 01/20/18  0517   WBC 10*3/mm3 8.39   HEMOGLOBIN g/dL 10.0*   HEMATOCRIT % 32.1*   PLATELETS 10*3/mm3 188       Results from last 7 days  Lab Units 01/20/18  0517   SODIUM mmol/L 136   POTASSIUM mmol/L 3.8   CHLORIDE mmol/L 101   CO2 mmol/L 29.0   BUN mg/dL 19   CREATININE mg/dL 1.00   GLUCOSE mg/dL 120*   CALCIUM mg/dL 9.6           Imaging Results (last 24 hours)     Procedure Component Value Units Date/Time    XR Chest 1 View [[id number]] Collected:  01/19/18 0859     Updated:  01/20/18 0953    Narrative:       EXAMINATION: XR CHEST 1 VW-01/19/2018:      INDICATION: Postop Heart Surgery; Z74.09-Other reduced mobility;  I71.2-Thoracic aortic aneurysm, without rupture.      COMPARISON: 01/18/2018.     FINDINGS: Large bore thoracotomy tubes remain in place. The heart is  enlarged. The vasculature appears normal. Lung volumes are relatively  low and there is mild hazy interstitial opacity of the lung bases stable  from prior exam. No pneumothorax is seen.           Impression:       Low lung volumes and stable mild basilar atelectasis.     D:  01/19/2018  E:  01/19/2018     This report was  finalized on 1/20/2018 9:51 AM by DR. Burton Vegas MD.           CT 350cc out last 12 hours    Assessment  POD 2 s/p Bent[place], expected recovery  Principal Problem:    Aneurysm of aortic sinus of Valsalva without rupture  Active Problems:    Ascending aortic aneurysm    Smoker    COPD (chronic obstructive pulmonary disease)    Essential hypertension  CV: stable      Plan   Keep chest tubes   Diuresis  Pulmonary toilet  Ambulate  Start Coumadin today

## 2018-01-20 NOTE — PROGRESS NOTES
[facility] Medicine Services  PROGRESS NOTE    Patient Name: Filemon Jj  : 1975  MRN: [id number]    Date of Admission: 2018  Length of Stay: 4  Primary Care Physician: Macho Jose MD    Subjective   Subjective     CC:  S/p Bentall procedure     HPI:  Patient sitting up in bed with family present.  Two Pleur-evac's at bedside, both to Y connector to anterior chest tubes.  Continues to have serosanguineous drainage.  Denies fevers or chills.  Unable to cough due to severe pain.  Has been using incentive spirometer.  Appetite poor.  Mobility very limited.    Hospital medicine assuming management consultative duty from intensivist today.    Review of Systems  Gen- No fevers, chills  CV- No palpitations  Resp- As above  GI- No N/V/D, abd pain    Otherwise ROS is negative except as mentioned in the HPI.    Objective   Objective     Vital Signs:   Temp:  [98.1 °F (36.7 °C)-99.2 °F (37.3 °C)] 99 °F (37.2 °C)  Heart Rate:  [80-96] 96  Resp:  [16-24] 16  BP: ()/(56-75) 97/61     Physical Exam:  Constitutional: No acute distress, awake, alert  HENT: NCAT, mucous membranes moist  Respiratory: Very poor air movement bilaterally with decreased expansion to bases, respiratory effort increased, no wheezing  Cardiovascular: RRR, no murmurs, rubs, or gallops, A2 click, palpable pedal pulses bilaterally  Gastrointestinal: Positive bowel sounds, soft, nontender, nondistended  Musculoskeletal: No bilateral ankle edema  Psychiatric: Appropriate affect, cooperative  Neurologic: Oriented x 3, strength symmetric in all extremities, Cranial Nerves grossly intact to confrontation, speech clear  Skin: No rashes    Results Reviewed:  I have personally reviewed current lab, radiology, and data and agree.      Results from last 7 days  Lab Units 18  1159 18  0517 18  0335 18  0340  18  1317   WBC 10*3/mm3  --  8.39 12.17* 11.48*  < >  --    HEMOGLOBIN g/dL  --   10.0* 10.4* 9.9*  < >  --    HEMATOCRIT %  --  32.1* 33.4* 32.0*  < >  --    PLATELETS 10*3/mm3  --  188 197 229  < >  --    INR  0.93  --   --  1.01  --  1.09   < > = values in this interval not displayed.    Results from last 7 days  Lab Units 01/20/18  0517 01/19/18  0335 01/18/18  0340  01/16/18  1930   SODIUM mmol/L 136 130* 135  < > 138   POTASSIUM mmol/L 3.8 4.9 4.9  < > 3.9   CHLORIDE mmol/L 101 96* 106  < > 108   CO2 mmol/L 29.0 25.0 24.0  < > 24.0   BUN mg/dL 19 21 16  < > 11   CREATININE mg/dL 1.00 1.30 1.10  < > 0.90   GLUCOSE mg/dL 120* 143* 148*  < > 97   CALCIUM mg/dL 9.6 9.1 8.1*  < > 9.4   ALT (SGPT) U/L  --   --   --   --  27   AST (SGOT) U/L  --   --   --   --  19   < > = values in this interval not displayed.  No results found for: BNP  pH, Arterial   Date Value Ref Range Status   01/17/2018 7.305 (L) 7.350 - 7.450 pH units Final       Microbiology Results Abnormal     Procedure Component Value - Date/Time    Urine Culture - Urine, Urine, Catheter [[id number]]  (Normal) Collected:  01/17/18 1320    Lab Status:  Final result Specimen:  Urine from Urine, Catheter Updated:  01/19/18 0913     Urine Culture No growth at 2 days          Imaging Results (last 24 hours)     Procedure Component Value Units Date/Time    XR Chest 1 View [[id number]] Collected:  01/19/18 0859     Updated:  01/20/18 0953    Narrative:       EXAMINATION: XR CHEST 1 VW-01/19/2018:      INDICATION: Postop Heart Surgery; Z74.09-Other reduced mobility;  I71.2-Thoracic aortic aneurysm, without rupture.      COMPARISON: 01/18/2018.     FINDINGS: Large bore thoracotomy tubes remain in place. The heart is  enlarged. The vasculature appears normal. Lung volumes are relatively  low and there is mild hazy interstitial opacity of the lung bases stable  from prior exam. No pneumothorax is seen.           Impression:       Low lung volumes and stable mild basilar atelectasis.     D:  01/19/2018  E:  01/19/2018     This report was finalized on  1/20/2018 9:51 AM by DR. Burton Vegas MD.           Results for orders placed in visit on 01/17/18   Intra-Operative Transesophageal Echocardiogram    Narrative Eric Horner MD     1/17/2018 11:16 AM  Procedure Performed: Emergent/Open-Heart Anesthesia AMBER     Start Time:        End Time:      Preanesthesia Checklist:  Patient identified, IV assessed, risks and benefits discussed, monitors   and equipment assessed, procedure being performed at surgeon's request and   anesthesia consent obtained.    General Procedure Information  Diagnostic Indications for Echo:  assessment of ascending aorta,   assessment of surgical repair, defect repair evaluation and hemodynamic   monitoring  Physician Requesting Echo: JARETT COLLAZO  Location performed:  OR  Intubated  Bite block not placed  Heart visualized  Probe Insertion:  Easy  Probe Type:  Multiplane  Modalities:  2D only, color flow mapping, continuous wave Doppler and   pulse wave Doppler    Echocardiographic and Doppler Measurements    Ventricles    Right Ventricle:  Thrombus not present.    Left Ventricle:  Cavity size dilated.  Thrombus not present.  Global Function mildly   impaired.  Ejection Fraction 50%.      Ventricular Regional Function:    Wall Motion Comments:       Mild global HK, EF 50%    Valves    Aortic Valve:  Annulus dilated.  Stenosis not present.  Area: 3.3 cm².  Regurgitation   moderate.  Leaflets normal.  Leaflet motions normal.      Mitral Valve:  Annulus normal.  Stenosis not present.  Regurgitation absent.  Leaflets   normal.  Leaflet motions normal.      Tricuspid Valve:  Annulus normal.  Stenosis not present.  Regurgitation absent.  Leaflets   normal.  Leaflet motions normal.    Other Valve Findings:       DILATED AV, ANNULUS 28, MODERATE AI BY P1/2 [id number]    Aorta    Ascending Aorta:  Size aneurysmal.  Diameter 6 cm.  Dissection not present.  Plaque   thickness less than 3 mm.  Mobile plaque not present.    Aortic Arch:  Size normal.   Diameter 2.77 cm.  Dissection not present.  Plaque thickness   less than 3 mm.  Mobile plaque not present.    Descending Aorta:  Size normal.  Diameter 2.07 cm.  Dissection not present.  Plaque thickness   less than 3 mm.  Mobile plaque not present.          Atria    Right Atrium:  Size normal.  Spontaneous echo contrast not present.  Thrombus not   present.  Tumor not present.  Device not present.      Left Atrium:  Size normal.  Spontaneous echo contrast not present.  Thrombus not   present.  Tumor not present.  Device not present.    Left atrial appendage normal.              Other Findings  Pericardium:  pericardial effusion      Anesthesia Information  Performed Personally  Anesthesiologist:  BARBARA VILLATORO      Echocardiogram Comments:       Post Bentall:  Bileaflet mechanical valve conduit, both leaflets open   and coapt, washing jets seen, AVG 12 mean 7; EF 45 % with inf HK-post   performed by Reji RUSSO have reviewed the medications.    Assessment/Plan   Assessment / Plan     Hospital Problem List     * (Principal)Aneurysm of aortic sinus of Valsalva without rupture    Ascending aortic aneurysm    Overview Addendum 1/17/2018  2:41 PM by DION Hoffman     · CT chest (10/17): 6 cm ascending aortic aneurysm  · Bentall procedure (01/17/2018) with Dr Aaron Lucas         Smoker    COPD (chronic obstructive pulmonary disease)    Essential hypertension         Brief Hospital Course to date:  Filemon Jj is a 42 y.o. male who was admitted on 1/17 following Bentall procedure for ascending aortic aneurysm:     Assessment & Plan:    6 cm Ascending Aortic Aneurysm (no dissection) s/p Bentall procedure on 1/17  - chest tubes as per CTS  - Pulmonary toilet, last CXR reviewed with poor expansion, IS at bedside, encouraged pt to use     - Ambulate, continue PT   - Supportive care with prn pain meds     Severe COPD (FEV1 32% predicted)  Tobacco Abuse   - prn Nebs    HTN  - BP reviewed and stable      Pre-diabetes mellitus   - Continue low dose SSI for now, FSBS reviewed and stable, dc if no insulin needs for 48 hours  - outpt f/u with PCP after dc     Periodontal disease s/p multiple teeth extractions pre-op    DVT Prophylaxis:  Coumadin to start today   CODE STATUS: Full Code    Disposition: I expect the patient to be discharged home (?) at discretion of primary service.     Ramon Orozco MD  01/20/18  11:55 AM

## 2018-01-21 ENCOUNTER — APPOINTMENT (OUTPATIENT)
Dept: GENERAL RADIOLOGY | Facility: HOSPITAL | Age: 43
End: 2018-01-21

## 2018-01-21 LAB
ANION GAP SERPL CALCULATED.3IONS-SCNC: 9 MMOL/L (ref 3–11)
BUN BLD-MCNC: 18 MG/DL (ref 9–23)
BUN/CREAT SERPL: 20 (ref 7–25)
CALCIUM SPEC-SCNC: 9.5 MG/DL (ref 8.7–10.4)
CHLORIDE SERPL-SCNC: 100 MMOL/L (ref 99–109)
CO2 SERPL-SCNC: 30 MMOL/L (ref 20–31)
CREAT BLD-MCNC: 0.9 MG/DL (ref 0.6–1.3)
GFR SERPL CREATININE-BSD FRML MDRD: 93 ML/MIN/1.73
GLUCOSE BLD-MCNC: 89 MG/DL (ref 70–100)
GLUCOSE BLDC GLUCOMTR-MCNC: 113 MG/DL (ref 70–130)
GLUCOSE BLDC GLUCOMTR-MCNC: 122 MG/DL (ref 70–130)
GLUCOSE BLDC GLUCOMTR-MCNC: 92 MG/DL (ref 70–130)
GLUCOSE BLDC GLUCOMTR-MCNC: 93 MG/DL (ref 70–130)
HCT VFR BLD AUTO: 31.2 % (ref 38.9–50.9)
HGB BLD-MCNC: 9.9 G/DL (ref 13.1–17.5)
INR PPP: 1.06
POTASSIUM BLD-SCNC: 3.6 MMOL/L (ref 3.5–5.5)
PROTHROMBIN TIME: 11.6 SECONDS (ref 9.6–11.5)
SODIUM BLD-SCNC: 139 MMOL/L (ref 132–146)

## 2018-01-21 PROCEDURE — 97110 THERAPEUTIC EXERCISES: CPT

## 2018-01-21 PROCEDURE — 85610 PROTHROMBIN TIME: CPT | Performed by: PHYSICIAN ASSISTANT

## 2018-01-21 PROCEDURE — 85018 HEMOGLOBIN: CPT | Performed by: PHYSICIAN ASSISTANT

## 2018-01-21 PROCEDURE — 99024 POSTOP FOLLOW-UP VISIT: CPT | Performed by: PHYSICIAN ASSISTANT

## 2018-01-21 PROCEDURE — 99232 SBSQ HOSP IP/OBS MODERATE 35: CPT | Performed by: INTERNAL MEDICINE

## 2018-01-21 PROCEDURE — 80048 BASIC METABOLIC PNL TOTAL CA: CPT | Performed by: PHYSICIAN ASSISTANT

## 2018-01-21 PROCEDURE — 94760 N-INVAS EAR/PLS OXIMETRY 1: CPT

## 2018-01-21 PROCEDURE — 85014 HEMATOCRIT: CPT | Performed by: PHYSICIAN ASSISTANT

## 2018-01-21 PROCEDURE — 82962 GLUCOSE BLOOD TEST: CPT

## 2018-01-21 PROCEDURE — 97116 GAIT TRAINING THERAPY: CPT

## 2018-01-21 PROCEDURE — 94640 AIRWAY INHALATION TREATMENT: CPT

## 2018-01-21 PROCEDURE — 71045 X-RAY EXAM CHEST 1 VIEW: CPT

## 2018-01-21 RX ORDER — BUMETANIDE 0.25 MG/ML
2 INJECTION INTRAMUSCULAR; INTRAVENOUS ONCE
Status: COMPLETED | OUTPATIENT
Start: 2018-01-21 | End: 2018-01-21

## 2018-01-21 RX ADMIN — HYDROCODONE BITARTRATE AND ACETAMINOPHEN 1 TABLET: 7.5; 325 TABLET ORAL at 05:23

## 2018-01-21 RX ADMIN — OXYCODONE AND ACETAMINOPHEN 2 TABLET: 5; 325 TABLET ORAL at 11:39

## 2018-01-21 RX ADMIN — METOPROLOL TARTRATE 12.5 MG: 25 TABLET, FILM COATED ORAL at 08:28

## 2018-01-21 RX ADMIN — Medication 10 ML: at 22:48

## 2018-01-21 RX ADMIN — FAMOTIDINE 20 MG: 20 TABLET, FILM COATED ORAL at 22:45

## 2018-01-21 RX ADMIN — DOCUSATE SODIUM AND SENNOSIDES 2 TABLET: 8.6; 5 TABLET, FILM COATED ORAL at 22:45

## 2018-01-21 RX ADMIN — FAMOTIDINE 20 MG: 20 TABLET, FILM COATED ORAL at 08:28

## 2018-01-21 RX ADMIN — HYDROCODONE BITARTRATE AND ACETAMINOPHEN 1 TABLET: 7.5; 325 TABLET ORAL at 14:19

## 2018-01-21 RX ADMIN — WARFARIN SODIUM 5 MG: 5 TABLET ORAL at 18:23

## 2018-01-21 RX ADMIN — METOPROLOL TARTRATE 12.5 MG: 25 TABLET, FILM COATED ORAL at 22:45

## 2018-01-21 RX ADMIN — BUMETANIDE 2 MG: 0.25 INJECTION INTRAMUSCULAR; INTRAVENOUS at 22:41

## 2018-01-21 RX ADMIN — HYDROCODONE BITARTRATE AND ACETAMINOPHEN 1 TABLET: 7.5; 325 TABLET ORAL at 22:45

## 2018-01-21 RX ADMIN — MONTELUKAST SODIUM 10 MG: 10 TABLET, FILM COATED ORAL at 08:28

## 2018-01-21 RX ADMIN — DOCUSATE SODIUM AND SENNOSIDES 2 TABLET: 8.6; 5 TABLET, FILM COATED ORAL at 08:28

## 2018-01-21 RX ADMIN — ALBUTEROL SULFATE 2.5 MG: 2.5 SOLUTION RESPIRATORY (INHALATION) at 08:48

## 2018-01-21 RX ADMIN — HYDROCODONE BITARTRATE AND ACETAMINOPHEN 1 TABLET: 7.5; 325 TABLET ORAL at 18:22

## 2018-01-21 NOTE — PROGRESS NOTES
[facility] Medicine Services  PROGRESS NOTE    Patient Name: Filemon Jj  : 1975  MRN: [id number]    Date of Admission: 2018  Length of Stay: 5  Primary Care Physician: Macho Jose MD    Subjective   Subjective     CC:  S/p Bentall procedure     HPI:  Patient sitting up in bed. No issues overnight. Pain at CT sites        Review of Systems  Gen- No fevers, chills  CV- No palpitations  Resp- As above  GI- No N/V/D, abd pain    Otherwise ROS is negative except as mentioned in the HPI.    Objective   Objective     Vital Signs:   Temp:  [98 °F (36.7 °C)-98.6 °F (37 °C)] 98.2 °F (36.8 °C)  Heart Rate:  [] 102  Resp:  [16-20] 20  BP: ()/(61-73) 102/69     Physical Exam:  Constitutional: No acute distress, awake, alert  HENT: NCAT, mucous membranes moist  Respiratory: Very poor air movement bilaterally with decreased expansion to bases, respiratory effort increased, no wheezing  Cardiovascular: RRR, no murmurs, rubs, or gallops, A2 click, palpable pedal pulses bilaterally  Gastrointestinal: Positive bowel sounds, soft, nontender, nondistended  Musculoskeletal: No bilateral ankle edema  Psychiatric: Appropriate affect, cooperative  Neurologic: Oriented x 3, strength symmetric in all extremities, Cranial Nerves grossly intact to confrontation, speech clear  Skin: No rashes    Results Reviewed:  I have personally reviewed current lab, radiology, and data and agree.      Results from last 7 days  Lab Units 18  0504 18  1159 18  0517 18  0335 18  0340   WBC 10*3/mm3  --   --  8.39 12.17* 11.48*   HEMOGLOBIN g/dL 9.9*  --  10.0* 10.4* 9.9*   HEMATOCRIT % 31.2*  --  32.1* 33.4* 32.0*   PLATELETS 10*3/mm3  --   --  188 197 229   INR  1.06 0.93  --   --  1.01       Results from last 7 days  Lab Units 18  0504 18  0517 18  0335  18  1930   SODIUM mmol/L 139 136 130*  < > 138   POTASSIUM mmol/L 3.6 3.8 4.9  < > 3.9    CHLORIDE mmol/L 100 101 96*  < > 108   CO2 mmol/L 30.0 29.0 25.0  < > 24.0   BUN mg/dL 18 19 21  < > 11   CREATININE mg/dL 0.90 1.00 1.30  < > 0.90   GLUCOSE mg/dL 89 120* 143*  < > 97   CALCIUM mg/dL 9.5 9.6 9.1  < > 9.4   ALT (SGPT) U/L  --   --   --   --  27   AST (SGOT) U/L  --   --   --   --  19   < > = values in this interval not displayed.  No results found for: BNP  No results found for: PHART    Microbiology Results Abnormal     Procedure Component Value - Date/Time    Urine Culture - Urine, Urine, Catheter [[id number]]  (Normal) Collected:  01/17/18 1320    Lab Status:  Final result Specimen:  Urine from Urine, Catheter Updated:  01/19/18 0913     Urine Culture No growth at 2 days          Imaging Results (last 24 hours)     Procedure Component Value Units Date/Time    XR Chest 1 View [[id number]] Collected:  01/20/18 1814     Updated:  01/21/18 1030    Narrative:          EXAMINATION: XR CHEST 1 VW - 01/20/2018     INDICATION: Z74.09-Other reduced mobility; I71.2-Thoracic aortic  aneurysm, without rupture.      COMPARISON: Chest x-ray 1/19/2018.     FINDINGS: Support hardware unchanged. Small left apical pneumothorax is  now identified. No new parenchymal disease or focal consolidation with  minimal bibasilar atelectasis greatest on the left. Cardiac size  enlarged and unchanged. Trace left pleural effusion.           Impression:       Development/visualization of small left apical pneumothorax  is visualized with chest tube remaining in place. No significant change  in pulmonary findings otherwise.     DICTATED:     01/20/2018  EDITED    :     01/20/2018      This report was finalized on 1/21/2018 10:28 AM by Dr. Carmelo Diggs.       XR Chest 1 View [[id number]] Collected:  01/21/18 0738     Updated:  01/21/18 1223    Narrative:          EXAMINATION: XR CHEST 1 VW - 01/21/2018     INDICATION:  Z74.09-Other reduced mobility; I71.2-Thoracic aortic  aneurysm, without rupture.      COMPARISON: One-day prior.      FINDINGS: Support hardware unchanged and in satisfactory positioning.  Persistent trace left apical pneumothorax. Cardiac size enlarged with  minimal central pulmonary vascularity increase as well as bibasilar  opacities greatest on the right likely representing atelectasis. No new  parenchymal disease.       Impression:       Persistent trace left apical pneumothorax appears slightly  decreased from prior with chest tubes remaining in place. Mild increase  in bibasilar atelectasis without focal consolidation or new parenchymal  disease otherwise.     DICTATED:     01/21/2018  EDITED    :     01/21/2018      This report was finalized on 1/21/2018 12:21 PM by Dr. Carmelo Diggs.           Results for orders placed in visit on 01/17/18   Intra-Operative Transesophageal Echocardiogram    Narrative Eric Horner MD     1/17/2018 11:16 AM  Procedure Performed: Emergent/Open-Heart Anesthesia AMBER     Start Time:        End Time:      Preanesthesia Checklist:  Patient identified, IV assessed, risks and benefits discussed, monitors   and equipment assessed, procedure being performed at surgeon's request and   anesthesia consent obtained.    General Procedure Information  Diagnostic Indications for Echo:  assessment of ascending aorta,   assessment of surgical repair, defect repair evaluation and hemodynamic   monitoring  Physician Requesting Echo: JARETT COLLAZO  Location performed:  OR  Intubated  Bite block not placed  Heart visualized  Probe Insertion:  Easy  Probe Type:  Multiplane  Modalities:  2D only, color flow mapping, continuous wave Doppler and   pulse wave Doppler    Echocardiographic and Doppler Measurements    Ventricles    Right Ventricle:  Thrombus not present.    Left Ventricle:  Cavity size dilated.  Thrombus not present.  Global Function mildly   impaired.  Ejection Fraction 50%.      Ventricular Regional Function:    Wall Motion Comments:       Mild global HK, EF 50%    Valves    Aortic Valve:  Annulus  dilated.  Stenosis not present.  Area: 3.3 cm².  Regurgitation   moderate.  Leaflets normal.  Leaflet motions normal.      Mitral Valve:  Annulus normal.  Stenosis not present.  Regurgitation absent.  Leaflets   normal.  Leaflet motions normal.      Tricuspid Valve:  Annulus normal.  Stenosis not present.  Regurgitation absent.  Leaflets   normal.  Leaflet motions normal.    Other Valve Findings:       DILATED AV, ANNULUS 28, MODERATE AI BY P1/2 [id number]    Aorta    Ascending Aorta:  Size aneurysmal.  Diameter 6 cm.  Dissection not present.  Plaque   thickness less than 3 mm.  Mobile plaque not present.    Aortic Arch:  Size normal.  Diameter 2.77 cm.  Dissection not present.  Plaque thickness   less than 3 mm.  Mobile plaque not present.    Descending Aorta:  Size normal.  Diameter 2.07 cm.  Dissection not present.  Plaque thickness   less than 3 mm.  Mobile plaque not present.          Atria    Right Atrium:  Size normal.  Spontaneous echo contrast not present.  Thrombus not   present.  Tumor not present.  Device not present.      Left Atrium:  Size normal.  Spontaneous echo contrast not present.  Thrombus not   present.  Tumor not present.  Device not present.    Left atrial appendage normal.              Other Findings  Pericardium:  pericardial effusion      Anesthesia Information  Performed Personally  Anesthesiologist:  BARBARA VILLATORO      Echocardiogram Comments:       Post Bentall:  Bileaflet mechanical valve conduit, both leaflets open   and coapt, washing jets seen, AVG 12 mean 7; EF 45 % with inf HK-post   performed by Reji RUSSO have reviewed the medications.    Assessment/Plan   Assessment / Plan     Hospital Problem List     * (Principal)Aneurysm of aortic sinus of Valsalva without rupture    Ascending aortic aneurysm    Overview Addendum 1/17/2018  2:41 PM by DION Hoffman     · CT chest (10/17): 6 cm ascending aortic aneurysm  · Bentall procedure (01/17/2018) with Dr Aaron Lucas          Smoker    COPD (chronic obstructive pulmonary disease)    Essential hypertension         Brief Hospital Course to date:  Filemon Jj is a 42 y.o. male who was admitted on 1/17 following Bentall procedure for ascending aortic aneurysm:     Assessment & Plan:    6 cm Ascending Aortic Aneurysm (no dissection) s/p Bentall procedure on 1/17  - chest tubes as per CTS  - Pulmonary toilet, last CXR reviewed with poor expansion, IS at bedside, encouraged pt to use     - Ambulate, continue PT   - Supportive care with prn pain meds     Severe COPD (FEV1 32% predicted)  Tobacco Abuse   - prn Nebs    HTN  - BP reviewed and stable     Pre-diabetes mellitus   - Continue low dose SSI for now, FSBS reviewed and stable, dc if no insulin needs for 48 hours (tomorrow)  - outpt f/u with PCP after dc     Periodontal disease s/p multiple teeth extractions pre-op    DVT Prophylaxis:  Coumadin to start today   CODE STATUS: Full Code    Disposition: I expect the patient to be discharged home (?) at discretion of primary service.     Joy Betancourt MD  01/21/18  12:58 PM

## 2018-01-21 NOTE — PROGRESS NOTES
"Pharmacy Consult  -  Warfarin    Filemon Jj is a  42 y.o. male   Height - 170 cm (66.93\")  Weight - 88 kg (194 lb 1.6 oz)    Consulting Provider: - RUSSELL Sheikh (ELICIA)  Indication: - [facility] aortic valve  Goal INR: - 2-3  Home Regimen:   -new start    Bridge Therapy: No      Drug-Drug Interactions with current regimen:   No major    Warfarin Dosing During Admission:    Date  1/20 1.21          INR  0.93 1.06          Dose  5 mg 5 mg               Education Provided:    Labs:    Results from last 7 days   Lab Units 01/21/18  0504 01/20/18  1159 01/20/18  0517 01/19/18  0335 01/18/18  0340 01/17/18  2139 01/17/18  1750 01/17/18  1318 01/17/18  1317  01/16/18  1930   INR  1.06 0.93 --  --  1.01 --  --  --  1.09 --  0.96   APTT seconds --  --  --  --  --  --  --  --  27.4 --  28.2   HEMOGLOBIN g/dL 9.9* --  10.0* 10.4* 9.9* 10.2* 10.0* 10.4* --  --  14.4   HEMOGLOBIN, POC  --  --  --  --  --  --  --  --  --  < > --    HEMATOCRIT % 31.2* --  32.1* 33.4* 32.0* 32.5* 31.1* 32.1* --  --  45.3   HEMATOCRIT POC  --  --  --  --  --  --  --  --  --  < > --    PLATELETS 10*3/mm3 --  --  188 197 229 --  213 190 --  --  245   < > = values in this interval not displayed.   Results from last 7 days   Lab Units 01/21/18  0504 01/20/18  0517 01/19/18  0335  01/16/18  1930   SODIUM mmol/L 139 136 130* < > 138   POTASSIUM mmol/L 3.6 3.8 4.9 < > 3.9   CHLORIDE mmol/L 100 101 96* < > 108   CO2 mmol/L 30.0 29.0 25.0 < > 24.0   BUN mg/dL 18 19 21 < > 11   CREATININE mg/dL 0.90 1.00 1.30 < > 0.90   CALCIUM mg/dL 9.5 9.6 9.1 < > 9.4   BILIRUBIN mg/dL --  --  --  --  0.2*   ALK PHOS U/L --  --  --  --  75   ALT (SGPT) U/L --  --  --  --  27   AST (SGOT) U/L --  --  --  --  19   GLUCOSE mg/dL 89 120* 143* < > 97   < > = values in this interval not displayed.     Current dietary intake: % meals      Assessment/Plan:   Patient initiated on warfarin 5 mg daily on 1/20.  Denies having used in the past.    INR with slight increase overnight, " so will plan to continue with 5 mg dose for now. Expect a few more days to see full effect.     Pharmacy will follow.      Thank you for this consult,  Wilma Calderón, PharmD  01/21/18  3:13 PM

## 2018-01-21 NOTE — PLAN OF CARE
Problem: Patient Care Overview (Adult)  Goal: Plan of Care Review  Outcome: Ongoing (interventions implemented as appropriate)   01/21/18 0338   Coping/Psychosocial Response Interventions   Plan Of Care Reviewed With patient   Patient Care Overview   Progress progress toward functional goals as expected       Problem: Aortic Aneurysm/Dissection (Adult)  Goal: Signs and Symptoms of Listed Potential Problems Will be Absent or Manageable (Aortic Aneurysm/Dissection)  Outcome: Ongoing (interventions implemented as appropriate)      Problem: Fall Risk (Adult)  Goal: Absence of Falls  Outcome: Ongoing (interventions implemented as appropriate)

## 2018-01-21 NOTE — THERAPY TREATMENT NOTE
Acute Care - Physical Therapy Treatment Note  [facility]     Patient Name: Filemon Jj  : 1975  MRN: [id number]  Today's Date: 2018  Onset of Illness/Injury or Date of Surgery Date: 18  Date of Referral to PT: 18  Referring Physician: ELICIA Walton    Admit Date: 2018    Visit Dx:    ICD-10-CM ICD-9-CM   1. Impaired functional mobility, balance, gait, and endurance Z74.09 V49.89   2. 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   • Smoker   • COPD (chronic obstructive pulmonary disease)   • Essential hypertension               Adult Rehabilitation Note       18 1027 18 0950       Rehab Assessment/Intervention    Discipline physical therapist  -SC physical therapist  -YVONNE     Document Type therapy note (daily note)  -SC therapy note (daily note)  -YVONNE     Subjective Information agree to therapy;complains of;pain  -SC no complaints;agree to therapy  -YVONNE     Patient Effort, Rehab Treatment good  -SC good  -YVONNE     Symptoms Noted During/After Treatment increased pain  -SC increased pain  -YVONNE     Precautions/Limitations cardiac precautions;sternal precautions  -SC cardiac precautions;oxygen therapy device and L/min;sternal precautions  -YVONNE     Recorded by [SC] Kiki Ch PT [YVONNE] Cora Gupta PT     Vital Signs    Pre Systolic BP Rehab  128  -YVONNE     Pre Treatment Diastolic BP  90  -YVONNE     Post Systolic BP Rehab 96  -  -YVONNE     Post Treatment Diastolic BP 52  -SC 86  -YVONNE     Pretreatment Heart Rate (beats/min)  80  -YVONNE     Posttreatment Heart Rate (beats/min)  88  -YVONNE     Pre SpO2 (%)  94  -YVONNE     O2 Delivery Pre Treatment  supplemental O2  -YVONNE     Intra SpO2 (%)  88  -YVONNE     O2 Delivery Intra Treatment  supplemental O2  -YVONNE     Post SpO2 (%) 95  -SC 93  -YVONNE     O2 Delivery Post Treatment supplemental O2  -SC  supplemental O2  -YVONNE     Pre Patient Position  Sitting  -YVONNE     Intra Patient Position  Standing  -YVONNE     Post Patient Position  Supine  -YVONNE     Recorded by [SC] Kiki Ch, PT [YVONNE] Cora Gupta PT     Pain Assessment    Pain Assessment Hamm-Foster FACES  -SC      Hamm-Foster FACES Pain Rating 4  -SC      Pain Score  5  -YVONNE     Post Pain Score 6  -SC 6  -YVONNE     Pain Type Surgical pain  -SC Surgical pain  -YVONNE     Pain Location Mediastinum  -SC Mediastinum  -YVONNE     Pain Intervention(s) Medication (See MAR);Repositioned  -SC Repositioned;Ambulation/increased activity;Splinting  -YVONNE     Response to Interventions tolerated  -SC      Recorded by [SC] Kiki Ch, PT [YVONNE] Cora Gupta PT     Cognitive Assessment/Intervention    Current Cognitive/Communication Assessment functional  -SC impaired  -YVONNE     Orientation Status oriented x 4  -SC oriented to;person;place  -YVONNE     Follows Commands/Answers Questions 100% of the time  -% of the time;able to follow single-step instructions  -YVONNE     Personal Safety WNL/WFL  -SC mild impairment;decreased awareness, need for assist;decreased awareness, need for safety;decreased insight to deficits  -YVONNE     Personal Safety Interventions gait belt;fall prevention program maintained  -SC fall prevention program maintained;gait belt;nonskid shoes/slippers when out of bed  -YVONNE     Recorded by [SC] Kiki Ch, PT [YVONNE] Cora Gupta PT     Bed Mobility, Assessment/Treatment    Bed Mob, Sit to Supine, [place]  minimum assist (75% patient effort)  -YVONNE     Bed Mobility, Safety Issues  decreased use of arms for pushing/pulling  -YVONNE     Bed Mobility, Impairments  strength decreased;impaired balance  -YVONNE     Bed Mobility, Comment found up on edge of bed  -SC      Recorded by [SC] Kiki Ch, PT [YVONNE] Cora Gupta PT     Transfer Assessment/Treatment    Transfers, Bed-Chair [place]  minimum assist (75% patient effort)  -YVONNE     Transfers, Sit-Stand  [place] verbal cues required;supervision required  -SC minimum assist (75% patient effort)  -YVONNE     Transfers, Stand-Sit [place] verbal cues required;supervision required  -SC minimum assist (75% patient effort)  -YVONNE     Transfers, Sit-Stand-Sit, Assist Device rolling walker  -SC      Transfer, Safety Issues  step length decreased;balance decreased during turns  -YVONNE     Transfer, Comment demonstrated good technique  -SC cues for posture and sternal precautions  -YVONNE     Recorded by [SC] Kiki Ch, PT [YVONNE] Cora Gupta PT     Gait Assessment/Treatment    Gait, [place] Level contact guard assist  -SC minimum assist (75% patient effort)  -YVONNE     Gait, Assistive Device rolling walker  -SC rolling walker  -YVONNE     Gait, Distance (Feet) 500  -  -YVONNE     Gait, Gait Pattern Analysis swing-through gait  -SC swing-to gait  -YVONNE     Gait, Gait Deviations [place] decreased;other (see comments)   very slow  -SC step length decreased  -YVONNE     Gait, Comment cues for straying close to walker  -SC patient required frequent standing rests wants to lean over on walker needs cues for safety  -YVONNE     Recorded by [SC] Kiki Ch, PT [YVONNE] Cora Gupta PT     Balance Skills Training    Sitting-Level of Assistance  Close supervision  -YVONNE     Sitting-Balance Support  Feet supported  -YVONNE     Standing-Level of Assistance  Minimum assistance  -YVONNE     Static Standing Balance Support  assistive device  -YVONNE     Gait Balance-Level of Assistance  Minimum assistance  -YVONNE     Gait Balance Support  assistive device  -YVONNE     Recorded by  [YVONNE] Cora Gupta PT     Therapy Exercises    Bilateral Lower Extremities 10 reps;AROM:;sitting;ankle pumps/circles;LAQ  -SC AROM:;10 reps;sitting;ankle pumps/circles;hip flexion;LAQ  -YVONNE     Recorded by [SC] Kiki Ch, PT [YVONNE] Cora Gupta PT     Positioning and Restraints    Pre-Treatment Position in bed  -SC sitting in chair/recliner  -YVONNE     Post Treatment  Position bed  -SC bed  -YVONNE     In Bed sitting EOB;call light within reach;encouraged to call for assist;exit alarm on;notified nsg;with nsg  -SC notified nsg;supine;call light within reach;exit alarm on;encouraged to call for assist  -YVONNE     Recorded by [SC] Kiki Ch, PT [YVONNE] Cora Gupta, PT       User Key  (r) = Recorded By, (t) = Taken By, (c) = Cosigned By    Initials Name Effective Dates    SC Kiki Ch, PT 06/19/15 -     YVONNE Cora Gupta, PT 06/19/15 -                 IP PT Goals       01/18/18 1119          Bed Mobility PT LTG    Bed Mobility PT LTG, Date Established 01/18/18  -KR      Bed Mobility PT LTG, Time to Achieve 2 wks  -KR      Bed Mobility PT LTG, Activity Type all bed mobility  -KR      Bed Mobility PT LTG, [place] Level independent  -KR      Bed Mobility PT LTG, Outcome goal ongoing  -KR      Transfer Training PT LTG    Transfer Training PT LTG, Date Established 01/18/18  -KR      Transfer Training PT LTG, Time to Achieve 2 wks  -KR      Transfer Training PT LTG, Activity Type sit to stand/stand to sit  -KR      Transfer Training PT LTG, [place] Level conditional independence  -KR      Transfer Training PT LTG, Assist Device walker, rolling  -KR      Transfer Training PT LTG, Outcome goal ongoing  -KR      Gait Training PT LTG    Gait Training Goal PT LTG, Date Established 01/18/18  -KR      Gait Training Goal PT LTG, Time to Achieve 2 wks  -KR      Gait Training Goal PT LTG, [place] Level conditional independence  -KR      Gait Training Goal PT LTG, Assist Device walker, rolling  -KR      Gait Training Goal PT LTG, Distance to Achieve 400 feet  -KR      Gait Training Goal PT LTG, Outcome goal ongoing  -KR      Stair Training PT LTG    Stair Training Goal PT LTG, Date Established 01/18/18  -KR      Stair Training Goal PT LTG, Time to Achieve 2 wks  -KR      Stair Training Goal PT LTG, Number of Steps 6  -KR      Stair Training Goal PT LTG, [place] Level  conditional independence  -KR      Stair Training Goal PT LTG, Assist Device 2 handrails  -KR      Stair Training Goal PT LTG, Outcome goal ongoing  -KR        User Key  (r) = Recorded By, (t) = Taken By, (c) = Cosigned By    Initials Name Provider Type    FLORENCIO Montes, PT Physical Therapist          Physical Therapy Education     Title: PT OT SLP Therapies (Done)     Topic: Physical Therapy (Done)     Point: Mobility training (Done)    Learning Progress Summary    Learner Readiness Method Response Comment Documented by Status   Patient Eager JEFF PENG reviewed benefits of activity SC 01/21/18 1211 Done    Acceptance E NR  YVONNE 01/19/18 1203 Active    Acceptance E NR  KR 01/18/18 1118 Active               Point: Home exercise program (Done)    Learning Progress Summary    Learner Readiness Method Response Comment Documented by Status   Patient Eager JEFF PENG reviewed benefits of activity SC 01/21/18 1211 Done    Acceptance E NR  YVONEN 01/19/18 1203 Active               Point: Body mechanics (Done)    Learning Progress Summary    Learner Readiness Method Response Comment Documented by Status   Patient Eager JEFF PENG reviewed benefits of activity SC 01/21/18 1211 Done    Acceptance E NR  YVONNE 01/19/18 1203 Active    Acceptance E NR  KR 01/18/18 1118 Active               Point: Precautions (Done)    Learning Progress Summary    Learner Readiness Method Response Comment Documented by Status   Patient Eager JEFF PENG reviewed benefits of activity SC 01/21/18 1211 Done    Acceptance E NR  YVONNE 01/19/18 1203 Active    Acceptance E NR  KR 01/18/18 1118 Active                      User Key     Initials Effective Dates Name Provider Type Discipline    SC 06/19/15 -  Kiki Ch, PT Physical Therapist PT     06/19/15 -  Cora Gupta, PT Physical Therapist PT    KR 09/25/17 -  Rhina Montes PT Physical Therapist PT                    PT Recommendation and Plan  Anticipated Discharge Disposition: home with assist, home with  home health  PT Frequency: daily  Plan of Care Review  Plan Of Care Reviewed With: patient  Progress: improving  Outcome Summary/Follow up Plan: Good effort today. Walking in the hallway 500 feet.          Outcome Measures       01/21/18 1027 01/19/18 0950       How much help from another person do you currently need...    Turning from your back to your side while in flat bed without using bedrails? 3  -SC 3  -YVONNE     Moving from lying on back to sitting on the side of a flat bed without bedrails? 3  -SC 3  -YVONNE     Moving to and from a bed to a chair (including a wheelchair)? 3  -SC 3  -VYONNE     Standing up from a chair using your arms (e.g., wheelchair, bedside chair)? 3  -SC 3  -YVONNE     Climbing 3-5 steps with a railing? 2  -SC 2  -YVONNE     To walk in hospital room? 3  -SC 2  -YVONNE     AM-PAC 6 Clicks Score 17  -SC 16  -YVONNE     Functional Assessment    Outcome Measure Options AM-PAC 6 Clicks Basic Mobility (PT)  -SC        User Key  (r) = Recorded By, (t) = Taken By, (c) = Cosigned By    Initials Name Provider Type    SC Kiki Ch, PT Physical Therapist    YVONNE Cora Gupta, PT Physical Therapist           Time Calculation:         PT Charges       01/21/18 1213          Time Calculation    Start Time 1027  -SC      PT Received On 01/21/18  -SC      PT Goal Re-Cert Due Date 01/28/18  -SC      Time Calculation- PT    Total Timed Code Minutes- PT 43 minute(s)  -SC        User Key  (r) = Recorded By, (t) = Taken By, (c) = Cosigned By    Initials Name Provider Type    SC Kiki Ch, PT Physical Therapist          Therapy Charges for Today     Code Description Service Date Service Provider Modifiers Qty    41811986661 HC GAIT TRAINING EA 15 MIN 1/21/2018 Kiki Ch, PT GP 1    98603165820 HC PT THER PROC EA 15 MIN 1/21/2018 Kiki Ch, PT GP 1          PT G-Codes  Outcome Measure Options: AM-PAC 6 Clicks Basic Mobility (PT)    Kiki Ch, PT  1/21/2018

## 2018-01-21 NOTE — PROGRESS NOTES
CTS Progress Note      POD 4 s/p [place]    CC: Chest incision pain     LOS: 5 days   Patient Care Team:  Macho Jose MD as PCP - General  Macho Jose MD as PCP - Family Medicine  McCheo Aburto MD as Consulting Physician (Cardiology)    Subjective  No new complaints  Had a good day yesterday    Objective    Vital Signs  Temp:  [98 °F (36.7 °C)-98.6 °F (37 °C)] 98.6 °F (37 °C)  Heart Rate:  [] 101  Resp:  [16-19] 19  BP: (105-121)/(61-73) 112/63    Physical Exam:   General Appearance: alert, appears stated age and sitting up in bed   Lungs: clear to auscultation, respirations regular, respirations even and respirations unlabored   Heart: regular rhythm & normal rate, normal S1, S2, no murmur, no tameka, no rub and no click   Skin:  Incision c/d/i     Results     Results from last 7 days  Lab Units 01/21/18  0504 01/20/18  0517   WBC 10*3/mm3  --  8.39   HEMOGLOBIN g/dL 9.9* 10.0*   HEMATOCRIT % 31.2* 32.1*   PLATELETS 10*3/mm3  --  188       Results from last 7 days  Lab Units 01/21/18  0504   SODIUM mmol/L 139   POTASSIUM mmol/L 3.6   CHLORIDE mmol/L 100   CO2 mmol/L 30.0   BUN mg/dL 18   CREATININE mg/dL 0.90   GLUCOSE mg/dL 89   CALCIUM mg/dL 9.5           Imaging Results (last 24 hours)     Procedure Component Value Units Date/Time    XR Chest 1 View [[id number]] Collected:  01/19/18 0859     Updated:  01/20/18 0953    Narrative:       EXAMINATION: XR CHEST 1 VW-01/19/2018:      INDICATION: Postop Heart Surgery; Z74.09-Other reduced mobility;  I71.2-Thoracic aortic aneurysm, without rupture.      COMPARISON: 01/18/2018.     FINDINGS: Large bore thoracotomy tubes remain in place. The heart is  enlarged. The vasculature appears normal. Lung volumes are relatively  low and there is mild hazy interstitial opacity of the lung bases stable  from prior exam. No pneumothorax is seen.           Impression:       Low lung volumes and stable mild basilar atelectasis.     D:  01/19/2018  E:   01/19/2018     This report was finalized on 1/20/2018 9:51 AM by DR. Burton Vegas MD.       XR Chest 1 View [[id number]] Collected:  01/20/18 1814     Updated:  01/20/18 1919    Narrative:          EXAMINATION: XR CHEST 1 VW - 01/20/2018     INDICATION: Z74.09-Other reduced mobility; I71.2-Thoracic aortic  aneurysm, without rupture.      COMPARISON: Chest x-ray 1/19/2018.     FINDINGS: Support hardware unchanged. Small left apical pneumothorax is  now identified. No new parenchymal disease or focal consolidation with  minimal bibasilar atelectasis greatest on the left. Cardiac size  enlarged and unchanged. Trace left pleural effusion.           Impression:       Development/visualization of small left apical pneumothorax  is visualized with chest tube remaining in place. No significant change  in pulmonary findings otherwise.     DICTATED:     01/20/2018  EDITED    :     01/20/2018           XR Chest 1 View [[id number]] Collected:  01/21/18 0738     Updated:  01/21/18 0740    Narrative:          EXAMINATION: XR CHEST 1 VW-      INDICATION: s/p BENTAL; Z74.09-Other reduced mobility; I71.2-Thoracic  aortic aneurysm, without rupture      COMPARISON: One-day prior     FINDINGS: Support hardware unchanged and in satisfactory positioning.  Persistent trace left apical pneumothorax. Cardiac size enlarged with  minimal central bony vascularity increase as well as bibasilar opacities  greatest on the right likely representing atelectasis. No new  parenchymal disease.       Impression:       Persistent trace left apical pneumothorax appears slightly  decreased from prior with chest tubes remain in place. Mild increase in  bibasilar atelectasis without focal consolidation or new parenchymal  disease otherwise.              CT 210cc out last 24 hours .  110 mL and chest tube #100 mL chest tube #2   No air leak    Assessment  POD 4 s/p [place], expected recovery  Principal Problem:    Aneurysm of aortic sinus of Valsalva without  rupture  Active Problems:    Ascending aortic aneurysm    Smoker    COPD (chronic obstructive pulmonary disease)    Essential hypertension  CV: stable      Plan   Keep chest tubes, likely D/C tomorrow  Diuresis  Pulmonary toilet  Ambulate  Coumadin again today  Possible D/C home 24-48 hours    Miguelangel Sheikh PA-C

## 2018-01-21 NOTE — PLAN OF CARE
Problem: Patient Care Overview (Adult)  Goal: Plan of Care Review  Outcome: Ongoing (interventions implemented as appropriate)   01/21/18 1211   Coping/Psychosocial Response Interventions   Plan Of Care Reviewed With patient   Patient Care Overview   Progress improving   Outcome Evaluation   Outcome Summary/Follow up Plan Good effort today. Walking in the hallway 500 feet.       Problem: Inpatient Physical Therapy  Goal: Bed Mobility Goal LTG- PT   01/18/18 1119   Bed Mobility PT LTG   Bed Mobility PT LTG, Date Established 01/18/18   Bed Mobility PT LTG, Time to Achieve 2 wks   Bed Mobility PT LTG, Activity Type all bed mobility   Bed Mobility PT LTG, [place] Level independent   Bed Mobility PT LTG, Outcome goal ongoing     Goal: Transfer Training Goal 1 LTG- PT   01/18/18 1119   Transfer Training PT LTG   Transfer Training PT LTG, Date Established 01/18/18   Transfer Training PT LTG, Time to Achieve 2 wks   Transfer Training PT LTG, Activity Type sit to stand/stand to sit   Transfer Training PT LTG, [place] Level conditional independence   Transfer Training PT LTG, Assist Device walker, rolling   Transfer Training PT LTG, Outcome goal ongoing     Goal: Gait Training Goal LTG- PT   01/18/18 1119   Gait Training PT LTG   Gait Training Goal PT LTG, Date Established 01/18/18   Gait Training Goal PT LTG, Time to Achieve 2 wks   Gait Training Goal PT LTG, [place] Level conditional independence   Gait Training Goal PT LTG, Assist Device walker, rolling   Gait Training Goal PT LTG, Distance to Achieve 400 feet   Gait Training Goal PT LTG, Outcome goal ongoing     Goal: Stair Training Goal LTG- PT   01/18/18 1119   Stair Training PT LTG   Stair Training Goal PT LTG, Date Established 01/18/18   Stair Training Goal PT LTG, Time to Achieve 2 wks   Stair Training Goal PT LTG, Number of Steps 6   Stair Training Goal PT LTG, [place] Level conditional independence   Stair Training Goal PT LTG, Assist Device 2  handrails   Stair Training Goal PT LTG, Outcome goal ongoing

## 2018-01-22 ENCOUNTER — APPOINTMENT (OUTPATIENT)
Dept: GENERAL RADIOLOGY | Facility: HOSPITAL | Age: 43
End: 2018-01-22

## 2018-01-22 VITALS
DIASTOLIC BLOOD PRESSURE: 97 MMHG | OXYGEN SATURATION: 98 % | WEIGHT: 188.2 LBS | RESPIRATION RATE: 20 BRPM | BODY MASS INDEX: 29.54 KG/M2 | HEIGHT: 67 IN | SYSTOLIC BLOOD PRESSURE: 129 MMHG | TEMPERATURE: 98.5 F | HEART RATE: 94 BPM

## 2018-01-22 PROBLEM — Z72.0 TOBACCO ABUSE: Status: ACTIVE | Noted: 2018-01-17

## 2018-01-22 LAB
GLUCOSE BLDC GLUCOMTR-MCNC: 86 MG/DL (ref 70–130)
GLUCOSE BLDC GLUCOMTR-MCNC: 92 MG/DL (ref 70–130)
INR PPP: 1.42
PROTHROMBIN TIME: 15.7 SECONDS (ref 9.6–11.5)

## 2018-01-22 PROCEDURE — 97116 GAIT TRAINING THERAPY: CPT

## 2018-01-22 PROCEDURE — 85610 PROTHROMBIN TIME: CPT | Performed by: PHYSICIAN ASSISTANT

## 2018-01-22 PROCEDURE — 94640 AIRWAY INHALATION TREATMENT: CPT

## 2018-01-22 PROCEDURE — 97110 THERAPEUTIC EXERCISES: CPT

## 2018-01-22 PROCEDURE — 99232 SBSQ HOSP IP/OBS MODERATE 35: CPT | Performed by: NURSE PRACTITIONER

## 2018-01-22 PROCEDURE — 99024 POSTOP FOLLOW-UP VISIT: CPT | Performed by: PHYSICIAN ASSISTANT

## 2018-01-22 PROCEDURE — 71045 X-RAY EXAM CHEST 1 VIEW: CPT

## 2018-01-22 PROCEDURE — 94799 UNLISTED PULMONARY SVC/PX: CPT

## 2018-01-22 PROCEDURE — 82962 GLUCOSE BLOOD TEST: CPT

## 2018-01-22 RX ORDER — ATORVASTATIN CALCIUM 10 MG/1
10 TABLET, FILM COATED ORAL DAILY
Qty: 90 TABLET | Refills: 0 | Status: SHIPPED | OUTPATIENT
Start: 2018-01-22 | End: 2018-04-22

## 2018-01-22 RX ORDER — WARFARIN SODIUM 5 MG/1
5 TABLET ORAL DAILY
Qty: 30 TABLET | Refills: 6 | Status: SHIPPED | OUTPATIENT
Start: 2018-01-22 | End: 2020-02-04 | Stop reason: HOSPADM

## 2018-01-22 RX ADMIN — ALBUTEROL SULFATE 2.5 MG: 2.5 SOLUTION RESPIRATORY (INHALATION) at 01:06

## 2018-01-22 RX ADMIN — FAMOTIDINE 20 MG: 20 TABLET, FILM COATED ORAL at 09:10

## 2018-01-22 RX ADMIN — MONTELUKAST SODIUM 10 MG: 10 TABLET, FILM COATED ORAL at 09:10

## 2018-01-22 RX ADMIN — DOCUSATE SODIUM AND SENNOSIDES 2 TABLET: 8.6; 5 TABLET, FILM COATED ORAL at 09:10

## 2018-01-22 RX ADMIN — METOPROLOL TARTRATE 12.5 MG: 25 TABLET, FILM COATED ORAL at 09:10

## 2018-01-22 RX ADMIN — HYDROCODONE BITARTRATE AND ACETAMINOPHEN 1 TABLET: 7.5; 325 TABLET ORAL at 12:13

## 2018-01-22 RX ADMIN — Medication 10 ML: at 05:58

## 2018-01-22 RX ADMIN — ALBUTEROL SULFATE 2.5 MG: 2.5 SOLUTION RESPIRATORY (INHALATION) at 09:58

## 2018-01-22 RX ADMIN — HYDROCODONE BITARTRATE AND ACETAMINOPHEN 1 TABLET: 7.5; 325 TABLET ORAL at 05:56

## 2018-01-22 NOTE — PROGRESS NOTES
Continued Stay Note   [facility]     Patient Name: Filemon Jj  MRN: [id number]  Today's Date: 1/22/2018    Admit Date: 1/16/2018          Discharge Plan       01/22/18 1514    Case Management/Social Work Plan    Plan Home with HH    Patient/Family In Agreement With Plan yes    Additional Comments Met with pt at bedside to f/u DCP.  Goal is to return home upon DC.  He is agreeable to [facility] to assist with PT/INR draws, surgical incision checks, and medication education.  Faxed orders to [phone].  Per phone conversation with Anatoliy Sheikh PA-C, no need for any bridging (today's INR 1.4).  CM will cont to follow.              Discharge Codes       01/22/18 1514    Discharge Codes    Discharge Codes 06  Discharged/transferred to home under care of organized home health service organization in anticipation of skilled care        Expected Discharge Date and Time     Expected Discharge Date Expected Discharge Time    Jan 22, 2018             Diana Castillo

## 2018-01-22 NOTE — THERAPY TREATMENT NOTE
Acute Care - Physical Therapy Treatment Note  [facility]     Patient Name: Filemon Jj  : 1975  MRN: [id number]  Today's Date: 2018  Onset of Illness/Injury or Date of Surgery Date: 18  Date of Referral to PT: 18  Referring Physician: ELICIA Walton    Admit Date: 2018    Visit Dx:    ICD-10-CM ICD-9-CM   1. Impaired functional mobility, balance, gait, and endurance Z74.09 V49.89   2. Ascending aortic aneurysm I71.2 441.2   3. Aneurysm of aortic sinus of Valsalva without rupture Q25.43 747.29   4. Mixed hyperlipidemia E78.2 272.2     Patient Active Problem List   Diagnosis   • Ascending aortic aneurysm   • Other chest pain   • Aneurysm of aortic sinus of Valsalva without rupture   • Tobacco abuse   • COPD (chronic obstructive pulmonary disease)   • Essential hypertension               Adult Rehabilitation Note       18 1330 18 1027       Rehab Assessment/Intervention    Discipline physical therapy assistant  -UD physical therapist  -SC     Document Type therapy note (daily note)  -UD therapy note (daily note)  -SC     Subjective Information agree to therapy;complains of;dyspnea  -UD agree to therapy;complains of;pain  -SC     Patient Effort, Rehab Treatment good  -UD good  -SC     Symptoms Noted During/After Treatment shortness of breath  -UD increased pain  -SC     Precautions/Limitations cardiac precautions  -UD cardiac precautions;sternal precautions  -SC     Recorded by [UD] Desiree Kendrick PTA [SC] Kiki Ch, PT     Vital Signs    Pre Systolic BP Rehab 119  -UD      Pre Treatment Diastolic BP 67  -UD      Post Systolic BP Rehab  96  -SC     Post Treatment Diastolic BP  52  -SC     Pretreatment Heart Rate (beats/min) 108  -UD      Pre SpO2 (%) 92  -UD      O2 Delivery Pre Treatment room air  -UD      Post SpO2 (%) 95  -UD 95  -SC     O2 Delivery Post Treatment room air  -UD supplemental O2  -SC     Pre Patient Position Standing  -UD      Intra Patient Position  Standing  -UD      Post Patient Position Sitting  -UD      Recorded by [UD] Desiree Kendrick PTA [SC] Kiki Ch, PT     Pain Assessment    Pain Assessment Hamm-Foster FACES  -UD Hamm-Baker FACES  -SC     Hamm-Foster FACES Pain Rating 2  -UD 4  -SC     Pain Score 2  -UD      Post Pain Score 2  -UD 6  -SC     Pain Type Surgical pain  -UD Surgical pain  -SC     Pain Location Sternum  -UD Mediastinum  -SC     Pain Intervention(s) Repositioned;Ambulation/increased activity  -UD Medication (See MAR);Repositioned  -SC     Response to Interventions  tolerated  -SC     Recorded by [UD] Desiree Kendrick PTA [SC] Kiki Ch, PT     Cognitive Assessment/Intervention    Current Cognitive/Communication Assessment  functional  -SC     Orientation Status oriented x 4;person;place;situation  -UD oriented x 4  -SC     Follows Commands/Answers Questions 100% of the time  -% of the time  -SC     Personal Safety  WNL/WFL  -SC     Personal Safety Interventions fall prevention program maintained  -UD gait belt;fall prevention program maintained  -SC     Recorded by [UD] Desiree Kendrick PTA [SC] Kiki Ch, PT     Bed Mobility, Assessment/Treatment    Bed Mob, Supine to Sit, Leonidas not tested   up in room  -UD      Bed Mobility, Comment  found up on edge of bed  -SC     Recorded by [UD] Desiree Kendrick PTA [SC] Kiki Ch, PT     Transfer Assessment/Treatment    Transfers, Sit-Stand Leonidas independent  -UD verbal cues required;supervision required  -SC     Transfers, Stand-Sit Leonidas independent  -UD verbal cues required;supervision required  -SC     Transfers, Sit-Stand-Sit, Assist Device  rolling walker  -SC     Transfer, Comment  demonstrated good technique  -SC     Recorded by [UD] Desiree Kendrick PTA [SC] Kiki Ch, PT     Gait Assessment/Treatment    Gait, Leonidas Level supervision required  -UD contact guard assist  -SC     Gait, Assistive Device  rolling walker  -SC     Gait, Distance (Feet) 500   -  -SC     Gait, Gait Pattern Analysis  swing-through gait  -SC     Gait, Gait Deviations winston decreased;step length decreased  -UD winston decreased;other (see comments)   very slow  -SC     Gait, Safety Issues step length decreased  -UD      Gait, Impairments strength decreased  -UD      Gait, Comment  cues for straying close to walker  -SC     Recorded by [UD] Desiree Kendrick PTA [SC] Kiki Palenciap, PT     Stairs Assessment/Treatment    Number of Stairs 5  -UD      Stairs, Handrail Location right side (ascending)  -UD      Stairs, Bard Level independent  -      Stairs, Technique Used step to step (ascending);step to step (descending)  -UD      Recorded by [UD] Desiree Kendrick PTA      Balance Skills Training    Sitting-Level of Assistance Independent  -UD      Recorded by [UD] Desiree Kendrick PTA      Therapy Exercises    Bilateral Lower Extremities AROM:;10 reps;sitting  -UD 10 reps;AROM:;sitting;ankle pumps/circles;LAQ  -SC     Bilateral Upper Extremity AROM:;10 reps;sitting  -UD      Recorded by [UD] Desiree Kendrick PTA [SC] Kiki Ch, PT     Positioning and Restraints    Pre-Treatment Position standing in room  -UD in bed  -SC     Post Treatment Position other  -UD bed  -SC     In Bed  sitting EOB;call light within reach;encouraged to call for assist;exit alarm on;notified nsg;with nsg  -SC     Other Position return to room independently   sitting on couch  -UD      Recorded by [UD] Desiree Kendrick PTA [SC] Kiki Ch, PT       User Key  (r) = Recorded By, (t) = Taken By, (c) = Cosigned By    Initials Name Effective Dates    SC Kiki Ch, PT 06/19/15 -     UD Desiree Kendrick PTA 06/22/15 -                 IP PT Goals       01/22/18 1414 01/18/18 1119       Bed Mobility PT LTG    Bed Mobility PT LTG, Date Established  01/18/18  -KR     Bed Mobility PT LTG, Time to Achieve  2 wks  -KR     Bed Mobility PT LTG, Activity Type  all bed mobility  -KR     Bed Mobility PT LTG, Bard Level   independent  -KR     Bed Mobility PT LTG, Outcome goal met  -UD goal ongoing  -KR     Transfer Training PT LTG    Transfer Training PT LTG, Date Established  01/18/18  -KR     Transfer Training PT LTG, Time to Achieve  2 wks  -KR     Transfer Training PT LTG, Activity Type  sit to stand/stand to sit  -KR     Transfer Training PT LTG, Addis Level  conditional independence  -KR     Transfer Training PT LTG, Assist Device  walker, rolling  -KR     Transfer Training PT LTG, Outcome goal met  -UD goal ongoing  -KR     Gait Training PT LTG    Gait Training Goal PT LTG, Date Established  01/18/18  -KR     Gait Training Goal PT LTG, Time to Achieve  2 wks  -KR     Gait Training Goal PT LTG, Addis Level  conditional independence  -KR     Gait Training Goal PT LTG, Assist Device  walker, rolling  -KR     Gait Training Goal PT LTG, Distance to Achieve  400 feet  -KR     Gait Training Goal PT LTG, Outcome goal met  -UD goal ongoing  -KR     Stair Training PT LTG    Stair Training Goal PT LTG, Date Established  01/18/18  -KR     Stair Training Goal PT LTG, Time to Achieve  2 wks  -KR     Stair Training Goal PT LTG, Number of Steps  6  -KR     Stair Training Goal PT LTG, Addis Level  conditional independence  -KR     Stair Training Goal PT LTG, Assist Device  2 handrails  -KR     Stair Training Goal PT LTG, Outcome goal met  -UD goal ongoing  -KR       User Key  (r) = Recorded By, (t) = Taken By, (c) = Cosigned By    Initials Name Provider Type    GENNY Kendrick, PTA Physical Therapy Assistant    FLORENCIO Montes, PT Physical Therapist          Physical Therapy Education     Title: PT OT SLP Therapies (Done)     Topic: Physical Therapy (Done)     Point: Mobility training (Done)    Learning Progress Summary    Learner Readiness Method Response Comment Documented by Status   Patient CHE Johnson H DU,NR  UD 01/22/18 1413 Done    JEFF Birch reviewed benefits of activity SC 01/21/18 1211 Done    Janay E  NR  YVONNE 01/19/18 1203 Active    Acceptance E NR   01/18/18 1118 Active               Point: Home exercise program (Done)    Learning Progress Summary    Learner Readiness Method Response Comment Documented by Status   Patient CHE Johnson H DU,NR   01/22/18 1413 Done    JEFF Birch reviewed benefits of activity SC 01/21/18 1211 Done    Acceptance E NR  YVONNE 01/19/18 1203 Active               Point: Body mechanics (Done)    Learning Progress Summary    Learner Readiness Method Response Comment Documented by Status   Patient CHE Johnson,EDWARDO AGUILAR,NR   01/22/18 1413 Done    JEFF Birch reviewed benefits of activity SC 01/21/18 1211 Done    Acceptance E NR   01/19/18 1203 Active    Acceptance E NR   01/18/18 1118 Active               Point: Precautions (Done)    Learning Progress Summary    Learner Readiness Method Response Comment Documented by Status   Patient CHE Johnson,EDWARDO AGUILAR,NR   01/22/18 1413 Done    JEFF Birch reviewed benefits of activity SC 01/21/18 1211 Done    Acceptance E NR   01/19/18 1203 Active    Acceptance E NR   01/18/18 1118 Active                      User Key     Initials Effective Dates Name Provider Type Discipline    SC 06/19/15 -  Kiki Ch, PT Physical Therapist PT     06/19/15 -  Cora Gupta, PT Physical Therapist PT     06/22/15 -  Desiree Kendrick, MORGAN Physical Therapy Assistant PT     09/25/17 -  Rhina Montes, PT Physical Therapist PT                    PT Recommendation and Plan  Anticipated Discharge Disposition: home with assist, home with home health  PT Frequency: daily  Plan of Care Review  Plan Of Care Reviewed With: patient  Progress: improving  Outcome Summary/Follow up Plan: pt out in [place],went up and down 5 steps with railing.ambulat 500 ft.mild sob,vitals stable          Outcome Measures       01/22/18 1330 01/21/18 1027       How much help from another person do you currently need...    Turning from your back to your side while in flat bed without using  bedrails? 4  -UD 3  -SC     Moving from lying on back to sitting on the side of a flat bed without bedrails? 4  -UD 3  -SC     Moving to and from a bed to a chair (including a wheelchair)? 4  -UD 3  -SC     Standing up from a chair using your arms (e.g., wheelchair, bedside chair)? 4  -UD 3  -SC     Climbing 3-5 steps with a railing? 4  -UD 2  -SC     To walk in hospital room? 4  -UD 3  -SC     AM-PAC 6 Clicks Score 24  -UD 17  -SC     Functional Assessment    Outcome Measure Options  AM-PAC 6 Clicks Basic Mobility (PT)  -SC       User Key  (r) = Recorded By, (t) = Taken By, (c) = Cosigned By    Initials Name Provider Type    SC Kiki Ch, PT Physical Therapist    UD Desiree Kendrick PTA Physical Therapy Assistant           Time Calculation:         PT Charges       01/22/18 1417          Time Calculation    PT Received On 01/22/18  -      PT Goal Re-Cert Due Date 01/28/18  -      Time Calculation- PT    Total Timed Code Minutes- PT 24 minute(s)  -UD        User Key  (r) = Recorded By, (t) = Taken By, (c) = Cosigned By    Initials Name Provider Type    UD Desiree Kendrick PTA Physical Therapy Assistant          Therapy Charges for Today     Code Description Service Date Service Provider Modifiers Qty    19759044536 HC PT THER PROC EA 15 MIN 1/22/2018 Desiree Kendrick PTA GP 1    34451444295 HC GAIT TRAINING EA 15 MIN 1/22/2018 Desiree Kendrick PTA GP 1          PT G-Codes  Outcome Measure Options: AM-PAC 6 Clicks Basic Mobility (PT)    Desiree Kendrick PTA  1/22/2018

## 2018-01-22 NOTE — PLAN OF CARE
Problem: Patient Care Overview (Adult)  Goal: Plan of Care Review  Outcome: Ongoing (interventions implemented as appropriate)   01/21/18 1211 01/22/18 0200 01/22/18 0640   Coping/Psychosocial Response Interventions   Plan Of Care Reviewed With --  patient --    Patient Care Overview   Progress improving --  --    Outcome Evaluation   Outcome Summary/Follow up Plan --  --  Patient ambulated in hallway. Pain managed by oral pain medicatiion. Patient did no sleep well.       Problem: Aortic Aneurysm/Dissection (Adult)  Goal: Signs and Symptoms of Listed Potential Problems Will be Absent or Manageable (Aortic Aneurysm/Dissection)  Outcome: Ongoing (interventions implemented as appropriate)      Problem: Perioperative Period (Adult)  Goal: Signs and Symptoms of Listed Potential Problems Will be Absent or Manageable (Perioperative Period)  Outcome: Ongoing (interventions implemented as appropriate)      Problem: Pressure Ulcer Risk (Eusebio Scale) (Adult,Obstetrics,Pediatric)  Goal: Identify Related Risk Factors and Signs and Symptoms  Outcome: Ongoing (interventions implemented as appropriate)      Problem: Fall Risk (Adult)  Goal: Identify Related Risk Factors and Signs and Symptoms  Outcome: Ongoing (interventions implemented as appropriate)

## 2018-01-22 NOTE — PROGRESS NOTES
"Pharmacy Consult  -  Warfarin    Filemon Jj is a  42 y.o. male   Height - 170 cm (66.93\")  Weight - 85.4 kg (188 lb 3.2 oz)    Consulting Provider: - RUSSELL Sheikh (ELICIA)  Indication: - [facility] aortic valve  Goal INR: - 2-3  Home Regimen:   -new start    Bridge Therapy: No      Drug-Drug Interactions with current regimen:   No major    Warfarin Dosing During Admission:    Date  1/20 1/21 1/22         INR  0.93 1.06 1.42         Dose  5 mg 5 mg (5 mg)              Education Provided: Will provide warfarin education with discharge counseling today prior to discharge    Labs:    Results from last 7 days   Lab Units 01/22/18  0518 01/21/18  0504 01/20/18  1159 01/20/18  0517 01/19/18  0335 01/18/18  0340 01/17/18  2139 01/17/18  1750 01/17/18  1318 01/17/18  1317  01/16/18  1930   INR  1.42 1.06 0.93 --  --  1.01 --  --  --  1.09 --  0.96   APTT seconds --  --  --  --  --  --  --  --  --  27.4 --  28.2   HEMOGLOBIN g/dL --  9.9* --  10.0* 10.4* 9.9* 10.2* 10.0* 10.4* --  --  14.4   HEMOGLOBIN, POC  --  --  --  --  --  --  --  --  --  --  < > --    HEMATOCRIT % --  31.2* --  32.1* 33.4* 32.0* 32.5* 31.1* 32.1* --  --  45.3   HEMATOCRIT POC  --  --  --  --  --  --  --  --  --  --  < > --    PLATELETS 10*3/mm3 --  --  --  188 197 229 --  213 190 --  --  245   < > = values in this interval not displayed.   Results from last 7 days   Lab Units 01/21/18  0504 01/20/18  0517 01/19/18  0335  01/16/18  1930   SODIUM mmol/L 139 136 130* < > 138   POTASSIUM mmol/L 3.6 3.8 4.9 < > 3.9   CHLORIDE mmol/L 100 101 96* < > 108   CO2 mmol/L 30.0 29.0 25.0 < > 24.0   BUN mg/dL 18 19 21 < > 11   CREATININE mg/dL 0.90 1.00 1.30 < > 0.90   CALCIUM mg/dL 9.5 9.6 9.1 < > 9.4   BILIRUBIN mg/dL --  --  --  --  0.2*   ALK PHOS U/L --  --  --  --  75   ALT (SGPT) U/L --  --  --  --  27   AST (SGOT) U/L --  --  --  --  19   GLUCOSE mg/dL 89 120* 143* < > 97   < > = values in this interval not displayed.     Current dietary intake: % " meals      Assessment/Plan:   -Patient initiated on warfarin 5 mg daily on 1/20.  Denies having used in the past. Will provide warfarin education with discharge counseling  -INR increased today to 1.42. Continue with 5 mg dose for now if patient still here at 1800. Expect a few more days to see full effect. Patient will require close outpatient monitoring for new start of warfarin  -Pharmacy will follow.      Thank you for this consult,  Michel Velásquez, PharmD  Pharmacy Resident  1/22/2018  10:53 AM

## 2018-01-22 NOTE — DISCHARGE PLACEMENT REQUEST
"AMRIT TSANG, RN    [phone]        PHYSICAL ADDRESS:    [address] [place] [address]    PH:  [phone]      Moustapha Escamilla (42 y.o. Male)     Date of Birth Social Security Number Address Home Phone MRN    1975  [address]   [place] [id number] [phone] [id number]    Caodaism Marital Status          Non-Mandaen Single       Admission Date Admission Type Admitting Provider Attending Provider Department, Room/Bed    18 Elective Aaron Lucas MD Chaney, John H, MD [address], S454/1    Discharge Date Discharge Disposition Discharge Destination         Home or Self Care             Attending Provider: Aaron Lucas MD     Allergies:  Aspirin    Isolation:  None   Infection:  None   Code Status:  FULL    Ht:  170 cm (66.93\")   Wt:  85.4 kg (188 lb 3.2 oz)    Admission Cmt:  None   Principal Problem:  Aneurysm of aortic sinus of Valsalva without rupture [Q25.43]                 Active Insurance as of 2018     Primary Coverage     Payor Plan Insurance Group Employer/Plan Group    AET [employer] [facility]      Payor Plan Address Payor Plan Phone Number Effective From Effective To    [address] [address]  [address]     [place] [address]       Subscriber Name Subscriber Birth Date Member ID       MOUSTAPHA ESCAMILLA 1975 [id number]                 Emergency Contacts      (Rel.) Home Phone Work Phone Mobile Phone    Alexandra Pena (Mother) [phone] -- --                      [address] [address]  Phone:  [phone]  Fax:   Date: 2018      Ambulatory Referral to Home Health     Patient:  Moustapha Escamilla MRN:  [id number]   [address]  [place] [id number] :  1975  SSN:    Phone: [phone] Sex:  M      INSURANCE PAYOR PLAN GROUP # SUBSCRIBER ID   [facility] [facility] [id number]   [id number]      Referring Provider " Information:  JARETT LUCAS Phone: [phone] Fax:       Referral Information:   # Visits:  1 Referral Type: Home Health [42]   Urgency:  Routine Referral Reason: Specialty Services Required   Start Date: Jan 22, 2018 End Date:  To be determined by Insurer   Diagnosis: Aneurysm of aortic sinus of Valsalva without rupture (Q25.43 [ICD-10-CM] 747.29 [ICD-9-CM])  Chronic obstructive pulmonary disease, unspecified COPD type (J44.9 [ICD-10-CM] 496 [ICD-9-CM])      Refer to Dept:   Refer to Provider:   Refer to Facility:    Assess sternal incision for s/s infection and healing.       PT/INR to be drawn on 1/24 with results to Dr. Macho Pepe and Dr. Jarett Lucas.  Goal INR 2.5-3.5  Face to Face Visit Date: 1/22/2018  Follow-up Provider for Plan of Care? I treated the patient in an acute care facility and will not continue treatment after discharge.  Follow-up Provider: MACHO PEPE [[id number]]  Reason/Clinical Findings: Aortic valve conduit (Bentall procedure with a 27 mm St Tony mechanical valve conduit)  Describe mobility limitations that make leaving home difficult: Impaired functional mobility, balance, gait, and endurance  Nursing/Therapeutic Services Requested: Skilled Nursing  Skilled nursing orders: Medication education (New to Coumadin use.  Will need lab draws. )  Skilled nursing orders: Other  Skilled nursing orders: COPD management  Frequency: 1 Week 1     This document serves as a request of services and does not constitute Insurance authorization or approval of services.  To determine eligibility, please contact the members Insurance carrier to verify and review coverage.     If you have medical questions regarding this request for services. Please contact [address] at [phone] between the hours of 8:00am - 5:00pm (Mon-Fri).             Verbal Order Mode: Per protocol: cosign required  Authorizing Provider: Jarett Lucas MD  Authorizing Provider's NPI: [id number]     Order Entered  By: Diana ARIEL Jonathan 1/22/2018  3:04 PM                          History & Physical      JEAN Chapin at 1/16/2018  7:02 PM     Attestation signed by Aaron Lucas MD at 1/17/2018  6:48 AM        Agree with above. [facility]                                 Cardiothoracic Surgery History & Physical           Chief complaint: Infrequent chest pain and shortness of breath    HPI:   Patient is a 42-year-old  male with history of severe chronic obstructive pulmonary disease who was admitted to [facility] on 10/17/2017 for shortness of breath and found to have a 6 cm ascending aortic aneurysm.  Patient underwent an extensive workup.  In addition, he was found to have poor dentition and was discharged to have multiple tooth extractions as an outpatient, which was performed in November of 2017.    Patient reports he experiences mild and infrequent epigastric pain lasting only seconds however states this has been occurring more frequently, once every few weeks.  He also reports persistent dyspnea on exertion and bilateral lower extremity weakness which has resulted in an inability to stand and walk great distances until his legs give out beneath him.  This resulted in a visit to the emergency department 2 to 3 weeks ago with him being discharged home from the emergency department after an unremarkable workup, having been treated with nebulizer treatments with no official diagnosis rendered.  The patient reports this has been going on for months.  Otherwise, the patient states there has been no change in his overall health status since his previous hospitalization.  Currently, he denies any chest pain, back pain, epigastric pain, lower extremity pain or dyspnea.      Past Medical History:   Diagnosis Date   • Anxiety    • Asthma    • Back pain    • COPD (chronic obstructive pulmonary disease)    • Emphysema lung    • Gastritis    • GERD (gastroesophageal reflux disease)    •  Headache    • Hypertension    • Migraine    • Substance abuse      Past Surgical History:   Procedure Laterality Date   • CARDIAC CATHETERIZATION N/A 10/23/2017    Procedure: Left Heart Cath;  Surgeon: Rodolfo Núñez MD;  Location: [facility] CATH INVASIVE LOCATION;  Service:    • DENTAL PROCEDURE     • LIVER SURGERY      tumor removed from liver     Family History   Problem Relation Age of Onset   • COPD Mother      Social History   Substance Use Topics   • Smoking status: Former Smoker     Packs/day: 1.00     Years: 4.00   • Smokeless tobacco: Current User     Types: Snuff      Comment: Pt uses vapor cigarette   • Alcohol use No      Comment: occassional        Lives in [place] alone   Prescriptions Prior to Admission   Medication Sig Dispense Refill Last Dose   • albuterol (PROVENTIL) (2.5 MG/3ML) 0.083% nebulizer solution Take 2.5 mg by nebulization 3 (Three) Times a Day.   1/16/2018 at Unknown time   • cyclobenzaprine (FLEXERIL) 10 MG tablet Take 10 mg by mouth 3 (Three) Times a Day As Needed for Muscle Spasms.   Past Week at Unknown time   • hydrOXYzine (VISTARIL) 25 MG capsule Take 25 mg by mouth 2 (Two) Times a Day As Needed for Anxiety (for anxiety).   Past Month at Unknown time   • predniSONE (DELTASONE) 20 MG tablet Take 1 tablet by mouth 2 (Two) Times a Day. 6 tablet 0 Past Week at Unknown time   • raNITIdine (ZANTAC) 300 MG tablet Take 300 mg by mouth 2 (Two) Times a Day.   Past Week at Unknown time   • albuterol (PROVENTIL HFA;VENTOLIN HFA) 108 (90 BASE) MCG/ACT inhaler Inhale 2 puffs every 4 (four) hours as needed for wheezing. (Patient taking differently: Inhale 2 puffs 2 (Two) Times a Day As Needed for Wheezing or Shortness of Air.) 3.7 g 0 Taking   • azithromycin (ZITHROMAX) 250 MG tablet Take 2 tablets the first day, then 1 tablet daily for 4 days.  Indications: Upper Respiratory Tract Infection 3 tablet 0 Not Taking   • cefdinir (OMNICEF) 300 MG capsule Take 1 capsule by mouth Every 12  (Twelve) Hours. Indications: Upper Respiratory Tract Infection 10 capsule 0 Not Taking   • Fluticasone Furoate-Vilanterol 100-25 MCG/INH aerosol powder  Inhale 1 puff by mouth Daily. 60 each 1 Not Taking   • ibuprofen (ADVIL,MOTRIN) 800 MG tablet Take 800 mg by mouth Every 6 (Six) Hours As Needed for Mild Pain .   Taking   • montelukast (SINGULAIR) 10 MG tablet Take 10 mg by mouth Daily.   More than a month at Unknown time     Allergies:  Aspirin    Review of Systems:    A comprehensive review of systems was negative except for:   Constitutional: Admits to worsening fatigue and infrequent chills, denies any fevers or recent weight loss or weight gain.   Respiratory: Admits to dyspnea, dyspnea on exertion and a nonproductive cough and a history of COPD, denies recent pneumonia or bronchitis  Cardiovascular: Admits to stable infrequent epigastric pain or denies any pedal edema, orthopnea, irregular heart rhythm or paroxysmal nocturnal dyspnea  Gastrointestinal: Denies nausea vomiting, abdominal pain, history of peptic ulcer disease, hematemesis, hematochezia or melena   Hematologic/lymphatic: Denies history of coagulopathy, deep vein thrombosis or pulmonary embolus  Musculoskeletal: Admits to diffuse arthralgias however these are stable no change  Neurological: Denies history of headache, seizure disorder, transient ischemic attack or cerebral vascular accident    All other systems were reviewed and are negative.    Vital Signs:      Physical Exam  Gen: Well-developed, well-nourished in no acute distress  HEENT: Normocephalic, atraumatic, PERRLA, EOMs intact mucous membranes moist with no scleral icterus  Neck: Supple without bruit  Pulm: Bilateral mild short wheezes with no rales or rhonchi  CV: Regular rate and rhythm with no murmurs, rubs or gallops.  Normal S1-S2.  Abd: Soft, nontender/nondistended with normoactive bowel sounds with no rebound or guarding and no organomegaly appreciated  Neuro: Alert awake ×3 with  cranial nerves II 12 grossly intact no motor sensory deficits  Pulses: Symmetric, 2+ carotid pulses bilaterally 2+ radial pulses bilaterally 2+ femoral pulses bilaterally 2+ dorsalis pedis and posterior tibialis pulses bilaterally  Ext: Warm with good color well-perfused no bilateral lower extremity edema  Int: No clubbing or cyanosis with no lesions or rashes appreciated there is a well-healed transverse laparotomy incision        Labs:  Pending      Imaging:   Pending    Assessment:   6 cm Ascending Aortic Aneurysm  Severe Chronic Obstructive Pulmonary Disease    Plan:   Will order routine preoperative laboratory testing and imaging and will plan to proceed with Bentall procedure with aortic valve conduit with Dr. Lucas in [facility]      JEAN Souza  01/16/18  7:19 PM             Electronically signed by Aaron Lucas MD at 1/17/2018  6:48 AM           Operative/Procedure Notes (most recent note)      Aaron Lucas MD at 1/17/2018 12:41 PM  Version 1 of 1         DATE OF PROCEDURE: 1/17/2018    PREOPERATIVE DIAGNOSES:  1. Ascending aortic aneurysm  2. Moderate aortic regurgitation  3. Hypertension    4. Asthma    5. Severe anxiety      POSTOPERATIVE DIAGNOSES:    1. Ascending aortic aneurysm  2. Moderate aortic regurgitation  3. Hypertension    4. Asthma    5. Severe anxiety      PROCEDURES PERFORMED:    1. Aortic valve conduit (Bentall procedure with a 27 mm St Tony mechanical valve conduit)     SURGEON: Aaron Lucas MD      ASSISTANTS:    1. Luís Esqueda MD  2. Meme Walton PA-C     ANESTHESIA: General endotracheal anesthesia with Dr. David Hayes MD    ESTIMATED BLOOD LOSS: 750 mL.      CROSSCLAMP TIME: 105 minutes.      TOTAL CARDIOPULMONARY BYPASS TIME: 136 minutes.       INDICATIONS: 42 year old  male with a history of hypertension, asthma and severe anxiety who presented with an asthma exacerbation and an incidental 6 cm ascending aortic aneurysm.  He underwent recent teeth extraction  for poor dentition secondary to chewing tobacco use.  His aneurysm was localized to the proximal ascending aorta and he had moderate aortic regurgitation.  He was felt to be a reasonable candidate for an aortic valve conduit using a mechanical valve secondary to his young age.  No underlying connective tissue disorder is known.  The risks and benefits of surgery were discussed with the patient including pain, bleeding, infection, renal failure, stroke, heart block and death.  The patient understood these risks and wished to proceed with surgery.     DESCRIPTION OF PROCEDURE: The patient was taken to the operating room and placed under general endotracheal anesthesia. A central line, Waldwick-Deondre catheter, radial arterial line, and Velásquez catheter were placed. The patient was prepped and draped in the usual sterile fashion and a timeout was performed, including patient's name, procedure, consent, beta blockade administration, and antibiotics were verified.    A median sternotomy incision was made and electrocautery was utilized to gain access to the sternum. A midline sternotomy was performed after lung desufflation and hemostasis was achieved with electrocautery.    The pericardium was opened and stay sutures were placed to create a pericardial well. The aorta was significantly dilated but tapered down proximal to the innominate artery.  Next, 3-0 Prolene sutures were placed in the ascending aorta at the level of the innominate artery and systemic heparin was administered. Additional cannulation sutures were placed in the right atrial appendage, ascending aorta, and right atrium. After verification of satisfactory activated clotting time, the arterial cannula was placed and connected to the cardiopulmonary bypass circuit after being de-aired. The line was tested and a wrap was performed. The venous cannula was inserted followed by antegrade and retrograde cardioplegia lines. Cardiopulmonary bypass was initiated and the  patient was cooled to 28 degrees. Bypass flow was dropped and the aortic crossclamp was applied just below the innominate artery. Cardioplegia was administered in a retrograde fashion with a delayed cessation of cardiac activity. Plegia was then transitioned to antegrade flow with cessation of activity.  An LV vent was placed via the right superior pulmonary vein. The root vent suction was turned on high and a transverse aortotomy was made.  The aorta was excised just proximal to the innominate artery and sent for permanent pathology.     The aortic valve was examined and found to be tricuspid with normal gross appearance.  The valve was sharply excised and sent for permanent pathology.  The left and right coronary buttons were fashioned.  Three pledgeted valve sutures were placed at the commissures.  The valve was sized at 27 mm. The valve was opened and the remaining circumferential pledgeted valve sutures were placed with the pledgets above the annulus and connected to the valve prosthesis.  The valve was lowered into position with adequate seating.  The valve sutures were tied down with proper seating of the valve.  The tube graft was then trimmed wit ha bevel to the appropriate length.  The tube graft was then opened for the left coronary button using cautery.  An end to side anastomosis was performed using a running 6-0 Prolene suture to reimplant the left coronary ostium.  Bioglue was then applied to the aortic annulus and left coronary artery.  The right coronary button ostium was created in the tube graft using cautery.  An end to side anastomosis was performed using a running 6-0 Prolene suture to reimplant the right coronary ostium.  The LV vent was turned off and the tube graft was anastomosed to the remaining native aorta just below the innominate artery using a running 4-0 Prolene suture.  Bioglue was then applied to the right coronary anastomosis and the aortic anastomosis.  The root vent catheter was  then inserted into the tube graft and a hotshot of cardioplegia was given down the new ascending aorta and the patient was placed in steep Trendelenburg position. The root vent was turned on high suction and cardiopulmonary bypass flow was turned down with the crossclamp subsequently removed. Bypass flows were returned to normal and the heart returned to spontaneous sinus rhythm.  A few pleats on the distal aortic anastomosis were repaired with additional 4-0 Prolene sutures.  Otherwise, hemostasis at all suture lines was excellent.  The patient was warmed and ventilation resumed.  The patient subsequently had a brief single episode of ventricular fibrillation likely secondary to air that responded to immediate defibrillation.     The patient was subsequently weaned from cardiopulmonary bypass after warming and decannulation was successfully carried out. Two units of platelets and fresh frozen plasma were administered.  All cannulation sites were reinforced with additional 4-0 Prolene suture and inspected for hemostasis. Atrial and ventricular pacing wires were placed and secured using 0 silk suture. Four chest tubes were then placed within the left and right pleural spaces and mediastinum. These were secured using a 0 Ethibond suture. The sternum was reapproximated with #7 stainless steel wire and the linea alba was closed with a running 0 Vicryl suture. Subcutaneous tissues were closed with a 2-0 Vicryl suture and the inferior aspect of the incision was closed with additional interrupted 2-0 Vicryl sutures in the dermal layer. The skin was reapproximated with a 4-0 Monocryl subcuticular stitch and overlying skin glue was applied to the incision. Gauze and tape were applied to the chest tube sites and the patient was subsequently transported to the cardiac ICU in stable condition, intubated.         Electronically signed by Aaron Lucas MD at 1/18/2018  6:24 AM           Physician Progress Notes (last 24 hours)  (Notes from 1/21/2018  3:08 PM through 1/22/2018  3:08 PM)      JEAN Sanchez at 1/22/2018  7:16 AM  Version 1 of 1         CTS Progress Note      POD 5 s/p Ryan    CC: Chest incision pain     LOS: 6 days   Patient Care Team:  Macho Jose MD as PCP - General  Macho Jose MD as PCP - Family Medicine  Nabor Aburto MD as Consulting Physician (Cardiology)    Subjective  No new complaints  Had a good day yesterday    Objective    Vital Signs  Temp:  [98.2 °F (36.8 °C)-98.4 °F (36.9 °C)] 98.3 °F (36.8 °C)  Heart Rate:  [] 100  Resp:  [16-20] 18  BP: ()/(60-69) 112/65    Physical Exam:   General Appearance: alert, appears stated age and sitting up in bed   Lungs: clear to auscultation, respirations regular, respirations even and respirations unlabored   Heart: regular rhythm & normal rate, normal S1, S2, no murmur, no tameka, no rub and no click   Skin:  Incision c/d/i     Results     Results from last 7 days  Lab Units 01/21/18  0504 01/20/18  0517   WBC 10*3/mm3  --  8.39   HEMOGLOBIN g/dL 9.9* 10.0*   HEMATOCRIT % 31.2* 32.1*   PLATELETS 10*3/mm3  --  188       Results from last 7 days  Lab Units 01/21/18  0504   SODIUM mmol/L 139   POTASSIUM mmol/L 3.6   CHLORIDE mmol/L 100   CO2 mmol/L 30.0   BUN mg/dL 18   CREATININE mg/dL 0.90   GLUCOSE mg/dL 89   CALCIUM mg/dL 9.5           Imaging Results (last 24 hours)     Procedure Component Value Units Date/Time    XR Chest 1 View [[id number]] Collected:  01/20/18 1814     Updated:  01/21/18 1030    Narrative:          EXAMINATION: XR CHEST 1 VW - 01/20/2018     INDICATION: Z74.09-Other reduced mobility; I71.2-Thoracic aortic  aneurysm, without rupture.      COMPARISON: Chest x-ray 1/19/2018.     FINDINGS: Support hardware unchanged. Small left apical pneumothorax is  now identified. No new parenchymal disease or focal consolidation with  minimal bibasilar atelectasis greatest on the left. Cardiac size  enlarged and unchanged.  Trace left pleural effusion.           Impression:       Development/visualization of small left apical pneumothorax  is visualized with chest tube remaining in place. No significant change  in pulmonary findings otherwise.     DICTATED:     01/20/2018  EDITED    :     01/20/2018      This report was finalized on 1/21/2018 10:28 AM by Dr. Carmelo Diggs.       XR Chest 1 View [[id number]] Collected:  01/21/18 0738     Updated:  01/21/18 1223    Narrative:          EXAMINATION: XR CHEST 1 VW - 01/21/2018     INDICATION:  Z74.09-Other reduced mobility; I71.2-Thoracic aortic  aneurysm, without rupture.      COMPARISON: One-day prior.     FINDINGS: Support hardware unchanged and in satisfactory positioning.  Persistent trace left apical pneumothorax. Cardiac size enlarged with  minimal central pulmonary vascularity increase as well as bibasilar  opacities greatest on the right likely representing atelectasis. No new  parenchymal disease.       Impression:       Persistent trace left apical pneumothorax appears slightly  decreased from prior with chest tubes remaining in place. Mild increase  in bibasilar atelectasis without focal consolidation or new parenchymal  disease otherwise.     DICTATED:     01/21/2018  EDITED    :     01/21/2018      This report was finalized on 1/21/2018 12:21 PM by Dr. Carmelo Diggs.       XR Chest 1 View [[id number]] Updated:  01/22/18 0627        CT 240cc out last 24 hours,No air leak    Assessment  POD 5 s/p Bentall, expected recovery  Principal Problem:    Aneurysm of aortic sinus of Valsalva without rupture  Active Problems:    Ascending aortic aneurysm    Smoker    COPD (chronic obstructive pulmonary disease)    Essential hypertension  CV: stable      Plan   D/C chest tubes, likely D/C today  Diuresis  Pulmonary toilet  Ambulate  Coumadin again today  Possible D/C home          Electronically signed by JEAN Sanchez at 1/22/2018  7:29 AM      DION Hoffman at 1/22/2018  11:16 AM  Version 3 of 3         [facility]  Cardiology Progress Note      Chief Complaint/Reason for service:    ·   · Hypertension  · Cardiac Risk Factors  Subjective    Patient lying flat in bed breathing easy. Denies chest pain or dyspnea.  Had drains removed earlier today and is ready for discharge.     Past medical, surgical, social and family history reviewed in the patient's electronic medical record.        Objective  Vital Sign Min/Max for last 24 hours  Temp  Min: 98.3 °F (36.8 °C)  Max: 98.5 °F (36.9 °C)   BP  Min: 95/60  Max: 129/97   Pulse  Min: 75  Max: 109   Resp  Min: 16  Max: 20   No Data Recorded   Flow (L/min)  Min: 2  Max: 3      Intake/Output Summary (Last 24 hours) at 01/22/18 1117  Last data filed at 01/22/18 0556   Gross per 24 hour   Intake              480 ml   Output              740 ml   Net             -260 ml           Physical Exam   Constitutional: He is oriented to person, place, and time. He appears well-developed and well-nourished.   HENT:   Head: Normocephalic.   Neck: No JVD present. Carotid bruit is not present.   Cardiovascular: Normal rate, regular rhythm and normal heart sounds.  Exam reveals no gallop and no friction rub.    No murmur heard.  [place] click   Pulmonary/Chest: Effort normal and breath sounds normal.   Abdominal: Soft. Bowel sounds are normal. He exhibits no distension.   Neurological: He is alert and oriented to person, place, and time.   Skin: Skin is warm and dry.   Psychiatric: He has a normal mood and affect.       Results Review:   I reviewed the patient's recent labs in the electronic medical record.      Lab Results   Component Value Date    GLUCOSE 89 01/21/2018    CALCIUM 9.5 01/21/2018     01/21/2018    K 3.6 01/21/2018    CO2 30.0 01/21/2018     01/21/2018    BUN 18 01/21/2018    CREATININE 0.90 01/21/2018    EGFRIFAFRI  09/22/2016      Comment:      <15 Indicative of kidney failure.    EGFRIFNONA 93  01/21/2018    BCR 20.0 01/21/2018    ANIONGAP 9.0 01/21/2018       Lab Results   Component Value Date    WBC 8.39 01/20/2018    HGB 9.9 (L) 01/21/2018    HCT 31.2 (L) 01/21/2018    MCV 89.2 01/20/2018     01/20/2018       Lab Results   Component Value Date    HGBA1C 6.20 (H) 01/16/2018       Lab Results   Component Value Date    CHOL 206 (H) 01/16/2018    TRIG 202 (H) 01/16/2018    HDL 33 (L) 01/16/2018    LDLCALC 139 (H) 10/18/2017    LDLDIRECT 163 (H) 01/16/2018          Tele:  NSR    Assessment    Hospital Problem List     * (Principal)Aneurysm of aortic sinus of Valsalva without rupture    Ascending aortic aneurysm    Overview Addendum 1/22/2018  8:49 AM by Rodolfo Núñez IV, MD     · CT chest (10/17): 6 cm ascending aortic aneurysm  · Bentall by Aaron Lucas 1/17/2018         Tobacco abuse    COPD (chronic obstructive pulmonary disease)    Essential hypertension            Plan    · Start Lipitor 10 mg daily with a CMP and lipid profile in 6 weeks  · Defer aspirin as patient has an allergy.  · Continue coumadin and BB  · Anticoagulation mgt per PCP please f/u in 1 week  · Follow up with primary cardiologist Dr Jacobsen in 6 weeks.    DION Chang  1/22/2018     Electronically signed by DION Hoffman at 1/22/2018 12:52 PM      DION Hoffman at 1/22/2018 11:16 AM  Version 2 of 3         [facility] at [facility]  Cardiology Progress Note      Chief Complaint/Reason for service:    ·   · Hypertension  · Cardiac Risk Factors  Subjective    Patient lying flat in bed breathing easy. Denies chest pain or dyspnea.  Had drains removed earlier today and is ready for discharge.     Past medical, surgical, social and family history reviewed in the patient's electronic medical record.        Objective  Vital Sign Min/Max for last 24 hours  Temp  Min: 98.3 °F (36.8 °C)  Max: 98.5 °F (36.9 °C)   BP  Min: 95/60  Max: 129/97   Pulse  Min: 75  Max: 109   Resp  Min:  16  Max: 20   No Data Recorded   Flow (L/min)  Min: 2  Max: 3      Intake/Output Summary (Last 24 hours) at 01/22/18 1117  Last data filed at 01/22/18 0556   Gross per 24 hour   Intake              480 ml   Output              740 ml   Net             -260 ml           Physical Exam   Constitutional: He is oriented to person, place, and time. He appears well-developed and well-nourished.   HENT:   Head: Normocephalic.   Neck: No JVD present. Carotid bruit is not present.   Cardiovascular: Normal rate, regular rhythm and normal heart sounds.  Exam reveals no gallop and no friction rub.    No murmur heard.  [place] click   Pulmonary/Chest: Effort normal and breath sounds normal.   Abdominal: Soft. Bowel sounds are normal. He exhibits no distension.   Neurological: He is alert and oriented to person, place, and time.   Skin: Skin is warm and dry.   Psychiatric: He has a normal mood and affect.       Results Review:   I reviewed the patient's recent labs in the electronic medical record.      Lab Results   Component Value Date    GLUCOSE 89 01/21/2018    CALCIUM 9.5 01/21/2018     01/21/2018    K 3.6 01/21/2018    CO2 30.0 01/21/2018     01/21/2018    BUN 18 01/21/2018    CREATININE 0.90 01/21/2018    EGFRIFAFRI  09/22/2016      Comment:      <15 Indicative of kidney failure.    EGFRIFNONA 93 01/21/2018    BCR 20.0 01/21/2018    ANIONGAP 9.0 01/21/2018       Lab Results   Component Value Date    WBC 8.39 01/20/2018    HGB 9.9 (L) 01/21/2018    HCT 31.2 (L) 01/21/2018    MCV 89.2 01/20/2018     01/20/2018       Lab Results   Component Value Date    HGBA1C 6.20 (H) 01/16/2018       Lab Results   Component Value Date    CHOL 206 (H) 01/16/2018    TRIG 202 (H) 01/16/2018    HDL 33 (L) 01/16/2018    LDLCALC 139 (H) 10/18/2017    LDLDIRECT 163 (H) 01/16/2018          Tele:  [facility]    Assessment    Hospital Problem List     * (Principal)Aneurysm of aortic sinus of Valsalva without rupture    Ascending aortic  aneurysm    Overview Addendum 1/22/2018  8:49 AM by Rodolfo Núñez IV, MD     · CT chest (10/17): 6 cm ascending aortic aneurysm  · Bentall by Aaron Lucas, 1/17/2018         Tobacco abuse    COPD (chronic obstructive pulmonary disease)    Essential hypertension            Plan    · Start Lipitor 10 mg daily with a CMP and lipid profile in 6 weeks  · Defer aspirin as patient has an allergy.  · Continue coumadin and BB  · Follow up with primary cardiologist Dr Jacobsen in 6 weeks.    DION Chang  1/22/2018     Electronically signed by DION Hoffman at 1/22/2018 11:41 AM      DION Hoffman at 1/22/2018 11:16 AM  Version 1 of 3         [facility] at [facility]  Cardiology Progress Note      Chief Complaint/Reason for service:    ·   · Hypertension  · Cardiac Risk Factors  Subjective    Patient lying flat in bed breathing easy. Denies chest pain or dyspnea.  Had drains removed earlier today and is ready for discharge.     Past medical, surgical, social and family history reviewed in the patient's electronic medical record.        Objective  Vital Sign Min/Max for last 24 hours  Temp  Min: 98.3 °F (36.8 °C)  Max: 98.5 °F (36.9 °C)   BP  Min: 95/60  Max: 129/97   Pulse  Min: 75  Max: 109   Resp  Min: 16  Max: 20   No Data Recorded   Flow (L/min)  Min: 2  Max: 3      Intake/Output Summary (Last 24 hours) at 01/22/18 1117  Last data filed at 01/22/18 0556   Gross per 24 hour   Intake              480 ml   Output              740 ml   Net             -260 ml           Physical Exam   Constitutional: He is oriented to person, place, and time. He appears well-developed and well-nourished.   HENT:   Head: Normocephalic.   Neck: No JVD present. Carotid bruit is not present.   Cardiovascular: Normal rate, regular rhythm and normal heart sounds.  Exam reveals no gallop and no friction rub.    No murmur heard.  [place] click   Pulmonary/Chest: Effort normal and breath sounds  normal.   Abdominal: Soft. Bowel sounds are normal. He exhibits no distension.   Neurological: He is alert and oriented to person, place, and time.   Skin: Skin is warm and dry.   Psychiatric: He has a normal mood and affect.       Results Review:   I reviewed the patient's recent labs in the electronic medical record.      Lab Results   Component Value Date    GLUCOSE 89 01/21/2018    CALCIUM 9.5 01/21/2018     01/21/2018    K 3.6 01/21/2018    CO2 30.0 01/21/2018     01/21/2018    BUN 18 01/21/2018    CREATININE 0.90 01/21/2018    EGFRIFAFRI  09/22/2016      Comment:      <15 Indicative of kidney failure.    EGFRIFNONA 93 01/21/2018    BCR 20.0 01/21/2018    ANIONGAP 9.0 01/21/2018       Lab Results   Component Value Date    WBC 8.39 01/20/2018    HGB 9.9 (L) 01/21/2018    HCT 31.2 (L) 01/21/2018    MCV 89.2 01/20/2018     01/20/2018       Lab Results   Component Value Date    HGBA1C 6.20 (H) 01/16/2018       Lab Results   Component Value Date    CHOL 206 (H) 01/16/2018    TRIG 202 (H) 01/16/2018    HDL 33 (L) 01/16/2018    LDLCALC 139 (H) 10/18/2017    LDLDIRECT 163 (H) 01/16/2018          Tele:  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  · Follow up with primary cardiologist Dr Jacobsen in 6 weeks.    DION Chang  1/22/2018     Electronically signed by DION Hoffman at 1/22/2018 11:39 AM           Consult Notes (most recent note)      DION Hoffman at 1/17/2018  3:07 PM  Version 1 of 1      Consult Orders:    1. Inpatient Consult to Cardiology [[id number]] ordered by Meme Walton PA-C at 01/17/18 1301                Inpatient Consult to Cardiology  Consult performed by: MARTITA GUDINO  Consult ordered by: MEME WALTON [facility] at [facility]  Cardiovascular Consultation Note      Subjective  Patient is a 42-year-old male who was referred for a cardiac catheterization with Dr. Néstor Núñez last October prior to proceeding with repair of his large ascending aortic aneurysm.  The patient was found to have normal coronary arteries and was cleared for a Bentall procedure without bypass required.  He underwent the procedure earlier today and we are being asked to consult to assist in his post operative care for management of his cardiac risk factors and history of hypertension.  The patient is currently intubated and sedated with Diprivan.  He is requiring no pressors for hemodynamic support.  His mediastinal tubes are patent and draining. No family is currently present and all information is obtained from the electronic medical record and through physical examination      Past medical and surgical history, social and family history reviewed in EMR.    REVIEW OF SYSTEMS:   H&P ROS reviewed and pertinent CV ROS as noted in HPI.    Objective    Vital Sign Min/Max for last 24 hours  Temp  Min: 97.2 °F (36.2 °C)  Max: 97.9 °F (36.6 °C)   BP  Min: 90/63  Max: 145/87   Pulse  Min: 64  Max: 102   Resp  Min: 16  Max: 20   SpO2  Min: 93 %  Max: 100 %   No Data Recorded      Intake/Output Summary (Last 24 hours) at 01/17/18 1515  Last data filed at 01/17/18 1400   Gross per 24 hour   Intake             1976 ml   Output             3540 ml   Net            -1564 ml           Physical Exam   Constitutional: He appears well-developed and well-nourished. He is sedated and intubated.   HENT:   Head: Normocephalic and atraumatic.   Eyes: Pupils are equal, round, and reactive  to light. No scleral icterus.   Neck: No JVD present. Carotid bruit is not present. No thyromegaly present.   Cardiovascular: Normal rate, regular rhythm, S1 normal and S2 normal.  Exam reveals no gallop.    No murmur heard.  [place] click   Pulmonary/Chest: Effort normal and breath sounds normal. He is intubated.   Abdominal: Soft. There is no hepatosplenomegaly. There is no tenderness.   Skin: Skin is warm and dry. No cyanosis. Nails show no clubbing.   Psychiatric: He has a normal mood and affect. His behavior is normal.       EKG: NSR    Lab Review:   Labs reviewed in the electronic medical record.  Pertinent findings include:  Lab Results   Component Value Date    GLUCOSE 131 (H) 01/17/2018    BUN 14 01/17/2018    CREATININE 1.00 01/17/2018    EGFRIFNONA 82 01/17/2018    EGFRIFAFRI  09/22/2016      Comment:      <15 Indicative of kidney failure.    BCR 14.0 01/17/2018    K 3.7 01/17/2018    CO2 24.0 01/17/2018    CALCIUM 8.0 (L) 01/17/2018    ALBUMIN 3.70 01/17/2018    LABIL2 1.8 01/16/2018    AST 19 01/16/2018    ALT 27 01/16/2018     Lab Results   Component Value Date    WBC 9.82 01/17/2018    HGB 10.4 (L) 01/17/2018    HCT 32.1 (L) 01/17/2018    MCV 86.5 01/17/2018     01/17/2018     Lab Results   Component Value Date    CHOL 206 (H) 01/16/2018    TRIG 202 (H) 01/16/2018    HDL 33 (L) 01/16/2018    LDLCALC 139 (H) 10/18/2017    LDLDIRECT 163 (H) 01/16/2018       Results from last 7 days  Lab Units 01/16/18  1930   HEMOGLOBIN A1C % 6.20*          Assessment  Hospital Problem List     * (Principal)Aneurysm of aortic sinus of Valsalva without rupture    Ascending aortic aneurysm    Overview Addendum 1/17/2018  2:41 PM by DION Hoffman     · CT chest (10/17): 6 cm ascending aortic aneurysm  · Bentall procedure (01/17/2018) with Dr Aaron Lucas         Smoker    COPD (chronic obstructive pulmonary disease)    Essential hypertension            Plan  · Continue routine postop course  · Will continue  to follow along      Roz ASHLEY           Electronically signed by DION Hoffman at 2018  3:19 PM           Physical Therapy Notes (most recent note)      Desiree Kendrick PTA at 2018  2:17 PM  Version 1 of 1         Acute Care - Physical Therapy Treatment Note   [place]     Patient Name: Filemon Jj  : 1975  MRN: [id number]  Today's Date: 2018  Onset of Illness/Injury or Date of Surgery Date: 18  Date of Referral to PT: 18  Referring Physician: ELICIA Walton    Admit Date: 2018    Visit Dx:    ICD-10-CM ICD-9-CM   1. Impaired functional mobility, balance, gait, and endurance Z74.09 V49.89   2. Ascending aortic aneurysm I71.2 441.2   3. Aneurysm of aortic sinus of Valsalva without rupture Q25.43 747.29   4. Mixed hyperlipidemia E78.2 272.2     Patient Active Problem List   Diagnosis   • Ascending aortic aneurysm   • Other chest pain   • Aneurysm of aortic sinus of Valsalva without rupture   • Tobacco abuse   • COPD (chronic obstructive pulmonary disease)   • Essential hypertension               Adult Rehabilitation Note       18 1330 18 1027       Rehab Assessment/Intervention    Discipline physical therapy assistant  -UD physical therapist  -SC     Document Type therapy note (daily note)  -UD therapy note (daily note)  -SC     Subjective Information agree to therapy;complains of;dyspnea  -UD agree to therapy;complains of;pain  -SC     Patient Effort, Rehab Treatment good  -UD good  -SC     Symptoms Noted During/After Treatment shortness of breath  -UD increased pain  -SC     Precautions/Limitations cardiac precautions  -UD cardiac precautions;sternal precautions  -SC     Recorded by [UD] Desiree Kendrick PTA [SC] Kiki Ch PT     Vital Signs    Pre Systolic BP Rehab 119  -UD      Pre Treatment Diastolic BP 67  -UD      Post Systolic BP Rehab  96  -SC     Post Treatment Diastolic BP  52  -SC     Pretreatment Heart Rate (beats/min)  108  -UD      Pre SpO2 (%) 92  -UD      O2 Delivery Pre Treatment room air  -UD      Post SpO2 (%) 95  -UD 95  -SC     O2 Delivery Post Treatment room air  -UD supplemental O2  -SC     Pre Patient Position Standing  -UD      Intra Patient Position Standing  -UD      Post Patient Position Sitting  -UD      Recorded by [UD] Desiree Kendrick PTA [SC] Kiki Ch, PT     Pain Assessment    Pain Assessment Hamm-Foster FACES  -UD Hamm-Baker FACES  -SC     Hamm-Foster FACES Pain Rating 2  -UD 4  -SC     Pain Score 2  -UD      Post Pain Score 2  -UD 6  -SC     Pain Type Surgical pain  -UD Surgical pain  -SC     Pain Location Sternum  -UD Mediastinum  -SC     Pain Intervention(s) Repositioned;Ambulation/increased activity  -UD Medication (See MAR);Repositioned  -SC     Response to Interventions  tolerated  -SC     Recorded by [UD] Desiree Kendrick PTA [SC] Kiki Ch, PT     Cognitive Assessment/Intervention    Current Cognitive/Communication Assessment  functional  -SC     Orientation Status oriented x 4;person;place;situation  -UD oriented x 4  -SC     Follows Commands/Answers Questions 100% of the time  -% of the time  -SC     Personal Safety  WNL/WFL  -SC     Personal Safety Interventions fall prevention program maintained  -UD gait belt;fall prevention program maintained  -SC     Recorded by [UD] Desiree eKndrick PTA [SC] Kiki Ch, PT     Bed Mobility, Assessment/Treatment    Bed Mob, Supine to Sit, [place] not tested   up in room  -UD      Bed Mobility, Comment  found up on edge of bed  -SC     Recorded by [UD] Desiree Kendrick PTA [SC] Kiki Ch, PT     Transfer Assessment/Treatment    Transfers, Sit-Stand [place] independent  -UD verbal cues required;supervision required  -SC     Transfers, Stand-Sit [place] independent  -UD verbal cues required;supervision required  -SC     Transfers, Sit-Stand-Sit, Assist Device  rolling walker  -SC     Transfer, Comment  demonstrated good technique  -SC      Recorded by [UD] Desiree Kendrick PTA [SC] Kiki Ch, PT     Gait Assessment/Treatment    Gait, [place] Level supervision required  - contact guard assist  -SC     Gait, Assistive Device  rolling walker  -SC     Gait, Distance (Feet) 500  -  -SC     Gait, Gait Pattern Analysis  swing-through gait  -SC     Gait, Gait Deviations [place] decreased;step length decreased  - [place] decreased;other (see comments)   very slow  -SC     Gait, Safety Issues step length decreased  -      Gait, Impairments strength decreased  -      Gait, Comment  cues for straying close to walker  -SC     Recorded by [UD] Desiree Kendrick PTA [SC] Kiki Ch, PT     Stairs Assessment/Treatment    Number of Stairs 5  -      Stairs, Handrail Location right side (ascending)  -UD      Stairs, [place] Level independent  -      Stairs, Technique Used step to step (ascending);step to step (descending)  -UD      Recorded by [UD] Desiree Kendrick PTA      Balance Skills Training    Sitting-Level of Assistance Independent  -UD      Recorded by [UD] Desiree Kendrick PTA      Therapy Exercises    Bilateral Lower Extremities AROM:;10 reps;sitting  -UD 10 reps;AROM:;sitting;ankle pumps/circles;LAQ  -SC     Bilateral Upper Extremity AROM:;10 reps;sitting  -UD      Recorded by [UD] Desiree Kendrick PTA [SC] Kiki Ch, PT     Positioning and Restraints    Pre-Treatment Position standing in room  -UD in bed  -SC     Post Treatment Position other  - bed  -SC     In Bed  sitting EOB;call light within reach;encouraged to call for assist;exit alarm on;notified nsg;with nsg  -SC     Other Position return to room independently   sitting on couch  -UD      Recorded by [UD] Desiree Kendrick PTA [SC] Kiki Ch, PT       User Key  (r) = Recorded By, (t) = Taken By, (c) = Cosigned By    Initials Name Effective Dates    SC Kiki Ch, PT 06/19/15 -     UD Desiree Kendrick PTA 06/22/15 -                 IP PT Goals       01/22/18 1410  01/18/18 1119       Bed Mobility PT LTG    Bed Mobility PT LTG, Date Established  01/18/18  -KR     Bed Mobility PT LTG, Time to Achieve  2 wks  -KR     Bed Mobility PT LTG, Activity Type  all bed mobility  -KR     Bed Mobility PT LTG, [place] Level  independent  -KR     Bed Mobility PT LTG, Outcome goal met  -UD goal ongoing  -KR     Transfer Training PT LTG    Transfer Training PT LTG, Date Established  01/18/18  -KR     Transfer Training PT LTG, Time to Achieve  2 wks  -KR     Transfer Training PT LTG, Activity Type  sit to stand/stand to sit  -KR     Transfer Training PT LTG, [place] Level  conditional independence  -KR     Transfer Training PT LTG, Assist Device  walker, rolling  -KR     Transfer Training PT LTG, Outcome goal met  -UD goal ongoing  -KR     Gait Training PT LTG    Gait Training Goal PT LTG, Date Established  01/18/18  -KR     Gait Training Goal PT LTG, Time to Achieve  2 wks  -KR     Gait Training Goal PT LTG, [place] Level  conditional independence  -KR     Gait Training Goal PT LTG, Assist Device  walker, rolling  -KR     Gait Training Goal PT LTG, Distance to Achieve  400 feet  -KR     Gait Training Goal PT LTG, Outcome goal met  -UD goal ongoing  -KR     Stair Training PT LTG    Stair Training Goal PT LTG, Date Established  01/18/18  -KR     Stair Training Goal PT LTG, Time to Achieve  2 wks  -KR     Stair Training Goal PT LTG, Number of Steps  6  -KR     Stair Training Goal PT LTG, [place] Level  conditional independence  -KR     Stair Training Goal PT LTG, Assist Device  2 handrails  -KR     Stair Training Goal PT LTG, Outcome goal met  -UD goal ongoing  -KR       User Key  (r) = Recorded By, (t) = Taken By, (c) = Cosigned By    Initials Name Provider Type    GENNY Kendrick, PTA Physical Therapy Assistant    FLORENCIO Montes, PT Physical Therapist          Physical Therapy Education     Title: PT OT SLP Therapies (Done)     Topic: Physical Therapy (Done)     Point:  Mobility training (Done)    Learning Progress Summary    Learner Readiness Method Response Comment Documented by Status   Patient Mager CHE EDWARDS,H JEFF,NR   01/22/18 1413 Done    JEFF Birch reviewed benefits of activity SC 01/21/18 1211 Done    Acceptance E NR  YVONNE 01/19/18 1203 Active    Acceptance E NR  KR 01/18/18 1118 Active               Point: Home exercise program (Done)    Learning Progress Summary    Learner Readiness Method Response Comment Documented by Status   Patient Mager CHE EDWARDS,H JEFF,NR   01/22/18 1413 Done    JEFF Birch reviewed benefits of activity SC 01/21/18 1211 Done    Acceptance E NR  YVONNE 01/19/18 1203 Active               Point: Body mechanics (Done)    Learning Progress Summary    Learner Readiness Method Response Comment Documented by Status   Patient CHE Johnson,H JEFF,NR   01/22/18 1413 Done    JEFF Birch reviewed benefits of activity SC 01/21/18 1211 Done    Acceptance E NR  YVONNE 01/19/18 1203 Active    Acceptance E NR  KR 01/18/18 1118 Active               Point: Precautions (Done)    Learning Progress Summary    Learner Readiness Method Response Comment Documented by Status   Patient CHE Johnson,H JEFF,NR   01/22/18 1413 Done    JEFF Birch reviewed benefits of activity SC 01/21/18 1211 Done    Acceptance E NR  YVONNE 01/19/18 1203 Active    Acceptance E NR   01/18/18 1118 Active                      User Key     Initials Effective Dates Name Provider Type Discipline    SC 06/19/15 -  Kiki Ch, PT Physical Therapist PT     06/19/15 -  Cora Gupta, PT Physical Therapist PT     06/22/15 -  Desiree Kendrick, PTA Physical Therapy Assistant PT     09/25/17 -  Rhina Montes, PT Physical Therapist PT                    PT Recommendation and Plan  Anticipated Discharge Disposition: home with assist, home with home health  PT Frequency: daily  Plan of Care Review  Plan Of Care Reviewed With: patient  Progress: improving  Outcome Summary/Follow up Plan: pt out in [place],went up and down 5  steps with railing.ambulat 500 ft.mild sob,vitals stable          Outcome Measures       01/22/18 1330 01/21/18 1027       How much help from another person do you currently need...    Turning from your back to your side while in flat bed without using bedrails? 4  -UD 3  -SC     Moving from lying on back to sitting on the side of a flat bed without bedrails? 4  -UD 3  -SC     Moving to and from a bed to a chair (including a wheelchair)? 4  -UD 3  -SC     Standing up from a chair using your arms (e.g., wheelchair, bedside chair)? 4  -UD 3  -SC     Climbing 3-5 steps with a railing? 4  -UD 2  -SC     To walk in hospital room? 4  -UD 3  -SC     AM-PAC 6 Clicks Score 24  -UD 17  -SC     Functional Assessment    Outcome Measure Options  AM-PAC 6 Clicks Basic Mobility (PT)  -SC       User Key  (r) = Recorded By, (t) = Taken By, (c) = Cosigned By    Initials Name Provider Type    SC Kiki Ch, PT Physical Therapist    UD Desiree Kendrick PTA Physical Therapy Assistant           Time Calculation:         PT Charges       01/22/18 1417          Time Calculation    PT Received On 01/22/18  -      PT Goal Re-Cert Due Date 01/28/18  -      Time Calculation- PT    Total Timed Code Minutes- PT 24 minute(s)  -        User Key  (r) = Recorded By, (t) = Taken By, (c) = Cosigned By    Initials Name Provider Type    UD Desiree Kendrick PTA Physical Therapy Assistant          Therapy Charges for Today     Code Description Service Date Service Provider Modifiers Qty    20271984899 HC PT THER PROC EA 15 MIN 1/22/2018 Dseiree Kendrick PTA GP 1    32182500293 HC GAIT TRAINING EA 15 MIN 1/22/2018 Desiree Kendrick PTA GP 1          PT G-Codes  Outcome Measure Options: AM-PAC 6 Clicks Basic Mobility (PT)    Desiree Kendrick PTA  1/22/2018          Electronically signed by Desiree Kendrick PTA at 1/22/2018  2:18 PM

## 2018-01-22 NOTE — NURSING NOTE
Pt. Referred for Phase II Cardiac Rehab. Staff discussed benefits of exercise, program protocol, and educational material provided. Teach back verified.  Permission granted from patient for staff to fax referral information to outlying program at this time.  Staff to fax referral info to [facility].

## 2018-01-22 NOTE — PROGRESS NOTES
[facility]  Cardiology Progress Note      Chief Complaint/Reason for service:    ·   · Hypertension  · Cardiac Risk Factors       Patient lying flat in bed breathing easy. Denies chest pain or dyspnea.  Had drains removed earlier today and is ready for discharge.     Past medical, surgical, social and family history reviewed in the patient's electronic medical record.           Vital Sign Min/Max for last 24 hours  Temp  Min: 98.3 °F (36.8 °C)  Max: 98.5 °F (36.9 °C)   BP  Min: 95/60  Max: 129/97   Pulse  Min: 75  Max: 109   Resp  Min: 16  Max: 20   No Data Recorded   Flow (L/min)  Min: 2  Max: 3      Intake/Output Summary (Last 24 hours) at 01/22/18 1117  Last data filed at 01/22/18 0556   Gross per 24 hour   Intake              480 ml   Output              740 ml   Net             -260 ml           Physical Exam   Constitutional: He is oriented to person, place, and time. He appears well-developed and well-nourished.   HENT:   Head: Normocephalic.   Neck: No JVD present. Carotid bruit is not present.   Cardiovascular: Normal rate, regular rhythm and normal heart sounds.  Exam reveals no gallop and no friction rub.    No murmur heard.  Dane click   Pulmonary/Chest: Effort normal and breath sounds normal.   Abdominal: Soft. Bowel sounds are normal. He exhibits no distension.   Neurological: He is alert and oriented to person, place, and time.   Skin: Skin is warm and dry.   Psychiatric: He has a normal mood and affect.       Results Review:   I reviewed the patient's recent labs in the electronic medical record.      Lab Results   Component Value Date    GLUCOSE 89 01/21/2018    CALCIUM 9.5 01/21/2018     01/21/2018    K 3.6 01/21/2018    CO2 30.0 01/21/2018     01/21/2018    BUN 18 01/21/2018    CREATININE 0.90 01/21/2018    EGFRIFAFRI  09/22/2016      Comment:      <15 Indicative of kidney failure.    EGFRIFNONA 93 01/21/2018    BCR 20.0 01/21/2018    ANIONGAP 9.0  01/21/2018       Lab Results   Component Value Date    WBC 8.39 01/20/2018    HGB 9.9 (L) 01/21/2018    HCT 31.2 (L) 01/21/2018    MCV 89.2 01/20/2018     01/20/2018       Lab Results   Component Value Date    HGBA1C 6.20 (H) 01/16/2018       Lab Results   Component Value Date    CHOL 206 (H) 01/16/2018    TRIG 202 (H) 01/16/2018    HDL 33 (L) 01/16/2018    LDLCALC 139 (H) 10/18/2017    LDLDIRECT 163 (H) 01/16/2018          Tele:  [facility]         Hospital Problem List     * (Principal)Aneurysm of aortic sinus of Valsalva without rupture    Ascending aortic aneurysm    Overview Addendum 1/22/2018  8:49 AM by Rodolfo Núñez IV, MD     · CT chest (10/17): 6 cm ascending aortic aneurysm  · Bentall by Aaron Lucas, 1/17/2018         Tobacco abuse    COPD (chronic obstructive pulmonary disease)    Essential hypertension                 · Start Lipitor 10 mg daily with a CMP and lipid profile in 6 weeks  · Defer aspirin as patient has an allergy.  · Continue coumadin and BB  · Anticoagulation mgt per PCP please f/u in 1 week  · Follow up with primary cardiologist Dr Jacobsen in 6 weeks.    Mable Rubio, APRN  1/22/2018

## 2018-01-22 NOTE — PROGRESS NOTES
CTS Progress Note      POD 5 s/p Ryan    CC: Chest incision pain     LOS: 6 days   Patient Care Team:  Macho Jose MD as PCP - General  Macho Jose MD as PCP - Family Medicine  McCheo Aburto MD as Consulting Physician (Cardiology)    Subjective  No new complaints  Had a good day yesterday    Objective    Vital Signs  Temp:  [98.2 °F (36.8 °C)-98.4 °F (36.9 °C)] 98.3 °F (36.8 °C)  Heart Rate:  [] 100  Resp:  [16-20] 18  BP: ()/(60-69) 112/65    Physical Exam:   General Appearance: alert, appears stated age and sitting up in bed   Lungs: clear to auscultation, respirations regular, respirations even and respirations unlabored   Heart: regular rhythm & normal rate, normal S1, S2, no murmur, no tameka, no rub and no click   Skin:  Incision c/d/i     Results     Results from last 7 days  Lab Units 01/21/18  0504 01/20/18  0517   WBC 10*3/mm3  --  8.39   HEMOGLOBIN g/dL 9.9* 10.0*   HEMATOCRIT % 31.2* 32.1*   PLATELETS 10*3/mm3  --  188       Results from last 7 days  Lab Units 01/21/18  0504   SODIUM mmol/L 139   POTASSIUM mmol/L 3.6   CHLORIDE mmol/L 100   CO2 mmol/L 30.0   BUN mg/dL 18   CREATININE mg/dL 0.90   GLUCOSE mg/dL 89   CALCIUM mg/dL 9.5           Imaging Results (last 24 hours)     Procedure Component Value Units Date/Time    XR Chest 1 View [[id number]] Collected:  01/20/18 1814     Updated:  01/21/18 1030    Narrative:          EXAMINATION: XR CHEST 1 VW - 01/20/2018     INDICATION: Z74.09-Other reduced mobility; I71.2-Thoracic aortic  aneurysm, without rupture.      COMPARISON: Chest x-ray 1/19/2018.     FINDINGS: Support hardware unchanged. Small left apical pneumothorax is  now identified. No new parenchymal disease or focal consolidation with  minimal bibasilar atelectasis greatest on the left. Cardiac size  enlarged and unchanged. Trace left pleural effusion.           Impression:       Development/visualization of small left apical pneumothorax  is visualized  with chest tube remaining in place. No significant change  in pulmonary findings otherwise.     DICTATED:     01/20/2018  EDITED    :     01/20/2018      This report was finalized on 1/21/2018 10:28 AM by Dr. Carmelo Diggs.       XR Chest 1 View [[id number]] Collected:  01/21/18 0738     Updated:  01/21/18 1223    Narrative:          EXAMINATION: XR CHEST 1 VW - 01/21/2018     INDICATION:  Z74.09-Other reduced mobility; I71.2-Thoracic aortic  aneurysm, without rupture.      COMPARISON: One-day prior.     FINDINGS: Support hardware unchanged and in satisfactory positioning.  Persistent trace left apical pneumothorax. Cardiac size enlarged with  minimal central pulmonary vascularity increase as well as bibasilar  opacities greatest on the right likely representing atelectasis. No new  parenchymal disease.       Impression:       Persistent trace left apical pneumothorax appears slightly  decreased from prior with chest tubes remaining in place. Mild increase  in bibasilar atelectasis without focal consolidation or new parenchymal  disease otherwise.     DICTATED:     01/21/2018  EDITED    :     01/21/2018      This report was finalized on 1/21/2018 12:21 PM by Dr. Carmelo Diggs.       XR Chest 1 View [[id number]] Updated:  01/22/18 0627        CT 240cc out last 24 hours,No air leak    Assessment  POD 5 s/p Bentall, expected recovery  Principal Problem:    Aneurysm of aortic sinus of Valsalva without rupture  Active Problems:    Ascending aortic aneurysm    Smoker    COPD (chronic obstructive pulmonary disease)    Essential hypertension  CV: stable      Plan   D/C chest tubes, likely D/C today  Diuresis  Pulmonary toilet  Ambulate  Coumadin again today  Possible D/C home

## 2018-01-22 NOTE — PLAN OF CARE
Problem: Patient Care Overview (Adult)  Goal: Plan of Care Review  Outcome: Outcome(s) achieved Date Met: 01/22/18 01/22/18 1414   Coping/Psychosocial Response Interventions   Plan Of Care Reviewed With patient   Patient Care Overview   Progress improving   Outcome Evaluation   Outcome Summary/Follow up Plan pt out in [place],went up and down 5 steps with railing.ambulat 500 ft.mild sob,vitals stable       Problem: Inpatient Physical Therapy  Goal: Bed Mobility Goal LTG- PT  Outcome: Outcome(s) achieved Date Met: 01/22/18 01/18/18 1119 01/22/18 1414   Bed Mobility PT LTG   Bed Mobility PT LTG, Date Established 01/18/18 --    Bed Mobility PT LTG, Time to Achieve 2 wks --    Bed Mobility PT LTG, Activity Type all bed mobility --    Bed Mobility PT LTG, [place] Level independent --    Bed Mobility PT LTG, Outcome --  goal met     Goal: Transfer Training Goal 1 LTG- PT  Outcome: Outcome(s) achieved Date Met: 01/22/18 01/18/18 1119 01/22/18 1414   Transfer Training PT LTG   Transfer Training PT LTG, Date Established 01/18/18 --    Transfer Training PT LTG, Time to Achieve 2 wks --    Transfer Training PT LTG, Activity Type sit to stand/stand to sit --    Transfer Training PT LTG, [place] Level conditional independence --    Transfer Training PT LTG, Assist Device walker, rolling --    Transfer Training PT LTG, Outcome --  goal met     Goal: Gait Training Goal LTG- PT  Outcome: Outcome(s) achieved Date Met: 01/22/18 01/18/18 1119 01/22/18 1414   Gait Training PT LTG   Gait Training Goal PT LTG, Date Established 01/18/18 --    Gait Training Goal PT LTG, Time to Achieve 2 wks --    Gait Training Goal PT LTG, [place] Level conditional independence --    Gait Training Goal PT LTG, Assist Device walker, rolling --    Gait Training Goal PT LTG, Distance to Achieve 400 feet --    Gait Training Goal PT LTG, Outcome --  goal met     Goal: Stair Training Goal LTG- PT  Outcome: Outcome(s) achieved Date Met:  01/22/18 01/18/18 1119 01/22/18 1414   Stair Training PT LTG   Stair Training Goal PT LTG, Date Established 01/18/18 --    Stair Training Goal PT LTG, Time to Achieve 2 wks --    Stair Training Goal PT LTG, Number of Steps 6 --    Stair Training Goal PT LTG, [place] Level conditional independence --    Stair Training Goal PT LTG, Assist Device 2 handrails --    Stair Training Goal PT LTG, Outcome --  goal met

## 2018-01-23 NOTE — DISCHARGE SUMMARY
CTS Discharge Summary    Patient Care Team:  Macho Jose MD as PCP - General  Macho Jose MD as PCP - Family Medicine  Nabor Aburto MD as Consulting Physician (Cardiology)      Date of Admission: 1/16/2018  5:50 PM  Date of Discharge:  01/22/2018    Discharge Diagnosis  Past Medical History:   Diagnosis Date   • Anxiety    • Asthma    • Back pain    • COPD (chronic obstructive pulmonary disease)    • Emphysema lung    • Gastritis    • GERD (gastroesophageal reflux disease)    • Headache    • Hypertension    • Migraine    • Substance abuse        Principal Problem:    Aneurysm of aortic sinus of Valsalva without rupture  Active Problems:    Ascending aortic aneurysm    Tobacco abuse    COPD (chronic obstructive pulmonary disease)    Essential hypertension      History of Present IllnessPatient is a 42-year-old  male with history of severe chronic obstructive pulmonary disease who was admitted to [facility] on 10/17/2017 for shortness of breath and found to have a 6 cm ascending aortic aneurysm.  Patient underwent an extensive workup.  In addition, he was found to have poor dentition and was discharged to have multiple tooth extractions as an outpatient, which was performed in November of 2017.     Patient reports he experiences mild and infrequent epigastric pain lasting only seconds however states this has been occurring more frequently, once every few weeks.  He also reports persistent dyspnea on exertion and bilateral lower extremity weakness which has resulted in an inability to stand and walk great distances until his legs give out beneath him.  This resulted in a visit to the emergency department 2 to 3 weeks ago with him being discharged home from the emergency department after an unremarkable workup, having been treated with nebulizer treatments with no official diagnosis rendered.  The patient reports this has been going on for months.  Otherwise, the  patient states there has been no change in his overall health status since his previous hospitalization.  Currently, he denies any chest pain, back pain, epigastric pain, lower extremity pain or dyspnea.        Hospital Course  Patient is a 42 y.o. male admitted for an ascending aortic aneurysm, moderate aortic regurgitation.  After admission patient was taken the operating suite and underwent an aortic valve conduit Bentall procedure with a 27 mm [place] [place] mechanical valve conduit.  Patient tolerated the procedure well.  Came off bypass promptly.  Was closed taken to the cardiothoracic unit in stable condition.  Postop was seen by the hospital intensivist while in the intensive care unit.  Postop day #1 patient is awake alert and extubated.  His up in a chair.  Trying to get to his tobacco dip.  Chest tubes with minimal drainage is on low-dose nitroglycerin secondary to hypertension.  Day 2 chest tubes with minimal drainage.  Patient was started on his Coumadin.  His pacing wires were removed.  Patient was transferred to telemetry.  Through the weekend the patient increased ambulation up on the floor.  Remained hemodynamically stable.  His INR on Monday was 1.4.  His chest tubes with minimal drainage they were able to be removed.  The patient was discharged home.  He is to follow-up with Dr. Jose in 3 days for recheck and a ProTime and INR.  Keep his INR 2.5-3.5.    Procedures Performed  Procedure(s):  MEDIAN STERNOTOMY, AORTIC VALVE CONDUIT, BENTAL, TRANSESOPHAGEAL ECHOCARDIOGRAM WITH ANESTHESIA       Consults:   Consults     Date and Time Order Name Status Description    1/17/2018 1316 Inpatient Consult to Cardiology Completed             Discharge Medications   Filemon Jj   Home Medication Instructions JASON:[id number]    Printed on:01/23/18 7801   Medication Information                      albuterol (PROVENTIL HFA;VENTOLIN HFA) 108 (90 BASE) MCG/ACT inhaler  Inhale 2 puffs every 4 (four) hours as  needed for wheezing.             albuterol (PROVENTIL) (2.5 MG/3ML) 0.083% nebulizer solution  Take 2.5 mg by nebulization Every 4 (Four) Hours As Needed.             atorvastatin (LIPITOR) 10 MG tablet  Take 1 tablet by mouth Daily for 90 days.             cyclobenzaprine (FLEXERIL) 10 MG tablet  Take 10 mg by mouth 3 (Three) Times a Day As Needed for Muscle Spasms.             Fluticasone Furoate-Vilanterol 100-25 MCG/INH aerosol powder   Inhale 1 puff by mouth Daily.             HYDROcodone-acetaminophen (NORCO) 7.5-325 MG per tablet  Take 1 tablet by mouth Every 4 (Four) Hours As Needed for Moderate Pain             hydrOXYzine (VISTARIL) 25 MG capsule  Take 25 mg by mouth 2 (Two) Times a Day As Needed for Anxiety (for anxiety).             ibuprofen (ADVIL,MOTRIN) 800 MG tablet  Take 800 mg by mouth Every 6 (Six) Hours As Needed for Mild Pain .             metoprolol tartrate (LOPRESSOR) 25 MG tablet  Take 0.5 tablets by mouth Every 12 (Twelve) Hours.             montelukast (SINGULAIR) 10 MG tablet  Take 10 mg by mouth Daily.             raNITIdine (ZANTAC) 300 MG tablet  Take 300 mg by mouth 2 (Two) Times a Day.             warfarin (COUMADIN) 5 MG tablet  Take 1 tablet by mouth Daily.                 Discharge Diet:   Diet Instructions     Diet: Regular; Thin       Discharge Diet:  Regular   Fluid Consistency:  Thin                 Activity at Discharge:   Activity Instructions     Discharge Activity Restrictions       1) No driving for 4 weeks and no longer taking narcotics.   2) Return to school / work in 4 week.  3) May shower / sponge bathe for now hours.  4) Do not lift / push / pull more then 10 lbs.               Do not drive while taking narcotics    Follow-up Appointments  Future Appointments  Date Time Provider Department Center   1/30/2018 9:30 AM DION Coleman [facility]   2/20/2018 2:00 PM Nabor Aburto MD [facility] None   This discharge took greater than 30  minutes to compile.   JEAN Haynes  01/23/18  6:43 AM

## 2018-01-24 ENCOUNTER — LAB REQUISITION (OUTPATIENT)
Dept: LAB | Facility: HOSPITAL | Age: 43
End: 2018-01-24

## 2018-01-24 DIAGNOSIS — Z79.1 LONG TERM CURRENT USE OF NON-STEROIDAL ANTI-INFLAMMATORIES (NSAID): ICD-10-CM

## 2018-01-24 LAB
INR PPP: 1.41 (ref 0.9–1.1)
PROTHROMBIN TIME: 17.4 SECONDS (ref 11–15.4)

## 2018-01-24 PROCEDURE — 85610 PROTHROMBIN TIME: CPT | Performed by: INTERNAL MEDICINE

## 2018-01-25 ENCOUNTER — TELEPHONE (OUTPATIENT)
Dept: CARDIOLOGY | Facility: CLINIC | Age: 43
End: 2018-01-25

## 2018-01-25 NOTE — TELEPHONE ENCOUNTER
Renetta called from [facility] with with Filemon INR results. His INR was 1.4 and he is taking 5 mg QD.     Are you going to be monitoring his INR's? If so I will make a coag encounter for his INR.

## 2018-01-25 NOTE — TELEPHONE ENCOUNTER
No area please refer this patient to the cardiac clinic if primary care is not willing to follow this.

## 2018-01-25 NOTE — TELEPHONE ENCOUNTER
Called PCP and they stated they will monitor his INR's. I called [facility] and advised them and they expressed understanding.

## 2018-01-28 ENCOUNTER — APPOINTMENT (OUTPATIENT)
Dept: GENERAL RADIOLOGY | Facility: HOSPITAL | Age: 43
End: 2018-01-28

## 2018-01-28 ENCOUNTER — HOSPITAL ENCOUNTER (EMERGENCY)
Facility: HOSPITAL | Age: 43
Discharge: HOME | End: 2018-01-28
Attending: FAMILY MEDICINE | Admitting: FAMILY MEDICINE

## 2018-01-28 VITALS
BODY MASS INDEX: 29.51 KG/M2 | HEIGHT: 67 IN | HEART RATE: 100 BPM | WEIGHT: 188 LBS | DIASTOLIC BLOOD PRESSURE: 70 MMHG | TEMPERATURE: 97.8 F | RESPIRATION RATE: 18 BRPM | OXYGEN SATURATION: 96 % | SYSTOLIC BLOOD PRESSURE: 117 MMHG

## 2018-01-28 DIAGNOSIS — Q25.43 ANEURYSM OF AORTIC SINUS OF VALSALVA WITHOUT RUPTURE: ICD-10-CM

## 2018-01-28 DIAGNOSIS — R07.9 CHEST PAIN, UNSPECIFIED TYPE: Primary | ICD-10-CM

## 2018-01-28 LAB
ALBUMIN SERPL-MCNC: 4.5 G/DL (ref 3.5–5)
ALBUMIN/GLOB SERPL: 1.6 G/DL (ref 1.5–2.5)
ALP SERPL-CCNC: 75 U/L (ref 40–129)
ALT SERPL W P-5'-P-CCNC: 18 U/L (ref 10–44)
ANION GAP SERPL CALCULATED.3IONS-SCNC: 9.2 MMOL/L (ref 3.6–11.2)
AST SERPL-CCNC: 22 U/L (ref 10–34)
BASOPHILS # BLD AUTO: 0.04 10*3/MM3 (ref 0–0.3)
BASOPHILS NFR BLD AUTO: 0.3 % (ref 0–2)
BILIRUB SERPL-MCNC: 0.3 MG/DL (ref 0.2–1.8)
BUN BLD-MCNC: 16 MG/DL (ref 7–21)
BUN/CREAT SERPL: 18 (ref 7–25)
CALCIUM SPEC-SCNC: 9.4 MG/DL (ref 7.7–10)
CHLORIDE SERPL-SCNC: 107 MMOL/L (ref 99–112)
CK MB SERPL-CCNC: <0.18 NG/ML (ref 0–5)
CK MB SERPL-RTO: NORMAL % (ref 0–3)
CK SERPL-CCNC: 36 U/L (ref 24–204)
CO2 SERPL-SCNC: 22.8 MMOL/L (ref 24.3–31.9)
CREAT BLD-MCNC: 0.89 MG/DL (ref 0.43–1.29)
DEPRECATED RDW RBC AUTO: 44.1 FL (ref 37–54)
EOSINOPHIL # BLD AUTO: 0.11 10*3/MM3 (ref 0–0.7)
EOSINOPHIL NFR BLD AUTO: 0.9 % (ref 0–5)
ERYTHROCYTE [DISTWIDTH] IN BLOOD BY AUTOMATED COUNT: 14.1 % (ref 11.5–14.5)
GFR SERPL CREATININE-BSD FRML MDRD: 94 ML/MIN/1.73
GLOBULIN UR ELPH-MCNC: 2.9 GM/DL
GLUCOSE BLD-MCNC: 115 MG/DL (ref 70–110)
HCT VFR BLD AUTO: 36.5 % (ref 42–52)
HGB BLD-MCNC: 11.6 G/DL (ref 14–18)
HOLD SPECIMEN: NORMAL
HOLD SPECIMEN: NORMAL
IMM GRANULOCYTES # BLD: 0.15 10*3/MM3 (ref 0–0.03)
IMM GRANULOCYTES NFR BLD: 1.2 % (ref 0–0.5)
INR PPP: 1.1 (ref 0.9–1.1)
LYMPHOCYTES # BLD AUTO: 1.46 10*3/MM3 (ref 1–3)
LYMPHOCYTES NFR BLD AUTO: 11.7 % (ref 21–51)
MCH RBC QN AUTO: 27.4 PG (ref 27–33)
MCHC RBC AUTO-ENTMCNC: 31.8 G/DL (ref 33–37)
MCV RBC AUTO: 86.3 FL (ref 80–94)
MONOCYTES # BLD AUTO: 0.68 10*3/MM3 (ref 0.1–0.9)
MONOCYTES NFR BLD AUTO: 5.5 % (ref 0–10)
NEUTROPHILS # BLD AUTO: 9.99 10*3/MM3 (ref 1.4–6.5)
NEUTROPHILS NFR BLD AUTO: 80.4 % (ref 30–70)
NRBC BLD MANUAL-RTO: 0 /100 WBC (ref 0–0)
OSMOLALITY SERPL CALC.SUM OF ELEC: 279.6 MOSM/KG (ref 273–305)
PLATELET # BLD AUTO: 498 10*3/MM3 (ref 130–400)
PMV BLD AUTO: 10.5 FL (ref 6–10)
POTASSIUM BLD-SCNC: 4.3 MMOL/L (ref 3.5–5.3)
PROT SERPL-MCNC: 7.4 G/DL (ref 6–8)
PROTHROMBIN TIME: 14.3 SECONDS (ref 11–15.4)
RBC # BLD AUTO: 4.23 10*6/MM3 (ref 4.7–6.1)
SODIUM BLD-SCNC: 139 MMOL/L (ref 135–153)
TROPONIN I SERPL-MCNC: 0.11 NG/ML
TROPONIN I SERPL-MCNC: 0.16 NG/ML
WBC NRBC COR # BLD: 12.43 10*3/MM3 (ref 4.5–12.5)
WHOLE BLOOD HOLD SPECIMEN: NORMAL
WHOLE BLOOD HOLD SPECIMEN: NORMAL

## 2018-01-28 PROCEDURE — 71045 X-RAY EXAM CHEST 1 VIEW: CPT

## 2018-01-28 PROCEDURE — 82550 ASSAY OF CK (CPK): CPT | Performed by: FAMILY MEDICINE

## 2018-01-28 PROCEDURE — 85610 PROTHROMBIN TIME: CPT | Performed by: FAMILY MEDICINE

## 2018-01-28 PROCEDURE — 93005 ELECTROCARDIOGRAM TRACING: CPT | Performed by: FAMILY MEDICINE

## 2018-01-28 PROCEDURE — 84484 ASSAY OF TROPONIN QUANT: CPT | Performed by: FAMILY MEDICINE

## 2018-01-28 PROCEDURE — 71045 X-RAY EXAM CHEST 1 VIEW: CPT | Performed by: RADIOLOGY

## 2018-01-28 PROCEDURE — 99284 EMERGENCY DEPT VISIT MOD MDM: CPT

## 2018-01-28 PROCEDURE — 96374 THER/PROPH/DIAG INJ IV PUSH: CPT

## 2018-01-28 PROCEDURE — 82553 CREATINE MB FRACTION: CPT | Performed by: FAMILY MEDICINE

## 2018-01-28 PROCEDURE — 80053 COMPREHEN METABOLIC PANEL: CPT | Performed by: FAMILY MEDICINE

## 2018-01-28 PROCEDURE — 25010000002 HYDROMORPHONE PER 4 MG: Performed by: FAMILY MEDICINE

## 2018-01-28 PROCEDURE — 85025 COMPLETE CBC W/AUTO DIFF WBC: CPT | Performed by: FAMILY MEDICINE

## 2018-01-28 RX ORDER — SODIUM CHLORIDE 0.9 % (FLUSH) 0.9 %
10 SYRINGE (ML) INJECTION AS NEEDED
Status: DISCONTINUED | OUTPATIENT
Start: 2018-01-28 | End: 2018-01-28

## 2018-01-28 RX ORDER — SODIUM CHLORIDE 0.9 % (FLUSH) 0.9 %
10 SYRINGE (ML) INJECTION AS NEEDED
Status: DISCONTINUED | OUTPATIENT
Start: 2018-01-28 | End: 2018-01-29 | Stop reason: HOSPADM

## 2018-01-28 RX ORDER — WARFARIN SODIUM 5 MG/1
5 TABLET ORAL ONCE
Status: COMPLETED | OUTPATIENT
Start: 2018-01-28 | End: 2018-01-28

## 2018-01-28 RX ORDER — OXYCODONE AND ACETAMINOPHEN 10; 325 MG/1; MG/1
1 TABLET ORAL ONCE
Status: COMPLETED | OUTPATIENT
Start: 2018-01-28 | End: 2018-01-28

## 2018-01-28 RX ORDER — ASPIRIN 325 MG
325 TABLET ORAL ONCE
Status: DISCONTINUED | OUTPATIENT
Start: 2018-01-28 | End: 2018-01-28

## 2018-01-28 RX ORDER — HYDROMORPHONE HYDROCHLORIDE 1 MG/ML
0.5 INJECTION, SOLUTION INTRAMUSCULAR; INTRAVENOUS; SUBCUTANEOUS ONCE
Status: COMPLETED | OUTPATIENT
Start: 2018-01-28 | End: 2018-01-28

## 2018-01-28 RX ADMIN — SODIUM CHLORIDE 500 ML: 9 INJECTION, SOLUTION INTRAVENOUS at 20:21

## 2018-01-28 RX ADMIN — OXYCODONE HYDROCHLORIDE AND ACETAMINOPHEN 1 TABLET: 10; 325 TABLET ORAL at 19:33

## 2018-01-28 RX ADMIN — HYDROMORPHONE HYDROCHLORIDE 0.5 MG: 1 INJECTION, SOLUTION INTRAMUSCULAR; INTRAVENOUS; SUBCUTANEOUS at 21:24

## 2018-01-28 RX ADMIN — WARFARIN SODIUM 5 MG: 5 TABLET ORAL at 20:21

## 2018-01-29 ENCOUNTER — HOSPITAL ENCOUNTER (EMERGENCY)
Facility: HOSPITAL | Age: 43
Discharge: HOME | End: 2018-01-29
Attending: FAMILY MEDICINE | Admitting: FAMILY MEDICINE

## 2018-01-29 VITALS
WEIGHT: 188 LBS | HEART RATE: 101 BPM | TEMPERATURE: 98.2 F | HEIGHT: 67 IN | DIASTOLIC BLOOD PRESSURE: 73 MMHG | BODY MASS INDEX: 29.51 KG/M2 | OXYGEN SATURATION: 100 % | SYSTOLIC BLOOD PRESSURE: 125 MMHG | RESPIRATION RATE: 18 BRPM

## 2018-01-29 DIAGNOSIS — R07.89 OTHER CHEST PAIN: Primary | ICD-10-CM

## 2018-01-29 DIAGNOSIS — Q25.43 ANEURYSM OF AORTIC SINUS OF VALSALVA WITHOUT RUPTURE: ICD-10-CM

## 2018-01-29 LAB
ALBUMIN SERPL-MCNC: 3.9 G/DL (ref 3.5–5)
ALBUMIN/GLOB SERPL: 1.6 G/DL (ref 1.5–2.5)
ALP SERPL-CCNC: 64 U/L (ref 40–129)
ALT SERPL W P-5'-P-CCNC: 22 U/L (ref 10–44)
ANION GAP SERPL CALCULATED.3IONS-SCNC: 5.6 MMOL/L (ref 3.6–11.2)
AST SERPL-CCNC: 18 U/L (ref 10–34)
BASOPHILS # BLD AUTO: 0.06 10*3/MM3 (ref 0–0.3)
BASOPHILS NFR BLD AUTO: 0.6 % (ref 0–2)
BILIRUB SERPL-MCNC: 0.3 MG/DL (ref 0.2–1.8)
BUN BLD-MCNC: 12 MG/DL (ref 7–21)
BUN/CREAT SERPL: 14.8 (ref 7–25)
CALCIUM SPEC-SCNC: 8.8 MG/DL (ref 7.7–10)
CHLORIDE SERPL-SCNC: 108 MMOL/L (ref 99–112)
CO2 SERPL-SCNC: 24.4 MMOL/L (ref 24.3–31.9)
CREAT BLD-MCNC: 0.81 MG/DL (ref 0.43–1.29)
DEPRECATED RDW RBC AUTO: 44.9 FL (ref 37–54)
EOSINOPHIL # BLD AUTO: 0.6 10*3/MM3 (ref 0–0.7)
EOSINOPHIL NFR BLD AUTO: 6.3 % (ref 0–5)
ERYTHROCYTE [DISTWIDTH] IN BLOOD BY AUTOMATED COUNT: 14.3 % (ref 11.5–14.5)
GFR SERPL CREATININE-BSD FRML MDRD: 105 ML/MIN/1.73
GLOBULIN UR ELPH-MCNC: 2.5 GM/DL
GLUCOSE BLD-MCNC: 113 MG/DL (ref 70–110)
HCT VFR BLD AUTO: 34 % (ref 42–52)
HGB BLD-MCNC: 10.8 G/DL (ref 14–18)
IMM GRANULOCYTES # BLD: 0.1 10*3/MM3 (ref 0–0.03)
IMM GRANULOCYTES NFR BLD: 1 % (ref 0–0.5)
INR PPP: 1.21 (ref 0.9–1.1)
LYMPHOCYTES # BLD AUTO: 2.12 10*3/MM3 (ref 1–3)
LYMPHOCYTES NFR BLD AUTO: 22.1 % (ref 21–51)
MCH RBC QN AUTO: 27.6 PG (ref 27–33)
MCHC RBC AUTO-ENTMCNC: 31.8 G/DL (ref 33–37)
MCV RBC AUTO: 87 FL (ref 80–94)
MONOCYTES # BLD AUTO: 0.81 10*3/MM3 (ref 0.1–0.9)
MONOCYTES NFR BLD AUTO: 8.5 % (ref 0–10)
NEUTROPHILS # BLD AUTO: 5.89 10*3/MM3 (ref 1.4–6.5)
NEUTROPHILS NFR BLD AUTO: 61.5 % (ref 30–70)
OSMOLALITY SERPL CALC.SUM OF ELEC: 276.2 MOSM/KG (ref 273–305)
PLATELET # BLD AUTO: 476 10*3/MM3 (ref 130–400)
PMV BLD AUTO: 10 FL (ref 6–10)
POTASSIUM BLD-SCNC: 3.9 MMOL/L (ref 3.5–5.3)
PROT SERPL-MCNC: 6.4 G/DL (ref 6–8)
PROTHROMBIN TIME: 15.5 SECONDS (ref 11–15.4)
RBC # BLD AUTO: 3.91 10*6/MM3 (ref 4.7–6.1)
SODIUM BLD-SCNC: 138 MMOL/L (ref 135–153)
TROPONIN I SERPL-MCNC: 0.22 NG/ML
TROPONIN I SERPL-MCNC: 0.25 NG/ML
WBC NRBC COR # BLD: 9.58 10*3/MM3 (ref 4.5–12.5)

## 2018-01-29 PROCEDURE — 94640 AIRWAY INHALATION TREATMENT: CPT

## 2018-01-29 PROCEDURE — 96372 THER/PROPH/DIAG INJ SC/IM: CPT

## 2018-01-29 PROCEDURE — 84484 ASSAY OF TROPONIN QUANT: CPT | Performed by: FAMILY MEDICINE

## 2018-01-29 PROCEDURE — 85610 PROTHROMBIN TIME: CPT | Performed by: FAMILY MEDICINE

## 2018-01-29 PROCEDURE — 25010000002 ENOXAPARIN PER 10 MG: Performed by: FAMILY MEDICINE

## 2018-01-29 PROCEDURE — 93005 ELECTROCARDIOGRAM TRACING: CPT | Performed by: FAMILY MEDICINE

## 2018-01-29 PROCEDURE — 36415 COLL VENOUS BLD VENIPUNCTURE: CPT

## 2018-01-29 PROCEDURE — 85025 COMPLETE CBC W/AUTO DIFF WBC: CPT | Performed by: FAMILY MEDICINE

## 2018-01-29 PROCEDURE — 99284 EMERGENCY DEPT VISIT MOD MDM: CPT

## 2018-01-29 PROCEDURE — 94799 UNLISTED PULMONARY SVC/PX: CPT

## 2018-01-29 PROCEDURE — 96360 HYDRATION IV INFUSION INIT: CPT

## 2018-01-29 PROCEDURE — 80053 COMPREHEN METABOLIC PANEL: CPT | Performed by: FAMILY MEDICINE

## 2018-01-29 RX ORDER — SODIUM CHLORIDE 0.9 % (FLUSH) 0.9 %
10 SYRINGE (ML) INJECTION AS NEEDED
Status: DISCONTINUED | OUTPATIENT
Start: 2018-01-29 | End: 2018-01-29 | Stop reason: HOSPADM

## 2018-01-29 RX ORDER — IPRATROPIUM BROMIDE AND ALBUTEROL SULFATE 2.5; .5 MG/3ML; MG/3ML
3 SOLUTION RESPIRATORY (INHALATION) ONCE
Status: COMPLETED | OUTPATIENT
Start: 2018-01-29 | End: 2018-01-29

## 2018-01-29 RX ORDER — ACETAMINOPHEN 500 MG
1000 TABLET ORAL ONCE
Status: COMPLETED | OUTPATIENT
Start: 2018-01-29 | End: 2018-01-29

## 2018-01-29 RX ORDER — WARFARIN SODIUM 5 MG/1
5 TABLET ORAL ONCE
Status: COMPLETED | OUTPATIENT
Start: 2018-01-29 | End: 2018-01-29

## 2018-01-29 RX ADMIN — WARFARIN SODIUM 5 MG: 5 TABLET ORAL at 16:25

## 2018-01-29 RX ADMIN — ENOXAPARIN SODIUM 90 MG: 100 INJECTION SUBCUTANEOUS at 16:40

## 2018-01-29 RX ADMIN — SODIUM CHLORIDE 1000 ML: 9 INJECTION, SOLUTION INTRAVENOUS at 16:25

## 2018-01-29 RX ADMIN — IPRATROPIUM BROMIDE AND ALBUTEROL SULFATE 3 ML: .5; 3 SOLUTION RESPIRATORY (INHALATION) at 19:58

## 2018-01-29 RX ADMIN — ACETAMINOPHEN 1000 MG: 500 TABLET ORAL at 16:40

## 2018-01-30 ENCOUNTER — TELEPHONE (OUTPATIENT)
Dept: CARDIAC SURGERY | Facility: CLINIC | Age: 43
End: 2018-01-30

## 2018-01-30 NOTE — TELEPHONE ENCOUNTER
I called the patient to let him know that Dr. Lucas had written him another prescription for pain medication. The patient gave me [address] [place] [address] to mail the prescription to. He let me know that he was in the ER on 1/28 and 1/29 for pain because of his surgery. Both days the ER doctor spoke with the CT surgeon on call here in [place]. I also let Dr. Lucas know today, 1/30, that the patient had been in the ER twice. Dr. Lucas stated that it was fine for the patient to wait until his normal post-op follow up to be seen.

## 2018-02-05 ENCOUNTER — HOSPITAL ENCOUNTER (OUTPATIENT)
Dept: CARDIOLOGY | Facility: HOSPITAL | Age: 43
Discharge: HOME | End: 2018-02-05
Admitting: NURSE PRACTITIONER

## 2018-02-05 ENCOUNTER — OFFICE VISIT (OUTPATIENT)
Dept: CARDIOLOGY | Facility: HOSPITAL | Age: 43
End: 2018-02-05

## 2018-02-05 VITALS
OXYGEN SATURATION: 97 % | BODY MASS INDEX: 28.31 KG/M2 | SYSTOLIC BLOOD PRESSURE: 102 MMHG | HEIGHT: 67 IN | HEART RATE: 105 BPM | DIASTOLIC BLOOD PRESSURE: 57 MMHG | RESPIRATION RATE: 18 BRPM | TEMPERATURE: 97.8 F | WEIGHT: 180.4 LBS

## 2018-02-05 DIAGNOSIS — I71.9 AORTIC ANEURYSM WITHOUT RUPTURE, UNSPECIFIED PORTION OF AORTA (HCC): Primary | ICD-10-CM

## 2018-02-05 DIAGNOSIS — I10 ESSENTIAL HYPERTENSION: ICD-10-CM

## 2018-02-05 DIAGNOSIS — Z79.01 LONG-TERM (CURRENT) USE OF ANTICOAGULANTS: ICD-10-CM

## 2018-02-05 DIAGNOSIS — I71.9 AORTIC ANEURYSM WITHOUT RUPTURE, UNSPECIFIED PORTION OF AORTA (HCC): ICD-10-CM

## 2018-02-05 PROCEDURE — 93005 ELECTROCARDIOGRAM TRACING: CPT | Performed by: NURSE PRACTITIONER

## 2018-02-05 PROCEDURE — 93010 ELECTROCARDIOGRAM REPORT: CPT | Performed by: INTERNAL MEDICINE

## 2018-02-05 PROCEDURE — 99214 OFFICE O/P EST MOD 30 MIN: CPT | Performed by: NURSE PRACTITIONER

## 2018-02-05 RX ORDER — HYDROCODONE BITARTRATE AND ACETAMINOPHEN 5; 325 MG/1; MG/1
1 TABLET ORAL EVERY 6 HOURS PRN
COMMUNITY
End: 2019-08-14

## 2018-02-05 NOTE — PROGRESS NOTES
[facility]      Encounter Date:02/05/2018     Filemon Jj  [address] [facility] [address]  [phone]    1975    Macho Jose MD    Filemon Jj is a 42 y.o. male.      Subjective:     Chief Complaint:  Follow-up (s/p hospitalization and surgery)       HPI     Patient was admitted to [facility] on 1/16/18.  He is a 42-year-old male with a history of severe COPD.  He is found to have 6 cm a ascending aortic aneurysm and moderate aortic regurgitation.  Patient underwent aortic valve conduit  procedure with [employer] Tony mechanical conduit.  Patient is followed by Dr. Jose primary care provider and Dr. Jacobsen with cardiology.  Patient was discharged after 6 day stay on 1/22/18.  He then presented back to [facility] ED on 1/28/18 with complaints of chest pain.  Patient then presented back to [facility] ED 1/29/18 with complaints of chest pain.  CT surgery aware of both ED visits with no change in medical management.      Pt reports today for f/u.  Reports gradual improvement of chest discomfort (incisional soreness), increase physical activity tolerance, and muscle strength.  Patient states he feels that he is doing well with no undue chest pain, pressure, dyspnea, edema, dizziness, syncope.  He is being monitored with home health.  Reports having his INR checked last week with increased dose of Coumadin by Dr. Jose.  Scheduled to have repeat lab work this week with home health.  Doing well with medications.  Does have questions on timing of medications which we will address today.  Patient denies fevers, chills, drainage from incision, abdominal pain, nausea, vomiting, diarrhea, dyspepsia, hematuria.    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 [Q25.43]   • Other chest pain  "[R07.89]   • Ascending aortic aneurysm [I71.2]     Overview Note:     · CT chest (10/17): 6 cm ascending aortic aneurysm  · [place] by Aaron Lucas, 1/17/2018           Past Surgical History:   Procedure Laterality Date   • CARDIAC CATHETERIZATION N/A 10/23/2017    Procedure: Left Heart Cath;  Surgeon: Rodolfo Núñez MD;  Location: [facility] CATH INVASIVE LOCATION;  Service:    • DENTAL PROCEDURE     • LIVER SURGERY      tumor removed from liver   • OTHER SURGICAL HISTORY      \"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\"   • THORACIC AORTIC ANEURYSM ASCENDING/ARCH REPAIR N/A 1/17/2018    Procedure: MEDIAN STERNOTOMY, AORTIC VALVE CONDUIT, BENTAL, TRANSESOPHAGEAL ECHOCARDIOGRAM WITH ANESTHESIA;  Surgeon: Aaron Lucas MD;  Location: [facility] OR;  Service:        Allergies   Allergen Reactions   • Aspirin      Patient said, \"it chokes him up.\" patient said, \"I got really short of breath and started to have an asthma attack.\"         Current Outpatient Prescriptions:   •  albuterol (PROVENTIL HFA;VENTOLIN HFA) 108 (90 BASE) MCG/ACT inhaler, Inhale 2 puffs every 4 (four) hours as needed for wheezing. (Patient taking differently: Inhale 2 puffs Every 4 (Four) Hours As Needed for Wheezing or Shortness of Air.), Disp: 3.7 g, Rfl: 0  •  albuterol (PROVENTIL) (2.5 MG/3ML) 0.083% nebulizer solution, Take 2.5 mg by nebulization Every 4 (Four) Hours As Needed., Disp: , Rfl:   •  atorvastatin (LIPITOR) 10 MG tablet, Take 1 tablet by mouth Daily for 90 days., Disp: 90 tablet, Rfl: 0  •  cyclobenzaprine (FLEXERIL) 10 MG tablet, Take 10 mg by mouth 3 (Three) Times a Day As Needed for Muscle Spasms., Disp: , Rfl:   •  HYDROcodone-acetaminophen (NORCO) 5-325 MG per tablet, Take 1 tablet by mouth Every 6 (Six) Hours As Needed., Disp: , Rfl:   •  hydrOXYzine (VISTARIL) 25 MG capsule, Take 25 mg by mouth 2 (Two) Times a Day As Needed for Anxiety (for anxiety)., Disp: , Rfl:   •  metoprolol tartrate (LOPRESSOR) 25 MG tablet, Take " 0.5 tablets by mouth Every 12 (Twelve) Hours., Disp: 90 tablet, Rfl: 3  •  montelukast (SINGULAIR) 10 MG tablet, Take 10 mg by mouth Daily., Disp: , Rfl:   •  raNITIdine (ZANTAC) 300 MG tablet, Take 300 mg by mouth 2 (Two) Times a Day., Disp: , Rfl:   •  warfarin (COUMADIN) 5 MG tablet, Take 1 tablet by mouth Daily., Disp: 30 tablet, Rfl: 6  •  Fluticasone Furoate-Vilanterol 100-25 MCG/INH aerosol powder , Inhale 1 puff by mouth Daily. (Patient taking differently: Inhale 1 puff Daily. Does not take), Disp: 60 each, Rfl: 1  •  ibuprofen (ADVIL,MOTRIN) 800 MG tablet, Take 800 mg by mouth Every 6 (Six) Hours As Needed for Mild Pain ., Disp: , Rfl:     The following portions of the patient's history were reviewed and updated as appropriate: allergies, current medications, past family history, past medical history, past social history, past surgical history and problem list.    Review of Systems   Constitution: Positive for weakness. Negative for chills, decreased appetite, diaphoresis, fever, malaise/fatigue, night sweats, weight gain and weight loss.   HENT: Positive for hearing loss. Negative for congestion, hoarse voice and nosebleeds.    Eyes: Negative for blurred vision, visual disturbance and visual halos.   Cardiovascular: Positive for chest pain and dyspnea on exertion. Negative for claudication, cyanosis, irregular heartbeat, leg swelling, near-syncope, orthopnea, palpitations, paroxysmal nocturnal dyspnea and syncope.   Respiratory: Positive for cough and wheezing. Negative for hemoptysis, shortness of breath, sleep disturbances due to breathing, snoring and sputum production.    Endocrine: Positive for cold intolerance, heat intolerance, polydipsia and polyphagia. Negative for polyuria.   Hematologic/Lymphatic: Negative for bleeding problem. Does not bruise/bleed easily.   Skin: Negative for dry skin, itching and rash.   Musculoskeletal: Positive for joint pain, muscle cramps and muscle weakness. Negative for  "arthritis, falls, joint swelling and myalgias.   Gastrointestinal: Positive for dysphagia and heartburn. Negative for bloating, abdominal pain, constipation, diarrhea, flatus, hematemesis, hematochezia, nausea and vomiting.   Genitourinary: Negative for dysuria, flank pain, frequency, hematuria, nocturia and urgency.   Neurological: Positive for dizziness, headaches and loss of balance. Negative for difficulty with concentration, excessive daytime sleepiness and light-headedness.   Psychiatric/Behavioral: Positive for altered mental status, depression and memory loss. The patient has insomnia and is nervous/anxious.    Allergic/Immunologic: Positive for environmental allergies.       Objective:     Vitals:    02/05/18 1138 02/05/18 1153 02/05/18 1154   BP: 110/55 104/55 102/57   BP Location: Right arm Left arm Left arm   Patient Position: Sitting Sitting Standing   Pulse: 97 96 105   Resp: 18     Temp: 97.8 °F (36.6 °C)     TempSrc: Temporal Artery      SpO2: 97%     Weight: 81.8 kg (180 lb 6.4 oz)     Height: 170.2 cm (67\")           Physical Exam   Constitutional: He is oriented to person, place, and time. He appears well-developed and well-nourished. No distress.   HENT:   Head: Normocephalic and atraumatic.   Mouth/Throat: Oropharynx is clear and moist.   Eyes: Conjunctivae are normal. Pupils are equal, round, and reactive to light. No scleral icterus.   Neck: No hepatojugular reflux and no JVD present. Carotid bruit is not present. No tracheal deviation present. No thyromegaly present.   Cardiovascular: Normal rate, regular rhythm and intact distal pulses.  Exam reveals no friction rub.    No murmur heard.  Click noted   Pulmonary/Chest: Effort normal and breath sounds normal.   Abdominal: Soft. Bowel sounds are normal. He exhibits no distension. There is no tenderness.   Musculoskeletal: He exhibits no edema.   Lymphadenopathy:     He has no cervical adenopathy.   Neurological: He is alert and oriented to " person, place, and time.   Skin: Skin is warm, dry and intact. No rash noted. No cyanosis or erythema. No pallor.   Psychiatric: He has a normal mood and affect. His behavior is normal. Thought content normal.   Vitals reviewed.      Lab and Diagnostic Review:  Admission on 01/29/2018, Discharged on 01/29/2018   Component Date Value Ref Range Status   • Protime 01/29/2018 15.5* 11.0 - 15.4 Seconds Final    Note new Reference Range   • INR 01/29/2018 1.21* 0.90 - 1.10 Final   • Glucose 01/29/2018 113* 70 - 110 mg/dL Final   • BUN 01/29/2018 12  7 - 21 mg/dL Final   • Creatinine 01/29/2018 0.81  0.43 - 1.29 mg/dL Final   • Sodium 01/29/2018 138  135 - 153 mmol/L Final   • Potassium 01/29/2018 3.9  3.5 - 5.3 mmol/L Final   • Chloride 01/29/2018 108  99 - 112 mmol/L Final   • CO2 01/29/2018 24.4  24.3 - 31.9 mmol/L Final   • Calcium 01/29/2018 8.8  7.7 - 10.0 mg/dL Final   • Total Protein 01/29/2018 6.4  6.0 - 8.0 g/dL Final   • Albumin 01/29/2018 3.90  3.50 - 5.00 g/dL Final   • ALT (SGPT) 01/29/2018 22  10 - 44 U/L Final   • AST (SGOT) 01/29/2018 18  10 - 34 U/L Final   • Alkaline Phosphatase 01/29/2018 64  40 - 129 U/L Final    Note New Reference Ranges   • Total Bilirubin 01/29/2018 0.3  0.2 - 1.8 mg/dL Final   • eGFR Non African Amer 01/29/2018 105  >60 mL/min/1.73 Final   • Globulin 01/29/2018 2.5  gm/dL Final   • A/G Ratio 01/29/2018 1.6  1.5 - 2.5 g/dL Final   • BUN/Creatinine Ratio 01/29/2018 14.8  7.0 - 25.0 Final   • Anion Gap 01/29/2018 5.6  3.6 - 11.2 mmol/L Final   • Troponin I 01/29/2018 0.217* <=0.040 ng/mL Final   • WBC 01/29/2018 9.58  4.50 - 12.50 10*3/mm3 Final   • RBC 01/29/2018 3.91* 4.70 - 6.10 10*6/mm3 Final   • Hemoglobin 01/29/2018 10.8* 14.0 - 18.0 g/dL Final   • Hematocrit 01/29/2018 34.0* 42.0 - 52.0 % Final   • MCV 01/29/2018 87.0  80.0 - 94.0 fL Final   • MCH 01/29/2018 27.6  27.0 - 33.0 pg Final   • MCHC 01/29/2018 31.8* 33.0 - 37.0 g/dL Final   • RDW 01/29/2018 14.3  11.5 - 14.5 % Final    • RDW-SD 01/29/2018 44.9  37.0 - 54.0 fl Final   • MPV 01/29/2018 10.0  6.0 - 10.0 fL Final   • Platelets 01/29/2018 476* 130 - 400 10*3/mm3 Final   • Neutrophil % 01/29/2018 61.5  30.0 - 70.0 % Final   • Lymphocyte % 01/29/2018 22.1  21.0 - 51.0 % Final   • Monocyte % 01/29/2018 8.5  0.0 - 10.0 % Final   • Eosinophil % 01/29/2018 6.3* 0.0 - 5.0 % Final   • Basophil % 01/29/2018 0.6  0.0 - 2.0 % Final   • Immature Grans % 01/29/2018 1.0* 0.0 - 0.5 % Final   • Neutrophils, Absolute 01/29/2018 5.89  1.40 - 6.50 10*3/mm3 Final   • Lymphocytes, Absolute 01/29/2018 2.12  1.00 - 3.00 10*3/mm3 Final   • Monocytes, Absolute 01/29/2018 0.81  0.10 - 0.90 10*3/mm3 Final   • Eosinophils, Absolute 01/29/2018 0.60  0.00 - 0.70 10*3/mm3 Final   • Basophils, Absolute 01/29/2018 0.06  0.00 - 0.30 10*3/mm3 Final   • Immature Grans, Absolute 01/29/2018 0.10* 0.00 - 0.03 10*3/mm3 Final   • Osmolality Calc 01/29/2018 276.2  273.0 - 305.0 mOsm/kg Final   • Troponin I 01/29/2018 0.248* <=0.040 ng/mL Final       Assessment and Plan:         1. Aortic aneurysm without rupture, unspecified portion of aorta  S/p Aortic valve conduit with St. Tony mechanical conduit  No signs and symptoms of infection  Reviewed medication regimen, timing of medications.  Unfortunately patient has been spreading out all of his medications which could be clustered in morning or night time dosing.  Medication form has been printed and reviewed.    Review signs and symptoms of infection, lifting limitations until follow-up with CT surgery.  Patient instructed okay to shower, with use of soap and water only.  Do not use peroxide, alcohol, lotions, Neosporin or anything else on incision sites.    - ECG 12 Lead; sinus rhythm 95 bpm    2. Hypertension    Low normal with out signs of hypotension    3.  Long term anticoagulant  Followed by primary care provider, INR levels being drawn by home health at this time    Follow-up with cardiology and primary care provider  as directed.  Patient to follow-up in the [facility] and [facility] Center as needed or as determined by cardiology.  Discussed role of [facility] and when to call as a resource.      *Please note that portions of this note were completed with a voice recognition program. Efforts were made to edit the dictations, but occasionally words are mistranscribed.

## 2018-02-06 ENCOUNTER — LAB REQUISITION (OUTPATIENT)
Dept: LAB | Facility: HOSPITAL | Age: 43
End: 2018-02-06

## 2018-02-06 DIAGNOSIS — N39.0 URINARY TRACT INFECTION: ICD-10-CM

## 2018-02-06 LAB
INR PPP: 3.45 (ref 0.9–1.1)
PROTHROMBIN TIME: 35.3 SECONDS (ref 11–15.4)

## 2018-02-06 PROCEDURE — 85610 PROTHROMBIN TIME: CPT | Performed by: INTERNAL MEDICINE

## 2018-02-07 DIAGNOSIS — I71.21 ASCENDING AORTIC ANEURYSM (HCC): Primary | ICD-10-CM

## 2018-02-13 ENCOUNTER — LAB REQUISITION (OUTPATIENT)
Dept: LAB | Facility: HOSPITAL | Age: 43
End: 2018-02-13

## 2018-02-13 DIAGNOSIS — I48.91 ATRIAL FIBRILLATION (HCC): ICD-10-CM

## 2018-02-13 LAB
INR PPP: 4.58 (ref 0.9–1.1)
PROTHROMBIN TIME: 44.1 SECONDS (ref 11–15.4)

## 2018-02-13 PROCEDURE — 85610 PROTHROMBIN TIME: CPT | Performed by: INTERNAL MEDICINE

## 2018-02-15 ENCOUNTER — DOCUMENTATION (OUTPATIENT)
Dept: CARDIAC REHAB | Facility: HOSPITAL | Age: 43
End: 2018-02-15

## 2018-02-19 ENCOUNTER — LAB REQUISITION (OUTPATIENT)
Dept: LAB | Facility: HOSPITAL | Age: 43
End: 2018-02-19

## 2018-02-19 DIAGNOSIS — Z79.01 LONG TERM CURRENT USE OF ANTICOAGULANT: ICD-10-CM

## 2018-02-19 LAB
INR PPP: 2.66 (ref 0.9–1.1)
PROTHROMBIN TIME: 28.7 SECONDS (ref 11–15.4)

## 2018-02-19 PROCEDURE — 85610 PROTHROMBIN TIME: CPT | Performed by: INTERNAL MEDICINE

## 2018-02-20 ENCOUNTER — OFFICE VISIT (OUTPATIENT)
Dept: CARDIOLOGY | Facility: CLINIC | Age: 43
End: 2018-02-20

## 2018-02-20 VITALS
RESPIRATION RATE: 16 BRPM | HEIGHT: 67 IN | HEART RATE: 91 BPM | SYSTOLIC BLOOD PRESSURE: 109 MMHG | BODY MASS INDEX: 29.35 KG/M2 | DIASTOLIC BLOOD PRESSURE: 70 MMHG | WEIGHT: 187 LBS

## 2018-02-20 DIAGNOSIS — R07.89 OTHER CHEST PAIN: ICD-10-CM

## 2018-02-20 DIAGNOSIS — Z72.0 TOBACCO ABUSE: ICD-10-CM

## 2018-02-20 DIAGNOSIS — I71.21 ASCENDING AORTIC ANEURYSM (HCC): Primary | ICD-10-CM

## 2018-02-20 DIAGNOSIS — I10 ESSENTIAL HYPERTENSION: ICD-10-CM

## 2018-02-20 PROCEDURE — 99213 OFFICE O/P EST LOW 20 MIN: CPT | Performed by: INTERNAL MEDICINE

## 2018-02-20 NOTE — PROGRESS NOTES
Macho Jose MD  Filemon Jj  : 1975  DATE:2017    Patient Active Problem List   Diagnosis   • Ascending aortic aneurysm   • Other chest pain   • Aneurysm of aortic sinus of Valsalva without rupture   • Tobacco abuse   • COPD (chronic obstructive pulmonary disease)   • Essential hypertension       Dear Macho Jose MD:    Subjective     Filemon Jj is a 42 y.o. male with the above medical problems who is being seen for follow-up. The patient he had his surgery done with an aortic valve replacement with aortic root graft.  He states he did well after the surgery.  He denies any chest pain, shortness breath, palpitations, orthopnea, PND, lower extremity edema, dizziness or syncope.  He has a history of hypertension and states he is blood pressure is doing much better.  He continues to chew tobacco in spite of multiple counseling sessions on cessation.      Current Outpatient Prescriptions:   •  albuterol (PROVENTIL HFA;VENTOLIN HFA) 108 (90 BASE) MCG/ACT inhaler, Inhale 2 puffs every 4 (four) hours as needed for wheezing. (Patient taking differently: Inhale 2 puffs Every 4 (Four) Hours As Needed for Wheezing or Shortness of Air.), Disp: 3.7 g, Rfl: 0  •  albuterol (PROVENTIL) (2.5 MG/3ML) 0.083% nebulizer solution, Take 2.5 mg by nebulization Every 4 (Four) Hours As Needed., Disp: , Rfl:   •  atorvastatin (LIPITOR) 10 MG tablet, Take 1 tablet by mouth Daily for 90 days., Disp: 90 tablet, Rfl: 0  •  cyclobenzaprine (FLEXERIL) 10 MG tablet, Take 10 mg by mouth 3 (Three) Times a Day As Needed for Muscle Spasms., Disp: , Rfl:   •  Fluticasone Furoate-Vilanterol 100-25 MCG/INH aerosol powder , Inhale 1 puff by mouth Daily. (Patient taking differently: Inhale 1 puff Daily. Does not take), Disp: 60 each, Rfl: 1  •  HYDROcodone-acetaminophen (NORCO) 5-325 MG per tablet, Take 1 tablet by mouth Every 6 (Six) Hours As Needed., Disp: , Rfl:   •  hydrOXYzine (VISTARIL) 25 MG capsule, Take 25 mg by  "mouth 2 (Two) Times a Day As Needed for Anxiety (for anxiety)., Disp: , Rfl:   •  ibuprofen (ADVIL,MOTRIN) 800 MG tablet, Take 800 mg by mouth Every 6 (Six) Hours As Needed for Mild Pain ., Disp: , Rfl:   •  metoprolol tartrate (LOPRESSOR) 25 MG tablet, Take 0.5 tablets by mouth Every 12 (Twelve) Hours., Disp: 90 tablet, Rfl: 3  •  montelukast (SINGULAIR) 10 MG tablet, Take 10 mg by mouth Daily., Disp: , Rfl:   •  raNITIdine (ZANTAC) 300 MG tablet, Take 300 mg by mouth 2 (Two) Times a Day., Disp: , Rfl:   •  warfarin (COUMADIN) 5 MG tablet, Take 1 tablet by mouth Daily., Disp: 30 tablet, Rfl: 6    The following portions of the patient's history were reviewed and updated as appropriate: allergies, current medications, past family history, past medical history, past social history, past surgical history and problem list.    Review of Systems   Constitution: Negative for chills, diaphoresis and fever.   HENT: Negative for congestion, ear pain and sore throat.    Eyes: Negative for blurred vision, pain and redness.   Cardiovascular: Negative for chest pain, dyspnea on exertion, leg swelling, orthopnea, palpitations, paroxysmal nocturnal dyspnea and syncope.   Respiratory: Negative for cough, hemoptysis and shortness of breath.    Endocrine: Negative for cold intolerance and heat intolerance.   Hematologic/Lymphatic: Does not bruise/bleed easily.   Skin: Negative for rash.   Musculoskeletal: Negative for arthritis, back pain, joint pain, joint swelling, myalgias and stiffness.   Gastrointestinal: Negative for abdominal pain, constipation, diarrhea, nausea and vomiting.   Genitourinary: Negative for dysuria and hematuria.   Neurological: Positive for dizziness. Negative for focal weakness and numbness.   Psychiatric/Behavioral: Negative for depression. The patient is nervous/anxious.        Objective   Blood pressure 109/70, pulse 91, resp. rate 16, height 170.2 cm (67.01\"), weight 84.8 kg (187 lb).    Physical Exam "   Constitutional: He is oriented to person, place, and time. He appears well-developed and well-nourished.   Disheveled WM sitting comfortably on chair.   HENT:   Mouth/Throat: Oropharynx is clear and moist.   Eyes: EOM are normal. Pupils are equal, round, and reactive to light.   Neck: Neck supple. No JVD present. No tracheal deviation present. No thyromegaly present.   Cardiovascular: Normal rate, regular rhythm, S1 normal and S2 normal.  Exam reveals no gallop and no friction rub.    No murmur heard.  Pulmonary/Chest: Effort normal and breath sounds normal. No respiratory distress. He has no wheezes. He has no rales.   Abdominal: Soft. Bowel sounds are normal. He exhibits no mass. There is no tenderness.   Musculoskeletal: Normal range of motion. He exhibits no edema.   Lymphadenopathy:     He has no cervical adenopathy.   Neurological: He is alert and oriented to person, place, and time.   Skin: Skin is warm and dry. No rash noted.   Medial chest car is present.   Psychiatric: He has a normal mood and affect.       Procedures    Assessment/Plan      1.  Ascending aortic aneurysm: Patient with a large ascending aortic aneurysm that has now been repaired.  He underwent aortic root replacement with a mechanical aortic valve.  He has been doing well since that time.  He underwent an intraoperative AMBER which showed adequate function of the valve.  We'll consider repeat echocardiogram in the future.    2.  Chest pain: Patient with a history of chest pain that appears resolved at this point.  We will need to continue to monitor for recurrence.    3.  Hypertension: Patient presents for potential appears to be well-controlled on current regimen.  At this point will continue.    4.  Tobacco abuse: Patient with a history of tobacco abuse who continues to chew tobacco.  I once again advised him the importance of tobacco cessation.     Diagnosis Plan   1. Ascending aortic aneurysm     2. Other chest pain     3. Essential  hypertension     4. Tobacco abuse          Return in about 3 months (around 5/20/2018).    I appreciate the opportunity to participate in this patient's cardiovascular care.    Best Regards    Nabor Jacobsen

## 2018-02-27 ENCOUNTER — LAB REQUISITION (OUTPATIENT)
Dept: LAB | Facility: HOSPITAL | Age: 43
End: 2018-02-27

## 2018-02-27 DIAGNOSIS — I82.501 CHRONIC THROMBOEMBOLISM OF DEEP VEIN OF RIGHT LOWER EXTREMITY (HCC): ICD-10-CM

## 2018-02-27 LAB
INR PPP: 2.94 (ref 0.9–1.1)
PROTHROMBIN TIME: 31.1 SECONDS (ref 11–15.4)

## 2018-02-27 PROCEDURE — 85610 PROTHROMBIN TIME: CPT | Performed by: INTERNAL MEDICINE

## 2018-03-06 ENCOUNTER — LAB REQUISITION (OUTPATIENT)
Dept: LAB | Facility: HOSPITAL | Age: 43
End: 2018-03-06

## 2018-03-06 DIAGNOSIS — Z79.01 LONG TERM CURRENT USE OF ANTICOAGULANT: ICD-10-CM

## 2018-03-06 LAB
INR PPP: 2.73 (ref 0.9–1.1)
PROTHROMBIN TIME: 29.3 SECONDS (ref 11–15.4)

## 2018-03-06 PROCEDURE — 85610 PROTHROMBIN TIME: CPT | Performed by: INTERNAL MEDICINE

## 2018-03-12 ENCOUNTER — LAB REQUISITION (OUTPATIENT)
Dept: LAB | Facility: HOSPITAL | Age: 43
End: 2018-03-12

## 2018-03-12 DIAGNOSIS — Z79.01 LONG TERM CURRENT USE OF ANTICOAGULANT: ICD-10-CM

## 2018-03-12 LAB
INR PPP: 3.14 (ref 0.9–1.1)
PROTHROMBIN TIME: 32.8 SECONDS (ref 11–15.4)

## 2018-03-12 PROCEDURE — 85610 PROTHROMBIN TIME: CPT | Performed by: INTERNAL MEDICINE

## 2018-03-15 DIAGNOSIS — Z95.2 S/P AVR: Primary | ICD-10-CM

## 2018-03-26 ENCOUNTER — LAB REQUISITION (OUTPATIENT)
Dept: LAB | Facility: HOSPITAL | Age: 43
End: 2018-03-26

## 2018-03-26 DIAGNOSIS — Z79.01 LONG TERM CURRENT USE OF ANTICOAGULANT: ICD-10-CM

## 2018-03-26 LAB
INR PPP: 2.39 (ref 0.9–1.1)
PROTHROMBIN TIME: 26.4 SECONDS (ref 11–15.4)

## 2018-03-26 PROCEDURE — 85610 PROTHROMBIN TIME: CPT | Performed by: INTERNAL MEDICINE

## 2018-03-28 ENCOUNTER — DOCUMENTATION (OUTPATIENT)
Dept: CARDIAC SURGERY | Facility: CLINIC | Age: 43
End: 2018-03-28

## 2018-03-28 NOTE — PROGRESS NOTES
[facility] Aorta Surgery Worksheet      For Aneurysms:    Aneurysm Location:B:  Sinotubular junction to mid-ascending   For Dissections:   Primary Tear Location N/A   Secondary Tear Location N/A   Retrograde Extension Location:N/A   Distal Extension Location:N/A   Rupture Location:N/A   Rupture Contained: NA   For Open Descending Thoracic Aorta or Thoracoabdominal Procedures   Proximal Location: N/A   Distal Location: N/A   Reverse Jacob:  NA   For Endovascular Procedures   Proximal Location:   N/A   Distal Location:   N/A   Intra OP   Unintentional Rupture of Dissection Septum Location:   N/A     Please Complete the Following Sections:     Presentation:   EndoLeak   Type I (leak at graft attachment site)  > Location:  N/A   Type II  (Aneurysm sac filling via branch vessel)  > Number of Vessels:   N/A   Type III (leak defect in graft)  Graft Defect Type:  N/A   Type IV (leak through graft fabric):  NA   Type V (endotension - expansion of aneurysm sac without leak):  NA   Aneurysm   Eitology:   N/A   Type:   N/A   Rupture:   NA   Dissection:   Timing:   N/A   Malperfusion:   N/A    Infection:   Infection:   N/A   Trauma   Trauma:   N/A   Root   Root:   N/A   Arch Type   Arch Type:  N/A   Ascending   Ascending:   N/A   Intervention   Planned Stage Hybrid:  NA   Open Arch Procedure:  N/A   Arch Branch Reimplantation:  N/A   Open Descending Thoracic Aorta or Thoracoabdominal Procedure   Open Descending Thoracic Aorta or Thoracoabdominal Procedure:  N/A   Endovascular Procedure   Endovascular Procedure:  N/A                Arch Vessel Management   Innominate:  N/A   Left Carotid:  N/A   Left Subclavian:  N/A   Other Arch Vessel(s) Extra-anatomic Bypass:  N/A   Visceral Vessel Management   Celiac:  N/A   Superior Mesenteric:  N/A   Right Renal:  N/A   Left Renal:  N/A   Right Iliac:  N/A   Left Iliac:  N/A   Internal Iliac Preserved:  N/A   Other Visceral Vessel(s):  N/A   Intra-OP (Check all that apply)   Dissection proximal  entry tear covered:   NA   Endoleak at end of procedure:  NA   Conversion to Open:  NA   Intra-OP Dissection Extension:   NA   Spinal Drain Placement:   NA   IntraOp Motor Evoked Potential > Documented MEP abnormality:  NA   IntraOp Somatosensory Evoked Potential > Documented SEP abnormality:  NA   IntraOp EEG > Documented EEG abnormality:   NA   IVUS Performed Intra-Op:  NA   IntraOp Transcutaneous Doppler Performed Intra-Op:  NA   IntraOp Angiogram:  NA

## 2018-03-29 ENCOUNTER — TREATMENT (OUTPATIENT)
Dept: CARDIAC REHAB | Facility: HOSPITAL | Age: 43
End: 2018-03-29

## 2018-03-29 VITALS
WEIGHT: 185.6 LBS | RESPIRATION RATE: 16 BRPM | HEART RATE: 84 BPM | SYSTOLIC BLOOD PRESSURE: 110 MMHG | OXYGEN SATURATION: 98 % | BODY MASS INDEX: 29.13 KG/M2 | DIASTOLIC BLOOD PRESSURE: 64 MMHG | HEIGHT: 67 IN

## 2018-03-29 DIAGNOSIS — Z95.2 S/P AVR: Primary | ICD-10-CM

## 2018-03-29 PROCEDURE — 93798 PHYS/QHP OP CAR RHAB W/ECG: CPT

## 2018-03-29 NOTE — PROGRESS NOTES
Cardiac Rehab Initial Assessment      Name: Filemon Jj  :1975 Allergies:Aspirin   MRN: [id number] 42 y.o. Physician: Macho Jose MD   Primary Diagnosis:    Diagnosis Plan   1. S/P AVR      Event Date: 18 Specialist: Aaron Owens   Secondary Diagnosis: na Risk Stratification:High Risk Note Author: Mariya Lyn, RN     Cardiovascular History: Comments Aortic aneurysm     EXERCISE AT HOME  yes  20min  5 days per week    EF: 50 %      Source: echo          Ambulatory Status:Independent  Ambulatory Fall Risk Assessed on Initial Visit: yes 6 Minute Walk Pre- Cardiac Rehab:  Distance:672ft      RPE:12  Max. HR: 92       SPO2:97    MET: 2.0              Resting BP: 114/70 LA, 110/64 RA    Peak BP: 118/70  Recovery BP: 112/66  Comments: Patient tolerated well no distress noted. He voiced no complaints      NUTRITION  Lipids:yes If yes, labs as follows;  Total: No components found for: CHOLESTEROL  HDL:   HDL Cholesterol   Date Value Ref Range Status   2018 33 (L) 40 - 60 mg/dL Final    Lipids continued:  LDL:  LDL Cholesterol    Date Value Ref Range Status   2018 163 (H) 0 - 130 mg/dL Final   10/18/2017 139 (H) 0 - 100 mg/dL Final     Triglyceride: No components found for: TRIGLYCERIDE   Weight Management:                 Weight: 185.6 lb  Height: 67 in                                   BMI: Body mass index is 29.07 kg/m².  Waist Circumference: na  inches   Alcohol Use: none Diabetes:No    Last HGBA1C with date if applicable:No components found for: A1C         SOCIAL HISTORY  Social History     Social History   • Marital status: Single   • Number of children: 0     Occupational History   •  Disabled     Social History Main Topics   • Smoking status: Former Smoker     Packs/day: 1.00     Years: 4.00   • Smokeless tobacco: Current User     Types: Snuff      Comment: Pt uses vapor cigarette   • Alcohol use No      Comment: occassional    • Drug use: No   • Sexual activity: Defer     Other  Topics Concern   • Not on file     Social History Narrative    Lives in [place], [place] alone.  He dropped out of school due to anxiety        Educational Level (choose one that applies) 9th grade Learning Barriers:Ready to Learn, Cognitive    Family Support:yes    Living Arrangement: lives alone    Risk Factors: Clinical Depression  Yes and Hyperlipidemia  Yes     Tobacco Adjunct: No  Smokeless tTobacco      Comorbidities: Pulmonary disease     PSYCHOSOCIAL  Clinical Depression: no    Stress: no     Assess presence or absence of depression using a valid screening tool: yes      PHYSICAL ASSESSMENT  Influenza vaccine: yes  Pneumococcal vaccine: no          Angina: no    Describe angina scale of 0 - 4: 0 = none    Today are you having incisional pain? No. If, Yes, Scale: 0    Patient has 4 areas on upper about below midline incision site that still has sutures. He stated he made an appointment with dr estrella for may 1 at 2 . Sutures have been in since 1/17/2018. I called dr estrella office and explained the skin was growing over the sutres and ask them to see him earlier. He is to see dr estrella on 4/2/18 at 915 am. He stated a home health nurse is coming twice a week to clean them.    Today are you having any other pain? Yes. If, Yes, Scale: 0     Diagnosed with Hypertension:yes    Heart Sounds: S1S2 no S3 S4     Lung Sounds: normal air entry, lungs clear to auscultation         Assessment: Pt tolerated 6MWT well, NAD noted, no complaints voiced, skin warm, pink and dry, resp within normal limits and even, denies chest discomfort, chest pressure ,SOA .  Monitor shows NSR, V/S WDL ,see Gonzalo documentation for exercise data.  Dr. Guerra immediately available     Pt educated on Cardiac Rehab Program,  warm up, stretching, use of RPE scale, application of heart monitor, home exercise and exercise guidelines, pre exercise meal stress and stress management, anxiety and depression .   Cleaning and use of equipment, s/s  to report. Verbal teach back verified   Orthopedic Problems: lower back pain    Are you being hurt, hit, or frightened by anyone at home or in your life? no    Are you being neglected by a caregiver? N/A Shoulder flexibility/Range of motion: Average     Recommended arm activity: Any       Leg flexibility: Average    Chose one: Fall Risk    Recommended stretching: Standing    Assessment: see above assessment    Family attends IA: no Time of arrival: 145 pm  Time of departure: 310 pm     Patient Goals: increase strength and endurance to get back to doing things he likes to do         3/29/2018  3:53 PM  Mariya Lyn RN

## 2018-03-29 NOTE — PATIENT INSTRUCTIONS
Major Depressive Disorder, Adult  Major depressive disorder (MDD) is a mental health condition. MDD often makes you feel sad, hopeless, or helpless. MDD can also cause symptoms in your body. MDD can affect your:  · Work.  · School.  · Relationships.  · Other normal activities.  MDD can range from mild to very bad. It may occur once (single episode MDD). It can also occur many times (recurrent MDD).  The main symptoms of MDD often include:  · Feeling sad, depressed, or irritable most of the time.  · Loss of interest.  MDD symptoms also include:  · Sleeping too much or too little.  · Eating too much or too little.  · A change in your weight.  · Feeling tired (fatigue) or having low energy.  · Feeling worthless.  · Feeling guilty.  · Trouble making decisions.  · Trouble thinking clearly.  · Thoughts of suicide or harming others.  · Feeling weak.  · Feeling agitated.  · Keeping yourself from being around other people (isolation).  Follow these instructions at home:  Activity   · Do these things as told by your doctor:  ¨ Go back to your normal activities.  ¨ Exercise regularly.  ¨ Spend time outdoors.  Alcohol   · Talk with your doctor about how alcohol can affect your antidepressant medicines.  · Do not drink alcohol. Or, limit how much alcohol you drink.  ¨ This means no more than 1 drink a day for nonpregnant women and 2 drinks a day for men. One drink equals one of these:  § 12 oz of beer.  § 5 oz of wine.  § 1½ oz of hard liquor.  General instructions   · Take over-the-counter and prescription medicines only as told by your doctor.  · Eat a healthy diet.  · Get plenty of sleep.  · Find activities that you enjoy. Make time to do them.  · Think about joining a support group. Your doctor may be able to suggest a group for you.  · Keep all follow-up visits as told by your doctor. This is important.  Where to find more information:   · National [place] on Mental Illness:  ¨ www.wilda.org  · [place] National [place] of  Mental Health:  ¨ www.[facility].nih.gov  · National Suicide Prevention Lifeline:  ¨ 0-945-390-5878. This is free, 24-hour help.  Contact a doctor if:  · Your symptoms get worse.  · You have new symptoms.  Get help right away if:  · You self-harm.  · You see, hear, taste, smell, or feel things that are not present (hallucinate).  If you ever feel like you may hurt yourself or others, or have thoughts about taking your own life, get help right away. You can go to your nearest emergency department or call:  · Your local emergency services (911 in the [place]  · A suicide crisis helpline, such as the National Suicide Prevention Lifeline:  ¨ 5-938-631-3060. This is open 24 hours a day.  This information is not intended to replace advice given to you by your health care provider. Make sure you discuss any questions you have with your health care provider.  Document Released: 11/28/2016 Document Revised: 09/03/2017 Document Reviewed: 09/03/2017  [employer] Interactive Patient Education © 2017 [employer]    Social Anxiety Disorder, Adult  Social anxiety disorder, previously called social phobia, is a mental disorder. People with social anxiety disorder often feel nervous, afraid, or embarrassed when they are around other people in social situations. They worry that other people are judging or criticizing them for how they look, what they say, or how they act.  Social anxiety disorder is more than just occasional shyness or self-consciousness. It can cause severe emotional distress. It can interfere with daily life activities. Social anxiety disorder also may lead to alcohol or drug use and even suicide.  Social anxiety disorder is a common mental disorder. It can develop at any time, but it usually starts in the teenage years.  What are the causes?  The cause of this condition is not known. It may involve genes that are passed through families. Stressful events may trigger anxiety.  What increases the risk?  This condition is  more likely to develop in:  · People who have a family history of anxiety disorders.  · Women.  · People who have a condition that makes them feel self-conscious or nervous, such as a stutter or a chronic disease.  What are the signs or symptoms?  The main symptom of this condition is fear of being criticized or judged in social situations. You may be afraid to:  · Speak in public.  · Go shopping.  · Use a public bathroom.  · Eat at a restaurant.  · Go to work.  · Interact with unfamiliar people.  Extreme fear and anxiety may cause physical symptoms, including:  · Blushing.  · Racing heart.  · Sweating.  · Shaky hands or voice.  · Confusion.  · Light-headedness.  · Upset stomach, diarrhea, or vomiting.  · Shortness of breath.  How is this diagnosed?  Your health care provider can diagnose this condition based on your history, symptoms, and behavior in social situations. Your health care provider may ask you about your use of alcohol or drugs, including prescription medicines. Your health care provider may refer you to a mental health specialist for further evaluation or treatment.  How is this treated?  Treatment for this condition may include:  · Cognitive behavioral therapy. This type of talk therapy helps you learn to replace negative thoughts and behaviors with positive ones. This may include learning better coping skills and ways to control anxiety.  · Exposure therapy. You will be exposed to social situations that cause fear. The treatment starts with situations that you can manage. Over time, you will learn to manage harder situations.  · Antidepressant medicines. These medicines may be used for a short time along with other therapies.  · Beta blockers. These medicines may help to control anxiety.  · Biofeedback. This process trains you to manage your body's response (physiological response) through breathing techniques and relaxation methods. You will work with a therapist while machines are used to monitor  your physical symptoms.  · Relaxation and coping techniques. These include deep breathing, self-talk, meditation, visual imagery, and yoga. Relaxation techniques help to keep you calm in social situations.  These treatments are often used in combination.  Follow these instructions at home:  · Take over-the-counter and prescription medicines only as told by your health care provider.  · Practice relaxation and coping strategies as taught by your health care provider.  · Return to social activities as suggested by your health care provider.  · Keep all follow-up visits as told by your health care provider. This is important.  Contact a health care provider if:  · Your symptoms do not improve.  · Your symptoms get worse.  · You have signs of depression, such as:  ¨ A persistently sad, cranky, or irritable mood.  ¨ Loss of enjoyment in activities that used to bring you miya.  ¨ Change in weight or eating.  ¨ Changes in sleeping habits.  ¨ Avoiding friends or family members.  ¨ Loss of energy for normal tasks.  ¨ Feelings of guilt or worthlessness.  · You become very isolated.  · You find it very hard to speak or interact with others.  · You are using drugs.  · You are drinking more alcohol than normal.  Get help right away if:  · You self-harm.  · You have suicidal thoughts.  If you ever feel like you may hurt yourself or others, or have thoughts about taking your own life, get help right away. You can go to your nearest emergency department or call:  · Your local emergency services (911 in the [place]  · A suicide crisis helpline, such as the National Suicide Prevention Lifeline at 1-601.274.3282. This is open 24 hours a day.  Summary  · Social anxiety disorder may cause you to feel nervous, afraid, or embarrassed when you are around other people in social situations.  · Social anxiety disorder is a common mental disorder. It can develop at any time, but it usually starts in the teenage years.  · Treatment includes talk  therapy, exposure therapy, medicines, biofeedback, relaxation techniques, or a combination of two or more treatments.  This information is not intended to replace advice given to you by your health care provider. Make sure you discuss any questions you have with your health care provider.  Document Released: 11/16/2006 Document Revised: 11/10/2017 Document Reviewed: 11/10/2017  [employer] Interactive Patient Education © 2017 [employer]    Stress and Stress Management  Stress is a normal reaction to life events. It is what you feel when life demands more than you are used to or more than you can handle. Some stress can be useful. For example, the stress reaction can help you catch the last bus of the day, study for a test, or meet a deadline at work. But stress that occurs too often or for too long can cause problems. It can affect your emotional health and interfere with relationships and normal daily activities. Too much stress can weaken your immune system and increase your risk for physical illness. If you already have a medical problem, stress can make it worse.  What are the causes?  All sorts of life events may cause stress. An event that causes stress for one person may not be stressful for another person. Major life events commonly cause stress. These may be positive or negative. Examples include losing your job, moving into a new home, getting , having a baby, or losing a loved one. Less obvious life events may also cause stress, especially if they occur day after day or in combination. Examples include working long hours, driving in traffic, caring for children, being in debt, or being in a difficult relationship.  What are the signs or symptoms?  Stress may cause emotional symptoms including, the following:  · Anxiety. This is feeling worried, afraid, on edge, overwhelmed, or out of control.  · Anger. This is feeling irritated or impatient.  · Depression. This is feeling sad, down, helpless, or  guilty.  · Difficulty focusing, remembering, or making decisions.  Stress may cause physical symptoms, including the following:  · Aches and pains. These may affect your head, neck, back, stomach, or other areas of your body.  · Tight muscles or clenched jaw.  · Low energy or trouble sleeping.  Stress may cause unhealthy behaviors, including the following:  · Eating to feel better (overeating) or skipping meals.  · Sleeping too little, too much, or both.  · Working too much or putting off tasks (procrastination).  · Smoking, drinking alcohol, or using drugs to feel better.  How is this diagnosed?  Stress is diagnosed through an assessment by your health care provider. Your health care provider will ask questions about your symptoms and any stressful life events. Your health care provider will also ask about your medical history and may order blood tests or other tests. Certain medical conditions and medicine can cause physical symptoms similar to stress. Mental illness can cause emotional symptoms and unhealthy behaviors similar to stress. Your health care provider may refer you to a mental health professional for further evaluation.  How is this treated?  Stress management is the recommended treatment for stress. The goals of stress management are reducing stressful life events and coping with stress in healthy ways.  Techniques for reducing stressful life events include the following:  · Stress identification. Self-monitor for stress and identify what causes stress for you. These skills may help you to avoid some stressful events.  · Time management. Set your priorities, keep a calendar of events, and learn to say “no.” These tools can help you avoid making too many commitments.  Techniques for coping with stress include the following:  · Rethinking the problem. Try to think realistically about stressful events rather than ignoring them or overreacting. Try to find the positives in a stressful situation rather  than focusing on the negatives.  · Exercise. Physical exercise can release both physical and emotional tension. The key is to find a form of exercise you enjoy and do it regularly.  · Relaxation techniques. These relax the body and mind. Examples include yoga, meditation, radha chi, biofeedback, deep breathing, progressive muscle relaxation, listening to music, being out in nature, journaling, and other hobbies. Again, the key is to find one or more that you enjoy and can do regularly.  · Healthy lifestyle. Eat a balanced diet, get plenty of sleep, and do not smoke. Avoid using alcohol or drugs to relax.  · Strong support network. Spend time with family, friends, or other people you enjoy being around. Express your feelings and talk things over with someone you trust.  Counseling or talktherapy with a mental health professional may be helpful if you are having difficulty managing stress on your own. Medicine is typically not recommended for the treatment of stress. Talk to your health care provider if you think you need medicine for symptoms of stress.  Follow these instructions at home:  · Keep all follow-up visits as directed by your health care provider.  · Take all medicines as directed by your health care provider.  Contact a health care provider if:  · Your symptoms get worse or you start having new symptoms.  · You feel overwhelmed by your problems and can no longer manage them on your own.  Get help right away if:  · You feel like hurting yourself or someone else.  This information is not intended to replace advice given to you by your health care provider. Make sure you discuss any questions you have with your health care provider.  Document Released: 06/13/2002 Document Revised: 05/25/2017 Document Reviewed: 08/12/2014  [employer] Interactive Patient Education © 2017 [employer]    Exercise Guidelines During Cardiac Rehabilitation  When you are recovering from a heart condition, such as from heart surgery,  heart attack, or heart failure, it is important to have heart-healthy habits, including exercise routines. Discuss an appropriate exercise program with your heart specialist (cardiologist) and rehabilitation therapist.  It is important to design a program that is safe and effective for you. The program should meet your specific abilities and needs. Walking, biking, jogging, and swimming are all good aerobic activities. These take light to moderate effort. Adding some light resistance training is also important. Even simple lifestyle changes can help. These lifestyle changes may include parking farther from the store or taking the stairs instead of the elevator.  At first, you may begin exercising under supervision, such as at a hospital or clinic. Over time, you may begin exercising at home, with your health care provider's approval.  Types of exercise  Aerobic exercise   During cardiac rehabilitation, it is important to do aerobic activities. Aerobic exercise keeps joints and muscles moving. It involves large muscle groups. It is also rhythmic and must be done for a longer period of time. Doing these exercises improves circulation and endurance. Examples of aerobic exercise include:  · Swimming.  · Walking.  · Hiking.  · Jogging.  · Cross-country skiing.  · Biking.  · Dancing.  Static exercise   Static exercise (isometric exercise) uses muscles at high intensities without moving the joints. Some examples of static exercise include pushing against a heavy couch that does not move, doing a wall sit, or holding a plank position. Static exercise improves strength but also quickly increases blood pressure. Follow these guidelines:  · If you have circulation problems or high blood pressure, talk with your health care provider before starting any static exercise routines. Do not do static exercises if your health care provider tells you not to.  · Do not hold your breath while doing static exercises. Holding your breath  "during static exercises can raise your blood pressure to a dangerously high level.  Weight-resistance exercise   Weight-resistance exercises are another important part of rehabilitation. These exercises strengthen your muscles by making them work against resistance. Resistance exercises may help you return to activities of daily living sooner and improve your quality of life. They also help reduce cardiac risk factors. Examples of weight-resistance exercise include using:  · Free weights.  · Weight-lifting machines.  · Large, specially designed rubber bands.  You will usually do weight-resistance exercises 2 times a week with a 2-day rest period between workouts.  Stretching  Stretching before you exercise warms up your muscles and prevents injury. Stretching also improves your flexibility, balance, coordination, and range of motion. Follow these guidelines:  · Stretch both before and after exercising.  · Do not force a muscle or joint into a painful angle. Stretching should be a relaxing part of your exercise routine.  · Once you feel resistance in your muscle, hold the stretch for a few seconds. Make sure you keep breathing while you hold the stretch.  · Go slowly when doing all stretches.  Setting a pace  · Choose a pace that is comfortable for you.  ¨ You should be able to talk while exercising. If you are short of breath or unable to speak while you exercise, slow down.  ¨ If you are able to sing while exercising, you are not exercising hard enough.  · Keep track of how hard you are working as you exercise (exertion level). Your rehabilitation therapist can teach you to use a mental scale to measure your level of exertion (perceived exertion). Using a mental scale, you will think about your exertion level and rate it in a range from 6 to 20.  ¨ A rating of 6 to 9. This means that you are doing \"very light\" exercise and are not exerting yourself enough. For a healthy person, this may be walking at a slow " "pace.  ¨ A rating of 11 to 15. This is exercise that is \"somewhat hard.\" For a healthy exercise session, you should aim for an exertion rate that is within this range.  ¨ A rating of 16 to 17. This is considered \"very hard\" or strenuous. For a healthy person, exercise at this rating may start to feel heavy and difficult.  ¨ A rating of 19 to 20. This means that you are working \"extremely hard.\" For most people, these numbers represent the hardest you've ever worked to exercise.  · Your health care provider or cardiac rehabilitation specialist may also recommend that you wear a heart rate monitor while you exercise. This will help you keep track of your heart rate zones and how hard your heart is working.  Frequency  As you are recovering, it is important to start exercising slowly and to gradually work up to your goal. Work with your health care provider to set up an exercise routine that works for you. Generally, cardiac rehabilitation exercise should include:  · 40 minutes of aerobic activity 3 - 4 days a week.  · Stretching and strength exercises 2 - 3 days a week.  Contact a doctor if:  · You have any of the following symptoms while exercising:  ¨ Pain, pressure, or burning in your chest, jaw, shoulder, or back (angina).  ¨ Lightheadedness.  ¨ Dizziness.  ¨ Irregular or fast heartbeat.  ¨ Shortness of breath.  · You are extremely tired after exercising.  Get help right away if:  · You have angina that does not get better with medicine and lasts for more than 5 minutes.  · You have nausea or you vomit.  · You have excessive sweating that is not caused by exercise.  Summary  · When you are recovering from a heart condition, it is important to have heart-healthy habits, including exercise routines.  · At first, you may begin exercising under supervision, such as at a hospital or clinic. Over time, you may begin exercising at home, with your health care provider's approval.  · Aim for 40 minutes of aerobic exercises " 3 - 4 days a week.  · Aim to do stretching and strength exercises 2 - 3 days a week.  · Choose a pace that is comfortable for you. You should be able to talk while exercising.  This information is not intended to replace advice given to you by your health care provider. Make sure you discuss any questions you have with your health care provider.  Document Released: 12/23/2014 Document Revised: 11/17/2017 Document Reviewed: 11/17/2017  [employer] Interactive Patient Education © 2017 [employer]    Pre-Exercise Meal  Eating the right foods and drinking fluids before exercising or physical activity can give you more energy and can help your muscles recover afterward. Your pre-exercise meal depends on your personal needs. Think about the length of time you will be exercising and any health conditions you have that may guide what you can and cannot eat. The number of calories in your pre-exercise meal should reflect the amount of time between the meal and your training session or performance. The earlier your meal, the more calories it should contain. If your pre-exercise meal does not have enough calories, you may become hungry, and your performance may decline.  When should I eat my pre-exercise meal?  The best time to eat a pre-exercise meal will vary depending on your personal needs and the timing of the exercise. Again, the size of the meal should reflect the amount of time before your training session or performance begins. In general, you should eat your pre-exercise meal 3-4 hours before the exercise session. Within 30-60 minutes of the session, you may want to limit what you consume to fluids (such as water or sports drinks) or easily digestible carbohydrates (such as sports gels, fruit, or fruit juice).  What should my pre-exercise meal include?  Your pre-exercise meal should include:  · Carbohydrates. Carbohydrates should be the primary type of food in your meal. Some examples of carbohydrate-rich meals  include:  ¨ Pasta.  ¨ A bagel or toast with peanut butter and honey.  ¨ A turkey and Swiss cheese sandwich with fruit and skim milk.  ¨ A low-fat tuna melt sandwich with a fruit cup.  ¨ Oatmeal with fruit and almonds and skim milk.  · Fluids. Drink enough fluids to keep you well hydrated. Water, sports drinks, and juice are good choices.  · Lean protein. Include moderate amounts of lean protein foods. Examples of these foods include fish, poultry (such as chicken or turkey), and lean meat (such as pork loin).  · Foods that you are familiar with and tolerate well.  What should my pre-exercise meal not include?  Your pre-exercise meal should not include:  · Carbonated drinks, such as soda and sparkling water.These may cause stomach discomfort.  · Foods that are high in sugar. These foods may cause your blood sugar levels to quickly rise, followed by a rapid drop to very low levels. This rapid drop to low levels can leave you feeling tired, light-headed, anxious, or irritable. As a result, it can interfere with your exercise routine or athletic performance.  · Salty foods, such as chips, crackers, or soups. These foods can increase your thirst during exercise.  · Foods that are high in fiber, such as bran products, dried beans or legumes, and vegetables such as onions, cauliflower, or cabbage. These types of foods can cause bloating or gas.  · Foods that are high in fat, including fried foods. Because you cannot digest fat as quickly, it may cause bloating and stomach discomfort.  · Caffeinated drinks, if caffeine affects you negatively. For some people, caffeine can cause nausea and anxiousness. Drinking a large amount of caffeine can lead to dehydration, because it causes your body to lose water.  This information is not intended to replace advice given to you by your health care provider. Make sure you discuss any questions you have with your health care provider.  Document Released: 12/18/2006 Document Revised:  05/25/2017 Document Reviewed: 05/19/2015  [employer] Interactive Patient Education © 2017 [employer]

## 2018-04-02 ENCOUNTER — TREATMENT (OUTPATIENT)
Dept: CARDIAC REHAB | Facility: HOSPITAL | Age: 43
End: 2018-04-02

## 2018-04-02 VITALS — SYSTOLIC BLOOD PRESSURE: 120 MMHG | HEART RATE: 91 BPM | DIASTOLIC BLOOD PRESSURE: 64 MMHG | OXYGEN SATURATION: 98 %

## 2018-04-02 DIAGNOSIS — Z95.2 S/P AVR: Primary | ICD-10-CM

## 2018-04-02 PROCEDURE — 93798 PHYS/QHP OP CAR RHAB W/ECG: CPT

## 2018-04-02 NOTE — PROGRESS NOTES
Pt attended phase II visit.  Tolerated exercise well, NAD noted, no complaints voiced, skin warm, pink, dry, resp nl and even, denies chest discomfort,NAD noted, no complaints voiced, skin warm, pink and dry, resp within normal limits and even, denies chest discomfort, chest pressure ,SOA .  Monitor shows NSR-ST , V/S WDL ,see Gonzalo documentation for exercise data.  Dr. Garcia physician immediately available       Patient attended first day of exercise. Educated on warm up, cool down, cleaning and use of equipment, signs and symptoms to report, Cardiac Rehab Protocol, Applying the heart monitor.

## 2018-04-04 ENCOUNTER — TELEPHONE (OUTPATIENT)
Dept: CARDIAC SURGERY | Facility: CLINIC | Age: 43
End: 2018-04-04

## 2018-04-04 ENCOUNTER — DOCUMENTATION (OUTPATIENT)
Dept: CARDIAC REHAB | Facility: HOSPITAL | Age: 43
End: 2018-04-04

## 2018-04-04 ENCOUNTER — TREATMENT (OUTPATIENT)
Dept: CARDIAC REHAB | Facility: HOSPITAL | Age: 43
End: 2018-04-04

## 2018-04-04 ENCOUNTER — APPOINTMENT (OUTPATIENT)
Dept: CARDIAC REHAB | Facility: HOSPITAL | Age: 43
End: 2018-04-04

## 2018-04-04 VITALS — SYSTOLIC BLOOD PRESSURE: 120 MMHG | OXYGEN SATURATION: 98 % | HEART RATE: 71 BPM | DIASTOLIC BLOOD PRESSURE: 70 MMHG

## 2018-04-04 DIAGNOSIS — Z95.2 S/P AVR: Primary | ICD-10-CM

## 2018-04-04 PROCEDURE — 93798 PHYS/QHP OP CAR RHAB W/ECG: CPT

## 2018-04-04 NOTE — PROGRESS NOTES
Pt attended phase II visit.  Tolerated exercise well, NAD noted, no complaints voiced, skin warm, pink, dry, resp nl and even, denies chest discomfort,NAD noted, no complaints voiced, skin warm, pink and dry, resp within normal limits and even, denies chest discomfort, chest pressure ,SOA .  Monitor shows NSR-ST , V/S WDL ,see Gonzalo documentation for exercise data.   physician immediately available

## 2018-04-04 NOTE — TELEPHONE ENCOUNTER
Cardiac Rehab called concerned about Mr. Jj's chest tube site suture. Dr. Jose's office has removed chest tube sutures but one was buried under skin and was difficult to remove. Patient was instructed by Dr. Jose's office to follow-up with Dr. Lucas to remove this suture. He has not seen Dr. Lucas due to several [facility] clinics cancelled and patient was not able to come to [place]. He has a current appointment in [place] on 4/18/18. He was instructed to keep this appointment.    Isaías

## 2018-04-04 NOTE — PROGRESS NOTES
Spoke with Isaías at Dr. Lucas office about stitches still in incision site.Filemon is to see Dr. Lucas here at [facility] [place] on 4/18/2018 at 1200. He is aware and I made him an appointment card reminder.

## 2018-04-05 DIAGNOSIS — I71.20 THORACIC AORTIC ANEURYSM WITHOUT RUPTURE (HCC): Primary | ICD-10-CM

## 2018-04-06 ENCOUNTER — APPOINTMENT (OUTPATIENT)
Dept: CARDIAC REHAB | Facility: HOSPITAL | Age: 43
End: 2018-04-06

## 2018-04-09 ENCOUNTER — TREATMENT (OUTPATIENT)
Dept: CARDIAC REHAB | Facility: HOSPITAL | Age: 43
End: 2018-04-09

## 2018-04-09 ENCOUNTER — APPOINTMENT (OUTPATIENT)
Dept: CARDIAC REHAB | Facility: HOSPITAL | Age: 43
End: 2018-04-09

## 2018-04-09 VITALS — SYSTOLIC BLOOD PRESSURE: 122 MMHG | OXYGEN SATURATION: 98 % | HEART RATE: 85 BPM | DIASTOLIC BLOOD PRESSURE: 68 MMHG

## 2018-04-09 DIAGNOSIS — Z95.2 S/P AVR: Primary | ICD-10-CM

## 2018-04-09 PROCEDURE — 93798 PHYS/QHP OP CAR RHAB W/ECG: CPT

## 2018-04-10 ENCOUNTER — LAB REQUISITION (OUTPATIENT)
Dept: LAB | Facility: HOSPITAL | Age: 43
End: 2018-04-10

## 2018-04-10 DIAGNOSIS — Z79.01 LONG TERM CURRENT USE OF ANTICOAGULANT: ICD-10-CM

## 2018-04-10 LAB
INR PPP: 2.35 (ref 0.9–1.1)
PROTHROMBIN TIME: 26.1 SECONDS (ref 11–15.4)

## 2018-04-10 PROCEDURE — 85610 PROTHROMBIN TIME: CPT | Performed by: INTERNAL MEDICINE

## 2018-04-11 ENCOUNTER — APPOINTMENT (OUTPATIENT)
Dept: CARDIAC REHAB | Facility: HOSPITAL | Age: 43
End: 2018-04-11

## 2018-04-13 ENCOUNTER — APPOINTMENT (OUTPATIENT)
Dept: CARDIAC REHAB | Facility: HOSPITAL | Age: 43
End: 2018-04-13

## 2018-04-16 ENCOUNTER — APPOINTMENT (OUTPATIENT)
Dept: CARDIAC REHAB | Facility: HOSPITAL | Age: 43
End: 2018-04-16

## 2018-04-18 ENCOUNTER — TELEPHONE (OUTPATIENT)
Dept: CARDIAC SURGERY | Facility: CLINIC | Age: 43
End: 2018-04-18

## 2018-04-18 ENCOUNTER — APPOINTMENT (OUTPATIENT)
Dept: CARDIAC REHAB | Facility: HOSPITAL | Age: 43
End: 2018-04-18

## 2018-04-18 NOTE — TELEPHONE ENCOUNTER
"The pt had an appt today, 04/18/2018, with Dr. Lucas at our [facility]. The pt had missed his appt, he stated\" when I realized my appt was today it was way passed my appt time, so I just didn't go.\" The pt wanted to R/S his appt because he had some stitches from his procedure done on 01/17/2018. Per our MAIsaías, telephone encounter on 04/04/2018, the pt had some of his sutures removed at Dr. Jose's office but had one that was buried under the skin and was unable to remove. I informed the pt that the soonest I could get him in for the [facility] would be May 16th @ 10:30. He said he needed to be seen sooner than that. I offered him an apt in [place] and explained that Dr. Lucas's [facility] fills up quick. He stated he would not come to [place], he did not have a way here and just would not come to [place]. I again explained to the pt that since he can not come to [place] than May 16 would be the quickest I could get him in. He was frustrated but said that would be fine if that was all he could get.   "

## 2018-04-20 ENCOUNTER — APPOINTMENT (OUTPATIENT)
Dept: CARDIAC REHAB | Facility: HOSPITAL | Age: 43
End: 2018-04-20

## 2018-04-23 ENCOUNTER — APPOINTMENT (OUTPATIENT)
Dept: CARDIAC REHAB | Facility: HOSPITAL | Age: 43
End: 2018-04-23

## 2018-04-25 ENCOUNTER — APPOINTMENT (OUTPATIENT)
Dept: CARDIAC REHAB | Facility: HOSPITAL | Age: 43
End: 2018-04-25

## 2018-04-27 ENCOUNTER — APPOINTMENT (OUTPATIENT)
Dept: CARDIAC REHAB | Facility: HOSPITAL | Age: 43
End: 2018-04-27

## 2018-04-30 ENCOUNTER — APPOINTMENT (OUTPATIENT)
Dept: CARDIAC REHAB | Facility: HOSPITAL | Age: 43
End: 2018-04-30

## 2018-05-01 RX ORDER — ATORVASTATIN CALCIUM 10 MG/1
10 TABLET, FILM COATED ORAL DAILY
Qty: 90 TABLET | Refills: 0 | Status: CANCELLED | OUTPATIENT
Start: 2018-05-01 | End: 2018-07-30

## 2018-05-02 ENCOUNTER — APPOINTMENT (OUTPATIENT)
Dept: CARDIAC REHAB | Facility: HOSPITAL | Age: 43
End: 2018-05-02

## 2018-05-04 ENCOUNTER — APPOINTMENT (OUTPATIENT)
Dept: CARDIAC REHAB | Facility: HOSPITAL | Age: 43
End: 2018-05-04

## 2018-05-07 ENCOUNTER — APPOINTMENT (OUTPATIENT)
Dept: CARDIAC REHAB | Facility: HOSPITAL | Age: 43
End: 2018-05-07

## 2018-05-09 ENCOUNTER — APPOINTMENT (OUTPATIENT)
Dept: CARDIAC REHAB | Facility: HOSPITAL | Age: 43
End: 2018-05-09

## 2018-05-11 ENCOUNTER — APPOINTMENT (OUTPATIENT)
Dept: CARDIAC REHAB | Facility: HOSPITAL | Age: 43
End: 2018-05-11

## 2018-05-14 ENCOUNTER — APPOINTMENT (OUTPATIENT)
Dept: CARDIAC REHAB | Facility: HOSPITAL | Age: 43
End: 2018-05-14

## 2018-05-15 ENCOUNTER — TELEPHONE (OUTPATIENT)
Dept: CARDIAC SURGERY | Facility: CLINIC | Age: 43
End: 2018-05-15

## 2018-05-15 NOTE — TELEPHONE ENCOUNTER
S/w pt and gave him new appointment day and time. Told pt to call if there was any issues between now and then .

## 2018-05-16 ENCOUNTER — APPOINTMENT (OUTPATIENT)
Dept: CARDIAC REHAB | Facility: HOSPITAL | Age: 43
End: 2018-05-16

## 2018-05-18 ENCOUNTER — APPOINTMENT (OUTPATIENT)
Dept: CARDIAC REHAB | Facility: HOSPITAL | Age: 43
End: 2018-05-18

## 2018-05-21 ENCOUNTER — APPOINTMENT (OUTPATIENT)
Dept: CARDIAC REHAB | Facility: HOSPITAL | Age: 43
End: 2018-05-21

## 2018-05-21 ENCOUNTER — LAB REQUISITION (OUTPATIENT)
Dept: LAB | Facility: HOSPITAL | Age: 43
End: 2018-05-21

## 2018-05-21 DIAGNOSIS — Z79.01 LONG TERM CURRENT USE OF ANTICOAGULANT: ICD-10-CM

## 2018-05-21 DIAGNOSIS — Z48.812 ENCOUNTER FOR SURGICAL AFTERCARE FOLLOWING SURGERY OF CIRCULATORY SYSTEM: ICD-10-CM

## 2018-05-21 DIAGNOSIS — I71.9 AORTIC ANEURYSM WITHOUT RUPTURE (HCC): ICD-10-CM

## 2018-05-21 LAB
INR PPP: 1.76 (ref 0.9–1.1)
PROTHROMBIN TIME: 20.8 SECONDS (ref 11–15.4)

## 2018-05-21 PROCEDURE — 85610 PROTHROMBIN TIME: CPT | Performed by: INTERNAL MEDICINE

## 2018-05-22 ENCOUNTER — OFFICE VISIT (OUTPATIENT)
Dept: CARDIOLOGY | Facility: CLINIC | Age: 43
End: 2018-05-22

## 2018-05-22 VITALS
DIASTOLIC BLOOD PRESSURE: 68 MMHG | BODY MASS INDEX: 29.98 KG/M2 | WEIGHT: 191 LBS | SYSTOLIC BLOOD PRESSURE: 115 MMHG | HEART RATE: 83 BPM | HEIGHT: 67 IN | RESPIRATION RATE: 16 BRPM

## 2018-05-22 DIAGNOSIS — I10 ESSENTIAL HYPERTENSION: Primary | ICD-10-CM

## 2018-05-22 DIAGNOSIS — I71.21 ASCENDING AORTIC ANEURYSM (HCC): ICD-10-CM

## 2018-05-22 DIAGNOSIS — E66.09 CLASS 1 OBESITY DUE TO EXCESS CALORIES WITH SERIOUS COMORBIDITY AND BODY MASS INDEX (BMI) OF 30.0 TO 30.9 IN ADULT: ICD-10-CM

## 2018-05-22 DIAGNOSIS — Z72.0 TOBACCO ABUSE: ICD-10-CM

## 2018-05-22 PROBLEM — E66.811 CLASS 1 OBESITY DUE TO EXCESS CALORIES WITH BODY MASS INDEX (BMI) OF 30.0 TO 30.9 IN ADULT: Status: ACTIVE | Noted: 2018-05-22

## 2018-05-22 PROCEDURE — 99213 OFFICE O/P EST LOW 20 MIN: CPT | Performed by: INTERNAL MEDICINE

## 2018-05-22 RX ORDER — ATORVASTATIN CALCIUM 10 MG/1
10 TABLET, FILM COATED ORAL DAILY
COMMUNITY
End: 2018-05-22 | Stop reason: SDUPTHER

## 2018-05-22 RX ORDER — ATORVASTATIN CALCIUM 10 MG/1
10 TABLET, FILM COATED ORAL DAILY
Qty: 30 TABLET | Refills: 6 | Status: ON HOLD | OUTPATIENT
Start: 2018-05-22 | End: 2020-09-19 | Stop reason: SDUPTHER

## 2018-05-22 NOTE — PROGRESS NOTES
Macho Jose MD  Filemon Jj  : 1975  DATE:2017    Patient Active Problem List   Diagnosis   • Ascending aortic aneurysm   • Other chest pain   • Aneurysm of aortic sinus of Valsalva without rupture   • Tobacco abuse   • COPD (chronic obstructive pulmonary disease)   • Essential hypertension   • Class 1 obesity due to excess calories with body mass index (BMI) of 30.0 to 30.9 in adult       Dear Macho Jose MD:    Subjective     Filemon Jj is a 43 y.o. male with the above medical problems who is being seen for follow-up.According to the patient has been doing fairly well since his last visit.  He denies any chest pain, shortness breath, palpitations, thiamine, PND, lower extremity edema, dizziness or syncope.  He has a history of hypertension that appears to be well-controlled at this time.  He continues to chew tobacco.      Current Outpatient Prescriptions:   •  albuterol (PROVENTIL) (2.5 MG/3ML) 0.083% nebulizer solution, Take 2.5 mg by nebulization Every 4 (Four) Hours As Needed., Disp: , Rfl:   •  atorvastatin (LIPITOR) 10 MG tablet, Take 1 tablet by mouth Daily., Disp: 30 tablet, Rfl: 6  •  cyclobenzaprine (FLEXERIL) 10 MG tablet, Take 10 mg by mouth 3 (Three) Times a Day As Needed for Muscle Spasms., Disp: , Rfl:   •  Fluticasone Furoate-Vilanterol 100-25 MCG/INH aerosol powder , Inhale 1 puff by mouth Daily. (Patient taking differently: Inhale 1 puff Daily. Does not take), Disp: 60 each, Rfl: 1  •  HYDROcodone-acetaminophen (NORCO) 5-325 MG per tablet, Take 1 tablet by mouth Every 6 (Six) Hours As Needed., Disp: , Rfl:   •  hydrOXYzine (VISTARIL) 25 MG capsule, Take 25 mg by mouth 2 (Two) Times a Day As Needed for Anxiety (for anxiety)., Disp: , Rfl:   •  ibuprofen (ADVIL,MOTRIN) 800 MG tablet, Take 800 mg by mouth Every 6 (Six) Hours As Needed for Mild Pain ., Disp: , Rfl:   •  metoprolol tartrate (LOPRESSOR) 25 MG tablet, Take 1/2 tablet by mouth Every 12 (Twelve) Hours.,  "Disp: 90 tablet, Rfl: 3  •  montelukast (SINGULAIR) 10 MG tablet, Take 10 mg by mouth Daily., Disp: , Rfl:   •  raNITIdine (ZANTAC) 300 MG tablet, Take 300 mg by mouth 2 (Two) Times a Day., Disp: , Rfl:   •  warfarin (COUMADIN) 5 MG tablet, Take 1 tablet by mouth Daily., Disp: 30 tablet, Rfl: 6  •  albuterol (PROVENTIL HFA;VENTOLIN HFA) 108 (90 BASE) MCG/ACT inhaler, Inhale 2 puffs every 4 (four) hours as needed for wheezing. (Patient taking differently: Inhale 2 puffs Every 4 (Four) Hours As Needed for Wheezing or Shortness of Air.), Disp: 3.7 g, Rfl: 0    The following portions of the patient's history were reviewed and updated as appropriate: allergies, current medications, past family history, past medical history, past social history, past surgical history and problem list.    Review of Systems   Constitution: Negative for chills, diaphoresis and fever.   HENT: Negative for congestion, ear pain and sore throat.    Eyes: Negative for blurred vision, pain and redness.   Cardiovascular: Positive for dyspnea on exertion. Negative for chest pain, leg swelling, orthopnea, palpitations, paroxysmal nocturnal dyspnea and syncope.   Respiratory: Negative for cough, hemoptysis and shortness of breath.    Endocrine: Negative for cold intolerance and heat intolerance.   Hematologic/Lymphatic: Does not bruise/bleed easily.   Skin: Negative for rash.   Musculoskeletal: Positive for arthritis, back pain, joint pain and stiffness. Negative for myalgias.   Gastrointestinal: Negative for abdominal pain, constipation, diarrhea, hematemesis, hematochezia, melena, nausea and vomiting.   Genitourinary: Negative for dysuria and hematuria.   Neurological: Positive for dizziness. Negative for focal weakness and numbness.   Psychiatric/Behavioral: Negative for depression. The patient is not nervous/anxious.        Objective   Blood pressure 115/68, pulse 83, resp. rate 16, height 170.2 cm (67.01\"), weight 86.6 kg (191 lb).    Physical " Exam   Constitutional: He is oriented to person, place, and time. He appears well-developed and well-nourished.   Disheveled WM sitting comfortably on chair.   HENT:   Mouth/Throat: Oropharynx is clear and moist.   Eyes: EOM are normal. Pupils are equal, round, and reactive to light.   Neck: Neck supple. No JVD present. No tracheal deviation present. No thyromegaly present.   Cardiovascular: Normal rate, regular rhythm, S1 normal and S2 normal.  Exam reveals no gallop and no friction rub.    No murmur heard.  Pulmonary/Chest: Effort normal. No respiratory distress. He has wheezes. He has rales.   Abdominal: Soft. Bowel sounds are normal. He exhibits no mass. There is no tenderness.   Musculoskeletal: Normal range of motion. He exhibits no edema.   Lymphadenopathy:     He has no cervical adenopathy.   Neurological: He is alert and oriented to person, place, and time.   Skin: Skin is warm and dry. No rash noted.   Medial chest car is present.   Psychiatric: He has a normal mood and affect.       Procedures    Assessment/Plan      1.  Ascending aortic aneurysm: Patient with a large ascending aortic aneurysm that has now been repaired.  He underwent aortic root replacement with a mechanical aortic valve.  He has been doing well since that time.  He underwent an intraoperative AMBER which showed adequate function of the valve.  We'll consider repeat echocardiogram in the future.    2.  Hypertension: Patient presents for potential appears to be well-controlled on current regimen.  At this point will continue.    3.  Tobacco abuse: Patient with a history of tobacco abuse who continues to chew tobacco.  I once again advised him the importance of tobacco cessation.    4.  Obesity: Patient with a history of obesity related to excessive calorie intake.Patient's Body mass index is 29.91 kg/m². BMI is above normal parameters. Recommendations include: exercise counseling, nutrition counseling and referral to primary care.        Diagnosis Plan   1. Essential hypertension     2. Ascending aortic aneurysm     3. Tobacco abuse     4. Class 1 obesity due to excess calories with serious comorbidity and body mass index (BMI) of 30.0 to 30.9 in adult          Return in about 6 months (around 11/22/2018).    I appreciate the opportunity to participate in this patient's cardiovascular care.    Best Regards    Nabor Jacobsen

## 2018-05-23 ENCOUNTER — APPOINTMENT (OUTPATIENT)
Dept: CARDIAC REHAB | Facility: HOSPITAL | Age: 43
End: 2018-05-23

## 2018-05-25 ENCOUNTER — APPOINTMENT (OUTPATIENT)
Dept: CARDIAC REHAB | Facility: HOSPITAL | Age: 43
End: 2018-05-25

## 2018-05-28 ENCOUNTER — LAB REQUISITION (OUTPATIENT)
Dept: LAB | Facility: HOSPITAL | Age: 43
End: 2018-05-28

## 2018-05-28 DIAGNOSIS — Z79.01 LONG TERM CURRENT USE OF ANTICOAGULANT: ICD-10-CM

## 2018-05-28 LAB
INR PPP: 1.81 (ref 0.9–1.1)
PROTHROMBIN TIME: 21.2 SECONDS (ref 11–15.4)

## 2018-05-28 PROCEDURE — 85610 PROTHROMBIN TIME: CPT | Performed by: INTERNAL MEDICINE

## 2018-05-30 ENCOUNTER — APPOINTMENT (OUTPATIENT)
Dept: CARDIAC REHAB | Facility: HOSPITAL | Age: 43
End: 2018-05-30

## 2018-05-30 ENCOUNTER — OFFICE VISIT (OUTPATIENT)
Dept: CARDIAC SURGERY | Facility: CLINIC | Age: 43
End: 2018-05-30

## 2018-05-30 VITALS
BODY MASS INDEX: 29.57 KG/M2 | DIASTOLIC BLOOD PRESSURE: 85 MMHG | HEIGHT: 67 IN | WEIGHT: 188.4 LBS | HEART RATE: 92 BPM | SYSTOLIC BLOOD PRESSURE: 124 MMHG

## 2018-05-30 DIAGNOSIS — I71.21 ASCENDING AORTIC ANEURYSM (HCC): Primary | ICD-10-CM

## 2018-05-30 PROCEDURE — 99213 OFFICE O/P EST LOW 20 MIN: CPT | Performed by: THORACIC SURGERY (CARDIOTHORACIC VASCULAR SURGERY)

## 2018-06-01 ENCOUNTER — APPOINTMENT (OUTPATIENT)
Dept: CARDIAC REHAB | Facility: HOSPITAL | Age: 43
End: 2018-06-01

## 2018-06-04 ENCOUNTER — APPOINTMENT (OUTPATIENT)
Dept: CARDIAC REHAB | Facility: HOSPITAL | Age: 43
End: 2018-06-04

## 2018-06-06 ENCOUNTER — APPOINTMENT (OUTPATIENT)
Dept: CARDIAC REHAB | Facility: HOSPITAL | Age: 43
End: 2018-06-06

## 2018-06-08 ENCOUNTER — APPOINTMENT (OUTPATIENT)
Dept: CARDIAC REHAB | Facility: HOSPITAL | Age: 43
End: 2018-06-08

## 2018-06-11 ENCOUNTER — APPOINTMENT (OUTPATIENT)
Dept: CARDIAC REHAB | Facility: HOSPITAL | Age: 43
End: 2018-06-11

## 2018-06-13 ENCOUNTER — APPOINTMENT (OUTPATIENT)
Dept: CARDIAC REHAB | Facility: HOSPITAL | Age: 43
End: 2018-06-13

## 2018-06-15 ENCOUNTER — APPOINTMENT (OUTPATIENT)
Dept: CARDIAC REHAB | Facility: HOSPITAL | Age: 43
End: 2018-06-15

## 2018-06-18 ENCOUNTER — APPOINTMENT (OUTPATIENT)
Dept: CARDIAC REHAB | Facility: HOSPITAL | Age: 43
End: 2018-06-18

## 2018-06-20 ENCOUNTER — LAB REQUISITION (OUTPATIENT)
Dept: LAB | Facility: HOSPITAL | Age: 43
End: 2018-06-20

## 2018-06-20 ENCOUNTER — APPOINTMENT (OUTPATIENT)
Dept: CARDIAC REHAB | Facility: HOSPITAL | Age: 43
End: 2018-06-20

## 2018-06-20 DIAGNOSIS — I11.0 HYPERTENSIVE HEART DISEASE WITH HEART FAILURE (HCC): ICD-10-CM

## 2018-06-20 DIAGNOSIS — Z79.01 LONG TERM CURRENT USE OF ANTICOAGULANT: ICD-10-CM

## 2018-06-20 DIAGNOSIS — Z48.812 ENCOUNTER FOR SURGICAL AFTERCARE FOLLOWING SURGERY OF CIRCULATORY SYSTEM: ICD-10-CM

## 2018-06-20 DIAGNOSIS — I71.9 AORTIC ANEURYSM WITHOUT RUPTURE (HCC): ICD-10-CM

## 2018-06-20 LAB
INR PPP: 2.13 (ref 0.9–1.1)
PROTHROMBIN TIME: 24.1 SECONDS (ref 11–15.4)

## 2018-06-20 PROCEDURE — 85610 PROTHROMBIN TIME: CPT | Performed by: INTERNAL MEDICINE

## 2018-07-19 ENCOUNTER — LAB REQUISITION (OUTPATIENT)
Dept: LAB | Facility: HOSPITAL | Age: 43
End: 2018-07-19

## 2018-07-19 DIAGNOSIS — I71.9 AORTIC ANEURYSM WITHOUT RUPTURE (HCC): ICD-10-CM

## 2018-07-19 LAB
INR PPP: 1.61 (ref 0.9–1.1)
PROTHROMBIN TIME: 19.4 SECONDS (ref 11–15.4)

## 2018-07-19 PROCEDURE — 85610 PROTHROMBIN TIME: CPT | Performed by: INTERNAL MEDICINE

## 2018-11-28 ENCOUNTER — OFFICE VISIT (OUTPATIENT)
Dept: CARDIOLOGY | Facility: CLINIC | Age: 43
End: 2018-11-28

## 2018-11-28 VITALS
HEART RATE: 91 BPM | OXYGEN SATURATION: 97 % | SYSTOLIC BLOOD PRESSURE: 113 MMHG | BODY MASS INDEX: 29.51 KG/M2 | HEIGHT: 67 IN | WEIGHT: 188 LBS | DIASTOLIC BLOOD PRESSURE: 76 MMHG | RESPIRATION RATE: 16 BRPM

## 2018-11-28 DIAGNOSIS — I10 ESSENTIAL HYPERTENSION: ICD-10-CM

## 2018-11-28 DIAGNOSIS — I71.21 ASCENDING AORTIC ANEURYSM (HCC): Primary | ICD-10-CM

## 2018-11-28 DIAGNOSIS — Z95.2 HX OF AORTIC VALVE REPLACEMENT, MECHANICAL: ICD-10-CM

## 2018-11-28 PROCEDURE — 93000 ELECTROCARDIOGRAM COMPLETE: CPT | Performed by: PHYSICIAN ASSISTANT

## 2018-11-28 PROCEDURE — 99213 OFFICE O/P EST LOW 20 MIN: CPT | Performed by: PHYSICIAN ASSISTANT

## 2018-11-28 NOTE — PATIENT INSTRUCTIONS
BMI for Adults  Body mass index (BMI) is a number that is calculated from a person's weight and height. In most adults, the number is used to find how much of an adult's weight is made up of fat. BMI is not as accurate as a direct measure of body fat.  HOW IS BMI CALCULATED?  BMI is calculated by dividing weight in kilograms by height in meters squared. It can also be calculated by dividing weight in pounds by height in inches squared, then multiplying the resulting number by 703. Charts are available to help you find your BMI quickly and easily without doing this calculation.   HOW IS BMI INTERPRETED?  Health care professionals use BMI charts to identify whether an adult is underweight, at a normal weight, or overweight based on the following guidelines:  · Underweight: BMI less than 18.5.  · Normal weight: BMI between 18.5 and 24.9.  · Overweight: BMI between 25 and 29.9.  · Obese: BMI of 30 and above.  BMI is usually interpreted the same for males and females.  Weight includes both fat and muscle, so someone with a muscular build, such as an athlete, may have a BMI that is higher than 24.9. In cases like these, BMI may not accurately depict body fat. To determine if excess body fat is the cause of a BMI of 25 or higher, further assessments may need to be done by a health care provider.  WHY IS BMI A USEFUL TOOL?  BMI is used to identify a possible weight problem that may be related to a medical problem or may increase the risk for medical problems. BMI can also be used to promote changes to reach a healthy weight.     This information is not intended to replace advice given to you by your health care provider. Make sure you discuss any questions you have with your health care provider.     Document Released: 08/29/2005 Document Revised: 01/08/2016 Document Reviewed: 05/15/2015  [employer] Interactive Patient Education ©2017 [employer]       Calorie Counting for Weight Loss  Calories are energy you get from the  things you eat and drink. Your body uses this energy to keep you going throughout the day. The number of calories you eat affects your weight. When you eat more calories than your body needs, your body stores the extra calories as fat. When you eat fewer calories than your body needs, your body burns fat to get the energy it needs.  Calorie counting means keeping track of how many calories you eat and drink each day. If you make sure to eat fewer calories than your body needs, you should lose weight. In order for calorie counting to work, you will need to eat the number of calories that are right for you in a day to lose a healthy amount of weight per week. A healthy amount of weight to lose per week is usually 1-2 lb (0.5-0.9 kg). A dietitian can determine how many calories you need in a day and give you suggestions on how to reach your calorie goal.   WHAT IS MY MY PLAN?  My goal is to have __________ calories per day.   If I have this many calories per day, I should lose around __________ pounds per week.  WHAT DO I NEED TO KNOW ABOUT CALORIE COUNTING?  In order to meet your daily calorie goal, you will need to:  · Find out how many calories are in each food you would like to eat. Try to do this before you eat.  · Decide how much of the food you can eat.  · Write down what you ate and how many calories it had. Doing this is called keeping a food log.  WHERE DO I FIND CALORIE INFORMATION?  The number of calories in a food can be found on a Nutrition Facts label. Note that all the information on a label is based on a specific serving of the food. If a food does not have a Nutrition Facts label, try to look up the calories online or ask your dietitian for help.  HOW DO I DECIDE HOW MUCH TO EAT?  To decide how much of the food you can eat, you will need to consider both the number of calories in one serving and the size of one serving. This information can be found on the Nutrition Facts label. If a food does not  have a Nutrition Facts label, look up the information online or ask your dietitian for help.  Remember that calories are listed per serving. If you choose to have more than one serving of a food, you will have to multiply the calories per serving by the amount of servings you plan to eat. For example, the label on a package of bread might say that a serving size is 1 slice and that there are 90 calories in a serving. If you eat 1 slice, you will have eaten 90 calories. If you eat 2 slices, you will have eaten 180 calories.  HOW DO I KEEP A FOOD LOG?  After each meal, record the following information in your food log:  · What you ate.  · How much of it you ate.  · How many calories it had.  · Then, add up your calories.  Keep your food log near you, such as in a small notebook in your pocket. Another option is to use a mobile blanca or website. Some programs will calculate calories for you and show you how many calories you have left each time you add an item to the log.  WHAT ARE SOME CALORIE COUNTING TIPS?  · Use your calories on foods and drinks that will fill you up and not leave you hungry. Some examples of this include foods like nuts and nut butters, vegetables, lean proteins, and high-fiber foods (more than 5 g fiber per serving).  · Eat nutritious foods and avoid empty calories. Empty calories are calories you get from foods or beverages that do not have many nutrients, such as candy and soda. It is better to have a nutritious high-calorie food (such as an avocado) than a food with few nutrients (such as a bag of chips).  · Know how many calories are in the foods you eat most often. This way, you do not have to look up how many calories they have each time you eat them.  · Look out for foods that may seem like low-calorie foods but are really high-calorie foods, such as baked goods, soda, and fat-free candy.  · Pay attention to calories in drinks. Drinks such as sodas, specialty coffee drinks, alcohol, and  juices have a lot of calories yet do not fill you up. Choose low-calorie drinks like water and diet drinks.  · Focus your calorie counting efforts on higher calorie items. Logging the calories in a garden salad that contains only vegetables is less important than calculating the calories in a milk shake.  · Find a way of tracking calories that works for you. Get creative. Most people who are successful find ways to keep track of how much they eat in a day, even if they do not count every calorie.  WHAT ARE SOME PORTION CONTROL TIPS?  · Know how many calories are in a serving. This will help you know how many servings of a certain food you can have.  · Use a measuring cup to measure serving sizes. This is helpful when you start out. With time, you will be able to estimate serving sizes for some foods.  · Take some time to put servings of different foods on your favorite plates, bowls, and cups so you know what a serving looks like.  · Try not to eat straight from a bag or box. Doing this can lead to overeating. Put the amount you would like to eat in a cup or on a plate to make sure you are eating the right portion.  · Use smaller plates, glasses, and bowls to prevent overeating. This is a quick and easy way to practice portion control. If your plate is smaller, less food can fit on it.  · Try not to multitask while eating, such as watching TV or using your computer. If it is time to eat, sit down at a table and enjoy your food. Doing this will help you to start recognizing when you are full. It will also make you more aware of what and how much you are eating.  HOW CAN I CALORIE COUNT WHEN EATING OUT?  · Ask for smaller portion sizes or child-sized portions.  · Consider sharing an entree and sides instead of getting your own entree.  · If you get your own entree, eat only half. Ask for a box at the beginning of your meal and put the rest of your entree in it so you are not tempted to eat it.  · Look for the calories  "on the menu. If calories are listed, choose the lower calorie options.  · Choose dishes that include vegetables, fruits, whole grains, low-fat dairy products, and lean protein. Focusing on smart food choices from each of the 5 food groups can help you stay on track at restaurants.  · Choose items that are boiled, broiled, grilled, or steamed.  · Choose water, milk, unsweetened iced tea, or other drinks without added sugars. If you want an alcoholic beverage, choose a lower calorie option. For example, a regular robel can have up to 700 calories and a glass of wine has around 150.  · Stay away from items that are buttered, battered, fried, or served with cream sauce. Items labeled \"crispy\" are usually fried, unless stated otherwise.  · Ask for dressings, sauces, and syrups on the side. These are usually very high in calories, so do not eat much of them.  · Watch out for salads. Many people think salads are a healthy option, but this is often not the case. Many salads come with calles, fried chicken, lots of cheese, fried chips, and dressing. All of these items have a lot of calories. If you want a salad, choose a garden salad and ask for grilled meats or steak. Ask for the dressing on the side, or ask for olive oil and vinegar or lemon to use as dressing.  · Estimate how many servings of a food you are given. For example, a serving of cooked rice is ½ cup or about the size of half a tennis ball or one cupcake wrapper. Knowing serving sizes will help you be aware of how much food you are eating at restaurants. The list below tells you how big or small some common portion sizes are based on everyday objects.  ¨ 1 oz--4 stacked dice.  ¨ 3 oz--1 deck of cards.  ¨ 1 tsp--1 dice.  ¨ 1 Tbsp--½ a Ping-Pong ball.  ¨ 2 Tbsp--1 Ping-Pong ball.  ¨ ½ cup--1 tennis ball or 1 cupcake wrapper.  ¨ 1 cup--1 baseball.     This information is not intended to replace advice given to you by your health care provider. Make sure you " discuss any questions you have with your health care provider.     Document Released: 12/18/2006 Document Revised: 01/08/2016 Document Reviewed: 10/23/2014  [employer] Interactive Patient Education ©2017 [employer]

## 2018-11-28 NOTE — PROGRESS NOTES
"Macho Jose MD  Filemon Jj  1975 11/28/2018    Patient Active Problem List   Diagnosis   • Ascending aortic aneurysm (CMS/[facility])   • Other chest pain   • Aneurysm of aortic sinus of Valsalva without rupture   • Tobacco abuse   • COPD (chronic obstructive pulmonary disease) (CMS/[facility])   • Essential hypertension   • Class 1 obesity due to excess calories with body mass index (BMI) of 30.0 to 30.9 in adult   • Hx of aortic valve replacement, mechanical       Dear Macho Jose MD:    Subjective       History of Present Illness:    Chief Complaint   Patient presents with   • Follow-up     6 mos   • Shortness of Breath     improved   • Med Management     call pharm   • Coronary Artery Disease     AAA s/p [facility] valve 01/2018       Filemon Jj is a pleasant 43 y.o. male with a past medical history significant for ascending aortic aneurysm and severe aortic stenosis status post repair of the aneurysm and replacement of aortic valve with mechanical valve.  He also has a history of essential hypertension, COPD, tobacco abuse, and obesity.  Patient comes in for routine cardiology follow-up.    Symptom wise patient states he has been doing well since the surgery.  He denies any chest pain, shortness of breath worse from baseline, palpitations, weakness, fatigue, dizziness, or syncope.  He also denies any orthopnea, PND, or pedal edema.      Allergies   Allergen Reactions   • Aspirin      Patient said, \"it chokes him up.\" patient said, \"I got really short of breath and started to have an asthma attack.\"   :      Current Outpatient Medications:   •  albuterol (PROVENTIL HFA;VENTOLIN HFA) 108 (90 BASE) MCG/ACT inhaler, Inhale 2 puffs every 4 (four) hours as needed for wheezing. (Patient taking differently: Inhale 2 puffs Every 4 (Four) Hours As Needed for Wheezing or Shortness of Air.), Disp: 3.7 g, Rfl: 0  •  atorvastatin (LIPITOR) 10 MG tablet, Take 1 tablet by mouth Daily., Disp: 30 tablet, Rfl: 6  •  " metoprolol tartrate (LOPRESSOR) 25 MG tablet, Take 1/2 tablet by mouth Every 12 (Twelve) Hours., Disp: 90 tablet, Rfl: 3  •  warfarin (COUMADIN) 5 MG tablet, Take 1 tablet by mouth Daily., Disp: 30 tablet, Rfl: 6  •  albuterol (PROVENTIL) (2.5 MG/3ML) 0.083% nebulizer solution, Take 2.5 mg by nebulization Every 4 (Four) Hours As Needed., Disp: , Rfl:   •  cyclobenzaprine (FLEXERIL) 10 MG tablet, Take 10 mg by mouth 3 (Three) Times a Day As Needed for Muscle Spasms., Disp: , Rfl:   •  Fluticasone Furoate-Vilanterol 100-25 MCG/INH aerosol powder , Inhale 1 puff by mouth Daily. (Patient taking differently: Inhale 1 puff Daily. Does not take), Disp: 60 each, Rfl: 1  •  HYDROcodone-acetaminophen (NORCO) 5-325 MG per tablet, Take 1 tablet by mouth Every 6 (Six) Hours As Needed., Disp: , Rfl:   •  hydrOXYzine (VISTARIL) 25 MG capsule, Take 25 mg by mouth 2 (Two) Times a Day As Needed for Anxiety (for anxiety)., Disp: , Rfl:   •  ibuprofen (ADVIL,MOTRIN) 800 MG tablet, Take 800 mg by mouth Every 6 (Six) Hours As Needed for Mild Pain ., Disp: , Rfl:   •  raNITIdine (ZANTAC) 300 MG tablet, Take 300 mg by mouth 2 (Two) Times a Day., Disp: , Rfl:       The following portions of the patient's history were reviewed and updated as appropriate: allergies, current medications, past family history, past medical history, past social history, past surgical history and problem list.    Social History     Tobacco Use   • Smoking status: Former Smoker     Packs/day: 1.00     Years: 4.00     Pack years: 4.00   • Smokeless tobacco: Current User     Types: Snuff   Substance Use Topics   • Alcohol use: No     Comment: occassional    • Drug use: No       Review of Systems   Constitution: Negative for weakness and malaise/fatigue.   Eyes: Positive for visual disturbance.   Cardiovascular: Negative for chest pain, dyspnea on exertion, irregular heartbeat, leg swelling and palpitations.   Respiratory: Positive for shortness of breath. Negative  "for cough.    Hematologic/Lymphatic: Negative for bleeding problem. Does not bruise/bleed easily.   Gastrointestinal: Negative for nausea and vomiting.       Objective   Vitals:    11/28/18 1051   BP: 113/76   Pulse: 91   Resp: 16   SpO2: 97%   Weight: 85.3 kg (188 lb)   Height: 170.2 cm (67\")     Body mass index is 29.44 kg/m².        Physical Exam   Constitutional: He is oriented to person, place, and time. He appears well-developed and well-nourished. No distress.   HENT:   Head: Normocephalic and atraumatic.   Cardiovascular: Normal rate, regular rhythm, normal heart sounds and intact distal pulses.   Palpable thrill with 6/6 mechanical click heard.    Pulmonary/Chest: Effort normal and breath sounds normal. No respiratory distress.   Musculoskeletal: He exhibits no edema.   Neurological: He is alert and oriented to person, place, and time.   Skin: He is not diaphoretic.       Lab Results   Component Value Date     01/29/2018    K 3.9 01/29/2018     01/29/2018    CO2 24.4 01/29/2018    BUN 12 01/29/2018    CREATININE 0.81 01/29/2018    GLUCOSE 113 (H) 01/29/2018    CALCIUM 8.8 01/29/2018    AST 18 01/29/2018    ALT 22 01/29/2018    ALKPHOS 64 01/29/2018    LABIL2 1.4 (L) 03/04/2016     Lab Results   Component Value Date    CKTOTAL 36 01/28/2018     Lab Results   Component Value Date    WBC 9.58 01/29/2018    HGB 10.8 (L) 01/29/2018    HCT 34.0 (L) 01/29/2018     (H) 01/29/2018     Lab Results   Component Value Date    INR 1.61 (H) 07/19/2018    INR 2.13 (H) 06/20/2018    INR 1.81 (H) 05/28/2018     Lab Results   Component Value Date    MG 2.5 01/18/2018     Lab Results   Component Value Date    TSH 0.201 (L) 10/18/2017    TRIG 202 (H) 01/16/2018    HDL 33 (L) 01/16/2018     (H) 01/16/2018      Lab Results   Component Value Date    BNP 66.0 10/16/2017       During this visit the following were done:  Labs Reviewed [x]    Labs Ordered []    Radiology Reports Reviewed [x]    Radiology " Ordered []    PCP Records Reviewed []    Referring Provider Records Reviewed []    ER Records Reviewed []    Hospital Records Reviewed []    History Obtained From Family []    Radiology Images Reviewed []    Other Reviewed []    Records Requested []         ECG 12 Lead  Date/Time: 11/28/2018 10:55 AM  Performed by: Len Velázquez PA-C  Authorized by: Len Velázquez PA-C   Rhythm: sinus rhythm  Conduction: conduction normal  ST Depression: V3, V4 and V5  T depression: V3  Other findings: LVH  Clinical impression: abnormal ECG and non-specific ECG  Comments: Chronic ST depression in anterior leads since 1/29/2018              Assessment/Plan    Diagnosis Plan   1. Ascending aortic aneurysm (CMS/HCC)     2. Essential hypertension     3. Hx of aortic valve replacement, mechanical                  Recommendations:  1. Will plan on repeating transthoracic echocardiogram at next visit for monitoring.  2. Continue Lipitor, Lopressor, and warfarin (managed by primary care provider)   3. Follow-up in 6 months.      Patient's Body mass index is 29.44 kg/m². BMI is above normal parameters. Recommendations include: educational material.       No Follow-up on file.    As always, I appreciate very much the opportunity to participate in the cardiovascular care of your patients.      With Best Regards,    ELICIA Rodriguez disclaimer:  Much of this encounter note is an electronic transcription/translation of spoken language to printed text. The electronic translation of spoken language may permit erroneous, or at times, nonsensical words or phrases to be inadvertently transcribed; Although I have reviewed the note for such errors, some may still exist.

## 2018-12-31 NOTE — PLAN OF CARE
Problem: Patient Care Overview (Adult)  Goal: Plan of Care Review  Outcome: Ongoing (interventions implemented as appropriate)   01/18/18 2000 01/19/18 0416   Coping/Psychosocial Response Interventions   Plan Of Care Reviewed With patient --    Patient Care Overview   Progress --  improving   Outcome Evaluation   Outcome Summary/Follow up Plan --  Pt stable overnight on no gtts. Desats with exertion or pain but rebounds; currently on 3L/nc. Minimal CT drainage. UOP marginal. Up in chair, complains of back pain.     Goal: Adult Individualization and Mutuality  Outcome: Ongoing (interventions implemented as appropriate)   01/19/18 0416   Individualization   Patient Specific Preferences Pull chest tubes   Patient Specific Goals Transfer to tele       Problem: Aortic Aneurysm/Dissection (Adult)  Goal: Signs and Symptoms of Listed Potential Problems Will be Absent or Manageable (Aortic Aneurysm/Dissection)  Outcome: Ongoing (interventions implemented as appropriate)   01/19/18 0416   Aortic Aneurysm/Dissection   Problems Assessed (Aortic Aneurysm/Dissection) all   Problems Present (Aortic Aneurysm/Dissection) pain       Problem: Perioperative Period (Adult)  Goal: Signs and Symptoms of Listed Potential Problems Will be Absent or Manageable (Perioperative Period)  Outcome: Ongoing (interventions implemented as appropriate)   01/19/18 0416   Perioperative Period   Problems Assessed (Perioperative Period) all   Problems Present (Perioperative Period) pain;hypoxia/hypoxemia       Problem: Pressure Ulcer Risk (Eusebio Scale) (Adult,Obstetrics,Pediatric)  Goal: Identify Related Risk Factors and Signs and Symptoms  Outcome: Ongoing (interventions implemented as appropriate)   01/19/18 0416   Pressure Ulcer Risk (Eusebio Scale)   Related Risk Factors (Pressure Ulcer Risk (Eusebio Scale)) critical care admission;medical devices;medication;mobility impaired     Goal: Skin Integrity  Outcome: Ongoing (interventions implemented as  appropriate)   01/19/18 0416   Pressure Ulcer Risk (Eusebio Scale) (Adult,Obstetrics,Pediatric)   Skin Integrity making progress toward outcome       Problem: Fall Risk (Adult)  Goal: Identify Related Risk Factors and Signs and Symptoms  Outcome: Ongoing (interventions implemented as appropriate)   01/19/18 0416   Fall Risk   Fall Risk: Related Risk Factors depression/anxiety;history of falls;environment unfamiliar;polypharmacy   Fall Risk: Signs and Symptoms presence of risk factors     Goal: Absence of Falls  Outcome: Ongoing (interventions implemented as appropriate)   01/19/18 0416   Fall Risk (Adult)   Absence of Falls making progress toward outcome          no

## 2019-01-28 ENCOUNTER — HOSPITAL ENCOUNTER (EMERGENCY)
Facility: HOSPITAL | Age: 44
Discharge: HOME | End: 2019-01-28
Attending: GENERAL ACUTE CARE HOSPITAL | Admitting: GENERAL ACUTE CARE HOSPITAL

## 2019-01-28 ENCOUNTER — APPOINTMENT (OUTPATIENT)
Dept: GENERAL RADIOLOGY | Facility: HOSPITAL | Age: 44
End: 2019-01-28

## 2019-01-28 VITALS
WEIGHT: 198 LBS | HEART RATE: 80 BPM | RESPIRATION RATE: 20 BRPM | DIASTOLIC BLOOD PRESSURE: 70 MMHG | BODY MASS INDEX: 31.08 KG/M2 | HEIGHT: 67 IN | SYSTOLIC BLOOD PRESSURE: 118 MMHG | TEMPERATURE: 98.3 F | OXYGEN SATURATION: 96 %

## 2019-01-28 DIAGNOSIS — J44.1 CHRONIC OBSTRUCTIVE PULMONARY DISEASE WITH ACUTE EXACERBATION (HCC): Primary | ICD-10-CM

## 2019-01-28 LAB
A-A DO2: 23.8 MMHG (ref 0–300)
ALBUMIN SERPL-MCNC: 4.2 G/DL (ref 3.5–5)
ALBUMIN/GLOB SERPL: 1.5 G/DL (ref 1.5–2.5)
ALP SERPL-CCNC: 68 U/L (ref 40–129)
ALT SERPL W P-5'-P-CCNC: 26 U/L (ref 10–44)
ANION GAP SERPL CALCULATED.3IONS-SCNC: 6.3 MMOL/L (ref 3.6–11.2)
APTT PPP: 39.1 SECONDS (ref 23.8–36.1)
ARTERIAL PATENCY WRIST A: ABNORMAL
AST SERPL-CCNC: 20 U/L (ref 10–34)
ATMOSPHERIC PRESS: 724 MMHG
BASE EXCESS BLDA CALC-SCNC: -1.9 MMOL/L
BASOPHILS # BLD AUTO: 0.07 10*3/MM3 (ref 0–0.3)
BASOPHILS NFR BLD AUTO: 0.9 % (ref 0–2)
BDY SITE: ABNORMAL
BILIRUB SERPL-MCNC: 0.4 MG/DL (ref 0.2–1.8)
BILIRUB UR QL STRIP: NEGATIVE
BODY TEMPERATURE: 98.6 C
BUN BLD-MCNC: 14 MG/DL (ref 7–21)
BUN/CREAT SERPL: 14.6 (ref 7–25)
CALCIUM SPEC-SCNC: 8.7 MG/DL (ref 7.7–10)
CHLORIDE SERPL-SCNC: 107 MMOL/L (ref 99–112)
CLARITY UR: CLEAR
CO2 SERPL-SCNC: 25.7 MMOL/L (ref 24.3–31.9)
COHGB MFR BLD: 1.1 % (ref 0–5)
COLOR UR: YELLOW
CREAT BLD-MCNC: 0.96 MG/DL (ref 0.43–1.29)
DEPRECATED RDW RBC AUTO: 50.8 FL (ref 37–54)
EOSINOPHIL # BLD AUTO: 0.53 10*3/MM3 (ref 0–0.7)
EOSINOPHIL NFR BLD AUTO: 6.9 % (ref 0–5)
ERYTHROCYTE [DISTWIDTH] IN BLOOD BY AUTOMATED COUNT: 16.4 % (ref 11.5–14.5)
GFR SERPL CREATININE-BSD FRML MDRD: 86 ML/MIN/1.73
GLOBULIN UR ELPH-MCNC: 2.8 GM/DL
GLUCOSE BLD-MCNC: 101 MG/DL (ref 70–110)
GLUCOSE UR STRIP-MCNC: NEGATIVE MG/DL
HCO3 BLDA-SCNC: 22.5 MMOL/L (ref 22–26)
HCT VFR BLD AUTO: 45.6 % (ref 42–52)
HCT VFR BLD CALC: 45 % (ref 42–52)
HGB BLD-MCNC: 14.8 G/DL (ref 14–18)
HGB BLDA-MCNC: 15.2 G/DL (ref 12–16)
HGB UR QL STRIP.AUTO: NEGATIVE
HOROWITZ INDEX BLD+IHG-RTO: 21 %
IMM GRANULOCYTES # BLD AUTO: 0.06 10*3/MM3 (ref 0–0.03)
IMM GRANULOCYTES NFR BLD AUTO: 0.8 % (ref 0–0.5)
INR PPP: 2.06 (ref 0.9–1.1)
KETONES UR QL STRIP: NEGATIVE
LEUKOCYTE ESTERASE UR QL STRIP.AUTO: NEGATIVE
LYMPHOCYTES # BLD AUTO: 2.78 10*3/MM3 (ref 1–3)
LYMPHOCYTES NFR BLD AUTO: 36.2 % (ref 21–51)
MCH RBC QN AUTO: 27.6 PG (ref 27–33)
MCHC RBC AUTO-ENTMCNC: 32.5 G/DL (ref 33–37)
MCV RBC AUTO: 85.1 FL (ref 80–94)
METHGB BLD QL: 0.4 % (ref 0–3)
MODALITY: ABNORMAL
MONOCYTES # BLD AUTO: 0.92 10*3/MM3 (ref 0.1–0.9)
MONOCYTES NFR BLD AUTO: 12 % (ref 0–10)
NEUTROPHILS # BLD AUTO: 3.32 10*3/MM3 (ref 1.4–6.5)
NEUTROPHILS NFR BLD AUTO: 43.2 % (ref 30–70)
NITRITE UR QL STRIP: NEGATIVE
OSMOLALITY SERPL CALC.SUM OF ELEC: 278.2 MOSM/KG (ref 273–305)
OXYHGB MFR BLDV: 93.8 % (ref 85–100)
PCO2 BLDA: 37.5 MM HG (ref 35–45)
PH BLDA: 7.4 PH UNITS (ref 7.35–7.45)
PH UR STRIP.AUTO: 6.5 [PH] (ref 5–8)
PLATELET # BLD AUTO: 269 10*3/MM3 (ref 130–400)
PMV BLD AUTO: 10.9 FL (ref 6–10)
PO2 BLDA: 73.5 MM HG (ref 80–100)
POTASSIUM BLD-SCNC: 3.7 MMOL/L (ref 3.5–5.3)
PROT SERPL-MCNC: 7 G/DL (ref 6–8)
PROT UR QL STRIP: NEGATIVE
PROTHROMBIN TIME: 23.5 SECONDS (ref 11–15.4)
RBC # BLD AUTO: 5.36 10*6/MM3 (ref 4.7–6.1)
SAO2 % BLDCOA: 95.2 % (ref 90–100)
SODIUM BLD-SCNC: 139 MMOL/L (ref 135–153)
SP GR UR STRIP: 1.02 (ref 1–1.03)
TROPONIN I SERPL-MCNC: <0.006 NG/ML
UROBILINOGEN UR QL STRIP: NORMAL
WBC NRBC COR # BLD: 7.68 10*3/MM3 (ref 4.5–12.5)

## 2019-01-28 PROCEDURE — 82805 BLOOD GASES W/O2 SATURATION: CPT | Performed by: PHYSICIAN ASSISTANT

## 2019-01-28 PROCEDURE — 93005 ELECTROCARDIOGRAM TRACING: CPT | Performed by: PHYSICIAN ASSISTANT

## 2019-01-28 PROCEDURE — 85610 PROTHROMBIN TIME: CPT | Performed by: PHYSICIAN ASSISTANT

## 2019-01-28 PROCEDURE — 94799 UNLISTED PULMONARY SVC/PX: CPT

## 2019-01-28 PROCEDURE — 94640 AIRWAY INHALATION TREATMENT: CPT

## 2019-01-28 PROCEDURE — 93010 ELECTROCARDIOGRAM REPORT: CPT | Performed by: INTERNAL MEDICINE

## 2019-01-28 PROCEDURE — 85025 COMPLETE CBC W/AUTO DIFF WBC: CPT | Performed by: PHYSICIAN ASSISTANT

## 2019-01-28 PROCEDURE — 85730 THROMBOPLASTIN TIME PARTIAL: CPT | Performed by: PHYSICIAN ASSISTANT

## 2019-01-28 PROCEDURE — 84484 ASSAY OF TROPONIN QUANT: CPT | Performed by: PHYSICIAN ASSISTANT

## 2019-01-28 PROCEDURE — 36600 WITHDRAWAL OF ARTERIAL BLOOD: CPT | Performed by: PHYSICIAN ASSISTANT

## 2019-01-28 PROCEDURE — 80053 COMPREHEN METABOLIC PANEL: CPT | Performed by: PHYSICIAN ASSISTANT

## 2019-01-28 PROCEDURE — 83050 HGB METHEMOGLOBIN QUAN: CPT | Performed by: PHYSICIAN ASSISTANT

## 2019-01-28 PROCEDURE — 25010000002 METHYLPREDNISOLONE PER 40 MG: Performed by: PHYSICIAN ASSISTANT

## 2019-01-28 PROCEDURE — 96374 THER/PROPH/DIAG INJ IV PUSH: CPT

## 2019-01-28 PROCEDURE — 99284 EMERGENCY DEPT VISIT MOD MDM: CPT

## 2019-01-28 PROCEDURE — 82375 ASSAY CARBOXYHB QUANT: CPT | Performed by: PHYSICIAN ASSISTANT

## 2019-01-28 PROCEDURE — 71045 X-RAY EXAM CHEST 1 VIEW: CPT | Performed by: RADIOLOGY

## 2019-01-28 PROCEDURE — 71045 X-RAY EXAM CHEST 1 VIEW: CPT

## 2019-01-28 PROCEDURE — 81003 URINALYSIS AUTO W/O SCOPE: CPT | Performed by: PHYSICIAN ASSISTANT

## 2019-01-28 RX ORDER — ALBUTEROL SULFATE 90 UG/1
2 AEROSOL, METERED RESPIRATORY (INHALATION) EVERY 6 HOURS PRN
Qty: 18 G | Refills: 0 | Status: SHIPPED | OUTPATIENT
Start: 2019-01-28

## 2019-01-28 RX ORDER — SODIUM CHLORIDE 0.9 % (FLUSH) 0.9 %
10 SYRINGE (ML) INJECTION AS NEEDED
Status: DISCONTINUED | OUTPATIENT
Start: 2019-01-28 | End: 2019-01-28 | Stop reason: HOSPADM

## 2019-01-28 RX ORDER — METHYLPREDNISOLONE 4 MG/1
TABLET ORAL
Qty: 21 TABLET | Refills: 0 | Status: SHIPPED | OUTPATIENT
Start: 2019-01-28 | End: 2019-05-12 | Stop reason: SDUPTHER

## 2019-01-28 RX ORDER — METHYLPREDNISOLONE SODIUM SUCCINATE 40 MG/ML
80 INJECTION, POWDER, LYOPHILIZED, FOR SOLUTION INTRAMUSCULAR; INTRAVENOUS ONCE
Status: COMPLETED | OUTPATIENT
Start: 2019-01-28 | End: 2019-01-28

## 2019-01-28 RX ORDER — DOXYCYCLINE 100 MG/1
100 CAPSULE ORAL 2 TIMES DAILY
Qty: 20 CAPSULE | Refills: 0 | OUTPATIENT
Start: 2019-01-28 | End: 2019-05-12

## 2019-01-28 RX ORDER — IPRATROPIUM BROMIDE AND ALBUTEROL SULFATE 2.5; .5 MG/3ML; MG/3ML
3 SOLUTION RESPIRATORY (INHALATION) ONCE
Status: COMPLETED | OUTPATIENT
Start: 2019-01-28 | End: 2019-01-28

## 2019-01-28 RX ADMIN — METHYLPREDNISOLONE SODIUM SUCCINATE 80 MG: 40 INJECTION, POWDER, FOR SOLUTION INTRAMUSCULAR; INTRAVENOUS at 07:30

## 2019-01-28 RX ADMIN — IPRATROPIUM BROMIDE AND ALBUTEROL SULFATE 3 ML: .5; 3 SOLUTION RESPIRATORY (INHALATION) at 07:14

## 2019-02-18 ENCOUNTER — HOSPITAL ENCOUNTER (EMERGENCY)
Facility: HOSPITAL | Age: 44
Discharge: HOME | End: 2019-02-18
Attending: EMERGENCY MEDICINE | Admitting: EMERGENCY MEDICINE

## 2019-02-18 VITALS
DIASTOLIC BLOOD PRESSURE: 76 MMHG | RESPIRATION RATE: 18 BRPM | SYSTOLIC BLOOD PRESSURE: 114 MMHG | HEART RATE: 75 BPM | TEMPERATURE: 98.8 F | HEIGHT: 67 IN | OXYGEN SATURATION: 97 % | BODY MASS INDEX: 31.23 KG/M2 | WEIGHT: 199 LBS

## 2019-02-18 DIAGNOSIS — R07.89 ATYPICAL CHEST PAIN: Primary | ICD-10-CM

## 2019-02-18 LAB
ALBUMIN SERPL-MCNC: 4.1 G/DL (ref 3.5–5)
ALBUMIN/GLOB SERPL: 1.5 G/DL (ref 1.5–2.5)
ALP SERPL-CCNC: 63 U/L (ref 40–129)
ALT SERPL W P-5'-P-CCNC: 25 U/L (ref 10–44)
ANION GAP SERPL CALCULATED.3IONS-SCNC: 4.3 MMOL/L (ref 3.6–11.2)
AST SERPL-CCNC: 21 U/L (ref 10–34)
BASOPHILS # BLD AUTO: 0.06 10*3/MM3 (ref 0–0.3)
BASOPHILS NFR BLD AUTO: 0.9 % (ref 0–2)
BILIRUB SERPL-MCNC: 0.4 MG/DL (ref 0.2–1.8)
BILIRUB UR QL STRIP: NEGATIVE
BNP SERPL-MCNC: 53 PG/ML (ref 0–100)
BUN BLD-MCNC: 15 MG/DL (ref 7–21)
BUN/CREAT SERPL: 13.8 (ref 7–25)
CALCIUM SPEC-SCNC: 8.7 MG/DL (ref 7.7–10)
CHLORIDE SERPL-SCNC: 108 MMOL/L (ref 99–112)
CLARITY UR: CLEAR
CO2 SERPL-SCNC: 25.7 MMOL/L (ref 24.3–31.9)
COLOR UR: YELLOW
CREAT BLD-MCNC: 1.09 MG/DL (ref 0.43–1.29)
DEPRECATED RDW RBC AUTO: 51.4 FL (ref 37–54)
EOSINOPHIL # BLD AUTO: 0.36 10*3/MM3 (ref 0–0.7)
EOSINOPHIL NFR BLD AUTO: 5.3 % (ref 0–5)
ERYTHROCYTE [DISTWIDTH] IN BLOOD BY AUTOMATED COUNT: 16.4 % (ref 11.5–14.5)
GFR SERPL CREATININE-BSD FRML MDRD: 74 ML/MIN/1.73
GLOBULIN UR ELPH-MCNC: 2.8 GM/DL
GLUCOSE BLD-MCNC: 113 MG/DL (ref 70–110)
GLUCOSE UR STRIP-MCNC: NEGATIVE MG/DL
HCT VFR BLD AUTO: 45.2 % (ref 42–52)
HGB BLD-MCNC: 14.5 G/DL (ref 14–18)
HGB UR QL STRIP.AUTO: NEGATIVE
IMM GRANULOCYTES # BLD AUTO: 0.05 10*3/MM3 (ref 0–0.03)
IMM GRANULOCYTES NFR BLD AUTO: 0.7 % (ref 0–0.5)
INR PPP: 2.15 (ref 0.9–1.1)
KETONES UR QL STRIP: NEGATIVE
LEUKOCYTE ESTERASE UR QL STRIP.AUTO: NEGATIVE
LIPASE SERPL-CCNC: 28 U/L (ref 13–60)
LYMPHOCYTES # BLD AUTO: 2.18 10*3/MM3 (ref 1–3)
LYMPHOCYTES NFR BLD AUTO: 32.2 % (ref 21–51)
MCH RBC QN AUTO: 27.3 PG (ref 27–33)
MCHC RBC AUTO-ENTMCNC: 32.1 G/DL (ref 33–37)
MCV RBC AUTO: 85.1 FL (ref 80–94)
MONOCYTES # BLD AUTO: 0.53 10*3/MM3 (ref 0.1–0.9)
MONOCYTES NFR BLD AUTO: 7.8 % (ref 0–10)
NEUTROPHILS # BLD AUTO: 3.59 10*3/MM3 (ref 1.4–6.5)
NEUTROPHILS NFR BLD AUTO: 53.1 % (ref 30–70)
NITRITE UR QL STRIP: NEGATIVE
OSMOLALITY SERPL CALC.SUM OF ELEC: 277.3 MOSM/KG (ref 273–305)
PH UR STRIP.AUTO: 6.5 [PH] (ref 5–8)
PLATELET # BLD AUTO: 245 10*3/MM3 (ref 130–400)
PMV BLD AUTO: 10.3 FL (ref 6–10)
POTASSIUM BLD-SCNC: 4.2 MMOL/L (ref 3.5–5.3)
PROT SERPL-MCNC: 6.9 G/DL (ref 6–8)
PROT UR QL STRIP: NEGATIVE
PROTHROMBIN TIME: 24.3 SECONDS (ref 11–15.4)
RBC # BLD AUTO: 5.31 10*6/MM3 (ref 4.7–6.1)
SODIUM BLD-SCNC: 138 MMOL/L (ref 135–153)
SP GR UR STRIP: 1.02 (ref 1–1.03)
TROPONIN I SERPL-MCNC: <0.006 NG/ML
UROBILINOGEN UR QL STRIP: NORMAL
WBC NRBC COR # BLD: 6.77 10*3/MM3 (ref 4.5–12.5)

## 2019-02-18 PROCEDURE — 99284 EMERGENCY DEPT VISIT MOD MDM: CPT

## 2019-02-18 PROCEDURE — 81003 URINALYSIS AUTO W/O SCOPE: CPT | Performed by: EMERGENCY MEDICINE

## 2019-02-18 PROCEDURE — 83690 ASSAY OF LIPASE: CPT | Performed by: EMERGENCY MEDICINE

## 2019-02-18 PROCEDURE — 93005 ELECTROCARDIOGRAM TRACING: CPT | Performed by: EMERGENCY MEDICINE

## 2019-02-18 PROCEDURE — 85025 COMPLETE CBC W/AUTO DIFF WBC: CPT | Performed by: EMERGENCY MEDICINE

## 2019-02-18 PROCEDURE — 80053 COMPREHEN METABOLIC PANEL: CPT | Performed by: EMERGENCY MEDICINE

## 2019-02-18 PROCEDURE — 93010 ELECTROCARDIOGRAM REPORT: CPT | Performed by: INTERNAL MEDICINE

## 2019-02-18 PROCEDURE — 83880 ASSAY OF NATRIURETIC PEPTIDE: CPT | Performed by: EMERGENCY MEDICINE

## 2019-02-18 PROCEDURE — 84484 ASSAY OF TROPONIN QUANT: CPT | Performed by: EMERGENCY MEDICINE

## 2019-02-18 PROCEDURE — 85610 PROTHROMBIN TIME: CPT | Performed by: EMERGENCY MEDICINE

## 2019-02-18 RX ORDER — SODIUM CHLORIDE 0.9 % (FLUSH) 0.9 %
10 SYRINGE (ML) INJECTION AS NEEDED
Status: DISCONTINUED | OUTPATIENT
Start: 2019-02-18 | End: 2019-02-18 | Stop reason: HOSPADM

## 2019-02-18 RX ORDER — WARFARIN SODIUM 5 MG/1
5 TABLET ORAL
Status: DISCONTINUED | OUTPATIENT
Start: 2019-02-18 | End: 2019-02-18 | Stop reason: HOSPADM

## 2019-02-18 NOTE — ED PROVIDER NOTES
"Subjective   Patient presented to ER with 5 hours chest pain        Chest Pain   Pain location:  Unable to specify  Pain quality: sharp    Pain severity:  Mild  Onset quality:  Gradual  Duration:  5 hours  Timing:  Intermittent  Chronicity:  Recurrent      Review of Systems   Constitutional: Negative.    HENT: Negative.    Eyes: Negative.    Respiratory: Negative.    Cardiovascular: Positive for chest pain.   Gastrointestinal: Negative.    Endocrine: Negative.    Genitourinary: Negative.    Musculoskeletal: Negative.    Allergic/Immunologic: Negative.    Neurological: Negative.    Hematological: Negative.    Psychiatric/Behavioral: Negative.        Past Medical History:   Diagnosis Date   • Anxiety    • Asthma    • Back pain    • COPD (chronic obstructive pulmonary disease) (CMS/HCC)    • Emphysema lung (CMS/HCC)    • Gastritis    • GERD (gastroesophageal reflux disease)    • Headache    • Hypertension    • Migraine    • Substance abuse (CMS/HCC)        Allergies   Allergen Reactions   • Aspirin      Patient said, \"it chokes him up.\" patient said, \"I got really short of breath and started to have an asthma attack.\"       Past Surgical History:   Procedure Laterality Date   • CARDIAC CATHETERIZATION N/A 10/23/2017    Procedure: Left Heart Cath;  Surgeon: Rodolfo Núñez MD;  Location: [facility] CATH INVASIVE LOCATION;  Service:    • DENTAL PROCEDURE     • LIVER SURGERY      tumor removed from liver   • OTHER SURGICAL HISTORY      \"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\"   • THORACIC AORTIC ANEURYSM ASCENDING/ARCH REPAIR N/A 1/17/2018    Procedure: MEDIAN STERNOTOMY, AORTIC VALVE CONDUIT, BENTAL, TRANSESOPHAGEAL ECHOCARDIOGRAM WITH ANESTHESIA;  Surgeon: Aaron Lucas MD;  Location: [facility] OR;  Service:        Family History   Problem Relation Age of Onset   • COPD Mother        Social History     Socioeconomic History   • Marital status: Single     Spouse name: Not on file   • Number of children: 0   • " Years of education: Not on file   • Highest education level: Not on file   Occupational History     Employer: DISABLED   Tobacco Use   • Smoking status: Former Smoker     Packs/day: 1.00     Years: 4.00     Pack years: 4.00   • Smokeless tobacco: Current User     Types: Snuff   Substance and Sexual Activity   • Alcohol use: No     Comment: occassional    • Drug use: No   • Sexual activity: Defer   Social History Narrative    Lives in [place], [place] alone.  He dropped out of school due to anxiety            Objective   Physical Exam   Constitutional: He appears well-developed.   HENT:   Head: Normocephalic.   Eyes: Pupils are equal, round, and reactive to light.   Neck: Normal range of motion.   Cardiovascular: Normal rate, regular rhythm and normal pulses.   Pulmonary/Chest: Effort normal.   Abdominal: Soft.   Neurological: He is alert.   Skin: Skin is warm and dry.   Psychiatric: He has a normal mood and affect.   Nursing note and vitals reviewed.      Procedures           ED Course  ED Course as of Feb 18 1849   Mon Feb 18, 2019   1746 ECG 16:22 NSR, rate 82. Non-specific T wave abnormalities  [KATARZYNA]      ED Course User Index  [KATARZYNA] Gopal Qiu MD                  [facility]      Final diagnoses:   Atypical chest pain            Gopal Qiu MD  02/18/19 [id number]

## 2019-02-18 NOTE — ED NOTES
Pt has a urine cup. States he is unable to provide a urine sample at this time.      Gail Leung  02/18/19 7961

## 2019-05-10 ENCOUNTER — HOSPITAL ENCOUNTER (EMERGENCY)
Facility: HOSPITAL | Age: 44
Discharge: HOME | End: 2019-05-11
Attending: EMERGENCY MEDICINE | Admitting: EMERGENCY MEDICINE

## 2019-05-10 ENCOUNTER — APPOINTMENT (OUTPATIENT)
Dept: GENERAL RADIOLOGY | Facility: HOSPITAL | Age: 44
End: 2019-05-10

## 2019-05-10 DIAGNOSIS — R07.9 CHEST PAIN, UNSPECIFIED TYPE: ICD-10-CM

## 2019-05-10 DIAGNOSIS — M62.838 MUSCLE SPASM: Primary | ICD-10-CM

## 2019-05-10 LAB
ALBUMIN SERPL-MCNC: 4.16 G/DL (ref 3.5–5.2)
ALBUMIN/GLOB SERPL: 1.3 G/DL
ALP SERPL-CCNC: 70 U/L (ref 39–117)
ALT SERPL W P-5'-P-CCNC: 21 U/L (ref 1–41)
ANION GAP SERPL CALCULATED.3IONS-SCNC: 11.7 MMOL/L
AST SERPL-CCNC: 16 U/L (ref 1–40)
BASOPHILS # BLD AUTO: 0.06 10*3/MM3 (ref 0–0.2)
BASOPHILS NFR BLD AUTO: 0.7 % (ref 0–1.5)
BILIRUB SERPL-MCNC: 0.4 MG/DL (ref 0.2–1.2)
BILIRUB UR QL STRIP: NEGATIVE
BUN BLD-MCNC: 13 MG/DL (ref 6–20)
BUN/CREAT SERPL: 11.8 (ref 7–25)
CALCIUM SPEC-SCNC: 8.9 MG/DL (ref 8.6–10.5)
CHLORIDE SERPL-SCNC: 102 MMOL/L (ref 98–107)
CLARITY UR: CLEAR
CO2 SERPL-SCNC: 24.3 MMOL/L (ref 22–29)
COLOR UR: YELLOW
CREAT BLD-MCNC: 1.1 MG/DL (ref 0.76–1.27)
DEPRECATED RDW RBC AUTO: 50.6 FL (ref 37–54)
EOSINOPHIL # BLD AUTO: 0.38 10*3/MM3 (ref 0–0.4)
EOSINOPHIL NFR BLD AUTO: 4.5 % (ref 0.3–6.2)
ERYTHROCYTE [DISTWIDTH] IN BLOOD BY AUTOMATED COUNT: 16 % (ref 12.3–15.4)
FLUAV AG NPH QL: NEGATIVE
FLUBV AG NPH QL IA: NEGATIVE
GFR SERPL CREATININE-BSD FRML MDRD: 73 ML/MIN/1.73
GLOBULIN UR ELPH-MCNC: 3.2 GM/DL
GLUCOSE BLD-MCNC: 91 MG/DL (ref 65–99)
GLUCOSE UR STRIP-MCNC: NEGATIVE MG/DL
HCT VFR BLD AUTO: 47.8 % (ref 37.5–51)
HGB BLD-MCNC: 15.3 G/DL (ref 13–17.7)
HGB UR QL STRIP.AUTO: NEGATIVE
IMM GRANULOCYTES # BLD AUTO: 0.06 10*3/MM3 (ref 0–0.05)
IMM GRANULOCYTES NFR BLD AUTO: 0.7 % (ref 0–0.5)
INR PPP: 2.66 (ref 0.9–1.1)
KETONES UR QL STRIP: ABNORMAL
LEUKOCYTE ESTERASE UR QL STRIP.AUTO: NEGATIVE
LYMPHOCYTES # BLD AUTO: 2.9 10*3/MM3 (ref 0.7–3.1)
LYMPHOCYTES NFR BLD AUTO: 34.6 % (ref 19.6–45.3)
MCH RBC QN AUTO: 27.4 PG (ref 26.6–33)
MCHC RBC AUTO-ENTMCNC: 32 G/DL (ref 31.5–35.7)
MCV RBC AUTO: 85.5 FL (ref 79–97)
MONOCYTES # BLD AUTO: 0.73 10*3/MM3 (ref 0.1–0.9)
MONOCYTES NFR BLD AUTO: 8.7 % (ref 5–12)
NEUTROPHILS # BLD AUTO: 4.24 10*3/MM3 (ref 1.7–7)
NEUTROPHILS NFR BLD AUTO: 50.8 % (ref 42.7–76)
NITRITE UR QL STRIP: NEGATIVE
PH UR STRIP.AUTO: 6.5 [PH] (ref 5–8)
PLATELET # BLD AUTO: 230 10*3/MM3 (ref 140–450)
PMV BLD AUTO: 10.8 FL (ref 6–12)
POTASSIUM BLD-SCNC: 4.5 MMOL/L (ref 3.5–5.2)
PROT SERPL-MCNC: 7.4 G/DL (ref 6–8.5)
PROT UR QL STRIP: NEGATIVE
PROTHROMBIN TIME: 29.6 SECONDS (ref 11–15.4)
RBC # BLD AUTO: 5.59 10*6/MM3 (ref 4.14–5.8)
SODIUM BLD-SCNC: 138 MMOL/L (ref 136–145)
SP GR UR STRIP: 1.02 (ref 1–1.03)
TROPONIN T SERPL-MCNC: <0.01 NG/ML (ref 0–0.03)
TROPONIN T SERPL-MCNC: <0.01 NG/ML (ref 0–0.03)
UROBILINOGEN UR QL STRIP: ABNORMAL
WBC NRBC COR # BLD: 8.37 10*3/MM3 (ref 3.4–10.8)

## 2019-05-10 PROCEDURE — 80053 COMPREHEN METABOLIC PANEL: CPT | Performed by: NURSE PRACTITIONER

## 2019-05-10 PROCEDURE — 85025 COMPLETE CBC W/AUTO DIFF WBC: CPT | Performed by: NURSE PRACTITIONER

## 2019-05-10 PROCEDURE — 71045 X-RAY EXAM CHEST 1 VIEW: CPT

## 2019-05-10 PROCEDURE — 87804 INFLUENZA ASSAY W/OPTIC: CPT | Performed by: NURSE PRACTITIONER

## 2019-05-10 PROCEDURE — 36415 COLL VENOUS BLD VENIPUNCTURE: CPT

## 2019-05-10 PROCEDURE — 99284 EMERGENCY DEPT VISIT MOD MDM: CPT

## 2019-05-10 PROCEDURE — 93010 ELECTROCARDIOGRAM REPORT: CPT | Performed by: INTERNAL MEDICINE

## 2019-05-10 PROCEDURE — 84484 ASSAY OF TROPONIN QUANT: CPT | Performed by: NURSE PRACTITIONER

## 2019-05-10 PROCEDURE — 81003 URINALYSIS AUTO W/O SCOPE: CPT | Performed by: NURSE PRACTITIONER

## 2019-05-10 PROCEDURE — 71045 X-RAY EXAM CHEST 1 VIEW: CPT | Performed by: RADIOLOGY

## 2019-05-10 PROCEDURE — 93005 ELECTROCARDIOGRAM TRACING: CPT | Performed by: EMERGENCY MEDICINE

## 2019-05-10 PROCEDURE — 85610 PROTHROMBIN TIME: CPT | Performed by: NURSE PRACTITIONER

## 2019-05-11 VITALS
SYSTOLIC BLOOD PRESSURE: 133 MMHG | BODY MASS INDEX: 31.08 KG/M2 | HEIGHT: 67 IN | OXYGEN SATURATION: 97 % | RESPIRATION RATE: 18 BRPM | HEART RATE: 90 BPM | TEMPERATURE: 98.9 F | DIASTOLIC BLOOD PRESSURE: 71 MMHG | WEIGHT: 198 LBS

## 2019-05-11 RX ORDER — FAMOTIDINE 20 MG/1
20 TABLET, FILM COATED ORAL ONCE
Status: COMPLETED | OUTPATIENT
Start: 2019-05-11 | End: 2019-05-11

## 2019-05-11 RX ADMIN — FAMOTIDINE 20 MG: 20 TABLET, FILM COATED ORAL at 00:05

## 2019-05-12 ENCOUNTER — APPOINTMENT (OUTPATIENT)
Dept: GENERAL RADIOLOGY | Facility: HOSPITAL | Age: 44
End: 2019-05-12

## 2019-05-12 ENCOUNTER — HOSPITAL ENCOUNTER (EMERGENCY)
Facility: HOSPITAL | Age: 44
Discharge: HOME | End: 2019-05-12
Attending: EMERGENCY MEDICINE | Admitting: EMERGENCY MEDICINE

## 2019-05-12 VITALS
WEIGHT: 198 LBS | HEIGHT: 66 IN | HEART RATE: 89 BPM | TEMPERATURE: 98.8 F | SYSTOLIC BLOOD PRESSURE: 113 MMHG | OXYGEN SATURATION: 97 % | RESPIRATION RATE: 18 BRPM | BODY MASS INDEX: 31.82 KG/M2 | DIASTOLIC BLOOD PRESSURE: 71 MMHG

## 2019-05-12 DIAGNOSIS — J45.901 EXACERBATION OF ASTHMA, UNSPECIFIED ASTHMA SEVERITY, UNSPECIFIED WHETHER PERSISTENT: Primary | ICD-10-CM

## 2019-05-12 LAB
A-A DO2: 27 MMHG (ref 0–300)
ALBUMIN SERPL-MCNC: 4.25 G/DL (ref 3.5–5.2)
ALBUMIN/GLOB SERPL: 1.4 G/DL
ALP SERPL-CCNC: 70 U/L (ref 39–117)
ALT SERPL W P-5'-P-CCNC: 21 U/L (ref 1–41)
ANION GAP SERPL CALCULATED.3IONS-SCNC: 11.9 MMOL/L
ARTERIAL PATENCY WRIST A: POSITIVE
AST SERPL-CCNC: 17 U/L (ref 1–40)
ATMOSPHERIC PRESS: 727 MMHG
BASE EXCESS BLDA CALC-SCNC: -0.8 MMOL/L
BASOPHILS # BLD AUTO: 0.06 10*3/MM3 (ref 0–0.2)
BASOPHILS NFR BLD AUTO: 0.8 % (ref 0–1.5)
BDY SITE: ABNORMAL
BILIRUB SERPL-MCNC: 0.3 MG/DL (ref 0.2–1.2)
BODY TEMPERATURE: 98.6 C
BUN BLD-MCNC: 13 MG/DL (ref 6–20)
BUN/CREAT SERPL: 10.7 (ref 7–25)
CALCIUM SPEC-SCNC: 8.6 MG/DL (ref 8.6–10.5)
CHLORIDE SERPL-SCNC: 103 MMOL/L (ref 98–107)
CO2 SERPL-SCNC: 24.1 MMOL/L (ref 22–29)
COHGB MFR BLD: 1.1 % (ref 0–5)
CREAT BLD-MCNC: 1.22 MG/DL (ref 0.76–1.27)
DEPRECATED RDW RBC AUTO: 49.6 FL (ref 37–54)
EOSINOPHIL # BLD AUTO: 0.42 10*3/MM3 (ref 0–0.4)
EOSINOPHIL NFR BLD AUTO: 5.8 % (ref 0.3–6.2)
ERYTHROCYTE [DISTWIDTH] IN BLOOD BY AUTOMATED COUNT: 15.7 % (ref 12.3–15.4)
GFR SERPL CREATININE-BSD FRML MDRD: 65 ML/MIN/1.73
GLOBULIN UR ELPH-MCNC: 3 GM/DL
GLUCOSE BLD-MCNC: 111 MG/DL (ref 65–99)
HCO3 BLDA-SCNC: 23.7 MMOL/L (ref 22–26)
HCT VFR BLD AUTO: 46.5 % (ref 37.5–51)
HCT VFR BLD CALC: 46 % (ref 42–52)
HGB BLD-MCNC: 14.9 G/DL (ref 13–17.7)
HGB BLDA-MCNC: 15.7 G/DL (ref 12–16)
HOROWITZ INDEX BLD+IHG-RTO: 21 %
IMM GRANULOCYTES # BLD AUTO: 0.05 10*3/MM3 (ref 0–0.05)
IMM GRANULOCYTES NFR BLD AUTO: 0.7 % (ref 0–0.5)
INR PPP: 2.25 (ref 0.9–1.1)
LYMPHOCYTES # BLD AUTO: 2.27 10*3/MM3 (ref 0.7–3.1)
LYMPHOCYTES NFR BLD AUTO: 31.6 % (ref 19.6–45.3)
MCH RBC QN AUTO: 27.2 PG (ref 26.6–33)
MCHC RBC AUTO-ENTMCNC: 32 G/DL (ref 31.5–35.7)
MCV RBC AUTO: 85 FL (ref 79–97)
METHGB BLD QL: 0.4 % (ref 0–3)
MODALITY: ABNORMAL
MONOCYTES # BLD AUTO: 0.66 10*3/MM3 (ref 0.1–0.9)
MONOCYTES NFR BLD AUTO: 9.2 % (ref 5–12)
NEUTROPHILS # BLD AUTO: 3.73 10*3/MM3 (ref 1.7–7)
NEUTROPHILS NFR BLD AUTO: 51.9 % (ref 42.7–76)
OXYHGB MFR BLDV: 92.8 % (ref 85–100)
PCO2 BLDA: 38.7 MM HG (ref 35–45)
PH BLDA: 7.4 PH UNITS (ref 7.35–7.45)
PLATELET # BLD AUTO: 240 10*3/MM3 (ref 140–450)
PMV BLD AUTO: 10.5 FL (ref 6–12)
PO2 BLDA: 69.5 MM HG (ref 80–100)
POTASSIUM BLD-SCNC: 4.4 MMOL/L (ref 3.5–5.2)
PROT SERPL-MCNC: 7.2 G/DL (ref 6–8.5)
PROTHROMBIN TIME: 25.9 SECONDS (ref 11–15.4)
RBC # BLD AUTO: 5.47 10*6/MM3 (ref 4.14–5.8)
SAO2 % BLDCOA: 94.2 % (ref 90–100)
SODIUM BLD-SCNC: 139 MMOL/L (ref 136–145)
TROPONIN T SERPL-MCNC: <0.01 NG/ML (ref 0–0.03)
TROPONIN T SERPL-MCNC: <0.01 NG/ML (ref 0–0.03)
WBC NRBC COR # BLD: 7.19 10*3/MM3 (ref 3.4–10.8)

## 2019-05-12 PROCEDURE — 94640 AIRWAY INHALATION TREATMENT: CPT

## 2019-05-12 PROCEDURE — 99284 EMERGENCY DEPT VISIT MOD MDM: CPT

## 2019-05-12 PROCEDURE — 85610 PROTHROMBIN TIME: CPT | Performed by: NURSE PRACTITIONER

## 2019-05-12 PROCEDURE — 82805 BLOOD GASES W/O2 SATURATION: CPT | Performed by: NURSE PRACTITIONER

## 2019-05-12 PROCEDURE — 84484 ASSAY OF TROPONIN QUANT: CPT | Performed by: NURSE PRACTITIONER

## 2019-05-12 PROCEDURE — 93005 ELECTROCARDIOGRAM TRACING: CPT | Performed by: NURSE PRACTITIONER

## 2019-05-12 PROCEDURE — 96374 THER/PROPH/DIAG INJ IV PUSH: CPT

## 2019-05-12 PROCEDURE — 80053 COMPREHEN METABOLIC PANEL: CPT | Performed by: NURSE PRACTITIONER

## 2019-05-12 PROCEDURE — 25010000002 METHYLPREDNISOLONE PER 125 MG: Performed by: NURSE PRACTITIONER

## 2019-05-12 PROCEDURE — 36600 WITHDRAWAL OF ARTERIAL BLOOD: CPT | Performed by: NURSE PRACTITIONER

## 2019-05-12 PROCEDURE — 94799 UNLISTED PULMONARY SVC/PX: CPT

## 2019-05-12 PROCEDURE — 85025 COMPLETE CBC W/AUTO DIFF WBC: CPT | Performed by: NURSE PRACTITIONER

## 2019-05-12 PROCEDURE — 83050 HGB METHEMOGLOBIN QUAN: CPT | Performed by: NURSE PRACTITIONER

## 2019-05-12 PROCEDURE — 82375 ASSAY CARBOXYHB QUANT: CPT | Performed by: NURSE PRACTITIONER

## 2019-05-12 PROCEDURE — 93010 ELECTROCARDIOGRAM REPORT: CPT | Performed by: INTERNAL MEDICINE

## 2019-05-12 PROCEDURE — 71045 X-RAY EXAM CHEST 1 VIEW: CPT | Performed by: RADIOLOGY

## 2019-05-12 PROCEDURE — 71045 X-RAY EXAM CHEST 1 VIEW: CPT

## 2019-05-12 RX ORDER — SODIUM CHLORIDE 0.9 % (FLUSH) 0.9 %
10 SYRINGE (ML) INJECTION AS NEEDED
Status: DISCONTINUED | OUTPATIENT
Start: 2019-05-12 | End: 2019-05-12 | Stop reason: HOSPADM

## 2019-05-12 RX ORDER — METHYLPREDNISOLONE SODIUM SUCCINATE 125 MG/2ML
125 INJECTION, POWDER, LYOPHILIZED, FOR SOLUTION INTRAMUSCULAR; INTRAVENOUS ONCE
Status: COMPLETED | OUTPATIENT
Start: 2019-05-12 | End: 2019-05-12

## 2019-05-12 RX ORDER — IPRATROPIUM BROMIDE AND ALBUTEROL SULFATE 2.5; .5 MG/3ML; MG/3ML
3 SOLUTION RESPIRATORY (INHALATION) ONCE
Status: COMPLETED | OUTPATIENT
Start: 2019-05-12 | End: 2019-05-12

## 2019-05-12 RX ORDER — METHYLPREDNISOLONE 4 MG/1
TABLET ORAL
Qty: 21 TABLET | Refills: 0 | OUTPATIENT
Start: 2019-05-12 | End: 2019-08-14

## 2019-05-12 RX ADMIN — IPRATROPIUM BROMIDE AND ALBUTEROL SULFATE 3 ML: .5; 3 SOLUTION RESPIRATORY (INHALATION) at 05:48

## 2019-05-12 RX ADMIN — METHYLPREDNISOLONE SODIUM SUCCINATE 125 MG: 125 INJECTION, POWDER, FOR SOLUTION INTRAMUSCULAR; INTRAVENOUS at 06:06

## 2019-06-27 ENCOUNTER — TELEPHONE (OUTPATIENT)
Dept: CARDIAC SURGERY | Facility: CLINIC | Age: 44
End: 2019-06-27

## 2019-07-24 ENCOUNTER — TELEPHONE (OUTPATIENT)
Dept: CARDIAC SURGERY | Facility: CLINIC | Age: 44
End: 2019-07-24

## 2019-07-24 NOTE — TELEPHONE ENCOUNTER
3rd attempt phone call for 1yr f/u with Dr. Lucas-pt would like to come back. Verified pt's insurance, phone number and address

## 2019-07-29 DIAGNOSIS — I71.21 ASCENDING AORTIC ANEURYSM (HCC): Primary | ICD-10-CM

## 2019-08-14 ENCOUNTER — APPOINTMENT (OUTPATIENT)
Dept: CT IMAGING | Facility: HOSPITAL | Age: 44
End: 2019-08-14

## 2019-08-14 ENCOUNTER — APPOINTMENT (OUTPATIENT)
Dept: GENERAL RADIOLOGY | Facility: HOSPITAL | Age: 44
End: 2019-08-14

## 2019-08-14 ENCOUNTER — HOSPITAL ENCOUNTER (EMERGENCY)
Facility: HOSPITAL | Age: 44
Discharge: HOME | End: 2019-08-14
Attending: EMERGENCY MEDICINE | Admitting: EMERGENCY MEDICINE

## 2019-08-14 VITALS
HEIGHT: 67 IN | DIASTOLIC BLOOD PRESSURE: 83 MMHG | SYSTOLIC BLOOD PRESSURE: 130 MMHG | WEIGHT: 197 LBS | OXYGEN SATURATION: 96 % | BODY MASS INDEX: 30.92 KG/M2 | RESPIRATION RATE: 20 BRPM | TEMPERATURE: 98.3 F | HEART RATE: 87 BPM

## 2019-08-14 DIAGNOSIS — M54.6 ACUTE THORACIC BACK PAIN, UNSPECIFIED BACK PAIN LATERALITY: Primary | ICD-10-CM

## 2019-08-14 LAB
ALBUMIN SERPL-MCNC: 4.23 G/DL (ref 3.5–5.2)
ALBUMIN/GLOB SERPL: 1.5 G/DL
ALP SERPL-CCNC: 58 U/L (ref 39–117)
ALT SERPL W P-5'-P-CCNC: 23 U/L (ref 1–41)
ANION GAP SERPL CALCULATED.3IONS-SCNC: 11 MMOL/L (ref 5–15)
APTT PPP: 34.2 SECONDS (ref 23.8–36.1)
AST SERPL-CCNC: 18 U/L (ref 1–40)
BASOPHILS # BLD AUTO: 0.07 10*3/MM3 (ref 0–0.2)
BASOPHILS NFR BLD AUTO: 0.8 % (ref 0–1.5)
BILIRUB SERPL-MCNC: 0.3 MG/DL (ref 0.2–1.2)
BUN BLD-MCNC: 13 MG/DL (ref 6–20)
BUN/CREAT SERPL: 12.3 (ref 7–25)
CALCIUM SPEC-SCNC: 9.2 MG/DL (ref 8.6–10.5)
CHLORIDE SERPL-SCNC: 102 MMOL/L (ref 98–107)
CO2 SERPL-SCNC: 26 MMOL/L (ref 22–29)
CREAT BLD-MCNC: 1.06 MG/DL (ref 0.76–1.27)
D DIMER PPP FEU-MCNC: 0.33 MCGFEU/ML (ref 0–0.5)
DEPRECATED RDW RBC AUTO: 49.8 FL (ref 37–54)
EOSINOPHIL # BLD AUTO: 0.34 10*3/MM3 (ref 0–0.4)
EOSINOPHIL NFR BLD AUTO: 4 % (ref 0.3–6.2)
ERYTHROCYTE [DISTWIDTH] IN BLOOD BY AUTOMATED COUNT: 15.7 % (ref 12.3–15.4)
GFR SERPL CREATININE-BSD FRML MDRD: 76 ML/MIN/1.73
GLOBULIN UR ELPH-MCNC: 2.9 GM/DL
GLUCOSE BLD-MCNC: 127 MG/DL (ref 65–99)
HCT VFR BLD AUTO: 46.2 % (ref 37.5–51)
HGB BLD-MCNC: 14.9 G/DL (ref 13–17.7)
IMM GRANULOCYTES # BLD AUTO: 0.04 10*3/MM3 (ref 0–0.05)
IMM GRANULOCYTES NFR BLD AUTO: 0.5 % (ref 0–0.5)
INR PPP: 1.67 (ref 0.9–1.1)
LIPASE SERPL-CCNC: 15 U/L (ref 13–60)
LYMPHOCYTES # BLD AUTO: 2.79 10*3/MM3 (ref 0.7–3.1)
LYMPHOCYTES NFR BLD AUTO: 32.5 % (ref 19.6–45.3)
MCH RBC QN AUTO: 28 PG (ref 26.6–33)
MCHC RBC AUTO-ENTMCNC: 32.3 G/DL (ref 31.5–35.7)
MCV RBC AUTO: 86.7 FL (ref 79–97)
MONOCYTES # BLD AUTO: 0.85 10*3/MM3 (ref 0.1–0.9)
MONOCYTES NFR BLD AUTO: 9.9 % (ref 5–12)
NEUTROPHILS # BLD AUTO: 4.5 10*3/MM3 (ref 1.7–7)
NEUTROPHILS NFR BLD AUTO: 52.3 % (ref 42.7–76)
PLATELET # BLD AUTO: 238 10*3/MM3 (ref 140–450)
PMV BLD AUTO: 10.8 FL (ref 6–12)
POTASSIUM BLD-SCNC: 3.9 MMOL/L (ref 3.5–5.2)
PROT SERPL-MCNC: 7.1 G/DL (ref 6–8.5)
PROTHROMBIN TIME: 20.5 SECONDS (ref 11–15.4)
RBC # BLD AUTO: 5.33 10*6/MM3 (ref 4.14–5.8)
SODIUM BLD-SCNC: 139 MMOL/L (ref 136–145)
TROPONIN T SERPL-MCNC: <0.01 NG/ML (ref 0–0.03)
TROPONIN T SERPL-MCNC: <0.01 NG/ML (ref 0–0.03)
WBC NRBC COR # BLD: 8.59 10*3/MM3 (ref 3.4–10.8)

## 2019-08-14 PROCEDURE — 94799 UNLISTED PULMONARY SVC/PX: CPT

## 2019-08-14 PROCEDURE — 85610 PROTHROMBIN TIME: CPT | Performed by: EMERGENCY MEDICINE

## 2019-08-14 PROCEDURE — 0 IOVERSOL 74 % SOLUTION: Performed by: EMERGENCY MEDICINE

## 2019-08-14 PROCEDURE — 85730 THROMBOPLASTIN TIME PARTIAL: CPT | Performed by: EMERGENCY MEDICINE

## 2019-08-14 PROCEDURE — 74175 CTA ABDOMEN W/CONTRAST: CPT | Performed by: RADIOLOGY

## 2019-08-14 PROCEDURE — 96361 HYDRATE IV INFUSION ADD-ON: CPT

## 2019-08-14 PROCEDURE — 99285 EMERGENCY DEPT VISIT HI MDM: CPT

## 2019-08-14 PROCEDURE — 93005 ELECTROCARDIOGRAM TRACING: CPT | Performed by: EMERGENCY MEDICINE

## 2019-08-14 PROCEDURE — 83690 ASSAY OF LIPASE: CPT | Performed by: EMERGENCY MEDICINE

## 2019-08-14 PROCEDURE — 25010000002 KETOROLAC TROMETHAMINE PER 15 MG: Performed by: EMERGENCY MEDICINE

## 2019-08-14 PROCEDURE — 93010 ELECTROCARDIOGRAM REPORT: CPT | Performed by: INTERNAL MEDICINE

## 2019-08-14 PROCEDURE — 94640 AIRWAY INHALATION TREATMENT: CPT

## 2019-08-14 PROCEDURE — 85025 COMPLETE CBC W/AUTO DIFF WBC: CPT | Performed by: EMERGENCY MEDICINE

## 2019-08-14 PROCEDURE — 84484 ASSAY OF TROPONIN QUANT: CPT | Performed by: EMERGENCY MEDICINE

## 2019-08-14 PROCEDURE — 71046 X-RAY EXAM CHEST 2 VIEWS: CPT

## 2019-08-14 PROCEDURE — 80053 COMPREHEN METABOLIC PANEL: CPT | Performed by: EMERGENCY MEDICINE

## 2019-08-14 PROCEDURE — 85379 FIBRIN DEGRADATION QUANT: CPT | Performed by: EMERGENCY MEDICINE

## 2019-08-14 PROCEDURE — 74175 CTA ABDOMEN W/CONTRAST: CPT

## 2019-08-14 PROCEDURE — 96374 THER/PROPH/DIAG INJ IV PUSH: CPT

## 2019-08-14 RX ORDER — WARFARIN SODIUM 5 MG/1
5 TABLET ORAL
Status: COMPLETED | OUTPATIENT
Start: 2019-08-14 | End: 2019-08-14

## 2019-08-14 RX ORDER — CYCLOBENZAPRINE HCL 10 MG
10 TABLET ORAL 3 TIMES DAILY PRN
Qty: 10 TABLET | Refills: 0 | Status: SHIPPED | OUTPATIENT
Start: 2019-08-14 | End: 2019-10-14

## 2019-08-14 RX ORDER — SODIUM CHLORIDE 0.9 % (FLUSH) 0.9 %
10 SYRINGE (ML) INJECTION AS NEEDED
Status: DISCONTINUED | OUTPATIENT
Start: 2019-08-14 | End: 2019-08-14 | Stop reason: HOSPADM

## 2019-08-14 RX ORDER — LIDOCAINE HYDROCHLORIDE 20 MG/ML
15 SOLUTION OROPHARYNGEAL ONCE
Status: COMPLETED | OUTPATIENT
Start: 2019-08-14 | End: 2019-08-14

## 2019-08-14 RX ORDER — SODIUM CHLORIDE 9 MG/ML
125 INJECTION, SOLUTION INTRAVENOUS CONTINUOUS
Status: DISCONTINUED | OUTPATIENT
Start: 2019-08-14 | End: 2019-08-14 | Stop reason: HOSPADM

## 2019-08-14 RX ORDER — ACETAMINOPHEN AND CODEINE PHOSPHATE 300; 30 MG/1; MG/1
1 TABLET ORAL EVERY 6 HOURS PRN
Qty: 10 TABLET | Refills: 0 | Status: SHIPPED | OUTPATIENT
Start: 2019-08-14 | End: 2019-10-14

## 2019-08-14 RX ORDER — WARFARIN SODIUM 1 MG/1
1 TABLET ORAL
Qty: 30 TABLET | Refills: 0 | Status: ON HOLD | OUTPATIENT
Start: 2019-08-14 | End: 2020-02-04 | Stop reason: SDUPTHER

## 2019-08-14 RX ORDER — KETOROLAC TROMETHAMINE 30 MG/ML
30 INJECTION, SOLUTION INTRAMUSCULAR; INTRAVENOUS ONCE
Status: COMPLETED | OUTPATIENT
Start: 2019-08-14 | End: 2019-08-14

## 2019-08-14 RX ORDER — ALUMINA, MAGNESIA, AND SIMETHICONE 2400; 2400; 240 MG/30ML; MG/30ML; MG/30ML
15 SUSPENSION ORAL ONCE
Status: COMPLETED | OUTPATIENT
Start: 2019-08-14 | End: 2019-08-14

## 2019-08-14 RX ORDER — IPRATROPIUM BROMIDE AND ALBUTEROL SULFATE 2.5; .5 MG/3ML; MG/3ML
3 SOLUTION RESPIRATORY (INHALATION) ONCE
Status: COMPLETED | OUTPATIENT
Start: 2019-08-14 | End: 2019-08-14

## 2019-08-14 RX ADMIN — IOVERSOL 100 ML: 741 INJECTION INTRA-ARTERIAL; INTRAVENOUS at 09:43

## 2019-08-14 RX ADMIN — WARFARIN SODIUM 5 MG: 5 TABLET ORAL at 10:59

## 2019-08-14 RX ADMIN — IPRATROPIUM BROMIDE AND ALBUTEROL SULFATE 3 ML: .5; 3 SOLUTION RESPIRATORY (INHALATION) at 08:56

## 2019-08-14 RX ADMIN — ALUMINUM HYDROXIDE, MAGNESIUM HYDROXIDE, AND DIMETHICONE 15 ML: 400; 400; 40 SUSPENSION ORAL at 04:33

## 2019-08-14 RX ADMIN — KETOROLAC TROMETHAMINE 30 MG: 30 INJECTION, SOLUTION INTRAMUSCULAR at 04:34

## 2019-08-14 RX ADMIN — LIDOCAINE HYDROCHLORIDE 15 ML: 20 SOLUTION ORAL; TOPICAL at 04:33

## 2019-08-14 RX ADMIN — SODIUM CHLORIDE 125 ML/HR: 9 INJECTION, SOLUTION INTRAVENOUS at 04:33

## 2019-08-14 NOTE — ED PROVIDER NOTES
Subjective   Pt comes in with back pain.  Pt reports interscapular pain.  He has chronic low back pain.  He feels like he pulled a muscle.  No nause.  No SOA.  No diaphoresis        History provided by:  Patient  Back Pain   Location:  Thoracic spine  Quality:  Aching  Radiates to:  Does not radiate  Pain severity:  Severe  Pain is:  Unable to specify  Onset quality:  Gradual  Timing:  Constant  Progression:  Worsening  Chronicity:  New  Context: not emotional stress, not falling, not jumping from heights, not lifting heavy objects, not MCA, not MVA, not occupational injury, not pedestrian accident, not physical stress, not recent illness, not recent injury and not twisting    Relieved by:  Nothing  Worsened by:  Nothing  Ineffective treatments:  None tried  Associated symptoms: no abdominal pain, no abdominal swelling, no bladder incontinence, no bowel incontinence, no chest pain, no dysuria, no fever, no headaches, no leg pain, no numbness, no paresthesias, no pelvic pain, no perianal numbness, no tingling, no weakness and no weight loss    Risk factors: no hx of cancer, no hx of osteoporosis, no lack of exercise, no menopause, not obese, not pregnant, no recent surgery, no steroid use and no vascular disease    Risk factors comment:  Valvular heart disease      Review of Systems   Constitutional: Negative.  Negative for fever and weight loss.   HENT: Negative.    Eyes: Negative.    Respiratory: Negative.    Cardiovascular: Negative.  Negative for chest pain.   Gastrointestinal: Negative.  Negative for abdominal pain and bowel incontinence.   Endocrine: Negative.    Genitourinary: Negative.  Negative for bladder incontinence, dysuria and pelvic pain.   Musculoskeletal: Positive for back pain.   Skin: Negative.    Allergic/Immunologic: Negative.    Neurological: Negative.  Negative for tingling, weakness, numbness, headaches and paresthesias.   Hematological: Negative.    Psychiatric/Behavioral: Negative.    All  "other systems reviewed and are negative.      Past Medical History:   Diagnosis Date   • Anxiety    • Asthma    • Back pain    • COPD (chronic obstructive pulmonary disease) (CMS/HCC)    • Emphysema lung (CMS/HCC)    • Gastritis    • GERD (gastroesophageal reflux disease)    • Headache    • Hypertension    • Migraine    • Substance abuse (CMS/HCC)        Allergies   Allergen Reactions   • Aspirin      Patient said, \"it chokes him up.\" patient said, \"I got really short of breath and started to have an asthma attack.\"       Past Surgical History:   Procedure Laterality Date   • CARDIAC CATHETERIZATION N/A 10/23/2017    Procedure: Left Heart Cath;  Surgeon: Rodolfo Núñez MD;  Location:  [facility] CATH INVASIVE LOCATION;  Service:    • DENTAL PROCEDURE     • LIVER SURGERY      tumor removed from liver   • OTHER SURGICAL HISTORY      \"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\"   • THORACIC AORTIC ANEURYSM ASCENDING/ARCH REPAIR N/A 1/17/2018    Procedure: MEDIAN STERNOTOMY, AORTIC VALVE CONDUIT, BENTAL, TRANSESOPHAGEAL ECHOCARDIOGRAM WITH ANESTHESIA;  Surgeon: Aaron Lucas MD;  Location: [facility] OR;  Service:        Family History   Problem Relation Age of Onset   • COPD Mother        Social History     Socioeconomic History   • Marital status: Single     Spouse name: Not on file   • Number of children: 0   • Years of education: Not on file   • Highest education level: Not on file   Occupational History     Employer: DISABLED   Tobacco Use   • Smoking status: Former Smoker     Packs/day: 1.00     Years: 4.00     Pack years: 4.00   • Smokeless tobacco: Current User     Types: Snuff   Substance and Sexual Activity   • Alcohol use: No     Comment: occassional    • Drug use: No   • Sexual activity: Defer   Social History Narrative    Lives in [place], [place] alone.  He dropped out of school due to anxiety            Objective   Physical Exam   Constitutional: He is oriented to person, place, and time. He appears " well-developed and well-nourished. No distress.   HENT:   Head: Normocephalic and atraumatic.   Nose: Nose normal.   Mouth/Throat: Oropharynx is clear and moist.   Eyes: Conjunctivae and EOM are normal. Right eye exhibits no discharge. Left eye exhibits no discharge. No scleral icterus.   Neck: Normal range of motion. Neck supple. No tracheal deviation present.   Cardiovascular: Normal rate, regular rhythm and intact distal pulses. Exam reveals no gallop and no friction rub.   No murmur heard.  Loud mechanical valve click   Pulmonary/Chest: Effort normal and breath sounds normal. No stridor. No respiratory distress. He has no wheezes. He has no rales.   Abdominal: Soft. He exhibits no distension and no mass. There is no tenderness. There is no guarding.   Genitourinary:   Genitourinary Comments: No CVAT   Musculoskeletal: Normal range of motion. He exhibits no tenderness.   Neurological: He is alert and oriented to person, place, and time. No sensory deficit. He exhibits normal muscle tone.   Skin: Skin is warm and dry. Capillary refill takes less than 2 seconds. He is not diaphoretic. No pallor.   Psychiatric: He has a normal mood and affect. His behavior is normal. Judgment and thought content normal.   Nursing note and vitals reviewed.      Procedures  CT Angiogram Aorta   Final Result   No new aneurysmal dilatation of the aorta. Previously   identified ascending thoracic aortic aneurysm has been repaired       No dissection or extravasation        This report was finalized on 8/14/2019 9:48 AM by Dr. Steven Toussaint MD.          XR Chest 2 View   Final Result   No acute cardiopulmonary findings. No interval change since May 2019.      Signer Name: Duy Colon MD    Signed: 8/14/2019 5:12 AM    Workstation Name: [id number]     [facility]        Results for orders placed or performed during the hospital encounter of 08/14/19   Comprehensive Metabolic Panel   Result Value Ref Range     Glucose 127 (H) 65 - 99 mg/dL    BUN 13 6 - 20 mg/dL    Creatinine 1.06 0.76 - 1.27 mg/dL    Sodium 139 136 - 145 mmol/L    Potassium 3.9 3.5 - 5.2 mmol/L    Chloride 102 98 - 107 mmol/L    CO2 26.0 22.0 - 29.0 mmol/L    Calcium 9.2 8.6 - 10.5 mg/dL    Total Protein 7.1 6.0 - 8.5 g/dL    Albumin 4.23 3.50 - 5.20 g/dL    ALT (SGPT) 23 1 - 41 U/L    AST (SGOT) 18 1 - 40 U/L    Alkaline Phosphatase 58 39 - 117 U/L    Total Bilirubin 0.3 0.2 - 1.2 mg/dL    eGFR Non African Amer 76 >60 mL/min/1.73    Globulin 2.9 gm/dL    A/G Ratio 1.5 g/dL    BUN/Creatinine Ratio 12.3 7.0 - 25.0    Anion Gap 11.0 5.0 - 15.0 mmol/L   aPTT   Result Value Ref Range    PTT 34.2 23.8 - 36.1 seconds   Protime-INR   Result Value Ref Range    Protime 20.5 (H) 11.0 - 15.4 Seconds    INR 1.67 (H) 0.90 - 1.10   Lipase   Result Value Ref Range    Lipase 15 13 - 60 U/L   Troponin   Result Value Ref Range    Troponin T <0.010 0.000 - 0.030 ng/mL   D-dimer, Quantitative   Result Value Ref Range    D-Dimer, Quantitative 0.33 0.00 - 0.50 MCGFEU/mL   CBC Auto Differential   Result Value Ref Range    WBC 8.59 3.40 - 10.80 10*3/mm3    RBC 5.33 4.14 - 5.80 10*6/mm3    Hemoglobin 14.9 13.0 - 17.7 g/dL    Hematocrit 46.2 37.5 - 51.0 %    MCV 86.7 79.0 - 97.0 fL    MCH 28.0 26.6 - 33.0 pg    MCHC 32.3 31.5 - 35.7 g/dL    RDW 15.7 (H) 12.3 - 15.4 %    RDW-SD 49.8 37.0 - 54.0 fl    MPV 10.8 6.0 - 12.0 fL    Platelets 238 140 - 450 10*3/mm3    Neutrophil % 52.3 42.7 - 76.0 %    Lymphocyte % 32.5 19.6 - 45.3 %    Monocyte % 9.9 5.0 - 12.0 %    Eosinophil % 4.0 0.3 - 6.2 %    Basophil % 0.8 0.0 - 1.5 %    Immature Grans % 0.5 0.0 - 0.5 %    Neutrophils, Absolute 4.50 1.70 - 7.00 10*3/mm3    Lymphocytes, Absolute 2.79 0.70 - 3.10 10*3/mm3    Monocytes, Absolute 0.85 0.10 - 0.90 10*3/mm3    Eosinophils, Absolute 0.34 0.00 - 0.40 10*3/mm3    Basophils, Absolute 0.07 0.00 - 0.20 10*3/mm3    Immature Grans, Absolute 0.04 0.00 - 0.05 10*3/mm3   Troponin   Result Value  Ref Range    Troponin T <0.010 0.000 - 0.030 ng/mL                ED Course  ED Course as of Aug 14 1059   Wed Aug 14, 2019   0436 EKG performed at 0434 hrs.  Interpreted by me at 0436 hrs.  Sinus rhythm.  First-degree AV block.  Ventricular rate 74.  NV interval 212.  QRS duration 92.  .  QTc 424.  No sustained ectopy or dysrhythmia.  No acute ischemic ST segment elevation.  No overt criteria for acute infarct/STEMI.  Patient reports that this started out his lower back pain about a week and a half ago, and has since moved into his mid thoracic area.  We discussed his test results and his plan of care.  He voices understanding and agreement. ECG 12 Lead [CM]   1004 I assumed patient's care from Dr. Whitmore this morning at shift change.  See his documentation above.  Patient is doing well.  Physical exam reveals some mild scattered expiratory wheezes throughout all lung fields, but he is in no distress.  Physical exam is otherwise benign.  [CM]   1044 I discussed the case with patient's PCP, Dr. Jose.  He advises give 5 mg Coumadin by mouth now, and have patient increase his dose to 6 mg daily starting with his evening dose tonight.  Dr. Jose advises that the patient has not been coming to the office for his biweekly INR checks, advises for the patient come to the office on Tuesday to see him and to have his INR checked.  I discussed all this with the patient.  He voices understanding and agreement.  [CM]      ED Course User Index  [CM] Obie Paez MD                  [facility]      Final diagnoses:   Acute thoracic back pain, unspecified back pain laterality             Please note that portions of this note were completed with a voice recognition program. Efforts were made to edit the dictations, but occasionally words are mistranscribed.       Obie Paez MD  08/14/19 8925

## 2019-08-14 NOTE — DISCHARGE INSTRUCTIONS
Home to rest.  Drink plenty of fluids.  Take your medications as prescribed.  Please increase your Coumadin dose to 6 mg daily.  I have written you a prescription for Coumadin 1 mg tablets.  Please take your 5 mg tablet and a 1 mg tablet for a total of 6 mg every evening, starting with tonight's dose.  See Dr. Jose in the office Tuesday, he is expecting you.  Return to the emergency department right away if symptoms worsen/any problems.

## 2019-08-15 NOTE — ED NOTES
Discharge instructions reviewed with patient, patient instructed to return to ED if symptoms worsen or if any new problems arise. Patient verbalizes understanding of discharge instructions, patient ambulatory out of ED. No acute distress noted.       Blanquita Ortega RN  08/14/19 2016

## 2019-10-14 ENCOUNTER — APPOINTMENT (OUTPATIENT)
Dept: GENERAL RADIOLOGY | Facility: HOSPITAL | Age: 44
End: 2019-10-14

## 2019-10-14 ENCOUNTER — HOSPITAL ENCOUNTER (EMERGENCY)
Facility: HOSPITAL | Age: 44
Discharge: HOME | End: 2019-10-14
Attending: EMERGENCY MEDICINE | Admitting: EMERGENCY MEDICINE

## 2019-10-14 VITALS
BODY MASS INDEX: 31.08 KG/M2 | OXYGEN SATURATION: 95 % | RESPIRATION RATE: 19 BRPM | HEART RATE: 92 BPM | DIASTOLIC BLOOD PRESSURE: 73 MMHG | WEIGHT: 198 LBS | HEIGHT: 67 IN | SYSTOLIC BLOOD PRESSURE: 113 MMHG | TEMPERATURE: 98.2 F

## 2019-10-14 DIAGNOSIS — J44.1 ACUTE EXACERBATION OF CHRONIC OBSTRUCTIVE PULMONARY DISEASE (COPD) (HCC): Primary | ICD-10-CM

## 2019-10-14 LAB
A-A DO2: 38.2 MMHG (ref 0–300)
ALBUMIN SERPL-MCNC: 4.3 G/DL (ref 3.5–5.2)
ALBUMIN/GLOB SERPL: 1.6 G/DL
ALP SERPL-CCNC: 61 U/L (ref 39–117)
ALT SERPL W P-5'-P-CCNC: 23 U/L (ref 1–41)
ANION GAP SERPL CALCULATED.3IONS-SCNC: 13.3 MMOL/L (ref 5–15)
ARTERIAL PATENCY WRIST A: ABNORMAL
AST SERPL-CCNC: 19 U/L (ref 1–40)
ATMOSPHERIC PRESS: 730 MMHG
BASE EXCESS BLDA CALC-SCNC: 0.7 MMOL/L (ref 0–2)
BASOPHILS # BLD AUTO: 0.05 10*3/MM3 (ref 0–0.2)
BASOPHILS NFR BLD AUTO: 0.6 % (ref 0–1.5)
BDY SITE: ABNORMAL
BILIRUB SERPL-MCNC: 0.3 MG/DL (ref 0.2–1.2)
BODY TEMPERATURE: 0 C
BUN BLD-MCNC: 17 MG/DL (ref 6–20)
BUN/CREAT SERPL: 17.7 (ref 7–25)
CALCIUM SPEC-SCNC: 9.4 MG/DL (ref 8.6–10.5)
CHLORIDE SERPL-SCNC: 103 MMOL/L (ref 98–107)
CO2 BLDA-SCNC: 27.4 MMOL/L (ref 22–33)
CO2 SERPL-SCNC: 23.7 MMOL/L (ref 22–29)
COHGB MFR BLD: 0.5 % (ref 0–5)
CREAT BLD-MCNC: 0.96 MG/DL (ref 0.76–1.27)
DEPRECATED RDW RBC AUTO: 50.7 FL (ref 37–54)
EOSINOPHIL # BLD AUTO: 0.29 10*3/MM3 (ref 0–0.4)
EOSINOPHIL NFR BLD AUTO: 3.3 % (ref 0.3–6.2)
ERYTHROCYTE [DISTWIDTH] IN BLOOD BY AUTOMATED COUNT: 16 % (ref 12.3–15.4)
GFR SERPL CREATININE-BSD FRML MDRD: 85 ML/MIN/1.73
GLOBULIN UR ELPH-MCNC: 2.7 GM/DL
GLUCOSE BLD-MCNC: 119 MG/DL (ref 65–99)
HCO3 BLDA-SCNC: 26.1 MMOL/L (ref 20–26)
HCT VFR BLD AUTO: 49.1 % (ref 37.5–51)
HCT VFR BLD CALC: 48.4 % (ref 38–51)
HGB BLD-MCNC: 15.8 G/DL (ref 13–17.7)
HGB BLDA-MCNC: 15.8 G/DL (ref 14–18)
HOROWITZ INDEX BLD+IHG-RTO: 21 %
IMM GRANULOCYTES # BLD AUTO: 0.07 10*3/MM3 (ref 0–0.05)
IMM GRANULOCYTES NFR BLD AUTO: 0.8 % (ref 0–0.5)
INR PPP: 2.02 (ref 0.9–1.1)
LYMPHOCYTES # BLD AUTO: 2.62 10*3/MM3 (ref 0.7–3.1)
LYMPHOCYTES NFR BLD AUTO: 30.2 % (ref 19.6–45.3)
Lab: ABNORMAL
MCH RBC QN AUTO: 27.7 PG (ref 26.6–33)
MCHC RBC AUTO-ENTMCNC: 32.2 G/DL (ref 31.5–35.7)
MCV RBC AUTO: 86 FL (ref 79–97)
METHGB BLD QL: 0.6 % (ref 0–3)
MODALITY: ABNORMAL
MONOCYTES # BLD AUTO: 0.69 10*3/MM3 (ref 0.1–0.9)
MONOCYTES NFR BLD AUTO: 7.9 % (ref 5–12)
NEUTROPHILS # BLD AUTO: 4.96 10*3/MM3 (ref 1.7–7)
NEUTROPHILS NFR BLD AUTO: 57.2 % (ref 42.7–76)
NOTE: ABNORMAL
NT-PROBNP SERPL-MCNC: 329.5 PG/ML (ref 5–450)
OXYHGB MFR BLDV: 88.1 % (ref 94–99)
PCO2 BLDA: 43.3 MM HG (ref 35–45)
PCO2 TEMP ADJ BLD: ABNORMAL MM[HG]
PH BLDA: 7.39 PH UNITS (ref 7.35–7.45)
PH, TEMP CORRECTED: ABNORMAL
PLATELET # BLD AUTO: 216 10*3/MM3 (ref 140–450)
PMV BLD AUTO: 11 FL (ref 6–12)
PO2 BLDA: 56.2 MM HG (ref 83–108)
PO2 TEMP ADJ BLD: ABNORMAL MM[HG]
POTASSIUM BLD-SCNC: 4.2 MMOL/L (ref 3.5–5.2)
PROT SERPL-MCNC: 7 G/DL (ref 6–8.5)
PROTHROMBIN TIME: 23.9 SECONDS (ref 11–15.4)
RBC # BLD AUTO: 5.71 10*6/MM3 (ref 4.14–5.8)
SAO2 % BLDCOA: 89.1 % (ref 94–99)
SODIUM BLD-SCNC: 140 MMOL/L (ref 136–145)
TROPONIN T SERPL-MCNC: <0.01 NG/ML (ref 0–0.03)
TROPONIN T SERPL-MCNC: <0.01 NG/ML (ref 0–0.03)
VENTILATOR MODE: ABNORMAL
WBC NRBC COR # BLD: 8.68 10*3/MM3 (ref 3.4–10.8)

## 2019-10-14 PROCEDURE — 94799 UNLISTED PULMONARY SVC/PX: CPT

## 2019-10-14 PROCEDURE — 96374 THER/PROPH/DIAG INJ IV PUSH: CPT

## 2019-10-14 PROCEDURE — 36600 WITHDRAWAL OF ARTERIAL BLOOD: CPT

## 2019-10-14 PROCEDURE — 85025 COMPLETE CBC W/AUTO DIFF WBC: CPT | Performed by: PHYSICIAN ASSISTANT

## 2019-10-14 PROCEDURE — 83050 HGB METHEMOGLOBIN QUAN: CPT

## 2019-10-14 PROCEDURE — 93010 ELECTROCARDIOGRAM REPORT: CPT | Performed by: INTERNAL MEDICINE

## 2019-10-14 PROCEDURE — 71046 X-RAY EXAM CHEST 2 VIEWS: CPT

## 2019-10-14 PROCEDURE — 85610 PROTHROMBIN TIME: CPT | Performed by: PHYSICIAN ASSISTANT

## 2019-10-14 PROCEDURE — 82375 ASSAY CARBOXYHB QUANT: CPT

## 2019-10-14 PROCEDURE — 82805 BLOOD GASES W/O2 SATURATION: CPT

## 2019-10-14 PROCEDURE — 84484 ASSAY OF TROPONIN QUANT: CPT | Performed by: PHYSICIAN ASSISTANT

## 2019-10-14 PROCEDURE — 94640 AIRWAY INHALATION TREATMENT: CPT

## 2019-10-14 PROCEDURE — 80053 COMPREHEN METABOLIC PANEL: CPT | Performed by: PHYSICIAN ASSISTANT

## 2019-10-14 PROCEDURE — 25010000002 METHYLPREDNISOLONE PER 125 MG: Performed by: PHYSICIAN ASSISTANT

## 2019-10-14 PROCEDURE — 99284 EMERGENCY DEPT VISIT MOD MDM: CPT

## 2019-10-14 PROCEDURE — 36415 COLL VENOUS BLD VENIPUNCTURE: CPT

## 2019-10-14 PROCEDURE — 83880 ASSAY OF NATRIURETIC PEPTIDE: CPT | Performed by: PHYSICIAN ASSISTANT

## 2019-10-14 PROCEDURE — 93005 ELECTROCARDIOGRAM TRACING: CPT | Performed by: EMERGENCY MEDICINE

## 2019-10-14 RX ORDER — PREDNISONE 10 MG/1
10 TABLET ORAL 2 TIMES DAILY
Qty: 10 TABLET | Refills: 0 | Status: SHIPPED | OUTPATIENT
Start: 2019-10-14 | End: 2020-02-02

## 2019-10-14 RX ORDER — SODIUM CHLORIDE 0.9 % (FLUSH) 0.9 %
10 SYRINGE (ML) INJECTION AS NEEDED
Status: DISCONTINUED | OUTPATIENT
Start: 2019-10-14 | End: 2019-10-14 | Stop reason: HOSPADM

## 2019-10-14 RX ORDER — IPRATROPIUM BROMIDE AND ALBUTEROL SULFATE 2.5; .5 MG/3ML; MG/3ML
3 SOLUTION RESPIRATORY (INHALATION) ONCE
Status: COMPLETED | OUTPATIENT
Start: 2019-10-14 | End: 2019-10-14

## 2019-10-14 RX ORDER — DOXYCYCLINE HYCLATE 100 MG/1
100 TABLET, DELAYED RELEASE ORAL 2 TIMES DAILY
Qty: 20 TABLET | Refills: 0 | Status: SHIPPED | OUTPATIENT
Start: 2019-10-14 | End: 2019-10-24

## 2019-10-14 RX ORDER — METHYLPREDNISOLONE SODIUM SUCCINATE 125 MG/2ML
125 INJECTION, POWDER, LYOPHILIZED, FOR SOLUTION INTRAMUSCULAR; INTRAVENOUS ONCE
Status: COMPLETED | OUTPATIENT
Start: 2019-10-14 | End: 2019-10-14

## 2019-10-14 RX ORDER — MONTELUKAST SODIUM 10 MG/1
10 TABLET ORAL NIGHTLY
COMMUNITY

## 2019-10-14 RX ADMIN — IPRATROPIUM BROMIDE AND ALBUTEROL SULFATE 3 ML: .5; 3 SOLUTION RESPIRATORY (INHALATION) at 06:41

## 2019-10-14 RX ADMIN — IPRATROPIUM BROMIDE AND ALBUTEROL SULFATE 3 ML: .5; 3 SOLUTION RESPIRATORY (INHALATION) at 05:00

## 2019-10-14 RX ADMIN — METHYLPREDNISOLONE SODIUM SUCCINATE 125 MG: 125 INJECTION, POWDER, FOR SOLUTION INTRAMUSCULAR; INTRAVENOUS at 04:58

## 2019-10-14 NOTE — ED PROVIDER NOTES
"Subjective     History provided by:  Patient   used: No    Shortness of Breath   Onset quality:  Sudden  Duration:  1 hour  Timing:  Constant  Progression:  Worsening  Chronicity:  New  Relieved by:  Nothing  Worsened by:  Nothing  Ineffective treatments:  Inhaler  Associated symptoms: cough and wheezing        Review of Systems   Constitutional: Negative.    HENT: Negative.    Eyes: Negative.    Respiratory: Positive for cough, shortness of breath and wheezing.    Cardiovascular: Negative.    Gastrointestinal: Negative.    Endocrine: Negative.    Genitourinary: Negative.    Musculoskeletal: Negative.    Skin: Negative.    Allergic/Immunologic: Negative.    Neurological: Negative.    Hematological: Negative.    Psychiatric/Behavioral: Negative.    All other systems reviewed and are negative.      Past Medical History:   Diagnosis Date   • Anxiety    • Asthma    • Back pain    • COPD (chronic obstructive pulmonary disease) (CMS/HCC)    • Emphysema lung (CMS/HCC)    • Gastritis    • GERD (gastroesophageal reflux disease)    • Headache    • Hypertension    • Migraine    • Substance abuse (CMS/HCC)        Allergies   Allergen Reactions   • Aspirin      Patient said, \"it chokes him up.\" patient said, \"I got really short of breath and started to have an asthma attack.\"       Past Surgical History:   Procedure Laterality Date   • CARDIAC CATHETERIZATION N/A 10/23/2017    Procedure: Left Heart Cath;  Surgeon: Rodolfo Núñez MD;  [facility];  Service:    • DENTAL PROCEDURE     • LIVER SURGERY      tumor removed from liver   • OTHER SURGICAL HISTORY      \"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\"   • THORACIC AORTIC ANEURYSM ASCENDING/ARCH REPAIR N/A 1/17/2018    Procedure: MEDIAN STERNOTOMY, AORTIC VALVE CONDUIT, BENTAL, TRANSESOPHAGEAL ECHOCARDIOGRAM WITH ANESTHESIA;  Surgeon: Aaron Lucas MD;  [facility];  Service:        Family History   Problem " Relation Age of Onset   • COPD Mother        Social History     Socioeconomic History   • Marital status: Single     Spouse name: Not on file   • Number of children: 0   • Years of education: Not on file   • Highest education level: Not on file   Occupational History     Employer: DISABLED   Tobacco Use   • Smoking status: Former Smoker     Packs/day: 1.00     Years: 4.00     Pack years: 4.00   • Smokeless tobacco: Current User     Types: Snuff   Substance and Sexual Activity   • Alcohol use: No     Comment: occassional    • Drug use: No   • Sexual activity: Defer   Social History Narrative    Lives in [place], [place] alone.  He dropped out of school due to anxiety            Objective   Physical Exam   Constitutional: He is oriented to person, place, and time. He appears well-developed and well-nourished.   HENT:   Head: Normocephalic and atraumatic.   Mouth/Throat: Oropharynx is clear and moist.   Eyes: EOM are normal. Pupils are equal, round, and reactive to light.   Neck: Normal range of motion. Neck supple.   Cardiovascular: Normal rate, regular rhythm, normal heart sounds and intact distal pulses.   Pulmonary/Chest: Effort normal. He has wheezes.   Abdominal: Soft. Bowel sounds are normal.   Musculoskeletal: Normal range of motion.        Right lower leg: Normal.        Left lower leg: Normal.   Neurological: He is alert and oriented to person, place, and time.   Skin: Skin is warm and dry. Capillary refill takes less than 2 seconds.   Psychiatric: He has a normal mood and affect. His behavior is normal.   Nursing note and vitals reviewed.      Procedures           ED Course  ED Course as of Oct 14 0739   Mon Oct 14, 2019   0546 Patient placed on 2 L nasal cannula  [ML]   0726 Dr. Santo - evaluated the patient. Recommends dc home with doxy and prednisone for COPD exacerbation. Patient is no longer requiring O2.   [ML]   0733 0539- Reviewed by Dr. Santo, rate 106, sinus tachy, no ischemia   [ML]      ED  Course User Index  [ML] Bridget Jones PA                  [facility]    Final diagnoses:   Acute exacerbation of chronic obstructive pulmonary disease (COPD) (CMS/[facility])              Bridget Jones PA  10/14/19 0739

## 2019-11-15 ENCOUNTER — TELEPHONE (OUTPATIENT)
Dept: CARDIAC SURGERY | Facility: CLINIC | Age: 44
End: 2019-11-15

## 2019-12-13 ENCOUNTER — TRANSCRIBE ORDERS (OUTPATIENT)
Dept: ADMINISTRATIVE | Facility: HOSPITAL | Age: 44
End: 2019-12-13

## 2019-12-13 DIAGNOSIS — Z95.2 PRESENCE OF PROSTHETIC HEART VALVE: Primary | ICD-10-CM

## 2020-01-22 ENCOUNTER — TELEPHONE (OUTPATIENT)
Dept: CARDIAC SURGERY | Facility: CLINIC | Age: 45
End: 2020-01-22

## 2020-02-02 ENCOUNTER — APPOINTMENT (OUTPATIENT)
Dept: GENERAL RADIOLOGY | Facility: HOSPITAL | Age: 45
End: 2020-02-02

## 2020-02-02 ENCOUNTER — HOSPITAL ENCOUNTER (OUTPATIENT)
Facility: HOSPITAL | Age: 45
Setting detail: OBSERVATION
Discharge: HOME | End: 2020-02-04
Attending: EMERGENCY MEDICINE | Admitting: INTERNAL MEDICINE

## 2020-02-02 ENCOUNTER — HOSPITAL ENCOUNTER (EMERGENCY)
Facility: HOSPITAL | Age: 45
Discharge: HOME | End: 2020-02-02
Attending: FAMILY MEDICINE | Admitting: FAMILY MEDICINE

## 2020-02-02 VITALS
OXYGEN SATURATION: 95 % | HEART RATE: 103 BPM | SYSTOLIC BLOOD PRESSURE: 116 MMHG | TEMPERATURE: 97.8 F | DIASTOLIC BLOOD PRESSURE: 72 MMHG | WEIGHT: 197 LBS | HEIGHT: 67 IN | BODY MASS INDEX: 30.92 KG/M2 | RESPIRATION RATE: 20 BRPM

## 2020-02-02 DIAGNOSIS — B96.89 ACUTE BACTERIAL BRONCHITIS: Primary | ICD-10-CM

## 2020-02-02 DIAGNOSIS — J20.8 ACUTE BACTERIAL BRONCHITIS: Primary | ICD-10-CM

## 2020-02-02 DIAGNOSIS — J44.1 COPD WITH ACUTE EXACERBATION (HCC): Primary | ICD-10-CM

## 2020-02-02 DIAGNOSIS — R06.03 RESPIRATORY DISTRESS: ICD-10-CM

## 2020-02-02 DIAGNOSIS — Z95.2 HX OF AORTIC VALVE REPLACEMENT, MECHANICAL: Chronic | ICD-10-CM

## 2020-02-02 PROBLEM — I71.21 ASCENDING AORTIC ANEURYSM (HCC): Chronic | Status: ACTIVE | Noted: 2017-10-18

## 2020-02-02 PROBLEM — E66.9 OBESITY (BMI 30-39.9): Chronic | Status: ACTIVE | Noted: 2020-02-02

## 2020-02-02 PROBLEM — Z72.0 TOBACCO ABUSE: Status: RESOLVED | Noted: 2018-01-17 | Resolved: 2020-02-02

## 2020-02-02 PROBLEM — Z91.199 MEDICAL NON-COMPLIANCE: Chronic | Status: ACTIVE | Noted: 2020-02-02

## 2020-02-02 PROBLEM — E78.5 HYPERLIPIDEMIA: Chronic | Status: ACTIVE | Noted: 2020-02-02

## 2020-02-02 PROBLEM — Z87.891 FORMER SMOKER: Chronic | Status: ACTIVE | Noted: 2020-02-02

## 2020-02-02 PROBLEM — E66.09 CLASS 1 OBESITY DUE TO EXCESS CALORIES WITH BODY MASS INDEX (BMI) OF 30.0 TO 30.9 IN ADULT: Status: RESOLVED | Noted: 2018-05-22 | Resolved: 2020-02-02

## 2020-02-02 PROBLEM — E66.811 CLASS 1 OBESITY DUE TO EXCESS CALORIES WITH BODY MASS INDEX (BMI) OF 30.0 TO 30.9 IN ADULT: Status: RESOLVED | Noted: 2018-05-22 | Resolved: 2020-02-02

## 2020-02-02 PROBLEM — F41.9 ANXIETY DISORDER: Chronic | Status: ACTIVE | Noted: 2020-02-02

## 2020-02-02 PROBLEM — J44.9 COPD (CHRONIC OBSTRUCTIVE PULMONARY DISEASE): Chronic | Status: ACTIVE | Noted: 2018-01-17

## 2020-02-02 PROBLEM — R07.89 OTHER CHEST PAIN: Status: RESOLVED | Noted: 2017-11-21 | Resolved: 2020-02-02

## 2020-02-02 PROBLEM — Z87.891 FORMER SMOKER: Chronic | Status: RESOLVED | Noted: 2020-02-02 | Resolved: 2020-02-02

## 2020-02-02 PROBLEM — K21.9 GERD WITHOUT ESOPHAGITIS: Chronic | Status: ACTIVE | Noted: 2020-02-02

## 2020-02-02 PROBLEM — E87.20 METABOLIC ACIDOSIS: Status: ACTIVE | Noted: 2020-02-02

## 2020-02-02 PROBLEM — R73.9 HYPERGLYCEMIA: Status: ACTIVE | Noted: 2020-02-02

## 2020-02-02 PROBLEM — I10 ESSENTIAL HYPERTENSION: Chronic | Status: ACTIVE | Noted: 2018-01-17

## 2020-02-02 PROBLEM — Z79.01 CHRONIC ANTICOAGULATION: Chronic | Status: ACTIVE | Noted: 2020-02-02

## 2020-02-02 PROBLEM — Z91.199 MEDICAL NON-COMPLIANCE: Chronic | Status: RESOLVED | Noted: 2020-02-02 | Resolved: 2020-02-02

## 2020-02-02 LAB
6-ACETYL MORPHINE: NEGATIVE
A-A DO2: 26.3 MMHG (ref 0–300)
A-A DO2: 35.7 MMHG (ref 0–300)
ALBUMIN SERPL-MCNC: 4.31 G/DL (ref 3.5–5.2)
ALBUMIN SERPL-MCNC: 4.45 G/DL (ref 3.5–5.2)
ALBUMIN/GLOB SERPL: 1.4 G/DL
ALBUMIN/GLOB SERPL: 1.4 G/DL
ALP SERPL-CCNC: 64 U/L (ref 39–117)
ALP SERPL-CCNC: 65 U/L (ref 39–117)
ALT SERPL W P-5'-P-CCNC: 23 U/L (ref 1–41)
ALT SERPL W P-5'-P-CCNC: 26 U/L (ref 1–41)
AMPHET+METHAMPHET UR QL: NEGATIVE
ANION GAP SERPL CALCULATED.3IONS-SCNC: 13.4 MMOL/L (ref 5–15)
ANION GAP SERPL CALCULATED.3IONS-SCNC: 17.4 MMOL/L (ref 5–15)
APTT PPP: 34.2 SECONDS (ref 23.8–36.1)
ARTERIAL PATENCY WRIST A: ABNORMAL
ARTERIAL PATENCY WRIST A: ABNORMAL
AST SERPL-CCNC: 18 U/L (ref 1–40)
AST SERPL-CCNC: 20 U/L (ref 1–40)
ATMOSPHERIC PRESS: 724 MMHG
ATMOSPHERIC PRESS: 725 MMHG
BARBITURATES UR QL SCN: NEGATIVE
BASE EXCESS BLDA CALC-SCNC: -3.4 MMOL/L (ref 0–2)
BASE EXCESS BLDA CALC-SCNC: -7.3 MMOL/L (ref 0–2)
BASOPHILS # BLD AUTO: 0.03 10*3/MM3 (ref 0–0.2)
BASOPHILS # BLD AUTO: 0.04 10*3/MM3 (ref 0–0.2)
BASOPHILS # BLD AUTO: 0.09 10*3/MM3 (ref 0–0.2)
BASOPHILS NFR BLD AUTO: 0.2 % (ref 0–1.5)
BASOPHILS NFR BLD AUTO: 0.3 % (ref 0–1.5)
BASOPHILS NFR BLD AUTO: 0.7 % (ref 0–1.5)
BDY SITE: ABNORMAL
BDY SITE: ABNORMAL
BENZODIAZ UR QL SCN: NEGATIVE
BILIRUB SERPL-MCNC: 0.3 MG/DL (ref 0.2–1.2)
BILIRUB SERPL-MCNC: 0.3 MG/DL (ref 0.2–1.2)
BILIRUB UR QL STRIP: NEGATIVE
BODY TEMPERATURE: 0 C
BODY TEMPERATURE: 0 C
BUN BLD-MCNC: 16 MG/DL (ref 6–20)
BUN BLD-MCNC: 18 MG/DL (ref 6–20)
BUN/CREAT SERPL: 14.7 (ref 7–25)
BUN/CREAT SERPL: 14.8 (ref 7–25)
BUPRENORPHINE SERPL-MCNC: NEGATIVE NG/ML
CALCIUM SPEC-SCNC: 8.9 MG/DL (ref 8.6–10.5)
CALCIUM SPEC-SCNC: 9 MG/DL (ref 8.6–10.5)
CANNABINOIDS SERPL QL: NEGATIVE
CHLORIDE SERPL-SCNC: 102 MMOL/L (ref 98–107)
CHLORIDE SERPL-SCNC: 105 MMOL/L (ref 98–107)
CLARITY UR: CLEAR
CO2 BLDA-SCNC: 19 MMOL/L (ref 22–33)
CO2 BLDA-SCNC: 23.1 MMOL/L (ref 22–33)
CO2 SERPL-SCNC: 17.6 MMOL/L (ref 22–29)
CO2 SERPL-SCNC: 23.6 MMOL/L (ref 22–29)
COCAINE UR QL: NEGATIVE
COHGB MFR BLD: 0.5 % (ref 0–5)
COHGB MFR BLD: 0.8 % (ref 0–5)
COLOR UR: ABNORMAL
CREAT BLD-MCNC: 1.09 MG/DL (ref 0.76–1.27)
CREAT BLD-MCNC: 1.22 MG/DL (ref 0.76–1.27)
D-LACTATE SERPL-SCNC: 3.8 MMOL/L (ref 0.5–2)
D-LACTATE SERPL-SCNC: 5.6 MMOL/L (ref 0.5–2)
DEPRECATED RDW RBC AUTO: 51.3 FL (ref 37–54)
DEPRECATED RDW RBC AUTO: 51.3 FL (ref 37–54)
DEPRECATED RDW RBC AUTO: 51.9 FL (ref 37–54)
EOSINOPHIL # BLD AUTO: 0.02 10*3/MM3 (ref 0–0.4)
EOSINOPHIL # BLD AUTO: 0.2 10*3/MM3 (ref 0–0.4)
EOSINOPHIL # BLD AUTO: 0.3 10*3/MM3 (ref 0–0.4)
EOSINOPHIL NFR BLD AUTO: 0.1 % (ref 0.3–6.2)
EOSINOPHIL NFR BLD AUTO: 1.5 % (ref 0.3–6.2)
EOSINOPHIL NFR BLD AUTO: 2.3 % (ref 0.3–6.2)
ERYTHROCYTE [DISTWIDTH] IN BLOOD BY AUTOMATED COUNT: 15.9 % (ref 12.3–15.4)
ERYTHROCYTE [DISTWIDTH] IN BLOOD BY AUTOMATED COUNT: 16 % (ref 12.3–15.4)
ERYTHROCYTE [DISTWIDTH] IN BLOOD BY AUTOMATED COUNT: 16.3 % (ref 12.3–15.4)
FLUAV AG NPH QL: NEGATIVE
FLUBV AG NPH QL IA: NEGATIVE
GFR SERPL CREATININE-BSD FRML MDRD: 65 ML/MIN/1.73
GFR SERPL CREATININE-BSD FRML MDRD: 74 ML/MIN/1.73
GLOBULIN UR ELPH-MCNC: 3.2 GM/DL
GLOBULIN UR ELPH-MCNC: 3.2 GM/DL
GLUCOSE BLD-MCNC: 105 MG/DL (ref 65–99)
GLUCOSE BLD-MCNC: 213 MG/DL (ref 65–99)
GLUCOSE UR STRIP-MCNC: ABNORMAL MG/DL
HBA1C MFR BLD: 6.2 % (ref 4.8–5.6)
HCO3 BLDA-SCNC: 17.9 MMOL/L (ref 20–26)
HCO3 BLDA-SCNC: 21.9 MMOL/L (ref 20–26)
HCT VFR BLD AUTO: 45.9 % (ref 37.5–51)
HCT VFR BLD AUTO: 48.6 % (ref 37.5–51)
HCT VFR BLD AUTO: 49.7 % (ref 37.5–51)
HCT VFR BLD CALC: 46.1 % (ref 38–51)
HCT VFR BLD CALC: 47 % (ref 38–51)
HGB BLD-MCNC: 14.5 G/DL (ref 13–17.7)
HGB BLD-MCNC: 15 G/DL (ref 13–17.7)
HGB BLD-MCNC: 15.4 G/DL (ref 13–17.7)
HGB BLDA-MCNC: 15 G/DL (ref 14–18)
HGB BLDA-MCNC: 15.3 G/DL (ref 14–18)
HGB UR QL STRIP.AUTO: NEGATIVE
HOLD SPECIMEN: NORMAL
HOROWITZ INDEX BLD+IHG-RTO: 21 %
HOROWITZ INDEX BLD+IHG-RTO: 21 %
IMM GRANULOCYTES # BLD AUTO: 0.08 10*3/MM3 (ref 0–0.05)
IMM GRANULOCYTES # BLD AUTO: 0.14 10*3/MM3 (ref 0–0.05)
IMM GRANULOCYTES # BLD AUTO: 0.15 10*3/MM3 (ref 0–0.05)
IMM GRANULOCYTES NFR BLD AUTO: 0.6 % (ref 0–0.5)
IMM GRANULOCYTES NFR BLD AUTO: 1 % (ref 0–0.5)
IMM GRANULOCYTES NFR BLD AUTO: 1.1 % (ref 0–0.5)
INR PPP: 1.56 (ref 0.9–1.1)
INR PPP: 1.65 (ref 0.9–1.1)
INR PPP: 1.68 (ref 0.9–1.1)
KETONES UR QL STRIP: NEGATIVE
LEUKOCYTE ESTERASE UR QL STRIP.AUTO: NEGATIVE
LYMPHOCYTES # BLD AUTO: 0.99 10*3/MM3 (ref 0.7–3.1)
LYMPHOCYTES # BLD AUTO: 1.64 10*3/MM3 (ref 0.7–3.1)
LYMPHOCYTES # BLD AUTO: 2.58 10*3/MM3 (ref 0.7–3.1)
LYMPHOCYTES NFR BLD AUTO: 11.4 % (ref 19.6–45.3)
LYMPHOCYTES NFR BLD AUTO: 20.1 % (ref 19.6–45.3)
LYMPHOCYTES NFR BLD AUTO: 7.3 % (ref 19.6–45.3)
Lab: ABNORMAL
Lab: ABNORMAL
MCH RBC QN AUTO: 27.2 PG (ref 26.6–33)
MCH RBC QN AUTO: 27.4 PG (ref 26.6–33)
MCH RBC QN AUTO: 27.4 PG (ref 26.6–33)
MCHC RBC AUTO-ENTMCNC: 30.9 G/DL (ref 31.5–35.7)
MCHC RBC AUTO-ENTMCNC: 31 G/DL (ref 31.5–35.7)
MCHC RBC AUTO-ENTMCNC: 31.6 G/DL (ref 31.5–35.7)
MCV RBC AUTO: 86.1 FL (ref 79–97)
MCV RBC AUTO: 88.4 FL (ref 79–97)
MCV RBC AUTO: 88.7 FL (ref 79–97)
METHADONE UR QL SCN: NEGATIVE
METHGB BLD QL: 0.3 % (ref 0–3)
METHGB BLD QL: 0.6 % (ref 0–3)
MODALITY: ABNORMAL
MODALITY: ABNORMAL
MONOCYTES # BLD AUTO: 0.2 10*3/MM3 (ref 0.1–0.9)
MONOCYTES # BLD AUTO: 0.38 10*3/MM3 (ref 0.1–0.9)
MONOCYTES # BLD AUTO: 0.98 10*3/MM3 (ref 0.1–0.9)
MONOCYTES NFR BLD AUTO: 1.5 % (ref 5–12)
MONOCYTES NFR BLD AUTO: 2.6 % (ref 5–12)
MONOCYTES NFR BLD AUTO: 7.7 % (ref 5–12)
NEUTROPHILS # BLD AUTO: 11.99 10*3/MM3 (ref 1.7–7)
NEUTROPHILS # BLD AUTO: 12.17 10*3/MM3 (ref 1.7–7)
NEUTROPHILS # BLD AUTO: 8.78 10*3/MM3 (ref 1.7–7)
NEUTROPHILS NFR BLD AUTO: 68.6 % (ref 42.7–76)
NEUTROPHILS NFR BLD AUTO: 84.7 % (ref 42.7–76)
NEUTROPHILS NFR BLD AUTO: 88.3 % (ref 42.7–76)
NITRITE UR QL STRIP: NEGATIVE
NOTE: ABNORMAL
NOTE: ABNORMAL
NRBC BLD AUTO-RTO: 0 /100 WBC (ref 0–0.2)
NT-PROBNP SERPL-MCNC: 744.2 PG/ML (ref 5–450)
OPIATES UR QL: NEGATIVE
OXYCODONE UR QL SCN: NEGATIVE
OXYHGB MFR BLDV: 91.8 % (ref 94–99)
OXYHGB MFR BLDV: 93.9 % (ref 94–99)
PCO2 BLDA: 34.9 MM HG (ref 35–45)
PCO2 BLDA: 39.5 MM HG (ref 35–45)
PCO2 TEMP ADJ BLD: ABNORMAL MM[HG]
PCO2 TEMP ADJ BLD: ABNORMAL MM[HG]
PCP UR QL SCN: NEGATIVE
PH BLDA: 7.32 PH UNITS (ref 7.35–7.45)
PH BLDA: 7.35 PH UNITS (ref 7.35–7.45)
PH UR STRIP.AUTO: 5.5 [PH] (ref 5–8)
PH, TEMP CORRECTED: ABNORMAL
PH, TEMP CORRECTED: ABNORMAL
PLATELET # BLD AUTO: 216 10*3/MM3 (ref 140–450)
PLATELET # BLD AUTO: 234 10*3/MM3 (ref 140–450)
PLATELET # BLD AUTO: 260 10*3/MM3 (ref 140–450)
PMV BLD AUTO: 10.5 FL (ref 6–12)
PMV BLD AUTO: 10.9 FL (ref 6–12)
PMV BLD AUTO: 11 FL (ref 6–12)
PO2 BLDA: 67.2 MM HG (ref 83–108)
PO2 BLDA: 71.6 MM HG (ref 83–108)
PO2 TEMP ADJ BLD: ABNORMAL MM[HG]
PO2 TEMP ADJ BLD: ABNORMAL MM[HG]
POTASSIUM BLD-SCNC: 3.8 MMOL/L (ref 3.5–5.2)
POTASSIUM BLD-SCNC: 4.4 MMOL/L (ref 3.5–5.2)
PROT SERPL-MCNC: 7.5 G/DL (ref 6–8.5)
PROT SERPL-MCNC: 7.6 G/DL (ref 6–8.5)
PROT UR QL STRIP: ABNORMAL
PROTHROMBIN TIME: 19.4 SECONDS (ref 11–15.4)
PROTHROMBIN TIME: 20.4 SECONDS (ref 11–15.4)
PROTHROMBIN TIME: 20.6 SECONDS (ref 11–15.4)
RBC # BLD AUTO: 5.33 10*6/MM3 (ref 4.14–5.8)
RBC # BLD AUTO: 5.48 10*6/MM3 (ref 4.14–5.8)
RBC # BLD AUTO: 5.62 10*6/MM3 (ref 4.14–5.8)
S PYO AG THROAT QL: NEGATIVE
SAO2 % BLDCOA: 92.8 % (ref 94–99)
SAO2 % BLDCOA: 94.9 % (ref 94–99)
SODIUM BLD-SCNC: 137 MMOL/L (ref 136–145)
SODIUM BLD-SCNC: 142 MMOL/L (ref 136–145)
SP GR UR STRIP: >1.03 (ref 1–1.03)
TROPONIN T SERPL-MCNC: <0.01 NG/ML (ref 0–0.03)
TSH SERPL DL<=0.05 MIU/L-ACNC: 0.68 UIU/ML (ref 0.27–4.2)
UROBILINOGEN UR QL STRIP: ABNORMAL
VENTILATOR MODE: ABNORMAL
VENTILATOR MODE: ABNORMAL
WBC NRBC COR # BLD: 12.81 10*3/MM3 (ref 3.4–10.8)
WBC NRBC COR # BLD: 13.57 10*3/MM3 (ref 3.4–10.8)
WBC NRBC COR # BLD: 14.38 10*3/MM3 (ref 3.4–10.8)

## 2020-02-02 PROCEDURE — 80307 DRUG TEST PRSMV CHEM ANLYZR: CPT | Performed by: PHYSICIAN ASSISTANT

## 2020-02-02 PROCEDURE — 93010 ELECTROCARDIOGRAM REPORT: CPT | Performed by: SPECIALIST

## 2020-02-02 PROCEDURE — 99285 EMERGENCY DEPT VISIT HI MDM: CPT

## 2020-02-02 PROCEDURE — 82805 BLOOD GASES W/O2 SATURATION: CPT

## 2020-02-02 PROCEDURE — 25010000002 HEPARIN (PORCINE) PER 1000 UNITS: Performed by: PHYSICIAN ASSISTANT

## 2020-02-02 PROCEDURE — 94799 UNLISTED PULMONARY SVC/PX: CPT

## 2020-02-02 PROCEDURE — 81003 URINALYSIS AUTO W/O SCOPE: CPT | Performed by: PHYSICIAN ASSISTANT

## 2020-02-02 PROCEDURE — 87081 CULTURE SCREEN ONLY: CPT | Performed by: PHYSICIAN ASSISTANT

## 2020-02-02 PROCEDURE — 99284 EMERGENCY DEPT VISIT MOD MDM: CPT

## 2020-02-02 PROCEDURE — 96376 TX/PRO/DX INJ SAME DRUG ADON: CPT

## 2020-02-02 PROCEDURE — 85025 COMPLETE CBC W/AUTO DIFF WBC: CPT | Performed by: PHYSICIAN ASSISTANT

## 2020-02-02 PROCEDURE — 87880 STREP A ASSAY W/OPTIC: CPT | Performed by: PHYSICIAN ASSISTANT

## 2020-02-02 PROCEDURE — 96374 THER/PROPH/DIAG INJ IV PUSH: CPT

## 2020-02-02 PROCEDURE — 25010000002 METHYLPREDNISOLONE PER 40 MG: Performed by: PHYSICIAN ASSISTANT

## 2020-02-02 PROCEDURE — 83050 HGB METHEMOGLOBIN QUAN: CPT

## 2020-02-02 PROCEDURE — 93005 ELECTROCARDIOGRAM TRACING: CPT | Performed by: PHYSICIAN ASSISTANT

## 2020-02-02 PROCEDURE — 85610 PROTHROMBIN TIME: CPT | Performed by: PHYSICIAN ASSISTANT

## 2020-02-02 PROCEDURE — 80053 COMPREHEN METABOLIC PANEL: CPT | Performed by: FAMILY MEDICINE

## 2020-02-02 PROCEDURE — 71045 X-RAY EXAM CHEST 1 VIEW: CPT

## 2020-02-02 PROCEDURE — 25010000002 METHYLPREDNISOLONE PER 125 MG: Performed by: FAMILY MEDICINE

## 2020-02-02 PROCEDURE — 94640 AIRWAY INHALATION TREATMENT: CPT

## 2020-02-02 PROCEDURE — G0378 HOSPITAL OBSERVATION PER HR: HCPCS

## 2020-02-02 PROCEDURE — 83605 ASSAY OF LACTIC ACID: CPT | Performed by: PHYSICIAN ASSISTANT

## 2020-02-02 PROCEDURE — 36600 WITHDRAWAL OF ARTERIAL BLOOD: CPT

## 2020-02-02 PROCEDURE — 85610 PROTHROMBIN TIME: CPT | Performed by: FAMILY MEDICINE

## 2020-02-02 PROCEDURE — 96365 THER/PROPH/DIAG IV INF INIT: CPT

## 2020-02-02 PROCEDURE — 25010000002 HEPARIN (PORCINE) PER 1000 UNITS: Performed by: INTERNAL MEDICINE

## 2020-02-02 PROCEDURE — 84484 ASSAY OF TROPONIN QUANT: CPT | Performed by: PHYSICIAN ASSISTANT

## 2020-02-02 PROCEDURE — 93005 ELECTROCARDIOGRAM TRACING: CPT | Performed by: FAMILY MEDICINE

## 2020-02-02 PROCEDURE — 80053 COMPREHEN METABOLIC PANEL: CPT | Performed by: PHYSICIAN ASSISTANT

## 2020-02-02 PROCEDURE — 83036 HEMOGLOBIN GLYCOSYLATED A1C: CPT | Performed by: PHYSICIAN ASSISTANT

## 2020-02-02 PROCEDURE — 83880 ASSAY OF NATRIURETIC PEPTIDE: CPT | Performed by: FAMILY MEDICINE

## 2020-02-02 PROCEDURE — 96366 THER/PROPH/DIAG IV INF ADDON: CPT

## 2020-02-02 PROCEDURE — 85025 COMPLETE CBC W/AUTO DIFF WBC: CPT | Performed by: FAMILY MEDICINE

## 2020-02-02 PROCEDURE — 84443 ASSAY THYROID STIM HORMONE: CPT | Performed by: PHYSICIAN ASSISTANT

## 2020-02-02 PROCEDURE — 82375 ASSAY CARBOXYHB QUANT: CPT

## 2020-02-02 PROCEDURE — 84484 ASSAY OF TROPONIN QUANT: CPT | Performed by: FAMILY MEDICINE

## 2020-02-02 PROCEDURE — 36415 COLL VENOUS BLD VENIPUNCTURE: CPT

## 2020-02-02 PROCEDURE — 87040 BLOOD CULTURE FOR BACTERIA: CPT | Performed by: PHYSICIAN ASSISTANT

## 2020-02-02 PROCEDURE — 96375 TX/PRO/DX INJ NEW DRUG ADDON: CPT

## 2020-02-02 PROCEDURE — 25010000002 METHYLPREDNISOLONE PER 125 MG: Performed by: PHYSICIAN ASSISTANT

## 2020-02-02 PROCEDURE — 99220 PR INITIAL OBSERVATION CARE/DAY 70 MINUTES: CPT | Performed by: PHYSICIAN ASSISTANT

## 2020-02-02 PROCEDURE — 87804 INFLUENZA ASSAY W/OPTIC: CPT | Performed by: FAMILY MEDICINE

## 2020-02-02 PROCEDURE — 84145 PROCALCITONIN (PCT): CPT | Performed by: PHYSICIAN ASSISTANT

## 2020-02-02 PROCEDURE — 85730 THROMBOPLASTIN TIME PARTIAL: CPT | Performed by: PHYSICIAN ASSISTANT

## 2020-02-02 PROCEDURE — 71045 X-RAY EXAM CHEST 1 VIEW: CPT | Performed by: RADIOLOGY

## 2020-02-02 RX ORDER — SODIUM CHLORIDE 0.9 % (FLUSH) 0.9 %
10 SYRINGE (ML) INJECTION EVERY 12 HOURS SCHEDULED
Status: DISCONTINUED | OUTPATIENT
Start: 2020-02-02 | End: 2020-02-04 | Stop reason: HOSPADM

## 2020-02-02 RX ORDER — PREDNISONE 20 MG/1
20 TABLET ORAL 3 TIMES DAILY
Qty: 15 TABLET | Refills: 0 | Status: SHIPPED | OUTPATIENT
Start: 2020-02-02 | End: 2020-02-02

## 2020-02-02 RX ORDER — ALBUTEROL SULFATE 2.5 MG/3ML
2.5 SOLUTION RESPIRATORY (INHALATION) EVERY 4 HOURS PRN
Status: DISCONTINUED | OUTPATIENT
Start: 2020-02-02 | End: 2020-02-04 | Stop reason: HOSPADM

## 2020-02-02 RX ORDER — MONTELUKAST SODIUM 10 MG/1
10 TABLET ORAL NIGHTLY
Status: DISCONTINUED | OUTPATIENT
Start: 2020-02-02 | End: 2020-02-04 | Stop reason: HOSPADM

## 2020-02-02 RX ORDER — HEPARIN SODIUM 10000 [USP'U]/100ML
11.19 INJECTION, SOLUTION INTRAVENOUS
Status: DISCONTINUED | OUTPATIENT
Start: 2020-02-02 | End: 2020-02-02

## 2020-02-02 RX ORDER — METHYLPREDNISOLONE SODIUM SUCCINATE 40 MG/ML
40 INJECTION, POWDER, LYOPHILIZED, FOR SOLUTION INTRAMUSCULAR; INTRAVENOUS EVERY 12 HOURS
Status: DISCONTINUED | OUTPATIENT
Start: 2020-02-02 | End: 2020-02-03

## 2020-02-02 RX ORDER — HEPARIN SODIUM 5000 [USP'U]/ML
5000 INJECTION, SOLUTION INTRAVENOUS; SUBCUTANEOUS ONCE
Status: COMPLETED | OUTPATIENT
Start: 2020-02-02 | End: 2020-02-02

## 2020-02-02 RX ORDER — METHYLPREDNISOLONE SODIUM SUCCINATE 125 MG/2ML
80 INJECTION, POWDER, LYOPHILIZED, FOR SOLUTION INTRAMUSCULAR; INTRAVENOUS ONCE
Status: COMPLETED | OUTPATIENT
Start: 2020-02-02 | End: 2020-02-02

## 2020-02-02 RX ORDER — HEPARIN SODIUM 5000 [USP'U]/ML
5000 INJECTION, SOLUTION INTRAVENOUS; SUBCUTANEOUS AS NEEDED
Status: DISCONTINUED | OUTPATIENT
Start: 2020-02-02 | End: 2020-02-04 | Stop reason: HOSPADM

## 2020-02-02 RX ORDER — WARFARIN SODIUM 1 MG/1
1 TABLET ORAL
Status: CANCELLED | OUTPATIENT
Start: 2020-02-02

## 2020-02-02 RX ORDER — METHYLPREDNISOLONE SODIUM SUCCINATE 125 MG/2ML
125 INJECTION, POWDER, LYOPHILIZED, FOR SOLUTION INTRAMUSCULAR; INTRAVENOUS ONCE
Status: COMPLETED | OUTPATIENT
Start: 2020-02-02 | End: 2020-02-02

## 2020-02-02 RX ORDER — SODIUM CHLORIDE 0.9 % (FLUSH) 0.9 %
10 SYRINGE (ML) INJECTION AS NEEDED
Status: DISCONTINUED | OUTPATIENT
Start: 2020-02-02 | End: 2020-02-04 | Stop reason: HOSPADM

## 2020-02-02 RX ORDER — FAMOTIDINE 20 MG/1
40 TABLET, FILM COATED ORAL 2 TIMES DAILY
Status: DISCONTINUED | OUTPATIENT
Start: 2020-02-02 | End: 2020-02-04 | Stop reason: HOSPADM

## 2020-02-02 RX ORDER — IPRATROPIUM BROMIDE AND ALBUTEROL SULFATE 2.5; .5 MG/3ML; MG/3ML
3 SOLUTION RESPIRATORY (INHALATION) ONCE
Status: COMPLETED | OUTPATIENT
Start: 2020-02-02 | End: 2020-02-02

## 2020-02-02 RX ORDER — SODIUM CHLORIDE 0.9 % (FLUSH) 0.9 %
10 SYRINGE (ML) INJECTION AS NEEDED
Status: DISCONTINUED | OUTPATIENT
Start: 2020-02-02 | End: 2020-02-02 | Stop reason: HOSPADM

## 2020-02-02 RX ORDER — NITROGLYCERIN 0.4 MG/1
0.4 TABLET SUBLINGUAL
Status: DISCONTINUED | OUTPATIENT
Start: 2020-02-02 | End: 2020-02-04 | Stop reason: HOSPADM

## 2020-02-02 RX ORDER — HEPARIN SODIUM 10000 [USP'U]/100ML
11.19 INJECTION, SOLUTION INTRAVENOUS
Status: DISCONTINUED | OUTPATIENT
Start: 2020-02-02 | End: 2020-02-04 | Stop reason: HOSPADM

## 2020-02-02 RX ORDER — AMOXICILLIN AND CLAVULANATE POTASSIUM 875; 125 MG/1; MG/1
1 TABLET, FILM COATED ORAL 2 TIMES DAILY
Qty: 20 TABLET | Refills: 0 | Status: SHIPPED | OUTPATIENT
Start: 2020-02-02 | End: 2020-02-02

## 2020-02-02 RX ORDER — WARFARIN SODIUM 3 MG/1
6 TABLET ORAL
Status: COMPLETED | OUTPATIENT
Start: 2020-02-02 | End: 2020-02-02

## 2020-02-02 RX ORDER — WARFARIN SODIUM 3 MG/1
6 TABLET ORAL DAILY
Status: CANCELLED | OUTPATIENT
Start: 2020-02-03

## 2020-02-02 RX ORDER — BUDESONIDE 0.25 MG/2ML
0.25 INHALANT ORAL
Status: DISCONTINUED | OUTPATIENT
Start: 2020-02-02 | End: 2020-02-04 | Stop reason: HOSPADM

## 2020-02-02 RX ORDER — ATORVASTATIN CALCIUM 10 MG/1
10 TABLET, FILM COATED ORAL DAILY
Status: DISCONTINUED | OUTPATIENT
Start: 2020-02-03 | End: 2020-02-04 | Stop reason: HOSPADM

## 2020-02-02 RX ORDER — ARFORMOTEROL TARTRATE 15 UG/2ML
15 SOLUTION RESPIRATORY (INHALATION)
Status: DISCONTINUED | OUTPATIENT
Start: 2020-02-02 | End: 2020-02-04 | Stop reason: HOSPADM

## 2020-02-02 RX ORDER — HEPARIN SODIUM 5000 [USP'U]/ML
2500 INJECTION, SOLUTION INTRAVENOUS; SUBCUTANEOUS AS NEEDED
Status: DISCONTINUED | OUTPATIENT
Start: 2020-02-02 | End: 2020-02-04 | Stop reason: HOSPADM

## 2020-02-02 RX ADMIN — IPRATROPIUM BROMIDE 0.5 MG: 0.5 SOLUTION RESPIRATORY (INHALATION) at 19:53

## 2020-02-02 RX ADMIN — SODIUM CHLORIDE, PRESERVATIVE FREE 10 ML: 5 INJECTION INTRAVENOUS at 21:35

## 2020-02-02 RX ADMIN — HEPARIN SODIUM 5000 UNITS: 5000 INJECTION INTRAVENOUS; SUBCUTANEOUS at 21:35

## 2020-02-02 RX ADMIN — FAMOTIDINE 40 MG: 20 TABLET ORAL at 22:03

## 2020-02-02 RX ADMIN — WARFARIN SODIUM 6 MG: 3 TABLET ORAL at 22:03

## 2020-02-02 RX ADMIN — ARFORMOTEROL TARTRATE 15 MCG: 15 SOLUTION RESPIRATORY (INHALATION) at 19:53

## 2020-02-02 RX ADMIN — IPRATROPIUM BROMIDE AND ALBUTEROL SULFATE 3 ML: .5; 3 SOLUTION RESPIRATORY (INHALATION) at 17:26

## 2020-02-02 RX ADMIN — IPRATROPIUM BROMIDE AND ALBUTEROL SULFATE 3 ML: .5; 3 SOLUTION RESPIRATORY (INHALATION) at 04:10

## 2020-02-02 RX ADMIN — MONTELUKAST SODIUM 10 MG: 10 TABLET, COATED ORAL at 22:03

## 2020-02-02 RX ADMIN — HEPARIN SODIUM 11.19 UNITS/KG/HR: 10000 INJECTION, SOLUTION INTRAVENOUS at 21:34

## 2020-02-02 RX ADMIN — METHYLPREDNISOLONE SODIUM SUCCINATE 80 MG: 125 INJECTION, POWDER, FOR SOLUTION INTRAMUSCULAR; INTRAVENOUS at 13:08

## 2020-02-02 RX ADMIN — IPRATROPIUM BROMIDE AND ALBUTEROL SULFATE 3 ML: .5; 3 SOLUTION RESPIRATORY (INHALATION) at 14:00

## 2020-02-02 RX ADMIN — BUDESONIDE 0.25 MG: 0.25 SUSPENSION RESPIRATORY (INHALATION) at 19:53

## 2020-02-02 RX ADMIN — METOPROLOL TARTRATE 12.5 MG: 25 TABLET, FILM COATED ORAL at 22:03

## 2020-02-02 RX ADMIN — IPRATROPIUM BROMIDE AND ALBUTEROL SULFATE 3 ML: .5; 3 SOLUTION RESPIRATORY (INHALATION) at 05:44

## 2020-02-02 RX ADMIN — METHYLPREDNISOLONE SODIUM SUCCINATE 125 MG: 125 INJECTION, POWDER, FOR SOLUTION INTRAMUSCULAR; INTRAVENOUS at 04:55

## 2020-02-02 RX ADMIN — METHYLPREDNISOLONE SODIUM SUCCINATE 40 MG: 40 INJECTION, POWDER, FOR SOLUTION INTRAMUSCULAR; INTRAVENOUS at 21:35

## 2020-02-02 NOTE — ED PROVIDER NOTES
"Subjective   44-year-old white male with past medical history of asthma, COPD, presents with shortness of breath. Pt reports that he has been exposed to smoke by his family and he has had upper respiratory-like symptoms he says that he had an albuterol treatment in ambulance and that helps some but normally he responds better to DuoNeb's.      History provided by:  Patient  Shortness of Breath   Severity:  Moderate  Onset quality:  Gradual  Timing:  Intermittent  Progression:  Unchanged  Chronicity:  New  Context: smoke exposure and URI    Relieved by:  Nothing  Worsened by:  Coughing  Ineffective treatments:  None tried  Associated symptoms: cough    Associated symptoms: no abdominal pain, no chest pain and no fever        Review of Systems   Constitutional: Negative.  Negative for fever.   HENT: Negative.    Respiratory: Positive for cough and shortness of breath.    Cardiovascular: Negative.  Negative for chest pain.   Gastrointestinal: Negative.  Negative for abdominal pain.   Endocrine: Negative.    Genitourinary: Negative.  Negative for dysuria.   Skin: Negative.    Neurological: Negative.    Psychiatric/Behavioral: Negative.    All other systems reviewed and are negative.      Past Medical History:   Diagnosis Date   • Anxiety    • Asthma    • Back pain    • COPD (chronic obstructive pulmonary disease) (CMS/HCC)    • Emphysema lung (CMS/HCC)    • Gastritis    • GERD (gastroesophageal reflux disease)    • Headache    • Hypertension    • Migraine    • Substance abuse (CMS/HCC)        Allergies   Allergen Reactions   • Aspirin      Patient said, \"it chokes him up.\" patient said, \"I got really short of breath and started to have an asthma attack.\"       Past Surgical History:   Procedure Laterality Date   • CARDIAC CATHETERIZATION N/A 10/23/2017    Procedure: Left Heart Cath;  Surgeon: Rodolfo Núñez MD;  Location: [facility] INVASIVE LOCATION;  Service:    • DENTAL PROCEDURE     • LIVER SURGERY      " "tumor removed from liver   • OTHER SURGICAL HISTORY      \"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\"   • THORACIC AORTIC ANEURYSM ASCENDING/ARCH REPAIR N/A 1/17/2018    Procedure: MEDIAN STERNOTOMY, AORTIC VALVE CONDUIT, BENTAL, TRANSESOPHAGEAL ECHOCARDIOGRAM WITH ANESTHESIA;  Surgeon: Aaron Lucas MD;  Location: [facility];  Service:        Family History   Problem Relation Age of Onset   • COPD Mother        Social History     Socioeconomic History   • Marital status: Single     Spouse name: Not on file   • Number of children: 0   • Years of education: Not on file   • Highest education level: Not on file   Occupational History     Employer: DISABLED   Tobacco Use   • Smoking status: Former Smoker     Packs/day: 1.00     Years: 4.00     Pack years: 4.00   • Smokeless tobacco: Current User     Types: Snuff   Substance and Sexual Activity   • Alcohol use: No     Comment: occassional    • Drug use: No   • Sexual activity: Defer   Social History Narrative    Lives in [place], [place] alone.  He dropped out of school due to anxiety            Objective   Physical Exam   Constitutional: He is oriented to person, place, and time. He appears well-developed and well-nourished.   HENT:   Head: Normocephalic and atraumatic.   Eyes: EOM are normal.   Neck: Neck supple.   Cardiovascular: Normal rate and regular rhythm.   Pulmonary/Chest: He has wheezes.   Abdominal: Soft. Bowel sounds are normal.   Musculoskeletal: Normal range of motion.   Neurological: He is alert and oriented to person, place, and time.   Skin: Skin is warm. Capillary refill takes less than 2 seconds.   Psychiatric: He has a normal mood and affect. His behavior is normal. Judgment and thought content normal.   Nursing note and vitals reviewed.      Procedures           ED Course                                               MDM  Number of Diagnoses or Management Options  Acute bacterial bronchitis: new and requires workup     Amount and/or Complexity of Data " Reviewed  Clinical lab tests: reviewed and ordered  Tests in the radiology section of CPT®: ordered and reviewed  Tests in the medicine section of CPT®: ordered and reviewed  Independent visualization of images, tracings, or specimens: yes    Risk of Complications, Morbidity, and/or Mortality  Presenting problems: moderate  Diagnostic procedures: moderate  Management options: moderate    Patient Progress  Patient progress: stable      Final diagnoses:   Acute bacterial bronchitis            Bernice Carmichael DO  02/02/20 1948

## 2020-02-02 NOTE — ED NOTES
Patient resting with eyes closed. NADN. WCTM. Resps even and unlabored. States that he would like a breakfast tray prior to being discharged. Call light and fall precautions in place.      Alissa Gusman RN  02/02/20 0656

## 2020-02-02 NOTE — PROGRESS NOTES
Discharge Planning Assessment   Jose     Patient Name: Filemon Jj  MRN: [id number]  Today's Date: 2/2/2020    Admit Date: 2/2/2020    Discharge Needs Assessment     Row Name 02/02/20 1403       Living Environment    Lives With  alone    Current Living Arrangements  home/apartment/condo    Primary Care Provided by  self    Provides Primary Care For  no one    Quality of Family Relationships  unable to assess    Able to Return to Prior Arrangements  yes       Resource/Environmental Concerns    Resource/Environmental Concerns  none       Transition Planning    Patient/Family Anticipates Transition to  home    Patient/Family Anticipated Services at Transition  durable medical equipment ASKING ABOUT GETTING WALKER R/T WEAKNESS/FALLS    Transportation Anticipated  public transportation;other (see comments)       Discharge Needs Assessment    Readmission Within the Last 30 Days  no previous admission in last 30 days    Concerns to be Addressed  denies needs/concerns at this time;no discharge needs identified    Equipment Currently Used at Home  nebulizer    Anticipated Changes Related to Illness  none    Equipment Needed After Discharge  walker, rolling        Discharge Plan     Row Name 02/02/20 1404       Plan    Plan  PT LIVES AT HOME ALONE AND PLANS TO RETURN HOME UPON DISCHARGE. PT STATES THAT HE USUALLY GETS [facility] OR [facility] FOR TRANSPORTATION. PCP IS DR PEPE, HE USES [facility] AND HAS [facility] [employer] HEALTH INSURANCE. PT DENIES ANY ISSUES WITH MEDS OR COPAY. PT DOES NOT HAVE A POA OR LIVING WILL. PT USES A NEBULIZER AT HOME, DOES NOT USE HOME O2 OR HOME HEALTH. PT STATES HE IS HAVING A LOT OF WEAKNESS AND NEAR FALLS AND IS REQUESTING A WALKER. SOCIAL SERIVCE CONSULT PLACED REGARDING THIS. NO OTHER NEEDS IDENTIFED AT THIS TIME.     Patient/Family in Agreement with Plan  yes        Destination      Coordination has not been started for this encounter.      Durable Medical Equipment      Coordination has  not been started for this encounter.      Dialysis/Infusion      Coordination has not been started for this encounter.      Home Medical Care      Coordination has not been started for this encounter.      Therapy      Coordination has not been started for this encounter.      Community Resources      Coordination has not been started for this encounter.          Demographic Summary     Row Name 02/02/20 1403       General Information    Admission Type  observation    Arrived From  home    Referral Source  emergency department    Reason for Consult  discharge planning    Preferred Language  English        Functional Status     Row Name 02/02/20 1403       Functional Status    Usual Activity Tolerance  moderate    Current Activity Tolerance  fair       Mental Status    General Appearance WDL  WDL        Psychosocial     Row Name 02/02/20 1403       Behavior WDL    Behavior WDL  WDL       Emotion Mood WDL    Emotion/Mood/Affect WDL  WDL       Speech WDL    Speech WDL  WDL        Abuse/Neglect    No documentation.       Legal    No documentation.       Substance Abuse    No documentation.       Patient Forms    No documentation.           Oralia Chambers RN

## 2020-02-02 NOTE — H&P
[facility]  HISTORY & PHYSICAL    Patient Identification:  Name:  Filemon Jj  Age:  44 y.o.  Sex:  male  :  1975  MRN:  [id number]   Visit Number:  [id number]  Primary Care Physician:  Macho Jose MD     Subjective     Chief complaint:   Chief Complaint   Patient presents with   • Shortness of Breath     History of presenting illness:   Patient is a 44 y.o. male with past medical history significant for bioprosthetic aortic valve replacement, chronic anticoagulation with Coumadin, history of AAA status post repair, non-oxygen dependent COPD, essential hypertension, hyperlipidemia, GERD, anxiety and obesity that presented to the [facility] emergency department for evaluation of shortness of breath.  It is of note that earlier this morning, patient was seen in the ED.  Patient was diagnosed with acute bronchitis, he was administered IV Solu-Medrol and DuoNeb treatments in the ER.  He was discharged home with oral steroids and Augmentin patient was discharged home.  However, patient returned to the ED for reevaluation after not feeling better upon arrival home.  Patient states that for 2 to 3 days he has had worsening shortness of breath and cough with production of clear sputum/phlegm.  Patient states his symptoms started with a sore throat and progressed.  He denies any fevers but does report intermittent chills.  Denies any ill contacts that he knows of.  He does not wear oxygen at home.  He denies any chest pain, does report some rib pain due to excessive coughing.  He denies any chest pressure.  He denies any palpitations.  He denies any abdominal pain, nausea, vomiting or change in bowel movements.  No urinary symptoms reported.    Upon arrival to the ED, vitals were temperature 99.2, heart rate 128, respiratory rate 20, blood pressure 112/73, and oxygen saturation 95% on room air.  BG with pH 7.319, PCO2 34.9, PO2 67.2, HCO3 17.9, and  oxygen saturation 92.8% on room air.  Troponin negative.  CMP with glucose 213, CO2 17.6, anion gap 17.4.  INR 1.65.  CBC with white blood cell count 13.57, neutrophil percentage 88.3%, absolute neutrophil count 11.99.  Influenza swab negative. Chest x-ray with no evidence of acute cardiopulmonary disease.  While in the emergency department, patient received a loading dose of IV Solu-Medrol 80 mg and a DuoNeb nebulizer treatment.    Patient has been placed in observation status for further evaluation and treatment.     Present during exam: N/A   ---------------------------------------------------------------------------------------------------------------------   Review of Systems   Constitutional: Positive for activity change, chills and fatigue. Negative for appetite change, diaphoresis and fever.   HENT: Positive for sore throat. Negative for congestion, postnasal drip, rhinorrhea, sinus pain and trouble swallowing.    Eyes: Negative for discharge and visual disturbance.   Respiratory: Positive for cough and shortness of breath. Negative for chest tightness and wheezing.    Cardiovascular: Negative for chest pain, palpitations and leg swelling.   Gastrointestinal: Negative for abdominal pain, constipation, diarrhea, nausea and vomiting.   Endocrine: Negative for cold intolerance and heat intolerance.   Genitourinary: Negative for decreased urine volume, dysuria, frequency and urgency.   Musculoskeletal: Negative for arthralgias, gait problem and myalgias.   Skin: Negative for color change, rash and wound.   Allergic/Immunologic: Negative for environmental allergies and immunocompromised state.   Neurological: Negative for dizziness, syncope, weakness, light-headedness and headaches.   Hematological: Negative for adenopathy. Does not bruise/bleed easily.   Psychiatric/Behavioral: Negative for confusion and decreased concentration. The patient is not nervous/anxious.      "  ---------------------------------------------------------------------------------------------------------------------   Past Medical History:   Diagnosis Date   • Anxiety    • Asthma    • Back pain    • Chronic anticoagulation 2/2/2020   • COPD (chronic obstructive pulmonary disease) (CMS/HCC)    • Emphysema lung (CMS/HCC)    • Gastritis    • GERD (gastroesophageal reflux disease)    • Headache    • Hx of aortic valve replacement, mechanical 11/28/2018   • Hyperglycemia 2/2/2020   • Hypertension    • Migraine    • Substance abuse (CMS/HCC)      Past Surgical History:   Procedure Laterality Date   • CARDIAC CATHETERIZATION N/A 10/23/2017    Procedure: Left Heart Cath;  Surgeon: Rodolfo Núñez MD;  Location:  [facility] CATH INVASIVE LOCATION;  Service:    • DENTAL PROCEDURE     • LIVER SURGERY      tumor removed from liver   • OTHER SURGICAL HISTORY      \"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\"   • THORACIC AORTIC ANEURYSM ASCENDING/ARCH REPAIR N/A 1/17/2018    Procedure: MEDIAN STERNOTOMY, AORTIC VALVE CONDUIT, BENTAL, TRANSESOPHAGEAL ECHOCARDIOGRAM WITH ANESTHESIA;  Surgeon: Aaron Lucas MD;  Location:  [facility];  Service:      Family History   Problem Relation Age of Onset   • COPD Mother      Social History     Socioeconomic History   • Marital status: Single     Spouse name: Not on file   • Number of children: 0   • Years of education: Not on file   • Highest education level: Not on file   Occupational History     Employer: DISABLED   Tobacco Use   • Smoking status: Former Smoker     Packs/day: 1.00     Years: 4.00     Pack years: 4.00   • Smokeless tobacco: Current User     Types: Snuff   Substance and Sexual Activity   • Alcohol use: No     Comment: occassional    • Drug use: No   • Sexual activity: Defer   Social History Narrative    Lives in [place], [place] alone.  He dropped out of school due to anxiety  "     ---------------------------------------------------------------------------------------------------------------------   Allergies:  Aspirin  ---------------------------------------------------------------------------------------------------------------------   Medications below are reported home medications pulling from within the system; at this time, these medications have not been reconciled unless otherwise specified and are in the verification process for further verifcation as current home medications.    Prior to Admission Medications     Prescriptions Last Dose Informant Patient Reported? Taking?    albuterol (PROVENTIL) (2.5 MG/3ML) 0.083% nebulizer solution  Self Yes Yes    Take 2.5 mg by nebulization Every 4 (Four) Hours As Needed.    albuterol sulfate  (90 Base) MCG/ACT inhaler   No Yes    Inhale 2 puffs Every 6 (Six) Hours As Needed for Wheezing.    amoxicillin-clavulanate (AUGMENTIN) 875-125 MG per tablet   No Yes    Take 1 tablet by mouth 2 (Two) Times a Day.    atorvastatin (LIPITOR) 10 MG tablet   No Yes    Take 1 tablet by mouth Daily.    metoprolol tartrate (LOPRESSOR) 25 MG tablet   Yes Yes    Take 12.5 mg by mouth 2 (Two) Times a Day. for blood pressure    montelukast (SINGULAIR) 10 MG tablet   Yes Yes    Take 10 mg by mouth Every Night.    predniSONE (DELTASONE) 10 MG tablet   No Yes    Take 1 tablet by mouth 2 (Two) Times a Day.    predniSONE (DELTASONE) 20 MG tablet   No Yes    Take 1 tablet by mouth 3 (Three) Times a Day for 5 days.    raNITIdine (ZANTAC) 300 MG tablet  Self Yes Yes    Take 300 mg by mouth 2 (Two) Times a Day.    warfarin (COUMADIN) 1 MG tablet   No Yes    Take 1 tablet by mouth Daily. Along with a 5 mg tablet for a total of 6 mg daily    warfarin (COUMADIN) 5 MG tablet   No Yes    Take 1 tablet by mouth Daily.        ---------------------------------------------------------------------------------------------------------------------    Objective     Hospital  Scheduled Meds:          Current listed hospital scheduled medications may not yet reflect those currently placed in orders that are signed and held, awaiting patient's arrival to floor/unit.    ---------------------------------------------------------------------------------------------------------------------   Vital Signs:  Temp:  [97.8 °F (36.6 °C)-99.2 °F (37.3 °C)] 99.2 °F (37.3 °C)  Heart Rate:  [] 100  Resp:  [18-24] 20  BP: (112-124)/(57-77) 116/63  Mean Arterial Pressure (Non-Invasive) for the past 24 hrs (Last 3 readings):   Noninvasive MAP (mmHg)   02/02/20 1432 84   02/02/20 1401 82   02/02/20 1202 94     SpO2 Percentage    02/02/20 1400 02/02/20 1401 02/02/20 1432   SpO2: 94% 96% 94%     SpO2:  [93 %-99 %] 94 %  on   ;   Device (Oxygen Therapy): room air    Body mass index is 30.85 kg/m².  Wt Readings from Last 3 Encounters:   02/02/20 89.4 kg (197 lb)   02/02/20 89.4 kg (197 lb)   10/14/19 89.8 kg (198 lb)       ---------------------------------------------------------------------------------------------------------------------   Physical Exam:  Physical Exam   Constitutional: He is oriented to person, place, and time. He appears well-developed and well-nourished.  Non-toxic appearance. No distress.   Sitting up on edge of ED stretcher, no acute distress noted.  Patient on room air.   HENT:   Head: Normocephalic and atraumatic.   Right Ear: External ear normal. Decreased hearing (Chronic [place] ) is noted.   Left Ear: External ear normal. Decreased hearing (Chronic [place] ) is noted.   Nose: Nose normal.   Mouth/Throat: Uvula is midline, oropharynx is clear and moist and mucous membranes are normal. No oropharyngeal exudate. Tonsils are 1+ on the right. Tonsils are 1+ on the left.   Eyes: Pupils are equal, round, and reactive to light. Conjunctivae are normal. No scleral icterus.   Neck: Neck supple.   Cardiovascular: Regular rhythm, normal heart sounds and intact distal pulses. Tachycardia present.  Exam reveals no gallop and no friction rub.   No murmur heard.  Pulses:       Dorsalis pedis pulses are 2+ on the right side, and 2+ on the left side.        Posterior tibial pulses are 2+ on the right side, and 2+ on the left side.   Mechanical click noted   Pulmonary/Chest: Effort normal. No accessory muscle usage. No tachypnea. No respiratory distress. He has decreased breath sounds. He has wheezes. He has no rhonchi. He has no rales. He exhibits no tenderness.   Abdominal: Soft. Bowel sounds are normal. He exhibits no distension and no mass. There is no hepatosplenomegaly. There is no tenderness. There is no rebound and no guarding. No hernia.   Obese.   Genitourinary:   Genitourinary Comments: No weems catheter in place    Musculoskeletal: He exhibits no edema, tenderness or deformity.        Right lower leg: He exhibits no edema.        Left lower leg: He exhibits no edema.     Vascular Status -  His right foot exhibits normal foot vasculature  and no edema. His left foot exhibits normal foot vasculature  and no edema.  Neurological: He is alert and oriented to person, place, and time. He has normal strength. No cranial nerve deficit or sensory deficit. GCS eye subscore is 4. GCS verbal subscore is 5. GCS motor subscore is 6.   Awake and alert.  Follows commands.  Answers questions appropriately.  Moves all extremities equally.  Strength and sensation intact.  No focal neurological deficits on exam.   Skin: Skin is warm and dry. Capillary refill takes less than 2 seconds. No rash noted. No erythema. No pallor.   Psychiatric: His speech is normal and behavior is normal. Judgment and thought content normal. His mood appears anxious. Cognition and memory are normal.   Nursing note and vitals reviewed.    ---------------------------------------------------------------------------------------------------------------------  EKG:        Telemetry:    Sinus tachycardia 100s-110s    I have personally reviewed the  EKG/Telemetry strip  ---------------------------------------------------------------------------------------------------------------------   Results from last 7 days   Lab Units 02/02/20  1239 02/02/20  0619 02/02/20  0412   TROPONIN T ng/mL <0.010 <0.010 <0.010     Results from last 7 days   Lab Units 02/02/20  0412   PROBNP pg/mL 744.2*       Results from last 7 days   Lab Units 02/02/20  1147   PH, ARTERIAL pH units 7.319*   PO2 ART mm Hg 67.2*   PCO2, ARTERIAL mm Hg 34.9*   HCO3 ART mmol/L 17.9*     Results from last 7 days   Lab Units 02/02/20  1239 02/02/20  0421 02/02/20  0412   WBC 10*3/mm3 13.57*  --  12.81*   HEMOGLOBIN g/dL 15.0  --  15.4   HEMATOCRIT % 48.6  --  49.7   MCV fL 88.7  --  88.4   MCHC g/dL 30.9*  --  31.0*   PLATELETS 10*3/mm3 234  --  260   INR  1.65* 1.56*  --      Results from last 7 days   Lab Units 02/02/20  1239 02/02/20  0412   SODIUM mmol/L 137 142   POTASSIUM mmol/L 3.8 4.4   CHLORIDE mmol/L 102 105   CO2 mmol/L 17.6* 23.6   BUN mg/dL 18 16   CREATININE mg/dL 1.22 1.09   EGFR IF NONAFRICN AM mL/min/1.73 65 74   CALCIUM mg/dL 8.9 9.0   GLUCOSE mg/dL 213* 105*   ALBUMIN g/dL 4.31 4.45   BILIRUBIN mg/dL 0.3 0.3   ALK PHOS U/L 64 65   AST (SGOT) U/L 20 18   ALT (SGPT) U/L 26 23   Estimated Creatinine Clearance: 83.3 mL/min (by C-G formula based on SCr of 1.22 mg/dL).    Lab Results   Component Value Date    HGBA1C 6.20 (H) 01/16/2018     Lab Results   Component Value Date    TSH 0.201 (L) 10/18/2017     Microbiology Results (last 10 days)     Procedure Component Value - Date/Time    Influenza Antigen, Rapid - Swab, Nasopharynx [[id number]]  (Normal) Collected:  02/02/20 0455    Lab Status:  Final result Specimen:  Swab from Nasopharynx Updated:  02/02/20 0516     Influenza A Ag, EIA Negative     Influenza B Ag, EIA Negative         Pain Management Panel     Pain Management Panel Latest Ref Rng & Units 1/16/2018 7/24/2016    AMPHETAMINES SCREEN, URINE Negative Negative Negative     BARBITURATES SCREEN Negative Negative Negative    BENZODIAZEPINE SCREEN, URINE Negative Negative Negative    BUPRENORPHINEUR Negative Negative -    COCAINE SCREEN, URINE Negative Negative Negative    METHADONE SCREEN, URINE Negative Negative Negative    METHAMPHETAMINEUR Negative Negative -        I have personally reviewed the above laboratory results.   ---------------------------------------------------------------------------------------------------------------------  Imaging Results (Last 7 Days)     Procedure Component Value Units Date/Time    XR Chest 1 View [[id number]] Collected:  02/02/20 1310     Updated:  02/02/20 1313    Narrative:       XR CHEST 1 VW-     CLINICAL INDICATION: sob        COMPARISON: 02/02/2020      TECHNIQUE: Single frontal view of the chest.     FINDINGS:     There is no focal alveolar infiltrate or effusion.  The cardiac silhouette is normal. The pulmonary vasculature is  unremarkable.  There is no evidence of an acute osseous abnormality.   There are no suspicious-appearing parenchymal soft tissue nodules.          Impression:       No evidence of active or acute cardiopulmonary disease on today's chest  radiograph.     This report was finalized on 2/2/2020 1:11 PM by Dr. Steven Toussaint MD.           I have personally reviewed the above radiology results.     Last Echocardiogram:  Results for orders placed in visit on 01/17/18   Intra-Operative Transesophageal Echocardiogram    Narrative Eric Horner MD     1/17/2018 11:16 AM  Procedure Performed: Emergent/Open-Heart Anesthesia AMBER     Start Time:        End Time:      Preanesthesia Checklist:  Patient identified, IV assessed, risks and benefits discussed, monitors   and equipment assessed, procedure being performed at surgeon's request and   anesthesia consent obtained.    General Procedure Information  Diagnostic Indications for Echo:  assessment of ascending aorta,   assessment of surgical repair, defect repair evaluation and  hemodynamic   monitoring  Physician Requesting Echo: JARETT COLLAZO  Location performed:  OR  Intubated  Bite block not placed  Heart visualized  Probe Insertion:  Easy  Probe Type:  Multiplane  Modalities:  2D only, color flow mapping, continuous wave Doppler and   pulse wave Doppler    Echocardiographic and Doppler Measurements    Ventricles    Right Ventricle:  Thrombus not present.    Left Ventricle:  Cavity size dilated.  Thrombus not present.  Global Function mildly   impaired.  Ejection Fraction 50%.      Ventricular Regional Function:    Wall Motion Comments:       Mild global HK, EF 50%    Valves    Aortic Valve:  Annulus dilated.  Stenosis not present.  Area: 3.3 cm².  Regurgitation   moderate.  Leaflets normal.  Leaflet motions normal.      Mitral Valve:  Annulus normal.  Stenosis not present.  Regurgitation absent.  Leaflets   normal.  Leaflet motions normal.      Tricuspid Valve:  Annulus normal.  Stenosis not present.  Regurgitation absent.  Leaflets   normal.  Leaflet motions normal.    Other Valve Findings:       DILATED AV, ANNULUS 28, MODERATE AI BY P1/2 [id number]    Aorta    Ascending Aorta:  Size aneurysmal.  Diameter 6 cm.  Dissection not present.  Plaque   thickness less than 3 mm.  Mobile plaque not present.    Aortic Arch:  Size normal.  Diameter 2.77 cm.  Dissection not present.  Plaque thickness   less than 3 mm.  Mobile plaque not present.    Descending Aorta:  Size normal.  Diameter 2.07 cm.  Dissection not present.  Plaque thickness   less than 3 mm.  Mobile plaque not present.          Atria    Right Atrium:  Size normal.  Spontaneous echo contrast not present.  Thrombus not   present.  Tumor not present.  Device not present.      Left Atrium:  Size normal.  Spontaneous echo contrast not present.  Thrombus not   present.  Tumor not present.  Device not present.    Left atrial appendage normal.              Other Findings  Pericardium:  pericardial effusion      Anesthesia  Information  Performed Personally  Anesthesiologist:  BARBARA VILLATORO      Echocardiogram Comments:       Post Bentall:  Bileaflet mechanical valve conduit, both leaflets open   and coapt, washing jets seen, AVG 12 mean 7; EF 45 % with inf HK-post   performed by Reji     ---------------------------------------------------------------------------------------------------------------------    Assessment & Plan      Active Hospital Problems    Diagnosis POA   • **COPD with acute exacerbation (CMS/HCC) [J44.1] Yes   • Obesity (BMI 30-39.9) [E66.9] Yes   • GERD without esophagitis [K21.9] Yes   • Anxiety disorder [F41.9] Yes   • Chronic anticoagulation [Z79.01] Not Applicable   • Hyperglycemia [R73.9] Yes   • Primary metabolic acidosis, with increased anion gap, compensated [E87.2] Yes   • Hyperlipidemia [E78.5] Yes   • Hx of aortic valve replacement, mechanical [Z95.2] Not Applicable   • Essential hypertension [I10] Yes   • Ascending aortic aneurysm (CMS/HCC) [I71.2] Yes     · CT chest (10/17): 6 cm ascending aortic aneurysm  · Bentall by Aaron Lucas, 1/17/2018       · SIRS with acute COPD exacerbation: Supportive care. Supplemental O2 as needed to titrate SpO2 > 90%. Scheduled inhalants. IV solumedrol. Incentive spirometry. CXR without evidence of consolidations. Blood cultures obtained in ED, follow for final results. Hold off on antibiotic therapy for now, patient is afebrile. SIRS: Leukocytosis, tachycardia, RR 20. Leukocytosis likely secondary to corticosteroids, tachycardia likely DuoNeb treatment induced. Monitor closely. Obtain pro calcitonin.   · Primary metabolic acidosis with elevated anion gap, compensated: Monitor closely, repeat chemistry panel in AM.   · EKG abnormality: ST/T wave abnormality in inferolateral leads, however difficult to assess due to sinus tachycardia.  Closely monitor on telemetry.  Troponin negative x3 in the ED.  Patient without chest pain.  Will continue with daily aspirin for now.   Continue home statin, lipid panel ordered.  Will repeat EKG once arrival to the floor.  Previous echocardiogram reviewed, will repeat transthoracic echocardiogram as it has been 2 years.  · Hyperglycemia without history of diabetes mellitus: Obtain HgbA1C.   · History of aortic mechanical valve conduit (Bentall procedure) 2018, with required lifelong anticoagulation with Coumadin, subtherapeutic INR on admission: Will consult pharmacy to dose Coumadin, goal INR 2.5-3.5 in setting of mechanical valve. Will bridge with heparin gtt per protocol. Daily INR. Monitor for signs/symptoms of bleeding.   · Essential hypertension: BP controlled. Plan to resume home medication regimen once reconciled per pharmacy. Closely monitor BP per hospital protocol, titrate medications as necessary.   · Hyperlipidemia: Continue home statin. Obtain AM lipid panel.   · History of AAA s/p repair in past: Continue home statin   · GERD: Pepcid   · Anxiety: Supportive care. Resume home medication once reconciled per pharmacy.   · Obestiy, BMI 30.85 kg/m²  · Smokeless tobacco abuse: No desire to quit. Cessation education provided and counseling.   · F/E/N: No IV fluids. Replace electrolytes per protocol as necessary. Regular diet.     ---------------------------------------------------  DVT Prophylaxis: Pharmacy to dose Coumadin, heparin gtt bridge   GI Prophylaxis: Pepcid  Activity: Up with assistance as tolerated     The patient is considered to be a high risk patient due to: COPD exacerbation, aortic  valve, subtherapeutic INR, AAA, obesity     OBSERVATION status, however if further evaluation or treatment plans warrant, status may change.  Based upon current information, I predict patient's care encounter to be less than or equal to 2 midnights.    Code Status: FULL CODE     I have discussed the patient's assessment and plan with the patient.       Carrie Escobar PA-C  Hospitalist Service -- [facility]   Pager:  [phone]    02/02/20  3:38 PM    Attending Physician: Dr. Goldman, DO       ---------------------------------------------------------------------------------------------------------------------   Patient seen and examined independently of JEAN Grijalva.  Agree with history of present illness, review of systems, physical exam, assessment and plan.  This makes patient second visit to the emergency department within 24 hours for shortness of breath.  Patient continues to be symptomatic despite multiple nebulizer treatments as well as 1 round of Solu-Medrol.  Patient does meet Sirs criteria on admission, however there is no evidence of active infection.  Will defer antibiotics at this time.  Continue IV Solu-Medrol as well as duo nebs.  Agree with starting heparin drip for recent history of mechanical aortic valve and subtherapeutic INR.

## 2020-02-02 NOTE — ED PROVIDER NOTES
"Subjective   44-year-old white male presents secondary shortness of breath cough and wheezing.  Patient was seen here earlier today.  He states his symptoms started last evening.  He states that he was feeling better though started wheezing worse and became more short of breath.  He has had at least 4 breathing treatments this morning along with multiple puffs on his inhaler.  He previously received Solu-Medrol.  He denies fever.  He states he has had a productive cough.  He voices no other complaints at this time.          Review of Systems   Constitutional: Negative.  Negative for fever.   HENT: Negative.    Respiratory: Positive for cough, shortness of breath and wheezing.    Cardiovascular: Negative.  Negative for chest pain.   Gastrointestinal: Negative.  Negative for abdominal pain.   Endocrine: Negative.    Genitourinary: Negative.  Negative for dysuria.   Skin: Negative.    Neurological: Negative.    Psychiatric/Behavioral: Negative.    All other systems reviewed and are negative.      Past Medical History:   Diagnosis Date   • Anxiety    • Asthma    • Back pain    • Chronic anticoagulation 2/2/2020   • COPD (chronic obstructive pulmonary disease) (CMS/HCC)    • Emphysema lung (CMS/[facility])    • Gastritis    • GERD (gastroesophageal reflux disease)    • Headache    • Hx of aortic valve replacement, mechanical 11/28/2018   • Hyperglycemia 2/2/2020   • Hypertension    • Migraine    • Substance abuse (CMS/HCC)        Allergies   Allergen Reactions   • Aspirin      Patient said, \"it chokes him up.\" patient said, \"I got really short of breath and started to have an asthma attack.\"       Past Surgical History:   Procedure Laterality Date   • CARDIAC CATHETERIZATION N/A 10/23/2017    Procedure: Left Heart Cath;  Surgeon: Rodolfo Núñez MD;  Location: [facility] INVASIVE LOCATION;  Service:    • DENTAL PROCEDURE     • LIVER SURGERY      tumor removed from liver   • OTHER SURGICAL HISTORY      \"tumor removed " "from heart when 2-3 months old\"   • THORACIC AORTIC ANEURYSM ASCENDING/ARCH REPAIR N/A 1/17/2018    Procedure: MEDIAN STERNOTOMY, AORTIC VALVE CONDUIT, BENTAL, TRANSESOPHAGEAL ECHOCARDIOGRAM WITH ANESTHESIA;  Surgeon: Aaron Lucas MD;  Location: [facility];  Service:        Family History   Problem Relation Age of Onset   • COPD Mother        Social History     Socioeconomic History   • Marital status: Single     Spouse name: Not on file   • Number of children: 0   • Years of education: Not on file   • Highest education level: Not on file   Occupational History     Employer: DISABLED   Tobacco Use   • Smoking status: Former Smoker     Packs/day: 1.00     Years: 4.00     Pack years: 4.00   • Smokeless tobacco: Current User     Types: Snuff   Substance and Sexual Activity   • Alcohol use: No     Comment: occassional    • Drug use: No   • Sexual activity: Defer   Social History Narrative    Lives in [place], [place] alone.  He dropped out of school due to anxiety            Objective   Physical Exam   Constitutional: He is oriented to person, place, and time. He appears well-developed and well-nourished. No distress.   HENT:   Head: Normocephalic and atraumatic.   Right Ear: External ear normal.   Left Ear: External ear normal.   Nose: Nose normal.   Eyes: Pupils are equal, round, and reactive to light. Conjunctivae and EOM are normal.   Neck: Normal range of motion. Neck supple. No JVD present. No tracheal deviation present.   Cardiovascular: Normal rate, regular rhythm and normal heart sounds.   No murmur heard.  Pulmonary/Chest: Effort normal. No respiratory distress. He has wheezes.   Decreased breath sounds throughout   Abdominal: Soft. Bowel sounds are normal. There is no tenderness.   Musculoskeletal: Normal range of motion. He exhibits no edema or deformity.   Neurological: He is alert and oriented to person, place, and time. No cranial nerve deficit.   Skin: Skin is warm and dry. No rash noted. He is not " diaphoretic. No erythema. No pallor.   Psychiatric: He has a normal mood and affect. His behavior is normal. Thought content normal.   Nursing note and vitals reviewed.      Procedures           ED Course  ED Course as of Feb 02 1741   Sun Feb 02, 2020   1503 Pt seen with PA.  Reviewed findings.  Agree with assessment and plan.    [BC]   1553 Lactic acid elevated though many nebs and albuterol inhaler.    [JI]      ED Course User Index  [BC] Jose Ramon De La Paz MD  [JI] Joey Green PA                      Juan Daniel Coma Scale Score: 15                          MDM  Number of Diagnoses or Management Options  COPD with acute exacerbation (CMS/HCC): new and requires workup  Respiratory distress: new and requires workup     Amount and/or Complexity of Data Reviewed  Clinical lab tests: reviewed and ordered  Tests in the radiology section of CPT®: ordered and reviewed  Tests in the medicine section of CPT®: reviewed and ordered  Discuss the patient with other providers: yes    Risk of Complications, Morbidity, and/or Mortality  Presenting problems: moderate        Final diagnoses:   COPD with acute exacerbation (CMS/HCC)   Respiratory distress            Joey Green PA  02/02/20 1520       Joey Green PA  02/02/20 1742

## 2020-02-02 NOTE — ED NOTES
02 SAT DROPPED TO 88% ON ROOM AIR SOCO HALL MADE AWARE, R.B.V.O FOR 2  LPM OXYGEN Darrell Novoa RN  02/02/20 2920

## 2020-02-02 NOTE — ED NOTES
Patient walking around the nurses station, skin warm and dry to touch. BAY. WCTM. Patient continues to complain of shortness of breath related to his asthma. States that it is bad at this time. Resps even and unlabored. Call light and fall precautions in place.      Alissa Gusman RN  02/02/20 0514

## 2020-02-02 NOTE — ED NOTES
Attempted to Draw patients Labs. X ray in room at this time.         Antonella Mendoza  02/02/20 0340

## 2020-02-03 ENCOUNTER — APPOINTMENT (OUTPATIENT)
Dept: CARDIOLOGY | Facility: HOSPITAL | Age: 45
End: 2020-02-03

## 2020-02-03 LAB
ANION GAP SERPL CALCULATED.3IONS-SCNC: 15.8 MMOL/L (ref 5–15)
APTT PPP: 67.4 SECONDS (ref 23.8–36.1)
APTT PPP: 80.9 SECONDS (ref 23.8–36.1)
BASOPHILS # BLD AUTO: 0.04 10*3/MM3 (ref 0–0.2)
BASOPHILS NFR BLD AUTO: 0.3 % (ref 0–1.5)
BH CV ECHO MEAS - % IVS THICK: 49.9 %
BH CV ECHO MEAS - % LVPW THICK: 44.6 %
BH CV ECHO MEAS - AO MAX PG (FULL): 10.3 MMHG
BH CV ECHO MEAS - AO MAX PG: 13.8 MMHG
BH CV ECHO MEAS - AO MEAN PG (FULL): 5.3 MMHG
BH CV ECHO MEAS - AO MEAN PG: 7.2 MMHG
BH CV ECHO MEAS - AO ROOT AREA (BSA CORRECTED): 1.3
BH CV ECHO MEAS - AO ROOT AREA: 5.5 CM^2
BH CV ECHO MEAS - AO ROOT DIAM: 2.6 CM
BH CV ECHO MEAS - AO V2 MAX: 186 CM/SEC
BH CV ECHO MEAS - AO V2 MEAN: 120.9 CM/SEC
BH CV ECHO MEAS - AO V2 VTI: 33.6 CM
BH CV ECHO MEAS - AVA(I,A): 1.5 CM^2
BH CV ECHO MEAS - AVA(I,D): 1.5 CM^2
BH CV ECHO MEAS - AVA(V,A): 1.5 CM^2
BH CV ECHO MEAS - AVA(V,D): 1.5 CM^2
BH CV ECHO MEAS - BSA(HAYCOCK): 2.1 M^2
BH CV ECHO MEAS - BSA: 2 M^2
BH CV ECHO MEAS - BZI_BMI: 30.2 KILOGRAMS/M^2
BH CV ECHO MEAS - BZI_METRIC_HEIGHT: 170.2 CM
BH CV ECHO MEAS - BZI_METRIC_WEIGHT: 87.5 KG
BH CV ECHO MEAS - EDV(CUBED): 110.2 ML
BH CV ECHO MEAS - EDV(MOD-SP4): 69 ML
BH CV ECHO MEAS - EDV(TEICH): 107.2 ML
BH CV ECHO MEAS - EF(CUBED): 51.2 %
BH CV ECHO MEAS - EF(MOD-SP4): 58 %
BH CV ECHO MEAS - EF(TEICH): 43.2 %
BH CV ECHO MEAS - ESV(CUBED): 53.8 ML
BH CV ECHO MEAS - ESV(MOD-SP4): 29 ML
BH CV ECHO MEAS - ESV(TEICH): 60.9 ML
BH CV ECHO MEAS - FS: 21.3 %
BH CV ECHO MEAS - IVS/LVPW: 0.71
BH CV ECHO MEAS - IVSD: 0.87 CM
BH CV ECHO MEAS - IVSS: 1.3 CM
BH CV ECHO MEAS - LA DIMENSION: 2.9 CM
BH CV ECHO MEAS - LA/AO: 1.1
BH CV ECHO MEAS - LV DIASTOLIC VOL/BSA (35-75): 34.6 ML/M^2
BH CV ECHO MEAS - LV MASS(C)D: 181.5 GRAMS
BH CV ECHO MEAS - LV MASS(C)DI: 91.1 GRAMS/M^2
BH CV ECHO MEAS - LV MASS(C)S: 224 GRAMS
BH CV ECHO MEAS - LV MASS(C)SI: 112.5 GRAMS/M^2
BH CV ECHO MEAS - LV MAX PG: 3.5 MMHG
BH CV ECHO MEAS - LV MEAN PG: 1.9 MMHG
BH CV ECHO MEAS - LV SYSTOLIC VOL/BSA (12-30): 14.6 ML/M^2
BH CV ECHO MEAS - LV V1 MAX: 93.8 CM/SEC
BH CV ECHO MEAS - LV V1 MEAN: 64.3 CM/SEC
BH CV ECHO MEAS - LV V1 VTI: 17.4 CM
BH CV ECHO MEAS - LVIDD: 4.8 CM
BH CV ECHO MEAS - LVIDS: 3.8 CM
BH CV ECHO MEAS - LVLD AP4: 6.7 CM
BH CV ECHO MEAS - LVLS AP4: 5.4 CM
BH CV ECHO MEAS - LVOT AREA (M): 2.8 CM^2
BH CV ECHO MEAS - LVOT AREA: 3 CM^2
BH CV ECHO MEAS - LVOT DIAM: 1.9 CM
BH CV ECHO MEAS - LVPWD: 1.2 CM
BH CV ECHO MEAS - LVPWS: 1.8 CM
BH CV ECHO MEAS - MV A MAX VEL: 49.9 CM/SEC
BH CV ECHO MEAS - MV E MAX VEL: 107.5 CM/SEC
BH CV ECHO MEAS - MV E/A: 2.2
BH CV ECHO MEAS - PA ACC SLOPE: 823.1 CM/SEC^2
BH CV ECHO MEAS - PA ACC TIME: 0.13 SEC
BH CV ECHO MEAS - PA PR(ACCEL): 20.4 MMHG
BH CV ECHO MEAS - SI(AO): 93 ML/M^2
BH CV ECHO MEAS - SI(CUBED): 28.3 ML/M^2
BH CV ECHO MEAS - SI(LVOT): 25.9 ML/M^2
BH CV ECHO MEAS - SI(MOD-SP4): 20.1 ML/M^2
BH CV ECHO MEAS - SI(TEICH): 23.2 ML/M^2
BH CV ECHO MEAS - SV(AO): 185.3 ML
BH CV ECHO MEAS - SV(CUBED): 56.5 ML
BH CV ECHO MEAS - SV(LVOT): 51.6 ML
BH CV ECHO MEAS - SV(MOD-SP4): 40 ML
BH CV ECHO MEAS - SV(TEICH): 46.3 ML
BUN BLD-MCNC: 19 MG/DL (ref 6–20)
BUN/CREAT SERPL: 19.2 (ref 7–25)
CALCIUM SPEC-SCNC: 8.9 MG/DL (ref 8.6–10.5)
CHLORIDE SERPL-SCNC: 103 MMOL/L (ref 98–107)
CHOLEST SERPL-MCNC: 157 MG/DL (ref 0–200)
CO2 SERPL-SCNC: 19.2 MMOL/L (ref 22–29)
CREAT BLD-MCNC: 0.99 MG/DL (ref 0.76–1.27)
CRP SERPL-MCNC: 1.31 MG/DL (ref 0–0.5)
DEPRECATED RDW RBC AUTO: 53.2 FL (ref 37–54)
EOSINOPHIL # BLD AUTO: 0.13 10*3/MM3 (ref 0–0.4)
EOSINOPHIL NFR BLD AUTO: 0.9 % (ref 0.3–6.2)
ERYTHROCYTE [DISTWIDTH] IN BLOOD BY AUTOMATED COUNT: 16.6 % (ref 12.3–15.4)
GFR SERPL CREATININE-BSD FRML MDRD: 83 ML/MIN/1.73
GLUCOSE BLD-MCNC: 159 MG/DL (ref 65–99)
HCT VFR BLD AUTO: 46 % (ref 37.5–51)
HDLC SERPL-MCNC: 40 MG/DL (ref 40–60)
HGB BLD-MCNC: 14.1 G/DL (ref 13–17.7)
IMM GRANULOCYTES # BLD AUTO: 0.18 10*3/MM3 (ref 0–0.05)
IMM GRANULOCYTES NFR BLD AUTO: 1.2 % (ref 0–0.5)
INR PPP: 2.04 (ref 0.9–1.1)
LDLC SERPL CALC-MCNC: 105 MG/DL (ref 0–100)
LDLC/HDLC SERPL: 2.63 {RATIO}
LYMPHOCYTES # BLD AUTO: 1.83 10*3/MM3 (ref 0.7–3.1)
LYMPHOCYTES NFR BLD AUTO: 12 % (ref 19.6–45.3)
MAGNESIUM SERPL-MCNC: 2.2 MG/DL (ref 1.6–2.6)
MAXIMAL PREDICTED HEART RATE: 177 BPM
MCH RBC QN AUTO: 27 PG (ref 26.6–33)
MCHC RBC AUTO-ENTMCNC: 30.7 G/DL (ref 31.5–35.7)
MCV RBC AUTO: 88.1 FL (ref 79–97)
MONOCYTES # BLD AUTO: 0.57 10*3/MM3 (ref 0.1–0.9)
MONOCYTES NFR BLD AUTO: 3.8 % (ref 5–12)
NEUTROPHILS # BLD AUTO: 12.44 10*3/MM3 (ref 1.7–7)
NEUTROPHILS NFR BLD AUTO: 81.8 % (ref 42.7–76)
NRBC BLD AUTO-RTO: 0 /100 WBC (ref 0–0.2)
PHOSPHATE SERPL-MCNC: 1.4 MG/DL (ref 2.5–4.5)
PLATELET # BLD AUTO: 213 10*3/MM3 (ref 140–450)
PMV BLD AUTO: 10.9 FL (ref 6–12)
POTASSIUM BLD-SCNC: 3.9 MMOL/L (ref 3.5–5.2)
POTASSIUM BLD-SCNC: 3.9 MMOL/L (ref 3.5–5.2)
PROCALCITONIN SERPL-MCNC: 0.14 NG/ML (ref 0.1–0.25)
PROTHROMBIN TIME: 24 SECONDS (ref 11–15.4)
RBC # BLD AUTO: 5.22 10*6/MM3 (ref 4.14–5.8)
SODIUM BLD-SCNC: 138 MMOL/L (ref 136–145)
STRESS TARGET HR: 150 BPM
TRIGL SERPL-MCNC: 60 MG/DL (ref 0–150)
VLDLC SERPL-MCNC: 12 MG/DL
WBC NRBC COR # BLD: 15.19 10*3/MM3 (ref 3.4–10.8)

## 2020-02-03 PROCEDURE — 96366 THER/PROPH/DIAG IV INF ADDON: CPT

## 2020-02-03 PROCEDURE — 93306 TTE W/DOPPLER COMPLETE: CPT | Performed by: INTERNAL MEDICINE

## 2020-02-03 PROCEDURE — 86140 C-REACTIVE PROTEIN: CPT | Performed by: PHYSICIAN ASSISTANT

## 2020-02-03 PROCEDURE — 93005 ELECTROCARDIOGRAM TRACING: CPT | Performed by: PHYSICIAN ASSISTANT

## 2020-02-03 PROCEDURE — 25010000002 METHYLPREDNISOLONE PER 40 MG: Performed by: PHYSICIAN ASSISTANT

## 2020-02-03 PROCEDURE — 85730 THROMBOPLASTIN TIME PARTIAL: CPT | Performed by: INTERNAL MEDICINE

## 2020-02-03 PROCEDURE — 85610 PROTHROMBIN TIME: CPT | Performed by: PHYSICIAN ASSISTANT

## 2020-02-03 PROCEDURE — 85025 COMPLETE CBC W/AUTO DIFF WBC: CPT | Performed by: PHYSICIAN ASSISTANT

## 2020-02-03 PROCEDURE — 83735 ASSAY OF MAGNESIUM: CPT | Performed by: PHYSICIAN ASSISTANT

## 2020-02-03 PROCEDURE — 99226 PR SBSQ OBSERVATION CARE/DAY 35 MINUTES: CPT | Performed by: PHYSICIAN ASSISTANT

## 2020-02-03 PROCEDURE — 94799 UNLISTED PULMONARY SVC/PX: CPT

## 2020-02-03 PROCEDURE — 84132 ASSAY OF SERUM POTASSIUM: CPT | Performed by: PHYSICIAN ASSISTANT

## 2020-02-03 PROCEDURE — 93010 ELECTROCARDIOGRAM REPORT: CPT | Performed by: INTERNAL MEDICINE

## 2020-02-03 PROCEDURE — 84100 ASSAY OF PHOSPHORUS: CPT | Performed by: PHYSICIAN ASSISTANT

## 2020-02-03 PROCEDURE — 25010000002 HEPARIN (PORCINE) PER 1000 UNITS: Performed by: INTERNAL MEDICINE

## 2020-02-03 PROCEDURE — 63710000001 PREDNISONE PER 1 MG: Performed by: PHYSICIAN ASSISTANT

## 2020-02-03 PROCEDURE — G0378 HOSPITAL OBSERVATION PER HR: HCPCS

## 2020-02-03 PROCEDURE — 80048 BASIC METABOLIC PNL TOTAL CA: CPT | Performed by: PHYSICIAN ASSISTANT

## 2020-02-03 PROCEDURE — 80061 LIPID PANEL: CPT | Performed by: PHYSICIAN ASSISTANT

## 2020-02-03 PROCEDURE — 93306 TTE W/DOPPLER COMPLETE: CPT

## 2020-02-03 PROCEDURE — 96376 TX/PRO/DX INJ SAME DRUG ADON: CPT

## 2020-02-03 RX ORDER — ACETAMINOPHEN 325 MG/1
650 TABLET ORAL EVERY 6 HOURS PRN
Status: DISCONTINUED | OUTPATIENT
Start: 2020-02-03 | End: 2020-02-04 | Stop reason: HOSPADM

## 2020-02-03 RX ORDER — PREDNISONE 20 MG/1
20 TABLET ORAL 2 TIMES DAILY WITH MEALS
Status: DISCONTINUED | OUTPATIENT
Start: 2020-02-03 | End: 2020-02-04 | Stop reason: HOSPADM

## 2020-02-03 RX ORDER — WARFARIN SODIUM 3 MG/1
6 TABLET ORAL
Status: COMPLETED | OUTPATIENT
Start: 2020-02-03 | End: 2020-02-03

## 2020-02-03 RX ADMIN — WARFARIN SODIUM 6 MG: 3 TABLET ORAL at 17:26

## 2020-02-03 RX ADMIN — IPRATROPIUM BROMIDE 0.5 MG: 0.5 SOLUTION RESPIRATORY (INHALATION) at 06:55

## 2020-02-03 RX ADMIN — METOPROLOL TARTRATE 12.5 MG: 25 TABLET, FILM COATED ORAL at 20:09

## 2020-02-03 RX ADMIN — PREDNISONE 20 MG: 20 TABLET ORAL at 17:26

## 2020-02-03 RX ADMIN — ARFORMOTEROL TARTRATE 15 MCG: 15 SOLUTION RESPIRATORY (INHALATION) at 22:20

## 2020-02-03 RX ADMIN — IPRATROPIUM BROMIDE 0.5 MG: 0.5 SOLUTION RESPIRATORY (INHALATION) at 00:33

## 2020-02-03 RX ADMIN — POTASSIUM & SODIUM PHOSPHATES POWDER PACK 280-160-250 MG 2 PACKET: 280-160-250 PACK at 05:08

## 2020-02-03 RX ADMIN — FAMOTIDINE 40 MG: 20 TABLET ORAL at 20:09

## 2020-02-03 RX ADMIN — METHYLPREDNISOLONE SODIUM SUCCINATE 40 MG: 40 INJECTION, POWDER, FOR SOLUTION INTRAMUSCULAR; INTRAVENOUS at 08:31

## 2020-02-03 RX ADMIN — BUDESONIDE 0.25 MG: 0.25 SUSPENSION RESPIRATORY (INHALATION) at 22:20

## 2020-02-03 RX ADMIN — IPRATROPIUM BROMIDE 0.5 MG: 0.5 SOLUTION RESPIRATORY (INHALATION) at 12:53

## 2020-02-03 RX ADMIN — IPRATROPIUM BROMIDE 0.5 MG: 0.5 SOLUTION RESPIRATORY (INHALATION) at 19:21

## 2020-02-03 RX ADMIN — HEPARIN SODIUM 11.19 UNITS/KG/HR: 10000 INJECTION, SOLUTION INTRAVENOUS at 23:56

## 2020-02-03 RX ADMIN — FAMOTIDINE 40 MG: 20 TABLET ORAL at 08:31

## 2020-02-03 RX ADMIN — SODIUM CHLORIDE, PRESERVATIVE FREE 10 ML: 5 INJECTION INTRAVENOUS at 20:09

## 2020-02-03 RX ADMIN — ATORVASTATIN CALCIUM 10 MG: 10 TABLET, FILM COATED ORAL at 08:30

## 2020-02-03 RX ADMIN — MONTELUKAST SODIUM 10 MG: 10 TABLET, COATED ORAL at 20:09

## 2020-02-03 RX ADMIN — METOPROLOL TARTRATE 12.5 MG: 25 TABLET, FILM COATED ORAL at 08:31

## 2020-02-03 NOTE — PROGRESS NOTES
[facility] Medicine Services  PROGRESS NOTE     Patient Identification:  Name:  Filemon Jj  Age:  44 y.o.  Sex:  male  :  1975  MRN:  [id number]  Visit Number:  [id number]  Primary Care Provider:  Macho Jose MD    Length of stay:  0    ----------------------------------------------------------------------------------------------------------------------  Subjective     Chief Complaint:  Follow up for COPD exacerbation, subtherapeutic INR     History of Presenting Illness/Hospital Course:  Patient is a 44 y.o. male with past medical history significant for bioprosthetic aortic valve replacement, chronic anticoagulation with Coumadin, history of AAA status post repair, non-oxygen dependent COPD, essential hypertension, hyperlipidemia, GERD, anxiety and obesity that presented to the [facility] emergency department for evaluation of shortness of breath. Please see admitting H&P for further details. Patient found to have SIRS, acute COPD exacerbation, and subtherapeutic INR in setting of mechanical aortic valve.     Subjective:  Today, the patient is resting in bed per my evaluation this morning.  Patient no acute distress.  On room air.  No overnight events reported per nursing staff, has been ambulating in [place] multiple times morning with no issues and no oxygen desaturations.  He states he feels much better today.  Dyspnea is improved, cough improved.  Denies any chest pain or pressure, no palpitations.  No fevers or chills.  He denies any abdominal pain, nausea, vomiting or diarrhea.  No urinary symptoms.  He remains subtherapeutic.    Present during exam: N/A   ----------------------------------------------------------------------------------------------------------------------  Objective     Consults:  • None     Procedures:  • 2/3/2020: Transthoracic echocardiogram   o Pending cardiology read     Current Hospital Meds:    arformoterol 15  mcg Nebulization BID - RT   atorvastatin 10 mg Oral Daily   budesonide 0.25 mg Nebulization Daily - RT   famotidine 40 mg Oral BID   ipratropium 0.5 mg Nebulization Q6H   metoprolol tartrate 12.5 mg Oral BID   montelukast 10 mg Oral Nightly   predniSONE 20 mg Oral BID With Meals   sodium chloride 10 mL Intravenous Q12H   warfarin 6 mg Oral Once       heparin (porcine) 11.19 Units/kg/hr Last Rate: 11.19 Units/kg/hr (02/03/20 0916)   Pharmacy to dose warfarin       ----------------------------------------------------------------------------------------------------------------------  Vital Signs:  Temp:  [97.4 °F (36.3 °C)-99.2 °F (37.3 °C)] 98.1 °F (36.7 °C)  Heart Rate:  [] 90  Resp:  [20-24] 20  BP: ()/(53-84) 108/53  Mean Arterial Pressure (Non-Invasive) for the past 24 hrs (Last 3 readings):   Noninvasive MAP (mmHg)   02/03/20 0715 84   02/03/20 0515 77   02/03/20 0056 78     SpO2 Percentage    02/03/20 0655 02/03/20 0706 02/03/20 0715   SpO2: 94% 94% 95%     SpO2:  [88 %-99 %] 95 %  on  Flow (L/min):  [2] 2;   Device (Oxygen Therapy): room air    Body mass index is 30.32 kg/m².  Wt Readings from Last 3 Encounters:   02/02/20 87.8 kg (193 lb 9.6 oz)   02/02/20 89.4 kg (197 lb)   10/14/19 89.8 kg (198 lb)        Intake/Output Summary (Last 24 hours) at 2/3/2020 1043  Last data filed at 2/3/2020 0715  Gross per 24 hour   Intake 120 ml   Output 225 ml   Net -105 ml     Diet Regular  ----------------------------------------------------------------------------------------------------------------------  Physical exam:  Physical Exam   Constitutional: He is oriented to person, place, and time. He appears well-developed and well-nourished.  Non-toxic appearance. No distress.   Sitting up in bed, no acute distress noted.    HENT:   Head: Normocephalic and atraumatic.   Mouth/Throat: Mucous membranes are normal.   Eyes: Pupils are equal, round, and reactive to light. Conjunctivae are normal. No scleral icterus.    Neck: Neck supple.   Cardiovascular: Normal rate, regular rhythm, normal heart sounds and intact distal pulses. Exam reveals no gallop and no friction rub.   No murmur heard.  Pulses:       Dorsalis pedis pulses are 2+ on the right side, and 2+ on the left side.        Posterior tibial pulses are 2+ on the right side, and 2+ on the left side.   Mechanical click noted   Pulmonary/Chest: Effort normal. No accessory muscle usage. No tachypnea. No respiratory distress. He has decreased breath sounds (Improved aeration bilaterally, no wheezing noted). He has no wheezes. He has no rhonchi. He has no rales. He exhibits no tenderness.   Abdominal: Soft. Bowel sounds are normal. There is no tenderness. There is no rebound and no guarding.   Obese.    Genitourinary:   Genitourinary Comments: No weems catheter in place, making urine.    Musculoskeletal: He exhibits no tenderness or deformity.        Right lower leg: He exhibits no edema.        Left lower leg: He exhibits no edema.     Vascular Status -  His right foot exhibits normal foot vasculature  and no edema. His left foot exhibits normal foot vasculature  and no edema.  Neurological: He is alert and oriented to person, place, and time. He has normal strength. No cranial nerve deficit or sensory deficit.   Awake and alert. Follow commands. Answers questions appropriately. Moves all extremities equally. Strength and sensation intact. No focal neurological deficits on exam.    Skin: Skin is warm and dry. Capillary refill takes less than 2 seconds. No rash noted. No erythema. No pallor.   Psychiatric: He has a normal mood and affect. His speech is normal and behavior is normal. Judgment and thought content normal. Cognition and memory are normal.   Nursing note and vitals reviewed.     ----------------------------------------------------------------------------------------------------------------------  Tele:    Sinus 90s    I have personally reviewed the EKG/Telemetry  strips   ----------------------------------------------------------------------------------------------------------------------  Results from last 7 days   Lab Units 02/02/20  1239 02/02/20  0619 02/02/20  0412   TROPONIN T ng/mL <0.010 <0.010 <0.010     Results from last 7 days   Lab Units 02/02/20  0412   PROBNP pg/mL 744.2*     Results from last 7 days   Lab Units 02/03/20  0038   CHOLESTEROL mg/dL 157   TRIGLYCERIDES mg/dL 60   HDL CHOL mg/dL 40   LDL CHOL mg/dL 105*     Results from last 7 days   Lab Units 02/02/20  1147   PH, ARTERIAL pH units 7.319*   PO2 ART mm Hg 67.2*   PCO2, ARTERIAL mm Hg 34.9*   HCO3 ART mmol/L 17.9*     Results from last 7 days   Lab Units 02/03/20  0435 02/03/20  0038 02/02/20 2053 02/02/20  1928 02/02/20  1500 02/02/20  1239 02/02/20  0421   CRP mg/dL  --  1.31*  --   --   --   --   --    LACTATE mmol/L  --   --   --  3.8* 5.6*  --   --    WBC 10*3/mm3  --  15.19* 14.38*  --   --  13.57*  --    HEMOGLOBIN g/dL  --  14.1 14.5  --   --  15.0  --    HEMATOCRIT %  --  46.0 45.9  --   --  48.6  --    MCV fL  --  88.1 86.1  --   --  88.7  --    MCHC g/dL  --  30.7* 31.6  --   --  30.9*  --    PLATELETS 10*3/mm3  --  213 216  --   --  234  --    INR  2.04*  --  1.68*  --   --  1.65* 1.56*     Results from last 7 days   Lab Units 02/03/20  0038 02/02/20  1239 02/02/20  0412   SODIUM mmol/L 138 137 142   POTASSIUM mmol/L 3.9  3.9 3.8 4.4   MAGNESIUM mg/dL 2.2  --   --    CHLORIDE mmol/L 103 102 105   CO2 mmol/L 19.2* 17.6* 23.6   BUN mg/dL 19 18 16   CREATININE mg/dL 0.99 1.22 1.09   EGFR IF NONAFRICN AM mL/min/1.73 83 65 74   CALCIUM mg/dL 8.9 8.9 9.0   GLUCOSE mg/dL 159* 213* 105*   ALBUMIN g/dL  --  4.31 4.45   BILIRUBIN mg/dL  --  0.3 0.3   ALK PHOS U/L  --  64 65   AST (SGOT) U/L  --  20 18   ALT (SGPT) U/L  --  26 23   Estimated Creatinine Clearance: 101.8 mL/min (by C-G formula based on SCr of 0.99 mg/dL).     Lab Results   Component Value Date    HGBA1C 6.20 (H) 02/02/2020     Lab  Results   Component Value Date    TSH 0.684 02/02/2020     Microbiology Results (last 10 days)     Procedure Component Value - Date/Time    Rapid Strep A Screen - Swab, Throat [[id number]]  (Normal) Collected:  02/02/20 2207    Lab Status:  Final result Specimen:  Swab from Throat Updated:  02/02/20 2250     Strep A Ag Negative    Blood Culture - Blood, Hand, Left [[id number]] Collected:  02/02/20 1508    Lab Status:  Preliminary result Specimen:  Blood from Hand, Left Updated:  02/03/20 0330     Blood Culture No growth at less than 24 hours    Blood Culture - Blood, Arm, Left [[id number]] Collected:  02/02/20 1500    Lab Status:  Preliminary result Specimen:  Blood from Arm, Left Updated:  02/03/20 0330     Blood Culture No growth at less than 24 hours    Influenza Antigen, Rapid - Swab, Nasopharynx [[id number]]  (Normal) Collected:  02/02/20 0455    Lab Status:  Final result Specimen:  Swab from Nasopharynx Updated:  02/02/20 0516     Influenza A Ag, EIA Negative     Influenza B Ag, EIA Negative         Pain Management Panel     Pain Management Panel Latest Ref Rng & Units 2/2/2020 1/16/2018    AMPHETAMINES SCREEN, URINE Negative Negative Negative    BARBITURATES SCREEN Negative Negative Negative    BENZODIAZEPINE SCREEN, URINE Negative Negative Negative    BUPRENORPHINEUR Negative Negative Negative    COCAINE SCREEN, URINE Negative Negative Negative    METHADONE SCREEN, URINE Negative Negative Negative    METHAMPHETAMINEUR Negative - Negative        I have personally reviewed the above laboratory results.   ----------------------------------------------------------------------------------------------------------------------  Imaging Results (Last 24 Hours)     Procedure Component Value Units Date/Time    XR Chest 1 View [[id number]] Collected:  02/02/20 1310     Updated:  02/02/20 1554    Narrative:       XR CHEST 1 VW-     CLINICAL INDICATION: sob        COMPARISON: 02/02/2020      TECHNIQUE: Single frontal view of  the chest.     FINDINGS:     There is no focal alveolar infiltrate or effusion.  The cardiac silhouette is normal. The pulmonary vasculature is  unremarkable.  There is no evidence of an acute osseous abnormality.   There are no suspicious-appearing parenchymal soft tissue nodules.          Impression:       No evidence of active or acute cardiopulmonary disease on today's chest  radiograph.     This report was finalized on 2/2/2020 1:11 PM by Dr. Steven Toussaint MD.           I have personally reviewed the above radiology results.   ----------------------------------------------------------------------------------------------------------------------  Assessment/Plan     Active Hospital Problems    Diagnosis POA   • **COPD with acute exacerbation (CMS/[facility]) [J44.1] Yes   • Obesity (BMI 30-39.9) [E66.9] Yes   • GERD without esophagitis [K21.9] Yes   • Anxiety disorder [F41.9] Yes   • Chronic anticoagulation [Z79.01] Not Applicable   • Hyperglycemia [R73.9] Yes   • Primary metabolic acidosis, with increased anion gap, compensated [E87.2] Yes   • Hyperlipidemia [E78.5] Yes   • Hx of aortic valve replacement, mechanical [Z95.2] Not Applicable   • Essential hypertension [I10] Yes   • Ascending aortic aneurysm (CMS/[facility]) [I71.2] Yes     · CT chest (10/17): 6 cm ascending aortic aneurysm  · Bentnatalie by Aaron Lucas, 1/17/2018       · SIRS with acute COPD exacerbation: Supportive care. Supplemental O2 as needed to titrate SpO2 > 90%. Scheduled inhalants. De-escalate IV solumedrol to PO prednisone taper. Blood cultures with NGTD, rapid strep negative, influenza negative.  White blood cell count increased, likely steroid-induced.  Patient is afebrile and hemodynamically stable.  Continue to monitor off of antibiotics.  Follow cultures for finalization.  Continue supportive care.  · Primary metabolic acidosis with elevated anion gap, compensated: AG improved, still slightly elevated. CO2 slightly low. Monitor closely, repeat  chemistry panel in AM. If not improved, consider ABG.  · EKG abnormality: ST/T wave abnormality in inferolateral leads, repeat EKG pending. Closely monitor on telemetry.  Troponin negative x3 in the ED.  Patient without chest pain. Already on heparin gtt.  Continue home statin, lipid panel with adequate control.  Previous echocardiogram reviewed, repeat ECHO pending cardiology read.   · Hyperglycemia without history of diabetes mellitus: HgbA1C 6.2, borderline. Patient educated on lifestyle modifications. Likely steroid induced. Monitor with daily chemistry panel.   · History of aortic mechanical valve conduit (Bentall procedure) 2018, with required lifelong anticoagulation with Coumadin, subtherapeutic INR on admission: INR still subtherapeutic. Continue heparin gtt bridge and pharmacy to dose Coumadin. Repeat INR in AM. Monitor for signs/symptoms of bleeding.   · Essential hypertension: BP controlled. Continue home Metoprolol with holding parameters to prevent hypotension and/or bradycardia. Closely monitor BP per hospital protocol, titrate medications as necessary.   · Hyperlipidemia: Continue home statin. Lipid panel with chuy  · History of AAA s/p repair in past: Continue home statin   · GERD: Pepcid   · Anxiety: Supportive care.   · Hypophosphatemia: Replace per protocol. Repeat phos level in AM.   · Obestiy, BMI 30.85 kg/m²  · Smokeless tobacco abuse: No desire to quit. Continue to encourage cessation.     --------------------------------------------------  DVT Prophylaxis: Heparin gtt bridge/pharmacy to dose Warfarin   GI Prophylaxis: Pepcid  FEN: No IV fluids. Replace electrolytes per protocol as necessary. Regular diet.   Activity: As tolerated   Home O2: None   Disposition: Home once INR is therapeutic   --------------------------------------------------    The patient is high risk due to the following diagnoses/reasons:  SIRS, COPD exacerbation, mechanical aortic valve replacement, subtherapeutic INR,  obesity, electrolyte abnormalities     I have discussed the patient's assessment and plan with the patient, AM NO Berry, and attending physician Dr. Goldman.      Carrie Escobar PA-C  [facility]   Pager: [phone]    02/03/20  10:43 AM    Attending Physician: Duy Goldman,*    ----------------------------------------------------------------------------------------------------------------------

## 2020-02-03 NOTE — PLAN OF CARE
Problem: Pain, Chronic (Adult)  Goal: Identify Related Risk Factors and Signs and Symptoms  Outcome: Ongoing (interventions implemented as appropriate)  Goal: Acceptable Pain/Comfort Level and Functional Ability  Outcome: Ongoing (interventions implemented as appropriate)     Problem: Patient Care Overview  Goal: Plan of Care Review  Outcome: Ongoing (interventions implemented as appropriate)  Flowsheets (Taken 2/3/2020 1866)  Progress: improving  Plan of Care Reviewed With: patient  Outcome Summary: Patient has had no complaints throughout shift, wheezing has improved since admission.  Goal: Individualization and Mutuality  Outcome: Ongoing (interventions implemented as appropriate)  Goal: Discharge Needs Assessment  Outcome: Ongoing (interventions implemented as appropriate)  Goal: Interprofessional Rounds/Family Conf  Outcome: Ongoing (interventions implemented as appropriate)     Problem: Chronic Obstructive Pulmonary Disease (Adult)  Goal: Signs and Symptoms of Listed Potential Problems Will be Absent, Minimized or Managed (Chronic Obstructive Pulmonary Disease)  Outcome: Ongoing (interventions implemented as appropriate)

## 2020-02-04 VITALS
WEIGHT: 193.6 LBS | RESPIRATION RATE: 18 BRPM | DIASTOLIC BLOOD PRESSURE: 53 MMHG | HEIGHT: 67 IN | TEMPERATURE: 98.2 F | HEART RATE: 73 BPM | BODY MASS INDEX: 30.39 KG/M2 | OXYGEN SATURATION: 99 % | SYSTOLIC BLOOD PRESSURE: 93 MMHG

## 2020-02-04 PROBLEM — E87.20 METABOLIC ACIDOSIS: Status: RESOLVED | Noted: 2020-02-02 | Resolved: 2020-02-04

## 2020-02-04 LAB
ANION GAP SERPL CALCULATED.3IONS-SCNC: 9.9 MMOL/L (ref 5–15)
APTT PPP: 60.9 SECONDS (ref 23.8–36.1)
BACTERIA SPEC AEROBE CULT: NORMAL
BASOPHILS # BLD AUTO: 0.03 10*3/MM3 (ref 0–0.2)
BASOPHILS NFR BLD AUTO: 0.2 % (ref 0–1.5)
BUN BLD-MCNC: 20 MG/DL (ref 6–20)
BUN/CREAT SERPL: 18.5 (ref 7–25)
CALCIUM SPEC-SCNC: 8.7 MG/DL (ref 8.6–10.5)
CHLORIDE SERPL-SCNC: 104 MMOL/L (ref 98–107)
CO2 SERPL-SCNC: 25.1 MMOL/L (ref 22–29)
CREAT BLD-MCNC: 1.08 MG/DL (ref 0.76–1.27)
DEPRECATED RDW RBC AUTO: 54.6 FL (ref 37–54)
EOSINOPHIL # BLD AUTO: 0 10*3/MM3 (ref 0–0.4)
EOSINOPHIL NFR BLD AUTO: 0 % (ref 0.3–6.2)
ERYTHROCYTE [DISTWIDTH] IN BLOOD BY AUTOMATED COUNT: 16.8 % (ref 12.3–15.4)
GFR SERPL CREATININE-BSD FRML MDRD: 75 ML/MIN/1.73
GLUCOSE BLD-MCNC: 151 MG/DL (ref 65–99)
HCT VFR BLD AUTO: 42 % (ref 37.5–51)
HGB BLD-MCNC: 13 G/DL (ref 13–17.7)
IMM GRANULOCYTES # BLD AUTO: 0.19 10*3/MM3 (ref 0–0.05)
IMM GRANULOCYTES NFR BLD AUTO: 1.3 % (ref 0–0.5)
INR PPP: 3.31 (ref 0.9–1.1)
LYMPHOCYTES # BLD AUTO: 2.68 10*3/MM3 (ref 0.7–3.1)
LYMPHOCYTES NFR BLD AUTO: 17.8 % (ref 19.6–45.3)
MAGNESIUM SERPL-MCNC: 2.3 MG/DL (ref 1.6–2.6)
MCH RBC QN AUTO: 27.4 PG (ref 26.6–33)
MCHC RBC AUTO-ENTMCNC: 31 G/DL (ref 31.5–35.7)
MCV RBC AUTO: 88.6 FL (ref 79–97)
MONOCYTES # BLD AUTO: 1 10*3/MM3 (ref 0.1–0.9)
MONOCYTES NFR BLD AUTO: 6.6 % (ref 5–12)
NEUTROPHILS # BLD AUTO: 11.16 10*3/MM3 (ref 1.7–7)
NEUTROPHILS NFR BLD AUTO: 74.1 % (ref 42.7–76)
NRBC BLD AUTO-RTO: 0 /100 WBC (ref 0–0.2)
PHOSPHATE SERPL-MCNC: 2.2 MG/DL (ref 2.5–4.5)
PLATELET # BLD AUTO: 222 10*3/MM3 (ref 140–450)
PMV BLD AUTO: 11 FL (ref 6–12)
POTASSIUM BLD-SCNC: 4.5 MMOL/L (ref 3.5–5.2)
PROTHROMBIN TIME: 35.1 SECONDS (ref 11–15.4)
RBC # BLD AUTO: 4.74 10*6/MM3 (ref 4.14–5.8)
SODIUM BLD-SCNC: 139 MMOL/L (ref 136–145)
WBC NRBC COR # BLD: 15.06 10*3/MM3 (ref 3.4–10.8)

## 2020-02-04 PROCEDURE — 94799 UNLISTED PULMONARY SVC/PX: CPT

## 2020-02-04 PROCEDURE — 85025 COMPLETE CBC W/AUTO DIFF WBC: CPT | Performed by: PHYSICIAN ASSISTANT

## 2020-02-04 PROCEDURE — 85730 THROMBOPLASTIN TIME PARTIAL: CPT | Performed by: INTERNAL MEDICINE

## 2020-02-04 PROCEDURE — G0378 HOSPITAL OBSERVATION PER HR: HCPCS

## 2020-02-04 PROCEDURE — 83735 ASSAY OF MAGNESIUM: CPT | Performed by: PHYSICIAN ASSISTANT

## 2020-02-04 PROCEDURE — 99217 PR OBSERVATION CARE DISCHARGE MANAGEMENT: CPT | Performed by: PHYSICIAN ASSISTANT

## 2020-02-04 PROCEDURE — 63710000001 PREDNISONE PER 1 MG: Performed by: PHYSICIAN ASSISTANT

## 2020-02-04 PROCEDURE — 85610 PROTHROMBIN TIME: CPT | Performed by: PHYSICIAN ASSISTANT

## 2020-02-04 PROCEDURE — 80048 BASIC METABOLIC PNL TOTAL CA: CPT | Performed by: PHYSICIAN ASSISTANT

## 2020-02-04 PROCEDURE — 96366 THER/PROPH/DIAG IV INF ADDON: CPT

## 2020-02-04 PROCEDURE — 84100 ASSAY OF PHOSPHORUS: CPT | Performed by: PHYSICIAN ASSISTANT

## 2020-02-04 RX ORDER — WARFARIN SODIUM 1 MG/1
3 TABLET ORAL
Qty: 42 TABLET | Refills: 0 | OUTPATIENT
Start: 2020-02-05 | End: 2020-09-19 | Stop reason: HOSPADM

## 2020-02-04 RX ORDER — WARFARIN SODIUM 1 MG/1
3 TABLET ORAL
Qty: 42 TABLET | Refills: 0 | Status: SHIPPED | OUTPATIENT
Start: 2020-02-05 | End: 2020-02-04 | Stop reason: SDUPTHER

## 2020-02-04 RX ORDER — PREDNISONE 20 MG/1
20 TABLET ORAL 2 TIMES DAILY WITH MEALS
Qty: 6 TABLET | Refills: 0 | Status: SHIPPED | OUTPATIENT
Start: 2020-02-04 | End: 2020-02-07

## 2020-02-04 RX ORDER — WARFARIN SODIUM 1 MG/1
3 TABLET ORAL
Qty: 42 TABLET | Refills: 0 | Status: SHIPPED | OUTPATIENT
Start: 2020-02-04 | End: 2020-02-04 | Stop reason: SDUPTHER

## 2020-02-04 RX ADMIN — IPRATROPIUM BROMIDE 0.5 MG: 0.5 SOLUTION RESPIRATORY (INHALATION) at 06:12

## 2020-02-04 RX ADMIN — METOPROLOL TARTRATE 12.5 MG: 25 TABLET, FILM COATED ORAL at 08:17

## 2020-02-04 RX ADMIN — IPRATROPIUM BROMIDE 0.5 MG: 0.5 SOLUTION RESPIRATORY (INHALATION) at 01:15

## 2020-02-04 RX ADMIN — FAMOTIDINE 40 MG: 20 TABLET ORAL at 08:17

## 2020-02-04 RX ADMIN — POTASSIUM & SODIUM PHOSPHATES POWDER PACK 280-160-250 MG 2 PACKET: 280-160-250 PACK at 03:47

## 2020-02-04 RX ADMIN — PREDNISONE 20 MG: 20 TABLET ORAL at 08:17

## 2020-02-04 RX ADMIN — ATORVASTATIN CALCIUM 10 MG: 10 TABLET, FILM COATED ORAL at 08:17

## 2020-02-04 NOTE — NURSING NOTE
Pt ambulated around the nurses station 2 times. Pt denied any shortness of breath and was able to maintain an O2 of 90% or above on room air. Pt currently does not wear home O2 and has not needed PRN order of O2 for this shift. Will continue to monitor.

## 2020-02-04 NOTE — PROGRESS NOTES
Discharge Planning Assessment   Jose     Patient Name: Filemon Jj  MRN: [id number]  Today's Date: 2/4/2020    Admit Date: 2/2/2020      Discharge Plan     Row Name 02/04/20 1017       Plan    Final Discharge Disposition Code  01 - home or self-care    Final Note  Pt is being discharged home on this day. SS arranged RTEc for pt to be transported home. MD does not see need for walker requested by pt. No other needs identified.             RHETT Monge

## 2020-02-04 NOTE — NURSING NOTE
Walking rounds and partial skin assessment completed with NO Adrian and NO Berry. Pt refused full skin assessment. See flowsheet for findings.

## 2020-02-04 NOTE — DISCHARGE PLACEMENT REQUEST
"ParvizMoustapha (44 y.o. Male)     Date of Birth Social Security Number Address Home Phone MRN    1975  [address]  [facility] [address] [phone] [id number]    Amish Marital Status          Non-Episcopal Single       Admission Date Admission Type Admitting Provider Attending Provider Department, Room/Bed    2/2/20 Emergency Duy Goldman DO Mullins, Thomas Anthony, DO [facility] OBSERVATION UNIT, 249/2S    Discharge Date Discharge Disposition Discharge Destination         Home or Self Care              Attending Provider:  Duy Goldman DO    Allergies:  Aspirin    Isolation:  None   Infection:  None   Code Status:  CPR    Ht:  170.2 cm (67\")   Wt:  87.8 kg (193 lb 9.6 oz)    Admission Cmt:  None   Principal Problem:  COPD with acute exacerbation (CMS/[facility]) [J44.1]                 Active Insurance as of 2/2/2020     Primary Coverage     Payor Plan Insurance Group Employer/Plan Group    [facility] [place] [facility] [place]      Payor Plan Address Payor Plan Phone Number Payor Plan Fax Number Effective Dates    [address] [address]   6/1/2014 - None Entered    [place] [address]       Subscriber Name Subscriber Birth Date Member ID       MOUSTAPHA JJ 1975 [id number]                 Emergency Contacts      (Rel.) Home Phone Work Phone Mobile Phone    Alexandra Pena (Mother) [phone] -- [phone]        Please call [phone] with any questions. Thank you!    Insurance Information                [employer] [place] [place] Phone:     Subscriber: Moustapha Jj Subscriber#: [id number]    Group#:  Precert#:           Discharge Order (From admission, onward)     Start     Ordered    02/04/20 0807  Discharge patient  Once     Expected Discharge Date:  02/04/20    Expected Discharge Time:  Morning    Discharge Disposition:  Home or Self Care    Physician of Record for Attribution - Please select from Treatment Team:  " CRAIG HODGES [[id number]]    Review needed by CMO to determine Physician of Record:  No       Question Answer Comment   Physician of Record for Attribution - Please select from Treatment Team CRAIG HODGES    Review needed by CMO to determine Physician of Record No        02/04/20 0815

## 2020-02-04 NOTE — DISCHARGE SUMMARY
[facility] Medicine Services  DISCHARGE SUMMARY    Patient Identification:  Name:  Filemon Jj  Age:  44 y.o.  Sex:  male  :  1975  MRN:  [id number]  Visit Number:  [id number]    Date of Admission: 2020  Date of Discharge:  2020    PCP: Macho [facility] MD    Discharging Provider: Carrie Escobar PA-C    Admission/Discharge Diagnoses     Discharge Diagnoses:  · SIRS with acute COPD exacerbation  · Primary metabolic acidosis with elevated anion gap, compensated, resolved  · Hyperglycemia without history of diabetes mellitus, hemoglobin A1c 6.2, recommend lifestyle modifications  · Hypophosphatemia, resolved with supplementation   · History of aortic mechanical valve conduit, chronically anticoagulated with Coumadin  · Subtherapeutic INR on admission, resolved at discharge  · Essential hypertension, controlled  · Hyperlipidemia, on statin  · History of AAA status post repair in past  · GERD  · Anxiety disorder  · Obesity, BMI 30.32 kg/m²  · Smokeless tobacco abuse    Consults/Procedures     Consults:   · None    Procedures Performed:  · 2/3/2020: Transthoracic echocardiogram   · Normal left ventricular cavity size and wall thickness noted.  · The following left ventricular wall segments are hypokinetic: mid anterior, apical anterior, apical septal, basal inferoseptal, mid inferoseptal, mid anteroseptal and basal inferoseptal.  · Left ventricular systolic function is mildly decreased.  · Estimated EF appears to be in the range of 46 - 50%.  · There is a prosthetic aortic valve. There is a bioprosthetic valve present. The aortic valve peak and mean gradients are within defined limits.  · Ao max PG 13.8 mmHg Ao mean PG 7.2 mmHg Ao V2 VTI 33.6 cm AMMY(I,D) 1.5 cm^2  · The mitral valve is abnormal in structure. Mild MAC is present. Mild mitral valve regurgitation is present. No significant mitral valve stenosis is present.  · There is no evidence of pericardial  effusion.    History of Presenting Illness     Filemon Jj is a 44 y.o. male with past medical history significant for COPD, mechanical aortic valve replacement, chronic anticoagulation with Coumadin, history of AAA status post repair in past, essential hypertension, hyperlipidemia, GERD, anxiety, obesity, and smokeless tobacco abuse that presented to the [facility] emergency department for evaluation of shortness of breath.  Please see the admitting history and physical for further details. Patient was found to have acute COPD exacerbation and subtherapeutic INR.    Hospital Course     Patient was placed in observation status in the observation unit for further evaluation and treatment.  Patient was never found to be hypoxic, he did not require any supplemental oxygen.  Chest x-ray was without evidence of consolidations.  Blood cultures were obtained in the ED prior to antibiotic administration.  Fluids and rapid strep negative in ED, blood cultures remained with no growth during hospitalization.  Patient was monitored closely off of antibiotics, procalcitonin level was normal.  Patient was started on scheduled inhalants and IV corticosteroids.  Patient was treated with symptomatic management.  Patient was monitored closely on telemetry and pulse oximetry.  Patient's INR was subtherapeutic at 1.56 on day of admission, with goal of 2.5-3.5 in setting of mechanical aortic valve.  Patient was started on a heparin drip for bridge therapy, pharmacy consulted for dosing of Coumadin.  His dosage was adjusted periodically.    On day of discharge, patient was noted to be ambulating in the [place] with no oxygen desaturations.  He was eager for discharge, agreeable to discharge plan.  His INR was therapeutic at 3.3.  I discussed the case in depth with Albina, registered pharmacist.  Per pharmacy, they recommended patient go home with 3 mg p.o. Coumadin daily whereas he had been prescribed 6 mg in the outpatient  setting.  It was felt he was probably not compliant with medication as he was given 6 mg Coumadin while hospitalized and his INR jumped rapidly from 1.5-3.3.  Patient will be scheduled to follow-up with PCP in the next 2 days post discharge with repeat INR while staying on the 3 mg dose.  He will not have a dose on the day of discharge as his INR was 3.3 and had a quick jump from 2.04.  Sharifa ARIZMENDI RN was present during discussion with patient about his Coumadin dosing, patient was explained in deep detail and given written instructions on his Coumadin dosing.  Patient verbalized understanding.  Patient educated on signs/symptoms warranting emergent return to care.  Patient will be discharged with short course of p.o. prednisone.  He will continue his home inhalants.  Patient to continue his home medications as previously prescribed, please see discharge MAR listed below.  Patient will continue to follow-up with cardiologist outpatient as previously recommended/scheduled.    Discharge Vitals/Physical Examination     Vital Signs:  Temp:  [98.2 °F (36.8 °C)-98.7 °F (37.1 °C)] 98.2 °F (36.8 °C)  Heart Rate:  [73-90] 73  Resp:  [18-20] 18  BP: ()/(50-64) 93/53  Mean Arterial Pressure (Non-Invasive) for the past 24 hrs (Last 3 readings):   Noninvasive MAP (mmHg)   02/04/20 0704 68   02/04/20 0449 83   02/04/20 0055 80     SpO2 Percentage    02/04/20 0612 02/04/20 0622 02/04/20 0704   SpO2: 95% 95% 99%     SpO2:  [93 %-99 %] 99 %  on   ;   Device (Oxygen Therapy): room air    Body mass index is 30.32 kg/m².  Wt Readings from Last 3 Encounters:   02/02/20 87.8 kg (193 lb 9.6 oz)   02/02/20 89.4 kg (197 lb)   10/14/19 89.8 kg (198 lb)       Physical Exam:  Physical Exam   Constitutional: He is oriented to person, place, and time. He appears well-developed and well-nourished.  Non-toxic appearance. No distress.   Pleasant, sitting up in bed, no acute distress noted.  On room air.   HENT:   Head: Normocephalic and  atraumatic.   Mouth/Throat: Mucous membranes are normal.   Eyes: Pupils are equal, round, and reactive to light. Conjunctivae are normal. No scleral icterus.   Neck: Neck supple.   Cardiovascular: Normal rate, regular rhythm, normal heart sounds and intact distal pulses. Exam reveals no gallop and no friction rub.   No murmur heard.  Pulses:       Dorsalis pedis pulses are 2+ on the right side, and 2+ on the left side.        Posterior tibial pulses are 2+ on the right side, and 2+ on the left side.   Mechanical click noted   Pulmonary/Chest: Effort normal. No accessory muscle usage. No tachypnea. No respiratory distress. He has decreased breath sounds (Significantly improved aeration). He has no wheezes. He has no rhonchi. He has no rales. He exhibits no tenderness.   Abdominal: Soft. Bowel sounds are normal. He exhibits no distension. There is no tenderness. There is no rebound and no guarding.   Obese.   Genitourinary:   Genitourinary Comments: No weems catheter in place.    Musculoskeletal: He exhibits no edema, tenderness or deformity.        Right lower leg: He exhibits no edema.        Left lower leg: He exhibits no edema.     Vascular Status -  His right foot exhibits normal foot vasculature  and no edema. His left foot exhibits normal foot vasculature  and no edema.  Neurological: He is alert and oriented to person, place, and time. He has normal strength. No cranial nerve deficit or sensory deficit. Gait normal.   Awake and alert.  Follows commands.  Answers questions properly.  Noted in the hallway walking without any issues.  No abnormal gait.  Moves all extremities equally.  Strength and sensation intact.  No focal neurological deficits on exam.   Skin: Skin is warm and dry. Capillary refill takes less than 2 seconds. No rash noted. No erythema. No pallor.   Psychiatric: He has a normal mood and affect. His speech is normal and behavior is normal. Judgment and thought content normal. Cognition and  memory are normal.   Nursing note and vitals reviewed.     Pertinent Laboratory/Radiology Results     Pertinent Laboratory Results:  Results from last 7 days   Lab Units 02/02/20  1239 02/02/20  0619 02/02/20  0412   TROPONIN T ng/mL <0.010 <0.010 <0.010     Results from last 7 days   Lab Units 02/02/20  0412   PROBNP pg/mL 744.2*     Results from last 7 days   Lab Units 02/03/20  0038   CHOLESTEROL mg/dL 157   TRIGLYCERIDES mg/dL 60   HDL CHOL mg/dL 40   LDL CHOL mg/dL 105*     Results from last 7 days   Lab Units 02/02/20  1147   PH, ARTERIAL pH units 7.319*   PO2 ART mm Hg 67.2*   PCO2, ARTERIAL mm Hg 34.9*   HCO3 ART mmol/L 17.9*     Results from last 7 days   Lab Units 02/04/20  0214 02/03/20  0435 02/03/20  0038 02/02/20  2053 02/02/20  1928 02/02/20  1500 02/02/20  1239 02/02/20  0421   CRP mg/dL  --   --  1.31*  --   --   --   --   --    LACTATE mmol/L  --   --   --   --  3.8* 5.6*  --   --    WBC 10*3/mm3 15.06*  --  15.19* 14.38*  --   --  13.57*  --    HEMOGLOBIN g/dL 13.0  --  14.1 14.5  --   --  15.0  --    HEMATOCRIT % 42.0  --  46.0 45.9  --   --  48.6  --    MCV fL 88.6  --  88.1 86.1  --   --  88.7  --    MCHC g/dL 31.0*  --  30.7* 31.6  --   --  30.9*  --    PLATELETS 10*3/mm3 222  --  213 216  --   --  234  --    INR  3.31* 2.04*  --  1.68*  --   --  1.65* 1.56*     Results from last 7 days   Lab Units 02/04/20  0214 02/03/20  0038 02/02/20  1239 02/02/20  0412   SODIUM mmol/L 139 138 137 142   POTASSIUM mmol/L 4.5 3.9  3.9 3.8 4.4   MAGNESIUM mg/dL 2.3 2.2  --   --    CHLORIDE mmol/L 104 103 102 105   CO2 mmol/L 25.1 19.2* 17.6* 23.6   BUN mg/dL 20 19 18 16   CREATININE mg/dL 1.08 0.99 1.22 1.09   EGFR IF NONAFRICN AM mL/min/1.73 75 83 65 74   CALCIUM mg/dL 8.7 8.9 8.9 9.0   GLUCOSE mg/dL 151* 159* 213* 105*   ALBUMIN g/dL  --   --  4.31 4.45   BILIRUBIN mg/dL  --   --  0.3 0.3   ALK PHOS U/L  --   --  64 65   AST (SGOT) U/L  --   --  20 18   ALT (SGPT) U/L  --   --  26 23   Estimated Creatinine  Clearance: 93.3 mL/min (by C-G formula based on SCr of 1.08 mg/dL).    Lab Results   Component Value Date    HGBA1C 6.20 (H) 02/02/2020     Lab Results   Component Value Date    TSH 0.684 02/02/2020     Microbiology Results (last 10 days)     Procedure Component Value - Date/Time    Rapid Strep A Screen - Swab, Throat [[id number]]  (Normal) Collected:  02/02/20 2207    Lab Status:  Final result Specimen:  Swab from Throat Updated:  02/02/20 2250     Strep A Ag Negative    Beta Strep Culture, Throat - Swab, Throat [[id number]]  (Normal) Collected:  02/02/20 2207    Lab Status:  Preliminary result Specimen:  Swab from Throat Updated:  02/03/20 1332     Throat Culture, Beta Strep No Beta Hemolytic Streptococcus Isolated    Narrative:       Group A Strep incidence is low in adults. Positive culture for Beta hemolytic Streptococcus species can reflect colonization and not true infection. Please correlate clinically.    Blood Culture - Blood, Hand, Left [[id number]] Collected:  02/02/20 1508    Lab Status:  Preliminary result Specimen:  Blood from Hand, Left Updated:  02/03/20 1530     Blood Culture No growth at 24 hours    Blood Culture - Blood, Arm, Left [[id number]] Collected:  02/02/20 1500    Lab Status:  Preliminary result Specimen:  Blood from Arm, Left Updated:  02/03/20 1530     Blood Culture No growth at 24 hours    Influenza Antigen, Rapid - Swab, Nasopharynx [[id number]]  (Normal) Collected:  02/02/20 0455    Lab Status:  Final result Specimen:  Swab from Nasopharynx Updated:  02/02/20 0516     Influenza A Ag, EIA Negative     Influenza B Ag, EIA Negative        Pain Management Panel     Pain Management Panel Latest Ref Rng & Units 2/2/2020 1/16/2018    AMPHETAMINES SCREEN, URINE Negative Negative Negative    BARBITURATES SCREEN Negative Negative Negative    BENZODIAZEPINE SCREEN, URINE Negative Negative Negative    BUPRENORPHINEUR Negative Negative Negative    COCAINE SCREEN, URINE Negative Negative Negative     METHADONE SCREEN, URINE Negative Negative Negative    METHAMPHETAMINEUR Negative - Negative        Pertinent Radiology Results:  Imaging Results (All)     Procedure Component Value Units Date/Time    XR Chest 1 View [[id number]] Collected:  02/02/20 1310     Updated:  02/02/20 1554    Narrative:       FINDINGS:  There is no focal alveolar infiltrate or effusion.  The cardiac silhouette is normal. The pulmonary vasculature is  unremarkable.  There is no evidence of an acute osseous abnormality.   There are no suspicious-appearing parenchymal soft tissue nodules.    Impression:       No evidence of active or acute cardiopulmonary disease on today's chest  radiograph.     This report was finalized on 2/2/2020 1:11 PM by Dr. Steven Toussaint MD.     Test Results Pending at Discharge:   Order Current Status    Beta Strep Culture, Throat - Swab, Throat Preliminary result    Blood Culture - Blood, Arm, Left Preliminary result    Blood Culture - Blood, Hand, Left Preliminary result        Discharge Disposition/Discharge Medications/Discharge Appointments     Discharge Disposition:   Home or Self Care    Condition at Discharge:  Stable     DME Prescribed at Discharge:  None     Discharge Diet:  Diet Instructions     Diet: Regular      Discharge Diet:  Regular        Discharge Activity:  Activity Instructions     Activity as Tolerated          Code Status While Inpatient:  Code Status and Medical Interventions:   Ordered at: 02/02/20 1348     Code Status:    CPR     Medical Interventions (Level of Support Prior to Arrest):    Full     Discharge Medications:     Discharge Medications      New Medications      Instructions Start Date   predniSONE 20 MG tablet  Commonly known as:  DELTASONE   20 mg, Oral, 2 Times Daily With Meals         Changes to Medications      Instructions Start Date   warfarin 1 MG tablet  Commonly known as:  COUMADIN  What changed:    · medication strength  · how much to take  · when to take this  · Another  medication with the same name was removed. Continue taking this medication, and follow the directions you see here.   3 mg, Oral, Daily Warfarin   Start Date:  February 5, 2020        Continue These Medications      Instructions Start Date   albuterol (2.5 MG/3ML) 0.083% nebulizer solution  Commonly known as:  PROVENTIL   2.5 mg, Nebulization, Every 4 Hours PRN      albuterol sulfate  (90 Base) MCG/ACT inhaler  Commonly known as:  PROVENTIL HFA;VENTOLIN HFA;PROAIR HFA   2 puffs, Inhalation, Every 6 Hours PRN      atorvastatin 10 MG tablet  Commonly known as:  LIPITOR   10 mg, Oral, Daily      metoprolol tartrate 25 MG tablet  Commonly known as:  LOPRESSOR   12.5 mg, Oral, 2 Times Daily, for blood pressure      montelukast 10 MG tablet  Commonly known as:  SINGULAIR   10 mg, Oral, Nightly      ZANTAC 300 MG tablet  Generic drug:  raNITIdine   300 mg, Oral, 2 Times Daily           Discharge Appointments:  Additional Instructions for the Follow-ups that You Need to Schedule     Call MD With Problems / Concerns   As directed      Call PCP or return to the ED with recurrent or worsening symptoms.    Order Comments:  Call PCP or return to the ED with recurrent or worsening symptoms.          Discharge Follow-up with PCP   As directed       Currently Documented PCP:    Macho Jose MD    PCP Phone Number:    [phone]     Follow Up Details:  2/6/2020 for INR check after change in Warfarin dose         Discharge Follow-up with Specialty: Cardiology -- keep previous appointments and follow ups as previously scheduled   As directed      Specialty:  Cardiology -- keep previous appointments and follow ups as previously scheduled         Protime-INR    Feb 06, 2020 (Approximate)      Dr. Jose office    Order Comments:  Dr. Jose office     Is Patient on anti-coag:  Yes               Attestation: I personally discussed the patient's hospital course, disposition, discharge planning, and discharge medications with  attending physician, Dr. Goldman, Duy Miranda,*, prior to time of discharge.       Carrie Escobar PA-C  Hospitalist Service -- [facility]       02/04/20  8:24 AM    Discharge Time: Less than 30 minutes     Please send a copy of this dictation to the following providers:  Macho [facility] MD

## 2020-02-04 NOTE — PLAN OF CARE
Problem: Pain, Chronic (Adult)  Goal: Identify Related Risk Factors and Signs and Symptoms  Description  Related risk factors and signs and symptoms are identified upon initiation of Human Response Clinical Practice Guideline (CPG).  Outcome: Ongoing (interventions implemented as appropriate)  Goal: Acceptable Pain/Comfort Level and Functional Ability  Description  Patient will demonstrate the desired outcomes by discharge/transition of care.  Outcome: Ongoing (interventions implemented as appropriate)     Problem: Patient Care Overview  Goal: Plan of Care Review  Outcome: Ongoing (interventions implemented as appropriate)  Goal: Individualization and Mutuality  Outcome: Ongoing (interventions implemented as appropriate)  Goal: Discharge Needs Assessment  Outcome: Ongoing (interventions implemented as appropriate)  Goal: Interprofessional Rounds/Family Conf  Outcome: Ongoing (interventions implemented as appropriate)     Problem: Chronic Obstructive Pulmonary Disease (Adult)  Goal: Signs and Symptoms of Listed Potential Problems Will be Absent, Minimized or Managed (Chronic Obstructive Pulmonary Disease)  Description  Signs and symptoms of listed potential problems will be absent, minimized or managed by discharge/transition of care (reference Chronic Obstructive Pulmonary Disease (Adult) CPG).  Outcome: Ongoing (interventions implemented as appropriate)     Pt rested comfortably throughout the night. Pt ambulated around the nurses station several times throughout the shift. Pt denied any shortness of breath while ambulating. Pt able to maintain O2 above 90% on room air. Pt denied any chest pain, nausea, or vomiting. Pt denied any needs or complaints at this time. Will continue to monitor.

## 2020-02-04 NOTE — NURSING NOTE
Pt states he does not have a ride home, Meg with  notified and will set up pt transportation home.

## 2020-02-05 ENCOUNTER — READMISSION MANAGEMENT (OUTPATIENT)
Dept: CALL CENTER | Facility: HOSPITAL | Age: 45
End: 2020-02-05

## 2020-02-05 NOTE — OUTREACH NOTE
Prep Survey      Responses   Facility patient discharged from?  [facility]   Is patient eligible?  Yes   Discharge diagnosis  COPD AE, obesity, GERD, anxiety disorder, chronic anticoagulation, Hyperglycemia, primary metabolic acidosis, HLD, hx aortic valve replacement, essential HTN, AAA   Does the patient have one of the following disease processes/diagnoses(primary or secondary)?  COPD/Pneumonia   Does the patient have Home health ordered?  No   Is there a DME ordered?  No   Comments regarding appointments  See AVS   Prep survey completed?  Yes          Maris Temple RN

## 2020-02-06 ENCOUNTER — READMISSION MANAGEMENT (OUTPATIENT)
Dept: CALL CENTER | Facility: HOSPITAL | Age: 45
End: 2020-02-06

## 2020-02-06 NOTE — OUTREACH NOTE
COPD/PN Week 1 Survey      Responses   Facility patient discharged from?  [facility]   Does the patient have one of the following disease processes/diagnoses(primary or secondary)?  COPD/Pneumonia   Is there a successful TCM telephone encounter documented?  No   Was the primary reason for admission:  COPD exacerbation   Week 1 attempt successful?  Yes   Call start time  1018   Call end time  1028   Discharge diagnosis  COPD AE, obesity, GERD, anxiety disorder, chronic anticoagulation, Hyperglycemia, primary metabolic acidosis, HLD, hx aortic valve replacement, essential HTN, AAA   Meds reviewed with patient/caregiver?  Yes   Is the patient having any side effects they believe may be caused by any medication additions or changes?  No   Does the patient have all medications ordered at discharge?  Yes   Is the patient taking all medications as directed (includes completed medication regime)?  Yes   Does the patient have an appointment with their PCP or pulmonologist within 7 days of discharge?  Yes   Comments regarding PCP  Has a followup today 2/6/2020 with PCP   Has the patient kept scheduled appointments due by today?  N/A   Has home health visited the patient within 72 hours of discharge?  N/A   Psychosocial issues?  No   Comments  Patient has followup appt today and must have INR checked. Explained to patient the importance of having blood work . He verbalized understanding. He reports he is coughing alot. He feels like his phelgm is loosening up.    Did the patient receive a copy of their discharge instructions?  Yes   Nursing interventions  Reviewed instructions with patient   What is the patient's perception of their health status since discharge?  Improving   Nursing Interventions  Nurse provided patient education   Are the patient's immunizations up to date?   Yes   Nursing interventions  Educated on importance of maintaining up to date immunizations as advised by provider   Is the patient/caregiver able to teach  back the hierarchy of who to call/visit for symptoms/problems? PCP, Specialist, Home health nurse, Urgent Care, ED, 911  Yes   Is the patient able to teach back COPD zones?  Yes   Nursing interventions  Education provided on various zones   Patient reports what zone on this call?  Yellow Zone   Yellow Zone  Increased shortness of air, Unable to complete daily activities, Increased or thicker phlegm/mucus   Yellow interventions  Continue to use daily medications, Use other meds such as steroids or antibiotics as ordered, Get plenty of rest, Call provider immediatly if symptoms do not improve   Week 1 call completed?  Yes          Robert Welsh RN

## 2020-02-07 LAB
BACTERIA SPEC AEROBE CULT: NORMAL
BACTERIA SPEC AEROBE CULT: NORMAL

## 2020-02-13 ENCOUNTER — READMISSION MANAGEMENT (OUTPATIENT)
Dept: CALL CENTER | Facility: HOSPITAL | Age: 45
End: 2020-02-13

## 2020-02-13 NOTE — OUTREACH NOTE
COPD/PN Week 2 Survey      Responses   Facility [facility] discharged from?  [facility]   Does the patient have one of the following disease processes/diagnoses(primary or secondary)?  COPD/Pneumonia   Was the primary reason for admission:  COPD exacerbation   Week 2 attempt successful?  Yes   Call start time  1357   Call end time  1402   Discharge diagnosis  COPD AE, obesity, GERD, anxiety disorder, chronic anticoagulation, Hyperglycemia, primary metabolic acidosis, HLD, hx aortic valve replacement, essential HTN, AAA   Meds reviewed with patient/caregiver?  Yes   Is the patient taking all medications as directed (includes completed medication regime)?  Yes   Medication comments  getting INR checked   Has the patient kept scheduled appointments due by today?  Yes   Home health comments  MD wants HH to come out to pt's home and draw labs   Psychosocial issues?  No   What is the patient's perception of their health status since discharge?  Improving   Additional teach back comments  Enc return to MD and good po fluid intake   Is the patient able to teach back COPD zones?  Yes   Nursing interventions  Education provided on various zones   Patient reports what zone on this call?  Green Zone   Green Zone  Reports doing well, Breathing without shortness of breath, Usual amount of phlegm/mucus without difficulty coughing up   Green Zone interventions:  Take daily medications, Do not smoke, Continue regular exercise/diet plan, Avoid indoor/outdoor triggers [Uses dip]   Week 2 call completed?  Yes          Renate Carmichael RN

## 2020-02-20 ENCOUNTER — READMISSION MANAGEMENT (OUTPATIENT)
Dept: CALL CENTER | Facility: HOSPITAL | Age: 45
End: 2020-02-20

## 2020-02-20 NOTE — OUTREACH NOTE
CHF Week 3 Survey      Responses   Fac[facility] discharged from?  [facility]   Does the patient have one of the following disease processes/diagnoses(primary or secondary)?  COPD/Pneumonia          Sabine Leiva RN

## 2020-02-21 ENCOUNTER — READMISSION MANAGEMENT (OUTPATIENT)
Dept: CALL CENTER | Facility: HOSPITAL | Age: 45
End: 2020-02-21

## 2020-02-21 NOTE — OUTREACH NOTE
COPD/PN Week 3 Survey      Responses   Fac[facility] discharged from?  [facility]   Does the patient have one of the following disease processes/diagnoses(primary or secondary)?  COPD/Pneumonia   Was the primary reason for admission:  COPD exacerbation   Week 3 attempt successful?  Yes   Call start time  1808   Revoke  Phone number issues   Call end time  1808   Discharge diagnosis  COPD AE, obesity, GERD, anxiety disorder, chronic anticoagulation, Hyperglycemia, primary metabolic acidosis, HLD, hx aortic valve replacement, essential HTN, AAA          Robert Welsh RN

## 2020-02-24 ENCOUNTER — OFFICE VISIT (OUTPATIENT)
Dept: CARDIOLOGY | Facility: CLINIC | Age: 45
End: 2020-02-24

## 2020-02-24 VITALS
HEIGHT: 67 IN | DIASTOLIC BLOOD PRESSURE: 60 MMHG | RESPIRATION RATE: 16 BRPM | BODY MASS INDEX: 30.23 KG/M2 | SYSTOLIC BLOOD PRESSURE: 104 MMHG | WEIGHT: 192.6 LBS | HEART RATE: 71 BPM

## 2020-02-24 DIAGNOSIS — R07.2 PRECORDIAL PAIN: ICD-10-CM

## 2020-02-24 DIAGNOSIS — Z79.01 CHRONIC ANTICOAGULATION: Chronic | ICD-10-CM

## 2020-02-24 DIAGNOSIS — E78.2 MIXED HYPERLIPIDEMIA: ICD-10-CM

## 2020-02-24 DIAGNOSIS — I71.21 ASCENDING AORTIC ANEURYSM (HCC): Chronic | ICD-10-CM

## 2020-02-24 DIAGNOSIS — Z95.2 HX OF AORTIC VALVE REPLACEMENT, MECHANICAL: Chronic | ICD-10-CM

## 2020-02-24 DIAGNOSIS — I10 ESSENTIAL HYPERTENSION: Chronic | ICD-10-CM

## 2020-02-24 DIAGNOSIS — I50.22 CHRONIC SYSTOLIC CONGESTIVE HEART FAILURE (HCC): Primary | ICD-10-CM

## 2020-02-24 PROCEDURE — 99214 OFFICE O/P EST MOD 30 MIN: CPT | Performed by: PHYSICIAN ASSISTANT

## 2020-02-24 RX ORDER — LOSARTAN POTASSIUM 25 MG/1
25 TABLET ORAL DAILY
Qty: 30 TABLET | Refills: 3 | Status: ON HOLD | OUTPATIENT
Start: 2020-02-24 | End: 2020-09-19 | Stop reason: SDUPTHER

## 2020-02-24 RX ORDER — PREDNISONE 20 MG/1
20 TABLET ORAL 2 TIMES DAILY
Status: ON HOLD | COMMUNITY
End: 2020-09-17

## 2020-02-24 RX ORDER — METOPROLOL SUCCINATE 25 MG/1
12.5 TABLET, EXTENDED RELEASE ORAL DAILY
Qty: 30 TABLET | Refills: 11 | Status: ON HOLD | OUTPATIENT
Start: 2020-02-24 | End: 2020-09-17

## 2020-02-24 RX ORDER — WARFARIN SODIUM 5 MG/1
5 TABLET ORAL DAILY
Status: ON HOLD | COMMUNITY
End: 2020-09-19 | Stop reason: SDUPTHER

## 2020-02-24 NOTE — PROGRESS NOTES
"M[facility] MD  Filemon Jj  1975 02/24/2020    Patient Active Problem List   Diagnosis   • Ascending aortic aneurysm (CMS/[facility])   • Aneurysm of aortic sinus of Valsalva without rupture   • COPD (chronic obstructive pulmonary disease) (CMS/HCC)   • Essential hypertension   • Hx of aortic valve replacement, mechanical   • COPD with acute exacerbation (CMS/[facility])   • Obesity (BMI 30-39.9)   • GERD without esophagitis   • Anxiety disorder   • Chronic anticoagulation   • Hyperglycemia   • Mixed hyperlipidemia   • Chronic systolic congestive heart failure (CMS/[facility])       Dear Macho [facility] MD:    Subjective     History of Present Illness:    Chief Complaint   Patient presents with   • ascending aortic aneury     1+ year follow, s/p valve replac., prosthetic   • Med Management     verbal       Filemon Jj is a pleasant 44 y.o. male with a past medical history significant for ascending aortic aneurysm and severe aortic stenosis status post repair of the aneurysm and replacement of aortic valve with mechanical valve.  He also has a history of essential hypertension, COPD, tobacco abuse, and obesity.  Patient comes in for routine cardiology follow-up.    Patient reports that he presented to the hospital due to some shortness of breath and a sore throat. He reports that afterwards he was admited and monitored. He did have echocardiogram done that showed a drop in his LVEF of  46/50% which was at 60% in 2017. He also found to be in metabolic acidosis likely from hyperglycemia.  Patient does report he occasionally gets chest pains that he describes as dull in the center of his chest he does believe this is positional as when he changes his position the pain will be relieved.  He reports his pain only last a few seconds in duration.  He denies any recent worsening in shortness of breath.    Allergies   Allergen Reactions   • Aspirin      Patient said, \"it chokes him up.\" patient said, \"I got really short of " "breath and started to have an asthma attack.\"   :      Current Outpatient Medications:   •  albuterol (PROVENTIL) (2.5 MG/3ML) 0.083% nebulizer solution, Take 2.5 mg by nebulization Every 4 (Four) Hours As Needed., Disp: , Rfl:   •  albuterol sulfate  (90 Base) MCG/ACT inhaler, Inhale 2 puffs Every 6 (Six) Hours As Needed for Wheezing., Disp: 18 g, Rfl: 0  •  atorvastatin (LIPITOR) 10 MG tablet, Take 1 tablet by mouth Daily., Disp: 30 tablet, Rfl: 6  •  montelukast (SINGULAIR) 10 MG tablet, Take 10 mg by mouth Every Night., Disp: , Rfl:   •  predniSONE (DELTASONE) 20 MG tablet, Take 20 mg by mouth 2 (Two) Times a Day., Disp: , Rfl:   •  raNITIdine (ZANTAC) 300 MG tablet, Take 300 mg by mouth 2 (Two) Times a Day., Disp: , Rfl:   •  warfarin (COUMADIN) 6 MG tablet, Take 6 mg by mouth Daily., Disp: , Rfl:   •  losartan (COZAAR) 25 MG tablet, Take 1 tablet by mouth Daily., Disp: 30 tablet, Rfl: 3  •  metoprolol succinate XL (TOPROL-XL) 25 MG 24 hr tablet, Take 0.5 tablets by mouth Daily., Disp: 30 tablet, Rfl: 11    The following portions of the patient's history were reviewed and updated as appropriate: allergies, current medications, past family history, past medical history, past social history, past surgical history and problem list.    Social History     Tobacco Use   • Smoking status: Former Smoker     Packs/day: 1.00     Years: 4.00     Pack years: 4.00   • Smokeless tobacco: Current User     Types: Snuff   Substance Use Topics   • Alcohol use: No     Comment: occassional    • Drug use: No       Review of Systems   Constitution: Negative for malaise/fatigue.   Cardiovascular: Positive for chest pain and dyspnea on exertion. Negative for irregular heartbeat, leg swelling and palpitations.   Respiratory: Positive for shortness of breath. Negative for cough.    Hematologic/Lymphatic: Negative for bleeding problem. Does not bruise/bleed easily.   Gastrointestinal: Negative for nausea and vomiting. " "  Neurological: Negative for weakness.       Objective   Vitals:    02/24/20 1401   BP: 104/60   Pulse: 71   Resp: 16   Weight: 87.4 kg (192 lb 9.6 oz)   Height: 170.2 cm (67\")     Body mass index is 30.17 kg/m².    Physical Exam   Constitutional: He is oriented to person, place, and time. He appears well-developed and well-nourished. No distress.   HENT:   Head: Normocephalic and atraumatic.   Cardiovascular: Normal rate, regular rhythm and normal heart sounds.   Very loud prominent S2 from mechanical valve with palpable thrill   Pulmonary/Chest: Effort normal and breath sounds normal. No respiratory distress.   Musculoskeletal: He exhibits no edema.   Neurological: He is alert and oriented to person, place, and time.   Skin: He is not diaphoretic.       Lab Results   Component Value Date     02/04/2020    K 4.5 02/04/2020     02/04/2020    CO2 25.1 02/04/2020    BUN 20 02/04/2020    CREATININE 1.08 02/04/2020    GLUCOSE 151 (H) 02/04/2020    CALCIUM 8.7 02/04/2020    AST 20 02/02/2020    ALT 26 02/02/2020    ALKPHOS 64 02/02/2020    LABIL2 1.4 (L) 03/04/2016     Lab Results   Component Value Date    CKTOTAL 36 01/28/2018     Lab Results   Component Value Date    WBC 15.06 (H) 02/04/2020    HGB 13.0 02/04/2020    HCT 42.0 02/04/2020     02/04/2020     Lab Results   Component Value Date    INR 3.31 (H) 02/04/2020    INR 2.04 (H) 02/03/2020    INR 1.68 (H) 02/02/2020     Lab Results   Component Value Date    MG 2.3 02/04/2020     Lab Results   Component Value Date    TSH 0.684 02/02/2020    TRIG 60 02/03/2020    HDL 40 02/03/2020     (H) 02/03/2020      Lab Results   Component Value Date    BNP 53.0 02/18/2019       During this visit the following were done:  Labs Reviewed [x]    Labs Ordered []    Radiology Reports Reviewed [x]    Radiology Ordered []    PCP Records Reviewed []    Referring Provider Records Reviewed []    ER Records Reviewed []    Hospital Records Reviewed []    History " Obtained From Family []    Radiology Images Reviewed []    Other Reviewed []    Records Requested []       Procedures    Assessment/Plan    Diagnosis Plan   1. Chronic systolic congestive heart failure (CMS/HCC)     2. Ascending aortic aneurysm (CMS/HCC)     3. Hx of aortic valve replacement, mechanical     4. Chronic anticoagulation     5. Essential hypertension     6. Mixed hyperlipidemia     7. Precordial pain  Basic Metabolic Panel    Stress Test With Myocardial Perfusion            Recommendations:  1. Will change his metoprolol tartrate to metoprolol succinate to better optimize his medical therapy for newwly diagnosed systolic heart failure  2. Will also start losartan and check bmp in  A week.  3. Will check stress test to look for etiology of his new systolic heart failure.       Return in about 1 month (around 3/24/2020).    As always, I appreciate very much the opportunity to participate in the cardiovascular care of your patients.      With Best Regards,    Len Velázquez PA-C

## 2020-03-02 PROBLEM — I50.22 CHRONIC SYSTOLIC CONGESTIVE HEART FAILURE (HCC): Status: ACTIVE | Noted: 2020-03-02

## 2020-03-04 ENCOUNTER — LAB (OUTPATIENT)
Dept: LAB | Facility: HOSPITAL | Age: 45
End: 2020-03-04

## 2020-03-04 DIAGNOSIS — R07.2 PRECORDIAL PAIN: ICD-10-CM

## 2020-03-04 LAB
ANION GAP SERPL CALCULATED.3IONS-SCNC: 13.5 MMOL/L (ref 5–15)
BUN BLD-MCNC: 12 MG/DL (ref 6–20)
BUN/CREAT SERPL: 13.3 (ref 7–25)
CALCIUM SPEC-SCNC: 8.8 MG/DL (ref 8.6–10.5)
CHLORIDE SERPL-SCNC: 102 MMOL/L (ref 98–107)
CO2 SERPL-SCNC: 22.5 MMOL/L (ref 22–29)
CREAT BLD-MCNC: 0.9 MG/DL (ref 0.76–1.27)
GFR SERPL CREATININE-BSD FRML MDRD: 92 ML/MIN/1.73
GLUCOSE BLD-MCNC: 83 MG/DL (ref 65–99)
POTASSIUM BLD-SCNC: 4 MMOL/L (ref 3.5–5.2)
SODIUM BLD-SCNC: 138 MMOL/L (ref 136–145)

## 2020-03-04 PROCEDURE — 36415 COLL VENOUS BLD VENIPUNCTURE: CPT

## 2020-03-04 PROCEDURE — 80048 BASIC METABOLIC PNL TOTAL CA: CPT

## 2020-03-16 ENCOUNTER — HOSPITAL ENCOUNTER (OUTPATIENT)
Dept: NUCLEAR MEDICINE | Facility: HOSPITAL | Age: 45
Discharge: HOME | End: 2020-03-16

## 2020-03-16 DIAGNOSIS — R07.2 PRECORDIAL PAIN: ICD-10-CM

## 2020-05-06 ENCOUNTER — TELEPHONE (OUTPATIENT)
Dept: CARDIOLOGY | Facility: CLINIC | Age: 45
End: 2020-05-06

## 2020-05-08 NOTE — TELEPHONE ENCOUNTER
Is it because of COVID-19? Let him know how hospital is taking every precaution possible to prevent the spread of the virus is of blaze the virus at our facility is extremely low there are currently no cases in our facility.

## 2020-08-28 NOTE — PROGRESS NOTES
Macho [facility] MD  Filemon Jj  : 1975  DATE:2017    Patient Active Problem List   Diagnosis   • Ascending aortic aneurysm   • Aneurysm   • Other chest pain       Dear Macho [facility] MD:    Nile Jj is a 42 y.o. male with the above medical problems who is being seen for consultation today at the request of Macho [facility] MD. Current to the patient he was recently diagnosed with a 6 cm ascending aortic aneurysm.  At the time he was evaluated by Dr. Lucas in vascular surgery who recommended vertical repair.  He underwent a cardiac catheterization done by Dr. Núñez in preparation for this which showed no evidence of coronary artery disease.  He is now awaiting the removal of all his teeth as she has very poor dentition.  He was referred to the cardiology clinic by his primary care physician for follow-up after the surgery.  He has no history of coronary artery disease in the past.  He does complain of chest pain which appears to be noncardiac in nature.      Past Medical History:   Diagnosis Date   • Anxiety    • Asthma    • Back pain    • COPD (chronic obstructive pulmonary disease)    • Emphysema lung    • Gastritis    • GERD (gastroesophageal reflux disease)    • Headache    • Hypertension    • Migraine    • Substance abuse        Past Surgical History:   Procedure Laterality Date   • CARDIAC CATHETERIZATION N/A 10/23/2017    Procedure: Left Heart Cath;  Surgeon: Rodolfo Núñez MD;  Location: [facility] CATH INVASIVE LOCATION;  Service:    • DENTAL PROCEDURE     • LIVER SURGERY      tumor removed from liver       Family History   Problem Relation Age of Onset   • COPD Mother        Social History     Social History   • Marital status: Single     Spouse name: N/A   • Number of children: 0   • Years of education: N/A     Occupational History   •  Disabled     Social History Main Topics   • Smoking status: Former Smoker     Packs/day: 1.00     Years: 4.00   •  Smokeless tobacco: Current User     Types: Snuff      Comment: Pt uses vapor cigarette   • Alcohol use No      Comment: occassional    • Drug use: No   • Sexual activity: Defer     Other Topics Concern   • Not on file     Social History Narrative    Lives in [place], [place] alone.  He dropped out of school due to anxiety          Current Outpatient Prescriptions:   •  albuterol (PROVENTIL HFA;VENTOLIN HFA) 108 (90 BASE) MCG/ACT inhaler, Inhale 2 puffs every 4 (four) hours as needed for wheezing. (Patient taking differently: Inhale 2 puffs 2 (Two) Times a Day As Needed for Wheezing or Shortness of Air.), Disp: 3.7 g, Rfl: 0  •  albuterol (PROVENTIL) (2.5 MG/3ML) 0.083% nebulizer solution, Take 2.5 mg by nebulization 3 (Three) Times a Day., Disp: , Rfl:   •  azithromycin (ZITHROMAX) 250 MG tablet, Take 2 tablets the first day, then 1 tablet daily for 4 days.  Indications: Upper Respiratory Tract Infection, Disp: 3 tablet, Rfl: 0  •  cyclobenzaprine (FLEXERIL) 10 MG tablet, Take 10 mg by mouth 3 (Three) Times a Day As Needed for Muscle Spasms., Disp: , Rfl:   •  Fluticasone Furoate-Vilanterol 100-25 MCG/INH aerosol powder , Inhale 1 puff by mouth Daily., Disp: 60 each, Rfl: 1  •  hydrOXYzine (VISTARIL) 25 MG capsule, Take 25 mg by mouth 2 (Two) Times a Day As Needed for Anxiety (for anxiety)., Disp: , Rfl:   •  ibuprofen (ADVIL,MOTRIN) 800 MG tablet, Take 800 mg by mouth Every 6 (Six) Hours As Needed for Mild Pain ., Disp: , Rfl:   •  montelukast (SINGULAIR) 10 MG tablet, Take 10 mg by mouth Daily., Disp: , Rfl:   •  predniSONE (DELTASONE) 20 MG tablet, Take 1 tablet by mouth 2 (Two) Times a Day., Disp: 6 tablet, Rfl: 0  •  raNITIdine (ZANTAC) 300 MG tablet, Take 300 mg by mouth 2 (Two) Times a Day., Disp: , Rfl:   •  cefdinir (OMNICEF) 300 MG capsule, Take 1 capsule by mouth Every 12 (Twelve) Hours. Indications: Upper Respiratory Tract Infection, Disp: 10 capsule, Rfl: 0    The following portions of the patient's history  "were reviewed and updated as appropriate: allergies, current medications, past family history, past medical history, past social history, past surgical history and problem list.    Review of Systems   Constitution: Negative for chills, diaphoresis, fever, weakness and malaise/fatigue.   HENT: Positive for hearing loss. Negative for congestion and sore throat.         Sinus headaches     Eyes: Positive for blurred vision. Negative for pain and redness.   Cardiovascular: Positive for chest pain, dyspnea on exertion and syncope. Negative for leg swelling, orthopnea, palpitations and paroxysmal nocturnal dyspnea.   Respiratory: Positive for shortness of breath and wheezing. Negative for cough and hemoptysis.         Asthma, does breathing tx's   Endocrine: Negative for cold intolerance and heat intolerance.   Hematologic/Lymphatic: Does not bruise/bleed easily.   Skin: Negative for rash.   Musculoskeletal: Positive for back pain, falls, joint pain, joint swelling, muscle cramps and muscle weakness. Negative for myalgias.   Gastrointestinal: Positive for constipation and heartburn. Negative for abdominal pain, diarrhea, hematemesis, hematochezia, melena, nausea and vomiting.   Genitourinary: Positive for frequency. Negative for dysuria and hematuria.   Neurological: Positive for headaches. Negative for dizziness, focal weakness and numbness.   Psychiatric/Behavioral: Negative for depression and memory loss. The patient has insomnia and is nervous/anxious.        Objective   Blood pressure 123/74, pulse 102, resp. rate 18, height 67\" (170.2 cm), weight 189 lb (85.7 kg), SpO2 94 %.    Physical Exam   Constitutional: He is oriented to person, place, and time. He appears well-developed and well-nourished.   WM sitting comfortably on chair.   HENT:   Mouth/Throat: Oropharynx is clear and moist.   Eyes: EOM are normal. Pupils are equal, round, and reactive to light.   Neck: Neck supple. No JVD present. No tracheal deviation " present. No thyromegaly present.   Cardiovascular: Normal rate, regular rhythm, S1 normal and S2 normal.  Exam reveals no gallop and no friction rub.    No murmur heard.  Pulmonary/Chest: Effort normal and breath sounds normal. No respiratory distress. He has no wheezes. He has no rales.   Abdominal: Soft. Bowel sounds are normal. He exhibits no mass. There is no tenderness.   Musculoskeletal: Normal range of motion. He exhibits no edema.   Lymphadenopathy:     He has no cervical adenopathy.   Neurological: He is alert and oriented to person, place, and time.   Skin: Skin is warm and dry. No rash noted.   Psychiatric: He has a normal mood and affect.         ECG 12 Lead  Date/Time: 11/21/2017 1:06 PM  Performed by: JAYJAY MASTERSON  Authorized by: JAYJAY MASTERSON   Comparison: compared with previous ECG from 10/23/2017  Similar to previous ECG  Rhythm: sinus rhythm  Rate: normal  BPM: 96  Conduction: conduction normal  ST Depression: III and aVL  T Waves: T waves normal  QRS axis: normal  Other: no other findings  Clinical impression: non-specific ECG            Assessment/Plan      1.  Ascending aortic aneurysm: Patient with a large ascending aortic aneurysm which is to undergo management by surgery. To the patient the severe risk of exsanguination and death if the aneurysm were to rupture.  I discussed with him the importance of having his teeth pulled as soon as possible so that the surgery can proceed.  I also advised him of the importance of seeking immediate medical assistance for any kind of significant symptoms.  I advised of the importance of keeping exertion to a minimum until the aneurysm can be repaired.  According to the patient is currently being followed by Dr. Lucas.    2.  Chest pain: Patient with occasional episodes of sharp chest pain that lasts for a few seconds and resolve spontaneously.  This appears to be noncardiac in nature.  He recently underwent a cardiac  catheterization which showed no evidence of coronary artery disease.       Diagnosis Plan   1. Ascending aortic aneurysm     2. Other chest pain          Return in about 4 weeks (around 12/19/2017).    I appreciate the opportunity to participate in this patient's cardiovascular care.    Best Regards    Nabor Jacobsen   no

## 2020-09-17 ENCOUNTER — APPOINTMENT (OUTPATIENT)
Dept: CT IMAGING | Facility: HOSPITAL | Age: 45
End: 2020-09-17

## 2020-09-17 ENCOUNTER — HOSPITAL ENCOUNTER (OUTPATIENT)
Facility: HOSPITAL | Age: 45
Setting detail: OBSERVATION
Discharge: HOME | End: 2020-09-19
Attending: EMERGENCY MEDICINE | Admitting: HOSPITALIST

## 2020-09-17 ENCOUNTER — APPOINTMENT (OUTPATIENT)
Dept: GENERAL RADIOLOGY | Facility: HOSPITAL | Age: 45
End: 2020-09-17

## 2020-09-17 DIAGNOSIS — J44.1 ACUTE EXACERBATION OF COPD WITH ASTHMA (HCC): Primary | ICD-10-CM

## 2020-09-17 DIAGNOSIS — J45.901 ACUTE EXACERBATION OF COPD WITH ASTHMA (HCC): Primary | ICD-10-CM

## 2020-09-17 DIAGNOSIS — R53.81 DEBILITY: ICD-10-CM

## 2020-09-17 DIAGNOSIS — Z95.2 HX OF AORTIC VALVE REPLACEMENT, MECHANICAL: Chronic | ICD-10-CM

## 2020-09-17 DIAGNOSIS — I50.22 CHRONIC SYSTOLIC CONGESTIVE HEART FAILURE (HCC): ICD-10-CM

## 2020-09-17 LAB
6-ACETYL MORPHINE: NEGATIVE
A-A DO2: 31.9 MMHG (ref 0–300)
ALBUMIN SERPL-MCNC: 4.07 G/DL (ref 3.5–5.2)
ALBUMIN/GLOB SERPL: 1.5 G/DL
ALP SERPL-CCNC: 77 U/L (ref 39–117)
ALT SERPL W P-5'-P-CCNC: 25 U/L (ref 1–41)
AMPHET+METHAMPHET UR QL: NEGATIVE
ANION GAP SERPL CALCULATED.3IONS-SCNC: 10.9 MMOL/L (ref 5–15)
ANISOCYTOSIS BLD QL: ABNORMAL
APTT PPP: >100 SECONDS (ref 25.6–35.3)
APTT PPP: >100 SECONDS (ref 25.6–35.3)
ARTERIAL PATENCY WRIST A: ABNORMAL
AST SERPL-CCNC: 20 U/L (ref 1–40)
ATMOSPHERIC PRESS: 727 MMHG
B PERT DNA SPEC QL NAA+PROBE: NOT DETECTED
BARBITURATES UR QL SCN: NEGATIVE
BASE EXCESS BLDA CALC-SCNC: 0.9 MMOL/L (ref 0–2)
BASOPHILS # BLD AUTO: 0.05 10*3/MM3 (ref 0–0.2)
BASOPHILS NFR BLD AUTO: 0.5 % (ref 0–1.5)
BDY SITE: ABNORMAL
BENZODIAZ UR QL SCN: NEGATIVE
BILIRUB SERPL-MCNC: 0.6 MG/DL (ref 0–1.2)
BILIRUB UR QL STRIP: NEGATIVE
BODY TEMPERATURE: 0 C
BUN SERPL-MCNC: 8 MG/DL (ref 6–20)
BUN/CREAT SERPL: 8.2 (ref 7–25)
BUPRENORPHINE SERPL-MCNC: NEGATIVE NG/ML
C PNEUM DNA NPH QL NAA+NON-PROBE: NOT DETECTED
CALCIUM SPEC-SCNC: 8.9 MG/DL (ref 8.6–10.5)
CANNABINOIDS SERPL QL: NEGATIVE
CHLORIDE SERPL-SCNC: 104 MMOL/L (ref 98–107)
CK SERPL-CCNC: 85 U/L (ref 20–200)
CLARITY UR: CLEAR
CO2 BLDA-SCNC: 26.7 MMOL/L (ref 22–33)
CO2 SERPL-SCNC: 24.1 MMOL/L (ref 22–29)
COCAINE UR QL: NEGATIVE
COHGB MFR BLD: 0.8 % (ref 0–5)
COLOR UR: YELLOW
CREAT SERPL-MCNC: 0.98 MG/DL (ref 0.76–1.27)
CRP SERPL-MCNC: 1.98 MG/DL (ref 0–0.5)
D-LACTATE SERPL-SCNC: 2.1 MMOL/L (ref 0.5–2)
D-LACTATE SERPL-SCNC: 2.1 MMOL/L (ref 0.5–2)
D-LACTATE SERPL-SCNC: 3 MMOL/L (ref 0.5–2)
D-LACTATE SERPL-SCNC: 3.7 MMOL/L (ref 0.5–2)
DEPRECATED RDW RBC AUTO: 50.1 FL (ref 37–54)
DEPRECATED RDW RBC AUTO: 50.5 FL (ref 37–54)
EOSINOPHIL # BLD AUTO: 0.24 10*3/MM3 (ref 0–0.4)
EOSINOPHIL # BLD MANUAL: 0.2 10*3/MM3 (ref 0–0.4)
EOSINOPHIL NFR BLD AUTO: 2.6 % (ref 0.3–6.2)
EOSINOPHIL NFR BLD MANUAL: 3 % (ref 0.3–6.2)
ERYTHROCYTE [DISTWIDTH] IN BLOOD BY AUTOMATED COUNT: 15.9 % (ref 12.3–15.4)
ERYTHROCYTE [DISTWIDTH] IN BLOOD BY AUTOMATED COUNT: 16.1 % (ref 12.3–15.4)
ERYTHROCYTE [SEDIMENTATION RATE] IN BLOOD: 5 MM/HR (ref 0–15)
ETHANOL BLD-MCNC: <10 MG/DL (ref 0–10)
ETHANOL UR QL: <0.01 %
FLUAV H1 2009 PAND RNA NPH QL NAA+PROBE: NOT DETECTED
FLUAV H1 HA GENE NPH QL NAA+PROBE: NOT DETECTED
FLUAV H3 RNA NPH QL NAA+PROBE: NOT DETECTED
FLUAV SUBTYP SPEC NAA+PROBE: NOT DETECTED
FLUBV RNA ISLT QL NAA+PROBE: NOT DETECTED
GFR SERPL CREATININE-BSD FRML MDRD: 83 ML/MIN/1.73
GLOBULIN UR ELPH-MCNC: 2.7 GM/DL
GLUCOSE SERPL-MCNC: 112 MG/DL (ref 65–99)
GLUCOSE UR STRIP-MCNC: NEGATIVE MG/DL
HADV DNA SPEC NAA+PROBE: NOT DETECTED
HCO3 BLDA-SCNC: 25.5 MMOL/L (ref 20–26)
HCOV 229E RNA SPEC QL NAA+PROBE: NOT DETECTED
HCOV HKU1 RNA SPEC QL NAA+PROBE: NOT DETECTED
HCOV NL63 RNA SPEC QL NAA+PROBE: NOT DETECTED
HCOV OC43 RNA SPEC QL NAA+PROBE: NOT DETECTED
HCT VFR BLD AUTO: 43.7 % (ref 37.5–51)
HCT VFR BLD AUTO: 46.1 % (ref 37.5–51)
HCT VFR BLD CALC: 46.2 % (ref 38–51)
HGB BLD-MCNC: 13.7 G/DL (ref 13–17.7)
HGB BLD-MCNC: 14.4 G/DL (ref 13–17.7)
HGB BLDA-MCNC: 15.1 G/DL (ref 14–18)
HGB UR QL STRIP.AUTO: NEGATIVE
HMPV RNA NPH QL NAA+NON-PROBE: NOT DETECTED
HPIV1 RNA SPEC QL NAA+PROBE: NOT DETECTED
HPIV2 RNA SPEC QL NAA+PROBE: NOT DETECTED
HPIV3 RNA NPH QL NAA+PROBE: NOT DETECTED
HPIV4 P GENE NPH QL NAA+PROBE: NOT DETECTED
HYPOCHROMIA BLD QL: ABNORMAL
IMM GRANULOCYTES # BLD AUTO: 0.05 10*3/MM3 (ref 0–0.05)
IMM GRANULOCYTES NFR BLD AUTO: 0.5 % (ref 0–0.5)
INHALED O2 CONCENTRATION: 21 %
INR PPP: 1.82 (ref 0.9–1.1)
INR PPP: 2.07 (ref 0.9–1.1)
KETONES UR QL STRIP: NEGATIVE
LACTATE HOLD SPECIMEN: NORMAL
LEUKOCYTE ESTERASE UR QL STRIP.AUTO: NEGATIVE
LYMPHOCYTES # BLD AUTO: 1.49 10*3/MM3 (ref 0.7–3.1)
LYMPHOCYTES # BLD MANUAL: 1.36 10*3/MM3 (ref 0.7–3.1)
LYMPHOCYTES NFR BLD AUTO: 16.2 % (ref 19.6–45.3)
LYMPHOCYTES NFR BLD MANUAL: 20 % (ref 19.6–45.3)
LYMPHOCYTES NFR BLD MANUAL: 7 % (ref 5–12)
Lab: ABNORMAL
M PNEUMO IGG SER IA-ACNC: NOT DETECTED
MAGNESIUM SERPL-MCNC: 2.2 MG/DL (ref 1.6–2.6)
MCH RBC QN AUTO: 26.8 PG (ref 26.6–33)
MCH RBC QN AUTO: 26.9 PG (ref 26.6–33)
MCHC RBC AUTO-ENTMCNC: 31.2 G/DL (ref 31.5–35.7)
MCHC RBC AUTO-ENTMCNC: 31.4 G/DL (ref 31.5–35.7)
MCV RBC AUTO: 85.8 FL (ref 79–97)
MCV RBC AUTO: 85.9 FL (ref 79–97)
METHADONE UR QL SCN: NEGATIVE
METHGB BLD QL: 0.2 % (ref 0–3)
MODALITY: ABNORMAL
MONOCYTES # BLD AUTO: 0.47 10*3/MM3 (ref 0.1–0.9)
MONOCYTES # BLD AUTO: 1.08 10*3/MM3 (ref 0.1–0.9)
MONOCYTES NFR BLD AUTO: 11.7 % (ref 5–12)
NEUTROPHILS # BLD AUTO: 4.75 10*3/MM3 (ref 1.7–7)
NEUTROPHILS NFR BLD AUTO: 6.3 10*3/MM3 (ref 1.7–7)
NEUTROPHILS NFR BLD AUTO: 68.5 % (ref 42.7–76)
NEUTROPHILS NFR BLD MANUAL: 62 % (ref 42.7–76)
NEUTS BAND NFR BLD MANUAL: 8 % (ref 0–5)
NITRITE UR QL STRIP: NEGATIVE
NOTE: ABNORMAL
NRBC BLD AUTO-RTO: 0 /100 WBC (ref 0–0.2)
NT-PROBNP SERPL-MCNC: 1085 PG/ML (ref 0–450)
OPIATES UR QL: NEGATIVE
OXYCODONE UR QL SCN: NEGATIVE
OXYHGB MFR BLDV: 93.1 % (ref 94–99)
PCO2 BLDA: 39.8 MM HG (ref 35–45)
PCO2 TEMP ADJ BLD: ABNORMAL MM[HG]
PCP UR QL SCN: NEGATIVE
PH BLDA: 7.42 PH UNITS (ref 7.35–7.45)
PH UR STRIP.AUTO: 5.5 [PH] (ref 5–8)
PH, TEMP CORRECTED: ABNORMAL
PHOSPHATE SERPL-MCNC: 1.8 MG/DL (ref 2.5–4.5)
PHOSPHATE SERPL-MCNC: 2.4 MG/DL (ref 2.5–4.5)
PLAT MORPH BLD: NORMAL
PLATELET # BLD AUTO: 210 10*3/MM3 (ref 140–450)
PLATELET # BLD AUTO: 217 10*3/MM3 (ref 140–450)
PMV BLD AUTO: 10.2 FL (ref 6–12)
PMV BLD AUTO: 11 FL (ref 6–12)
PO2 BLDA: 66 MM HG (ref 83–108)
PO2 TEMP ADJ BLD: ABNORMAL MM[HG]
POTASSIUM SERPL-SCNC: 3.4 MMOL/L (ref 3.5–5.2)
PROCALCITONIN SERPL-MCNC: 0.19 NG/ML (ref 0–0.25)
PROT SERPL-MCNC: 6.8 G/DL (ref 6–8.5)
PROT UR QL STRIP: NEGATIVE
PROTHROMBIN TIME: 20.9 SECONDS (ref 11.9–14.1)
PROTHROMBIN TIME: 23.1 SECONDS (ref 11.9–14.1)
RBC # BLD AUTO: 5.09 10*6/MM3 (ref 4.14–5.8)
RBC # BLD AUTO: 5.37 10*6/MM3 (ref 4.14–5.8)
RHINOVIRUS RNA SPEC NAA+PROBE: DETECTED
RSV RNA NPH QL NAA+NON-PROBE: NOT DETECTED
S PYO AG THROAT QL: NEGATIVE
SAO2 % BLDCOA: 94 % (ref 94–99)
SARS-COV-2 RDRP RESP QL NAA+PROBE: NOT DETECTED
SODIUM SERPL-SCNC: 139 MMOL/L (ref 136–145)
SP GR UR STRIP: 1.01 (ref 1–1.03)
T4 FREE SERPL-MCNC: 0.97 NG/DL (ref 0.93–1.7)
TROPONIN T SERPL-MCNC: <0.01 NG/ML (ref 0–0.03)
TROPONIN T SERPL-MCNC: <0.01 NG/ML (ref 0–0.03)
TSH SERPL DL<=0.05 MIU/L-ACNC: 3.62 UIU/ML (ref 0.27–4.2)
UROBILINOGEN UR QL STRIP: NORMAL
VENTILATOR MODE: ABNORMAL
WBC # BLD AUTO: 6.78 10*3/MM3 (ref 3.4–10.8)
WBC # BLD AUTO: 9.21 10*3/MM3 (ref 3.4–10.8)

## 2020-09-17 PROCEDURE — 94799 UNLISTED PULMONARY SVC/PX: CPT

## 2020-09-17 PROCEDURE — 71275 CT ANGIOGRAPHY CHEST: CPT

## 2020-09-17 PROCEDURE — 36600 WITHDRAWAL OF ARTERIAL BLOOD: CPT

## 2020-09-17 PROCEDURE — 80307 DRUG TEST PRSMV CHEM ANLYZR: CPT | Performed by: EMERGENCY MEDICINE

## 2020-09-17 PROCEDURE — 96366 THER/PROPH/DIAG IV INF ADDON: CPT

## 2020-09-17 PROCEDURE — 93010 ELECTROCARDIOGRAM REPORT: CPT | Performed by: INTERNAL MEDICINE

## 2020-09-17 PROCEDURE — 96365 THER/PROPH/DIAG IV INF INIT: CPT

## 2020-09-17 PROCEDURE — 99220 PR INITIAL OBSERVATION CARE/DAY 70 MINUTES: CPT | Performed by: HOSPITALIST

## 2020-09-17 PROCEDURE — 71275 CT ANGIOGRAPHY CHEST: CPT | Performed by: RADIOLOGY

## 2020-09-17 PROCEDURE — G0378 HOSPITAL OBSERVATION PER HR: HCPCS

## 2020-09-17 PROCEDURE — 96375 TX/PRO/DX INJ NEW DRUG ADDON: CPT

## 2020-09-17 PROCEDURE — 25010000002 HEPARIN (PORCINE) PER 1000 UNITS: Performed by: NURSE PRACTITIONER

## 2020-09-17 PROCEDURE — 0 IOVERSOL 74 % SOLUTION: Performed by: EMERGENCY MEDICINE

## 2020-09-17 PROCEDURE — 81003 URINALYSIS AUTO W/O SCOPE: CPT | Performed by: EMERGENCY MEDICINE

## 2020-09-17 PROCEDURE — 94640 AIRWAY INHALATION TREATMENT: CPT

## 2020-09-17 PROCEDURE — 80053 COMPREHEN METABOLIC PANEL: CPT | Performed by: EMERGENCY MEDICINE

## 2020-09-17 PROCEDURE — 84145 PROCALCITONIN (PCT): CPT | Performed by: EMERGENCY MEDICINE

## 2020-09-17 PROCEDURE — 86140 C-REACTIVE PROTEIN: CPT | Performed by: EMERGENCY MEDICINE

## 2020-09-17 PROCEDURE — 71045 X-RAY EXAM CHEST 1 VIEW: CPT

## 2020-09-17 PROCEDURE — 87081 CULTURE SCREEN ONLY: CPT | Performed by: HOSPITALIST

## 2020-09-17 PROCEDURE — 85652 RBC SED RATE AUTOMATED: CPT | Performed by: EMERGENCY MEDICINE

## 2020-09-17 PROCEDURE — 84484 ASSAY OF TROPONIN QUANT: CPT | Performed by: EMERGENCY MEDICINE

## 2020-09-17 PROCEDURE — 93005 ELECTROCARDIOGRAM TRACING: CPT | Performed by: EMERGENCY MEDICINE

## 2020-09-17 PROCEDURE — 85025 COMPLETE CBC W/AUTO DIFF WBC: CPT | Performed by: NURSE PRACTITIONER

## 2020-09-17 PROCEDURE — 85007 BL SMEAR W/DIFF WBC COUNT: CPT | Performed by: NURSE PRACTITIONER

## 2020-09-17 PROCEDURE — 25010000002 METHYLPREDNISOLONE PER 40 MG: Performed by: NURSE PRACTITIONER

## 2020-09-17 PROCEDURE — 0099U HC BIOFIRE FILMARRAY RESP PANEL 1: CPT | Performed by: NURSE PRACTITIONER

## 2020-09-17 PROCEDURE — 25010000002 LORAZEPAM PER 2 MG: Performed by: EMERGENCY MEDICINE

## 2020-09-17 PROCEDURE — 84443 ASSAY THYROID STIM HORMONE: CPT | Performed by: EMERGENCY MEDICINE

## 2020-09-17 PROCEDURE — 85730 THROMBOPLASTIN TIME PARTIAL: CPT | Performed by: NURSE PRACTITIONER

## 2020-09-17 PROCEDURE — 84100 ASSAY OF PHOSPHORUS: CPT | Performed by: EMERGENCY MEDICINE

## 2020-09-17 PROCEDURE — 85610 PROTHROMBIN TIME: CPT | Performed by: EMERGENCY MEDICINE

## 2020-09-17 PROCEDURE — 83605 ASSAY OF LACTIC ACID: CPT | Performed by: HOSPITALIST

## 2020-09-17 PROCEDURE — 82805 BLOOD GASES W/O2 SATURATION: CPT

## 2020-09-17 PROCEDURE — 87635 SARS-COV-2 COVID-19 AMP PRB: CPT | Performed by: EMERGENCY MEDICINE

## 2020-09-17 PROCEDURE — 84439 ASSAY OF FREE THYROXINE: CPT | Performed by: EMERGENCY MEDICINE

## 2020-09-17 PROCEDURE — 99284 EMERGENCY DEPT VISIT MOD MDM: CPT

## 2020-09-17 PROCEDURE — 96367 TX/PROPH/DG ADDL SEQ IV INF: CPT

## 2020-09-17 PROCEDURE — 36415 COLL VENOUS BLD VENIPUNCTURE: CPT

## 2020-09-17 PROCEDURE — 83735 ASSAY OF MAGNESIUM: CPT | Performed by: EMERGENCY MEDICINE

## 2020-09-17 PROCEDURE — 83880 ASSAY OF NATRIURETIC PEPTIDE: CPT | Performed by: EMERGENCY MEDICINE

## 2020-09-17 PROCEDURE — 82375 ASSAY CARBOXYHB QUANT: CPT

## 2020-09-17 PROCEDURE — C9803 HOPD COVID-19 SPEC COLLECT: HCPCS

## 2020-09-17 PROCEDURE — 87040 BLOOD CULTURE FOR BACTERIA: CPT | Performed by: EMERGENCY MEDICINE

## 2020-09-17 PROCEDURE — 25010000002 DEXAMETHASONE SODIUM PHOSPHATE 20 MG/5ML SOLUTION: Performed by: EMERGENCY MEDICINE

## 2020-09-17 PROCEDURE — 87880 STREP A ASSAY W/OPTIC: CPT | Performed by: HOSPITALIST

## 2020-09-17 PROCEDURE — 82550 ASSAY OF CK (CPK): CPT | Performed by: EMERGENCY MEDICINE

## 2020-09-17 PROCEDURE — 83605 ASSAY OF LACTIC ACID: CPT | Performed by: NURSE PRACTITIONER

## 2020-09-17 PROCEDURE — 83605 ASSAY OF LACTIC ACID: CPT | Performed by: EMERGENCY MEDICINE

## 2020-09-17 PROCEDURE — 85610 PROTHROMBIN TIME: CPT | Performed by: NURSE PRACTITIONER

## 2020-09-17 PROCEDURE — 83050 HGB METHEMOGLOBIN QUAN: CPT

## 2020-09-17 PROCEDURE — 84100 ASSAY OF PHOSPHORUS: CPT | Performed by: HOSPITALIST

## 2020-09-17 PROCEDURE — 85730 THROMBOPLASTIN TIME PARTIAL: CPT | Performed by: HOSPITALIST

## 2020-09-17 PROCEDURE — 25010000002 CEFTRIAXONE PER 250 MG: Performed by: NURSE PRACTITIONER

## 2020-09-17 PROCEDURE — 85025 COMPLETE CBC W/AUTO DIFF WBC: CPT | Performed by: EMERGENCY MEDICINE

## 2020-09-17 RX ORDER — FAMOTIDINE 20 MG/1
20 TABLET, FILM COATED ORAL 2 TIMES DAILY
COMMUNITY

## 2020-09-17 RX ORDER — MAGNESIUM SULFATE HEPTAHYDRATE 40 MG/ML
2 INJECTION, SOLUTION INTRAVENOUS AS NEEDED
Status: DISCONTINUED | OUTPATIENT
Start: 2020-09-17 | End: 2020-09-19 | Stop reason: HOSPADM

## 2020-09-17 RX ORDER — IPRATROPIUM BROMIDE AND ALBUTEROL SULFATE 2.5; .5 MG/3ML; MG/3ML
3 SOLUTION RESPIRATORY (INHALATION) ONCE
Status: COMPLETED | OUTPATIENT
Start: 2020-09-17 | End: 2020-09-17

## 2020-09-17 RX ORDER — ALBUTEROL SULFATE 2.5 MG/3ML
2.5 SOLUTION RESPIRATORY (INHALATION) EVERY 6 HOURS PRN
Status: CANCELLED | OUTPATIENT
Start: 2020-09-17

## 2020-09-17 RX ORDER — IPRATROPIUM BROMIDE AND ALBUTEROL SULFATE 2.5; .5 MG/3ML; MG/3ML
3 SOLUTION RESPIRATORY (INHALATION)
Status: COMPLETED | OUTPATIENT
Start: 2020-09-17 | End: 2020-09-17

## 2020-09-17 RX ORDER — POTASSIUM CHLORIDE 7.45 MG/ML
10 INJECTION INTRAVENOUS
Status: DISCONTINUED | OUTPATIENT
Start: 2020-09-17 | End: 2020-09-19 | Stop reason: HOSPADM

## 2020-09-17 RX ORDER — MAGNESIUM SULFATE 1 G/100ML
1 INJECTION INTRAVENOUS AS NEEDED
Status: DISCONTINUED | OUTPATIENT
Start: 2020-09-17 | End: 2020-09-19 | Stop reason: HOSPADM

## 2020-09-17 RX ORDER — BUDESONIDE 0.5 MG/2ML
0.5 INHALANT ORAL
Status: DISCONTINUED | OUTPATIENT
Start: 2020-09-17 | End: 2020-09-19 | Stop reason: HOSPADM

## 2020-09-17 RX ORDER — METHYLPREDNISOLONE SODIUM SUCCINATE 40 MG/ML
40 INJECTION, POWDER, LYOPHILIZED, FOR SOLUTION INTRAMUSCULAR; INTRAVENOUS EVERY 12 HOURS
Status: COMPLETED | OUTPATIENT
Start: 2020-09-17 | End: 2020-09-19

## 2020-09-17 RX ORDER — GUAIFENESIN 600 MG/1
600 TABLET, EXTENDED RELEASE ORAL EVERY 12 HOURS SCHEDULED
Status: DISCONTINUED | OUTPATIENT
Start: 2020-09-17 | End: 2020-09-19 | Stop reason: HOSPADM

## 2020-09-17 RX ORDER — IPRATROPIUM BROMIDE AND ALBUTEROL SULFATE 2.5; .5 MG/3ML; MG/3ML
3 SOLUTION RESPIRATORY (INHALATION) ONCE
Status: DISCONTINUED | OUTPATIENT
Start: 2020-09-17 | End: 2020-09-17

## 2020-09-17 RX ORDER — MONTELUKAST SODIUM 10 MG/1
10 TABLET ORAL NIGHTLY
Status: DISCONTINUED | OUTPATIENT
Start: 2020-09-17 | End: 2020-09-19 | Stop reason: HOSPADM

## 2020-09-17 RX ORDER — POTASSIUM CHLORIDE 750 MG/1
40 TABLET, FILM COATED, EXTENDED RELEASE ORAL AS NEEDED
Status: DISCONTINUED | OUTPATIENT
Start: 2020-09-17 | End: 2020-09-19 | Stop reason: HOSPADM

## 2020-09-17 RX ORDER — NITROGLYCERIN 0.4 MG/1
0.4 TABLET SUBLINGUAL
Status: DISCONTINUED | OUTPATIENT
Start: 2020-09-17 | End: 2020-09-19 | Stop reason: HOSPADM

## 2020-09-17 RX ORDER — MAGNESIUM SULFATE HEPTAHYDRATE 40 MG/ML
4 INJECTION, SOLUTION INTRAVENOUS AS NEEDED
Status: DISCONTINUED | OUTPATIENT
Start: 2020-09-17 | End: 2020-09-19 | Stop reason: HOSPADM

## 2020-09-17 RX ORDER — FAMOTIDINE 20 MG/1
20 TABLET, FILM COATED ORAL 2 TIMES DAILY
Status: CANCELLED | OUTPATIENT
Start: 2020-09-17

## 2020-09-17 RX ORDER — POTASSIUM CHLORIDE 20 MEQ/1
40 TABLET, EXTENDED RELEASE ORAL EVERY 4 HOURS
Status: COMPLETED | OUTPATIENT
Start: 2020-09-17 | End: 2020-09-17

## 2020-09-17 RX ORDER — METOPROLOL SUCCINATE 25 MG/1
25 TABLET, EXTENDED RELEASE ORAL
Status: DISCONTINUED | OUTPATIENT
Start: 2020-09-17 | End: 2020-09-19

## 2020-09-17 RX ORDER — ATORVASTATIN CALCIUM 10 MG/1
10 TABLET, FILM COATED ORAL DAILY
Status: DISCONTINUED | OUTPATIENT
Start: 2020-09-17 | End: 2020-09-19 | Stop reason: HOSPADM

## 2020-09-17 RX ORDER — WARFARIN SODIUM 7.5 MG/1
7.5 TABLET ORAL
Status: COMPLETED | OUTPATIENT
Start: 2020-09-17 | End: 2020-09-17

## 2020-09-17 RX ORDER — HEPARIN SODIUM 5000 [USP'U]/ML
60 INJECTION, SOLUTION INTRAVENOUS; SUBCUTANEOUS ONCE
Status: COMPLETED | OUTPATIENT
Start: 2020-09-17 | End: 2020-09-17

## 2020-09-17 RX ORDER — L.ACID,PARA/B.BIFIDUM/S.THERM 8B CELL
1 CAPSULE ORAL DAILY
Status: DISCONTINUED | OUTPATIENT
Start: 2020-09-17 | End: 2020-09-19 | Stop reason: HOSPADM

## 2020-09-17 RX ORDER — SODIUM CHLORIDE 0.9 % (FLUSH) 0.9 %
10 SYRINGE (ML) INJECTION AS NEEDED
Status: DISCONTINUED | OUTPATIENT
Start: 2020-09-17 | End: 2020-09-19 | Stop reason: HOSPADM

## 2020-09-17 RX ORDER — HEPARIN SODIUM 5000 [USP'U]/ML
60 INJECTION, SOLUTION INTRAVENOUS; SUBCUTANEOUS AS NEEDED
Status: DISCONTINUED | OUTPATIENT
Start: 2020-09-17 | End: 2020-09-19

## 2020-09-17 RX ORDER — BUDESONIDE AND FORMOTEROL FUMARATE DIHYDRATE 80; 4.5 UG/1; UG/1
2 AEROSOL RESPIRATORY (INHALATION)
Status: DISCONTINUED | OUTPATIENT
Start: 2020-09-17 | End: 2020-09-17

## 2020-09-17 RX ORDER — HEPARIN SODIUM 5000 [USP'U]/ML
30 INJECTION, SOLUTION INTRAVENOUS; SUBCUTANEOUS AS NEEDED
Status: DISCONTINUED | OUTPATIENT
Start: 2020-09-17 | End: 2020-09-19

## 2020-09-17 RX ORDER — ATORVASTATIN CALCIUM 10 MG/1
10 TABLET, FILM COATED ORAL DAILY
Status: CANCELLED | OUTPATIENT
Start: 2020-09-17

## 2020-09-17 RX ORDER — LORAZEPAM 2 MG/ML
1 INJECTION INTRAMUSCULAR ONCE
Status: COMPLETED | OUTPATIENT
Start: 2020-09-17 | End: 2020-09-17

## 2020-09-17 RX ORDER — LOSARTAN POTASSIUM 25 MG/1
25 TABLET ORAL DAILY
Status: CANCELLED | OUTPATIENT
Start: 2020-09-18

## 2020-09-17 RX ORDER — HEPARIN SODIUM 10000 [USP'U]/100ML
11.9 INJECTION, SOLUTION INTRAVENOUS
Status: DISCONTINUED | OUTPATIENT
Start: 2020-09-17 | End: 2020-09-19

## 2020-09-17 RX ORDER — PANTOPRAZOLE SODIUM 40 MG/1
40 TABLET, DELAYED RELEASE ORAL
Status: DISCONTINUED | OUTPATIENT
Start: 2020-09-17 | End: 2020-09-19 | Stop reason: HOSPADM

## 2020-09-17 RX ORDER — POTASSIUM CHLORIDE 1.5 G/1.77G
40 POWDER, FOR SOLUTION ORAL AS NEEDED
Status: DISCONTINUED | OUTPATIENT
Start: 2020-09-17 | End: 2020-09-19 | Stop reason: HOSPADM

## 2020-09-17 RX ORDER — SODIUM CHLORIDE 0.9 % (FLUSH) 0.9 %
10 SYRINGE (ML) INJECTION EVERY 12 HOURS SCHEDULED
Status: DISCONTINUED | OUTPATIENT
Start: 2020-09-17 | End: 2020-09-19 | Stop reason: HOSPADM

## 2020-09-17 RX ORDER — WARFARIN SODIUM 5 MG/1
5 TABLET ORAL DAILY
Status: CANCELLED | OUTPATIENT
Start: 2020-09-17

## 2020-09-17 RX ORDER — WARFARIN SODIUM 1 MG/1
1 TABLET ORAL DAILY PRN
Status: ON HOLD | COMMUNITY
End: 2021-09-20

## 2020-09-17 RX ORDER — MONTELUKAST SODIUM 10 MG/1
10 TABLET ORAL NIGHTLY
Status: CANCELLED | OUTPATIENT
Start: 2020-09-17

## 2020-09-17 RX ADMIN — Medication 1 CAPSULE: at 20:43

## 2020-09-17 RX ADMIN — SODIUM CHLORIDE 125 ML/HR: 9 INJECTION, SOLUTION INTRAVENOUS at 15:14

## 2020-09-17 RX ADMIN — POTASSIUM CHLORIDE 40 MEQ: 20 TABLET, EXTENDED RELEASE ORAL at 20:44

## 2020-09-17 RX ADMIN — WARFARIN 7.5 MG: 7.5 TABLET ORAL at 20:43

## 2020-09-17 RX ADMIN — SODIUM CHLORIDE 1000 ML: 9 INJECTION, SOLUTION INTRAVENOUS at 13:12

## 2020-09-17 RX ADMIN — POTASSIUM & SODIUM PHOSPHATES POWDER PACK 280-160-250 MG 2 PACKET: 280-160-250 PACK at 16:29

## 2020-09-17 RX ADMIN — GUAIFENESIN 600 MG: 600 TABLET, EXTENDED RELEASE ORAL at 16:29

## 2020-09-17 RX ADMIN — HEPARIN SODIUM 5000 UNITS: 5000 INJECTION INTRAVENOUS; SUBCUTANEOUS at 15:27

## 2020-09-17 RX ADMIN — METHYLPREDNISOLONE SODIUM SUCCINATE 40 MG: 40 INJECTION, POWDER, FOR SOLUTION INTRAMUSCULAR; INTRAVENOUS at 20:45

## 2020-09-17 RX ADMIN — ATORVASTATIN CALCIUM 10 MG: 10 TABLET, FILM COATED ORAL at 20:44

## 2020-09-17 RX ADMIN — IPRATROPIUM BROMIDE AND ALBUTEROL SULFATE 3 ML: .5; 3 SOLUTION RESPIRATORY (INHALATION) at 06:30

## 2020-09-17 RX ADMIN — SODIUM CHLORIDE, PRESERVATIVE FREE 10 ML: 5 INJECTION INTRAVENOUS at 16:30

## 2020-09-17 RX ADMIN — SODIUM CHLORIDE, PRESERVATIVE FREE 10 ML: 5 INJECTION INTRAVENOUS at 20:45

## 2020-09-17 RX ADMIN — LORAZEPAM 1 MG: 2 INJECTION, SOLUTION INTRAMUSCULAR; INTRAVENOUS at 06:49

## 2020-09-17 RX ADMIN — IOVERSOL 90 ML: 741 INJECTION INTRA-ARTERIAL; INTRAVENOUS at 08:36

## 2020-09-17 RX ADMIN — SODIUM CHLORIDE 125 ML/HR: 9 INJECTION, SOLUTION INTRAVENOUS at 13:13

## 2020-09-17 RX ADMIN — DOXYCYCLINE 100 MG: 100 INJECTION, POWDER, LYOPHILIZED, FOR SOLUTION INTRAVENOUS at 13:12

## 2020-09-17 RX ADMIN — DEXAMETHASONE SODIUM PHOSPHATE 10 MG: 4 INJECTION, SOLUTION INTRAMUSCULAR; INTRAVENOUS at 04:13

## 2020-09-17 RX ADMIN — POTASSIUM CHLORIDE 40 MEQ: 20 TABLET, EXTENDED RELEASE ORAL at 16:28

## 2020-09-17 RX ADMIN — PANTOPRAZOLE SODIUM 40 MG: 40 TABLET, DELAYED RELEASE ORAL at 16:29

## 2020-09-17 RX ADMIN — HEPARIN SODIUM 11.9 UNITS/KG/HR: 10000 INJECTION, SOLUTION INTRAVENOUS at 15:28

## 2020-09-17 RX ADMIN — IPRATROPIUM BROMIDE AND ALBUTEROL SULFATE 3 ML: .5; 3 SOLUTION RESPIRATORY (INHALATION) at 11:52

## 2020-09-17 RX ADMIN — CEFTRIAXONE 1 G: 1 INJECTION, POWDER, FOR SOLUTION INTRAMUSCULAR; INTRAVENOUS at 16:28

## 2020-09-17 RX ADMIN — IPRATROPIUM BROMIDE AND ALBUTEROL SULFATE 3 ML: .5; 3 SOLUTION RESPIRATORY (INHALATION) at 12:21

## 2020-09-17 RX ADMIN — MONTELUKAST SODIUM 10 MG: 10 TABLET, COATED ORAL at 20:44

## 2020-09-18 ENCOUNTER — APPOINTMENT (OUTPATIENT)
Dept: CARDIOLOGY | Facility: HOSPITAL | Age: 45
End: 2020-09-18

## 2020-09-18 PROBLEM — J44.1 ACUTE EXACERBATION OF COPD WITH ASTHMA: Status: ACTIVE | Noted: 2020-09-18

## 2020-09-18 PROBLEM — J45.901 ACUTE EXACERBATION OF COPD WITH ASTHMA (HCC): Status: ACTIVE | Noted: 2020-09-18

## 2020-09-18 LAB
A-A DO2: 19.5 MMHG (ref 0–300)
ANION GAP SERPL CALCULATED.3IONS-SCNC: 9.6 MMOL/L (ref 5–15)
APTT PPP: 65.1 SECONDS (ref 25.6–35.3)
APTT PPP: 72.3 SECONDS (ref 25.6–35.3)
ARTERIAL PATENCY WRIST A: ABNORMAL
ATMOSPHERIC PRESS: 728 MMHG
BASE EXCESS BLDA CALC-SCNC: -1.6 MMOL/L (ref 0–2)
BASOPHILS # BLD AUTO: 0.01 10*3/MM3 (ref 0–0.2)
BASOPHILS NFR BLD AUTO: 0.1 % (ref 0–1.5)
BDY SITE: ABNORMAL
BH CV ECHO MEAS - % IVS THICK: 19.7 %
BH CV ECHO MEAS - % LVPW THICK: 54.1 %
BH CV ECHO MEAS - AO MAX PG: 9.1 MMHG
BH CV ECHO MEAS - AO MEAN PG: 5.5 MMHG
BH CV ECHO MEAS - AO ROOT AREA (BSA CORRECTED): 1.7
BH CV ECHO MEAS - AO ROOT AREA: 8.3 CM^2
BH CV ECHO MEAS - AO ROOT DIAM: 3.3 CM
BH CV ECHO MEAS - AO V2 MAX: 150.5 CM/SEC
BH CV ECHO MEAS - AO V2 MEAN: 113.5 CM/SEC
BH CV ECHO MEAS - AO V2 VTI: 34.4 CM
BH CV ECHO MEAS - BSA(HAYCOCK): 2 M^2
BH CV ECHO MEAS - BSA: 2 M^2
BH CV ECHO MEAS - BZI_BMI: 29.1 KILOGRAMS/M^2
BH CV ECHO MEAS - BZI_METRIC_HEIGHT: 170.2 CM
BH CV ECHO MEAS - BZI_METRIC_WEIGHT: 84.4 KG
BH CV ECHO MEAS - EDV(CUBED): 124.3 ML
BH CV ECHO MEAS - EDV(MOD-SP4): 78.3 ML
BH CV ECHO MEAS - EDV(TEICH): 117.7 ML
BH CV ECHO MEAS - EF(CUBED): 74.8 %
BH CV ECHO MEAS - EF(MOD-SP4): 59.8 %
BH CV ECHO MEAS - EF(TEICH): 66.5 %
BH CV ECHO MEAS - ESV(CUBED): 31.3 ML
BH CV ECHO MEAS - ESV(MOD-SP4): 31.5 ML
BH CV ECHO MEAS - ESV(TEICH): 39.4 ML
BH CV ECHO MEAS - FS: 36.9 %
BH CV ECHO MEAS - IVS/LVPW: 0.95
BH CV ECHO MEAS - IVSD: 0.99 CM
BH CV ECHO MEAS - IVSS: 1.2 CM
BH CV ECHO MEAS - LA DIMENSION: 2.4 CM
BH CV ECHO MEAS - LA/AO: 0.74
BH CV ECHO MEAS - LV DIASTOLIC VOL/BSA (35-75): 39.9 ML/M^2
BH CV ECHO MEAS - LV MASS(C)D: 186.2 GRAMS
BH CV ECHO MEAS - LV MASS(C)DI: 94.9 GRAMS/M^2
BH CV ECHO MEAS - LV MASS(C)S: 149.9 GRAMS
BH CV ECHO MEAS - LV MASS(C)SI: 76.4 GRAMS/M^2
BH CV ECHO MEAS - LV SYSTOLIC VOL/BSA (12-30): 16.1 ML/M^2
BH CV ECHO MEAS - LVIDD: 5 CM
BH CV ECHO MEAS - LVIDS: 3.2 CM
BH CV ECHO MEAS - LVLD AP4: 6.7 CM
BH CV ECHO MEAS - LVLS AP4: 5.9 CM
BH CV ECHO MEAS - LVOT AREA (M): 3.1 CM^2
BH CV ECHO MEAS - LVOT AREA: 3.1 CM^2
BH CV ECHO MEAS - LVOT DIAM: 2 CM
BH CV ECHO MEAS - LVPWD: 1 CM
BH CV ECHO MEAS - LVPWS: 1.6 CM
BH CV ECHO MEAS - MV A MAX VEL: 62.4 CM/SEC
BH CV ECHO MEAS - MV E MAX VEL: 120 CM/SEC
BH CV ECHO MEAS - MV E/A: 1.9
BH CV ECHO MEAS - PA ACC TIME: 0.11 SEC
BH CV ECHO MEAS - PA PR(ACCEL): 28.2 MMHG
BH CV ECHO MEAS - RAP SYSTOLE: 10 MMHG
BH CV ECHO MEAS - RVSP: 27.6 MMHG
BH CV ECHO MEAS - SI(AO): 145.3 ML/M^2
BH CV ECHO MEAS - SI(CUBED): 47.4 ML/M^2
BH CV ECHO MEAS - SI(MOD-SP4): 23.9 ML/M^2
BH CV ECHO MEAS - SI(TEICH): 39.9 ML/M^2
BH CV ECHO MEAS - SV(AO): 285 ML
BH CV ECHO MEAS - SV(CUBED): 93 ML
BH CV ECHO MEAS - SV(MOD-SP4): 46.8 ML
BH CV ECHO MEAS - SV(TEICH): 78.3 ML
BH CV ECHO MEAS - TR MAX VEL: 210 CM/SEC
BODY TEMPERATURE: 0 C
BUN SERPL-MCNC: 13 MG/DL (ref 6–20)
BUN/CREAT SERPL: 14 (ref 7–25)
CALCIUM SPEC-SCNC: 9 MG/DL (ref 8.6–10.5)
CHLORIDE SERPL-SCNC: 109 MMOL/L (ref 98–107)
CHOLEST SERPL-MCNC: 151 MG/DL (ref 0–200)
CO2 BLDA-SCNC: 23.9 MMOL/L (ref 22–33)
CO2 SERPL-SCNC: 19.4 MMOL/L (ref 22–29)
COHGB MFR BLD: 0.6 % (ref 0–5)
CREAT SERPL-MCNC: 0.93 MG/DL (ref 0.76–1.27)
CRP SERPL-MCNC: 2.6 MG/DL (ref 0–0.5)
D-LACTATE SERPL-SCNC: 2.2 MMOL/L (ref 0.5–2)
D-LACTATE SERPL-SCNC: 2.3 MMOL/L (ref 0.5–2)
D-LACTATE SERPL-SCNC: 3.5 MMOL/L (ref 0.5–2)
DEPRECATED RDW RBC AUTO: 50.9 FL (ref 37–54)
EOSINOPHIL # BLD AUTO: 0 10*3/MM3 (ref 0–0.4)
EOSINOPHIL NFR BLD AUTO: 0 % (ref 0.3–6.2)
ERYTHROCYTE [DISTWIDTH] IN BLOOD BY AUTOMATED COUNT: 16.2 % (ref 12.3–15.4)
GFR SERPL CREATININE-BSD FRML MDRD: 88 ML/MIN/1.73
GLUCOSE BLDC GLUCOMTR-MCNC: 119 MG/DL (ref 70–130)
GLUCOSE SERPL-MCNC: 197 MG/DL (ref 65–99)
HBA1C MFR BLD: 6.2 % (ref 4.8–5.6)
HCO3 BLDA-SCNC: 22.8 MMOL/L (ref 20–26)
HCT VFR BLD AUTO: 41.6 % (ref 37.5–51)
HCT VFR BLD CALC: 40.4 % (ref 38–51)
HDLC SERPL-MCNC: 41 MG/DL (ref 40–60)
HGB BLD-MCNC: 13 G/DL (ref 13–17.7)
HGB BLDA-MCNC: 13.2 G/DL (ref 14–18)
IMM GRANULOCYTES # BLD AUTO: 0.05 10*3/MM3 (ref 0–0.05)
IMM GRANULOCYTES NFR BLD AUTO: 0.6 % (ref 0–0.5)
INHALED O2 CONCENTRATION: 21 %
INR PPP: 2.22 (ref 0.9–1.1)
LDLC SERPL CALC-MCNC: 95 MG/DL (ref 0–100)
LDLC/HDLC SERPL: 2.32 {RATIO}
LYMPHOCYTES # BLD AUTO: 1.2 10*3/MM3 (ref 0.7–3.1)
LYMPHOCYTES NFR BLD AUTO: 15.2 % (ref 19.6–45.3)
Lab: ABNORMAL
MCH RBC QN AUTO: 26.7 PG (ref 26.6–33)
MCHC RBC AUTO-ENTMCNC: 31.3 G/DL (ref 31.5–35.7)
MCV RBC AUTO: 85.4 FL (ref 79–97)
METHGB BLD QL: 0.3 % (ref 0–3)
MODALITY: ABNORMAL
MONOCYTES # BLD AUTO: 0.22 10*3/MM3 (ref 0.1–0.9)
MONOCYTES NFR BLD AUTO: 2.8 % (ref 5–12)
NEUTROPHILS NFR BLD AUTO: 6.39 10*3/MM3 (ref 1.7–7)
NEUTROPHILS NFR BLD AUTO: 81.3 % (ref 42.7–76)
NOTE: ABNORMAL
NRBC BLD AUTO-RTO: 0 /100 WBC (ref 0–0.2)
OXYHGB MFR BLDV: 95.9 % (ref 94–99)
PCO2 BLDA: 36.9 MM HG (ref 35–45)
PCO2 TEMP ADJ BLD: ABNORMAL MM[HG]
PH BLDA: 7.4 PH UNITS (ref 7.35–7.45)
PH, TEMP CORRECTED: ABNORMAL
PHOSPHATE SERPL-MCNC: 2.1 MG/DL (ref 2.5–4.5)
PHOSPHATE SERPL-MCNC: 3 MG/DL (ref 2.5–4.5)
PLATELET # BLD AUTO: 221 10*3/MM3 (ref 140–450)
PMV BLD AUTO: 10.8 FL (ref 6–12)
PO2 BLDA: 81.9 MM HG (ref 83–108)
PO2 TEMP ADJ BLD: ABNORMAL MM[HG]
POTASSIUM SERPL-SCNC: 5 MMOL/L (ref 3.5–5.2)
PROTHROMBIN TIME: 24.5 SECONDS (ref 11.9–14.1)
RBC # BLD AUTO: 4.87 10*6/MM3 (ref 4.14–5.8)
SAO2 % BLDCOA: 96.8 % (ref 94–99)
SODIUM SERPL-SCNC: 138 MMOL/L (ref 136–145)
TRIGL SERPL-MCNC: 74 MG/DL (ref 0–150)
VENTILATOR MODE: ABNORMAL
VLDLC SERPL-MCNC: 14.8 MG/DL
WBC # BLD AUTO: 7.87 10*3/MM3 (ref 3.4–10.8)

## 2020-09-18 PROCEDURE — 93010 ELECTROCARDIOGRAM REPORT: CPT | Performed by: SPECIALIST

## 2020-09-18 PROCEDURE — 83605 ASSAY OF LACTIC ACID: CPT | Performed by: HOSPITALIST

## 2020-09-18 PROCEDURE — 25010000002 METHYLPREDNISOLONE PER 40 MG: Performed by: HOSPITALIST

## 2020-09-18 PROCEDURE — 99225 PR SBSQ OBSERVATION CARE/DAY 25 MINUTES: CPT | Performed by: HOSPITALIST

## 2020-09-18 PROCEDURE — 94799 UNLISTED PULMONARY SVC/PX: CPT

## 2020-09-18 PROCEDURE — 83036 HEMOGLOBIN GLYCOSYLATED A1C: CPT | Performed by: HOSPITALIST

## 2020-09-18 PROCEDURE — 82375 ASSAY CARBOXYHB QUANT: CPT

## 2020-09-18 PROCEDURE — 93005 ELECTROCARDIOGRAM TRACING: CPT | Performed by: HOSPITALIST

## 2020-09-18 PROCEDURE — G0378 HOSPITAL OBSERVATION PER HR: HCPCS

## 2020-09-18 PROCEDURE — 25010000002 METHYLPREDNISOLONE PER 40 MG: Performed by: NURSE PRACTITIONER

## 2020-09-18 PROCEDURE — 85025 COMPLETE CBC W/AUTO DIFF WBC: CPT | Performed by: NURSE PRACTITIONER

## 2020-09-18 PROCEDURE — 80048 BASIC METABOLIC PNL TOTAL CA: CPT | Performed by: NURSE PRACTITIONER

## 2020-09-18 PROCEDURE — 96361 HYDRATE IV INFUSION ADD-ON: CPT

## 2020-09-18 PROCEDURE — 82962 GLUCOSE BLOOD TEST: CPT

## 2020-09-18 PROCEDURE — 86140 C-REACTIVE PROTEIN: CPT | Performed by: NURSE PRACTITIONER

## 2020-09-18 PROCEDURE — 85610 PROTHROMBIN TIME: CPT | Performed by: NURSE PRACTITIONER

## 2020-09-18 PROCEDURE — 85730 THROMBOPLASTIN TIME PARTIAL: CPT | Performed by: HOSPITALIST

## 2020-09-18 PROCEDURE — 84100 ASSAY OF PHOSPHORUS: CPT | Performed by: HOSPITALIST

## 2020-09-18 PROCEDURE — 96366 THER/PROPH/DIAG IV INF ADDON: CPT

## 2020-09-18 PROCEDURE — 93306 TTE W/DOPPLER COMPLETE: CPT | Performed by: SPECIALIST

## 2020-09-18 PROCEDURE — 93306 TTE W/DOPPLER COMPLETE: CPT

## 2020-09-18 PROCEDURE — 80061 LIPID PANEL: CPT | Performed by: HOSPITALIST

## 2020-09-18 PROCEDURE — 96376 TX/PRO/DX INJ SAME DRUG ADON: CPT

## 2020-09-18 PROCEDURE — 82805 BLOOD GASES W/O2 SATURATION: CPT

## 2020-09-18 PROCEDURE — 25010000002 HEPARIN (PORCINE) PER 1000 UNITS: Performed by: NURSE PRACTITIONER

## 2020-09-18 PROCEDURE — 36600 WITHDRAWAL OF ARTERIAL BLOOD: CPT

## 2020-09-18 PROCEDURE — 83050 HGB METHEMOGLOBIN QUAN: CPT

## 2020-09-18 RX ORDER — SODIUM CHLORIDE 9 MG/ML
50 INJECTION, SOLUTION INTRAVENOUS CONTINUOUS
Status: DISCONTINUED | OUTPATIENT
Start: 2020-09-18 | End: 2020-09-19

## 2020-09-18 RX ORDER — TIOTROPIUM BROMIDE INHALATION SPRAY 3.12 UG/1
2 SPRAY, METERED RESPIRATORY (INHALATION) DAILY
Qty: 4 G | Refills: 0 | Status: ON HOLD | OUTPATIENT
Start: 2020-09-18 | End: 2021-09-20

## 2020-09-18 RX ORDER — SODIUM CHLORIDE 9 MG/ML
75 INJECTION, SOLUTION INTRAVENOUS CONTINUOUS
Status: DISCONTINUED | OUTPATIENT
Start: 2020-09-18 | End: 2020-09-18 | Stop reason: SDUPTHER

## 2020-09-18 RX ORDER — NICOTINE POLACRILEX 4 MG
15 LOZENGE BUCCAL
Status: DISCONTINUED | OUTPATIENT
Start: 2020-09-18 | End: 2020-09-18

## 2020-09-18 RX ORDER — DEXTROSE MONOHYDRATE 25 G/50ML
25 INJECTION, SOLUTION INTRAVENOUS
Status: DISCONTINUED | OUTPATIENT
Start: 2020-09-18 | End: 2020-09-18

## 2020-09-18 RX ORDER — WARFARIN SODIUM 7.5 MG/1
7.5 TABLET ORAL
Status: COMPLETED | OUTPATIENT
Start: 2020-09-18 | End: 2020-09-18

## 2020-09-18 RX ADMIN — IPRATROPIUM BROMIDE 0.5 MG: 0.5 SOLUTION RESPIRATORY (INHALATION) at 00:20

## 2020-09-18 RX ADMIN — SODIUM CHLORIDE 75 ML/HR: 9 INJECTION, SOLUTION INTRAVENOUS at 14:13

## 2020-09-18 RX ADMIN — HEPARIN SODIUM 8.9 UNITS/KG/HR: 10000 INJECTION, SOLUTION INTRAVENOUS at 20:30

## 2020-09-18 RX ADMIN — Medication 1 CAPSULE: at 17:08

## 2020-09-18 RX ADMIN — IPRATROPIUM BROMIDE 0.5 MG: 0.5 SOLUTION RESPIRATORY (INHALATION) at 18:52

## 2020-09-18 RX ADMIN — SODIUM CHLORIDE, PRESERVATIVE FREE 10 ML: 5 INJECTION INTRAVENOUS at 08:31

## 2020-09-18 RX ADMIN — METHYLPREDNISOLONE SODIUM SUCCINATE 40 MG: 40 INJECTION, POWDER, FOR SOLUTION INTRAMUSCULAR; INTRAVENOUS at 08:31

## 2020-09-18 RX ADMIN — GUAIFENESIN 600 MG: 600 TABLET, EXTENDED RELEASE ORAL at 22:16

## 2020-09-18 RX ADMIN — METHYLPREDNISOLONE SODIUM SUCCINATE 40 MG: 40 INJECTION, POWDER, FOR SOLUTION INTRAMUSCULAR; INTRAVENOUS at 22:16

## 2020-09-18 RX ADMIN — MONTELUKAST SODIUM 10 MG: 10 TABLET, COATED ORAL at 22:16

## 2020-09-18 RX ADMIN — WARFARIN 7.5 MG: 7.5 TABLET ORAL at 17:08

## 2020-09-18 RX ADMIN — BUDESONIDE 0.5 MG: 0.5 INHALANT RESPIRATORY (INHALATION) at 06:31

## 2020-09-18 RX ADMIN — PANTOPRAZOLE SODIUM 40 MG: 40 TABLET, DELAYED RELEASE ORAL at 06:23

## 2020-09-18 RX ADMIN — METOPROLOL SUCCINATE 25 MG: 25 TABLET, FILM COATED, EXTENDED RELEASE ORAL at 08:30

## 2020-09-18 RX ADMIN — IPRATROPIUM BROMIDE 0.5 MG: 0.5 SOLUTION RESPIRATORY (INHALATION) at 06:30

## 2020-09-18 RX ADMIN — BUDESONIDE 0.5 MG: 0.5 INHALANT RESPIRATORY (INHALATION) at 18:52

## 2020-09-18 RX ADMIN — IPRATROPIUM BROMIDE 0.5 MG: 0.5 SOLUTION RESPIRATORY (INHALATION) at 13:52

## 2020-09-18 RX ADMIN — DOXYCYCLINE 100 MG: 100 INJECTION, POWDER, LYOPHILIZED, FOR SOLUTION INTRAVENOUS at 01:41

## 2020-09-18 RX ADMIN — SODIUM CHLORIDE 75 ML/HR: 9 INJECTION, SOLUTION INTRAVENOUS at 01:43

## 2020-09-18 RX ADMIN — POTASSIUM & SODIUM PHOSPHATES POWDER PACK 280-160-250 MG 2 PACKET: 280-160-250 PACK at 10:46

## 2020-09-18 RX ADMIN — DOXYCYCLINE 100 MG: 100 INJECTION, POWDER, LYOPHILIZED, FOR SOLUTION INTRAVENOUS at 13:06

## 2020-09-18 RX ADMIN — GUAIFENESIN 600 MG: 600 TABLET, EXTENDED RELEASE ORAL at 08:30

## 2020-09-18 RX ADMIN — ATORVASTATIN CALCIUM 10 MG: 10 TABLET, FILM COATED ORAL at 08:30

## 2020-09-18 RX ADMIN — SODIUM CHLORIDE, PRESERVATIVE FREE 10 ML: 5 INJECTION INTRAVENOUS at 22:16

## 2020-09-18 RX ADMIN — POTASSIUM & SODIUM PHOSPHATES POWDER PACK 280-160-250 MG 2 PACKET: 280-160-250 PACK at 01:39

## 2020-09-18 RX ADMIN — SODIUM CHLORIDE 75 ML/HR: 9 INJECTION, SOLUTION INTRAVENOUS at 08:31

## 2020-09-19 VITALS
DIASTOLIC BLOOD PRESSURE: 71 MMHG | RESPIRATION RATE: 20 BRPM | BODY MASS INDEX: 29.19 KG/M2 | HEART RATE: 83 BPM | TEMPERATURE: 98.3 F | OXYGEN SATURATION: 90 % | WEIGHT: 186 LBS | SYSTOLIC BLOOD PRESSURE: 104 MMHG | HEIGHT: 67 IN

## 2020-09-19 PROBLEM — J44.1 ACUTE EXACERBATION OF COPD WITH ASTHMA (HCC): Status: RESOLVED | Noted: 2020-09-18 | Resolved: 2020-09-19

## 2020-09-19 PROBLEM — J44.1 COPD EXACERBATION (HCC): Status: RESOLVED | Noted: 2020-09-17 | Resolved: 2020-09-19

## 2020-09-19 PROBLEM — J45.901 ACUTE EXACERBATION OF COPD WITH ASTHMA (HCC): Status: RESOLVED | Noted: 2020-09-18 | Resolved: 2020-09-19

## 2020-09-19 LAB
ANION GAP SERPL CALCULATED.3IONS-SCNC: 8.5 MMOL/L (ref 5–15)
APTT PPP: 75.1 SECONDS (ref 25.6–35.3)
BACTERIA SPEC AEROBE CULT: NORMAL
BASOPHILS # BLD AUTO: 0.02 10*3/MM3 (ref 0–0.2)
BASOPHILS NFR BLD AUTO: 0.2 % (ref 0–1.5)
BUN SERPL-MCNC: 18 MG/DL (ref 6–20)
BUN/CREAT SERPL: 22.5 (ref 7–25)
CALCIUM SPEC-SCNC: 9.1 MG/DL (ref 8.6–10.5)
CHLORIDE SERPL-SCNC: 106 MMOL/L (ref 98–107)
CO2 SERPL-SCNC: 24.5 MMOL/L (ref 22–29)
CREAT SERPL-MCNC: 0.8 MG/DL (ref 0.76–1.27)
CRP SERPL-MCNC: 0.85 MG/DL (ref 0–0.5)
D-LACTATE SERPL-SCNC: 1.6 MMOL/L (ref 0.5–2)
DEPRECATED RDW RBC AUTO: 52.5 FL (ref 37–54)
EOSINOPHIL # BLD AUTO: 0 10*3/MM3 (ref 0–0.4)
EOSINOPHIL NFR BLD AUTO: 0 % (ref 0.3–6.2)
ERYTHROCYTE [DISTWIDTH] IN BLOOD BY AUTOMATED COUNT: 16.4 % (ref 12.3–15.4)
GFR SERPL CREATININE-BSD FRML MDRD: 105 ML/MIN/1.73
GLUCOSE SERPL-MCNC: 144 MG/DL (ref 65–99)
HCT VFR BLD AUTO: 42.4 % (ref 37.5–51)
HGB BLD-MCNC: 13 G/DL (ref 13–17.7)
IMM GRANULOCYTES # BLD AUTO: 0.09 10*3/MM3 (ref 0–0.05)
IMM GRANULOCYTES NFR BLD AUTO: 0.8 % (ref 0–0.5)
INR PPP: 3.41 (ref 0.9–1.1)
LYMPHOCYTES # BLD AUTO: 1.51 10*3/MM3 (ref 0.7–3.1)
LYMPHOCYTES NFR BLD AUTO: 14.2 % (ref 19.6–45.3)
MAGNESIUM SERPL-MCNC: 2.2 MG/DL (ref 1.6–2.6)
MCH RBC QN AUTO: 26.7 PG (ref 26.6–33)
MCHC RBC AUTO-ENTMCNC: 30.7 G/DL (ref 31.5–35.7)
MCV RBC AUTO: 87.1 FL (ref 79–97)
MONOCYTES # BLD AUTO: 0.36 10*3/MM3 (ref 0.1–0.9)
MONOCYTES NFR BLD AUTO: 3.4 % (ref 5–12)
NEUTROPHILS NFR BLD AUTO: 8.65 10*3/MM3 (ref 1.7–7)
NEUTROPHILS NFR BLD AUTO: 81.4 % (ref 42.7–76)
NRBC BLD AUTO-RTO: 0 /100 WBC (ref 0–0.2)
NT-PROBNP SERPL-MCNC: 1209 PG/ML (ref 0–450)
PLATELET # BLD AUTO: 214 10*3/MM3 (ref 140–450)
PMV BLD AUTO: 11.6 FL (ref 6–12)
POTASSIUM SERPL-SCNC: 4.6 MMOL/L (ref 3.5–5.2)
PROTHROMBIN TIME: 34.4 SECONDS (ref 11.9–14.1)
RBC # BLD AUTO: 4.87 10*6/MM3 (ref 4.14–5.8)
SODIUM SERPL-SCNC: 139 MMOL/L (ref 136–145)
WBC # BLD AUTO: 10.63 10*3/MM3 (ref 3.4–10.8)

## 2020-09-19 PROCEDURE — 25010000002 ALBUMIN HUMAN 25% PER 50 ML: Performed by: HOSPITALIST

## 2020-09-19 PROCEDURE — 96375 TX/PRO/DX INJ NEW DRUG ADDON: CPT

## 2020-09-19 PROCEDURE — G0378 HOSPITAL OBSERVATION PER HR: HCPCS

## 2020-09-19 PROCEDURE — 94799 UNLISTED PULMONARY SVC/PX: CPT

## 2020-09-19 PROCEDURE — 96376 TX/PRO/DX INJ SAME DRUG ADON: CPT

## 2020-09-19 PROCEDURE — 85610 PROTHROMBIN TIME: CPT | Performed by: NURSE PRACTITIONER

## 2020-09-19 PROCEDURE — 85730 THROMBOPLASTIN TIME PARTIAL: CPT | Performed by: HOSPITALIST

## 2020-09-19 PROCEDURE — P9047 ALBUMIN (HUMAN), 25%, 50ML: HCPCS | Performed by: HOSPITALIST

## 2020-09-19 PROCEDURE — 80048 BASIC METABOLIC PNL TOTAL CA: CPT | Performed by: HOSPITALIST

## 2020-09-19 PROCEDURE — 25010000002 METHYLPREDNISOLONE PER 40 MG: Performed by: HOSPITALIST

## 2020-09-19 PROCEDURE — 85025 COMPLETE CBC W/AUTO DIFF WBC: CPT | Performed by: HOSPITALIST

## 2020-09-19 PROCEDURE — 83605 ASSAY OF LACTIC ACID: CPT | Performed by: HOSPITALIST

## 2020-09-19 PROCEDURE — 83880 ASSAY OF NATRIURETIC PEPTIDE: CPT | Performed by: HOSPITALIST

## 2020-09-19 PROCEDURE — 25010000002 FUROSEMIDE PER 20 MG: Performed by: HOSPITALIST

## 2020-09-19 PROCEDURE — 86140 C-REACTIVE PROTEIN: CPT | Performed by: HOSPITALIST

## 2020-09-19 PROCEDURE — 83735 ASSAY OF MAGNESIUM: CPT | Performed by: HOSPITALIST

## 2020-09-19 PROCEDURE — 99217 PR OBSERVATION CARE DISCHARGE MANAGEMENT: CPT | Performed by: HOSPITALIST

## 2020-09-19 RX ORDER — LOSARTAN POTASSIUM 25 MG/1
25 TABLET ORAL EVERY EVENING
Qty: 30 TABLET | Refills: 3 | Status: SHIPPED | OUTPATIENT
Start: 2020-09-19

## 2020-09-19 RX ORDER — PREDNISONE 20 MG/1
40 TABLET ORAL
Status: DISCONTINUED | OUTPATIENT
Start: 2020-09-20 | End: 2020-09-19 | Stop reason: HOSPADM

## 2020-09-19 RX ORDER — METOPROLOL SUCCINATE 25 MG/1
12.5 TABLET, EXTENDED RELEASE ORAL
Status: DISCONTINUED | OUTPATIENT
Start: 2020-09-19 | End: 2020-09-19 | Stop reason: HOSPADM

## 2020-09-19 RX ORDER — ALBUMIN (HUMAN) 12.5 G/50ML
12.5 SOLUTION INTRAVENOUS ONCE
Status: COMPLETED | OUTPATIENT
Start: 2020-09-19 | End: 2020-09-19

## 2020-09-19 RX ORDER — FUROSEMIDE 10 MG/ML
20 INJECTION INTRAMUSCULAR; INTRAVENOUS ONCE
Status: COMPLETED | OUTPATIENT
Start: 2020-09-19 | End: 2020-09-19

## 2020-09-19 RX ORDER — PREDNISONE 20 MG/1
40 TABLET ORAL
Qty: 6 TABLET | Refills: 0 | Status: SHIPPED | OUTPATIENT
Start: 2020-09-20 | End: 2020-09-23

## 2020-09-19 RX ORDER — L.ACID,PARA/B.BIFIDUM/S.THERM 8B CELL
1 CAPSULE ORAL DAILY
Qty: 5 CAPSULE | Refills: 0 | Status: SHIPPED | OUTPATIENT
Start: 2020-09-19 | End: 2020-09-24

## 2020-09-19 RX ORDER — WARFARIN SODIUM 5 MG/1
2.5 TABLET ORAL DAILY
Status: ON HOLD
Start: 2020-09-19 | End: 2021-09-20

## 2020-09-19 RX ORDER — DOXYCYCLINE 100 MG/1
100 CAPSULE ORAL 2 TIMES DAILY
Qty: 6 CAPSULE | Refills: 0 | Status: SHIPPED | OUTPATIENT
Start: 2020-09-19 | End: 2020-09-22

## 2020-09-19 RX ORDER — WARFARIN SODIUM 2.5 MG/1
2.5 TABLET ORAL
Status: COMPLETED | OUTPATIENT
Start: 2020-09-19 | End: 2020-09-19

## 2020-09-19 RX ORDER — ATORVASTATIN CALCIUM 40 MG/1
40 TABLET, FILM COATED ORAL NIGHTLY
Qty: 30 TABLET | Refills: 6 | Status: SHIPPED | OUTPATIENT
Start: 2020-09-20

## 2020-09-19 RX ORDER — METOPROLOL SUCCINATE 25 MG/1
12.5 TABLET, EXTENDED RELEASE ORAL
Qty: 15 TABLET | Refills: 0 | Status: ON HOLD | OUTPATIENT
Start: 2020-09-19 | End: 2021-09-20

## 2020-09-19 RX ADMIN — Medication 1 CAPSULE: at 17:15

## 2020-09-19 RX ADMIN — SODIUM CHLORIDE, PRESERVATIVE FREE 10 ML: 5 INJECTION INTRAVENOUS at 09:15

## 2020-09-19 RX ADMIN — BUDESONIDE 0.5 MG: 0.5 INHALANT RESPIRATORY (INHALATION) at 06:31

## 2020-09-19 RX ADMIN — METHYLPREDNISOLONE SODIUM SUCCINATE 40 MG: 40 INJECTION, POWDER, FOR SOLUTION INTRAMUSCULAR; INTRAVENOUS at 09:15

## 2020-09-19 RX ADMIN — IPRATROPIUM BROMIDE 0.5 MG: 0.5 SOLUTION RESPIRATORY (INHALATION) at 06:38

## 2020-09-19 RX ADMIN — WARFARIN 2.5 MG: 2.5 TABLET ORAL at 17:14

## 2020-09-19 RX ADMIN — IPRATROPIUM BROMIDE 0.5 MG: 0.5 SOLUTION RESPIRATORY (INHALATION) at 00:40

## 2020-09-19 RX ADMIN — PANTOPRAZOLE SODIUM 40 MG: 40 TABLET, DELAYED RELEASE ORAL at 05:33

## 2020-09-19 RX ADMIN — DOXYCYCLINE 100 MG: 100 INJECTION, POWDER, LYOPHILIZED, FOR SOLUTION INTRAVENOUS at 02:59

## 2020-09-19 RX ADMIN — ATORVASTATIN CALCIUM 10 MG: 10 TABLET, FILM COATED ORAL at 09:15

## 2020-09-19 RX ADMIN — IPRATROPIUM BROMIDE 0.5 MG: 0.5 SOLUTION RESPIRATORY (INHALATION) at 13:01

## 2020-09-19 RX ADMIN — DOXYCYCLINE 100 MG: 100 INJECTION, POWDER, LYOPHILIZED, FOR SOLUTION INTRAVENOUS at 13:58

## 2020-09-19 RX ADMIN — ALBUMIN HUMAN 12.5 G: 0.25 SOLUTION INTRAVENOUS at 11:59

## 2020-09-19 RX ADMIN — FUROSEMIDE 20 MG: 20 INJECTION, SOLUTION INTRAMUSCULAR; INTRAVENOUS at 11:59

## 2020-09-19 RX ADMIN — GUAIFENESIN 600 MG: 600 TABLET, EXTENDED RELEASE ORAL at 09:15

## 2020-09-20 ENCOUNTER — READMISSION MANAGEMENT (OUTPATIENT)
Dept: CALL CENTER | Facility: HOSPITAL | Age: 45
End: 2020-09-20

## 2020-09-20 NOTE — OUTREACH NOTE
Prep Survey      Responses   [facility]   Is LACE score < 7 ?  No   Eligibility  Readm Mgmt   Discharge diagnosis  COPD   COVID-19 Test Status  Negative   Does the patient have one of the following disease processes/diagnoses(primary or secondary)?  COPD/Pneumonia   Does the patient have Home health ordered?  No   Is there a DME ordered?  Yes   What DME was ordered?  RW   Comments regarding appointments  see AVS   Medication alerts for this patient  see AVS for changes   Prep survey completed?  Yes          Ting De La Cruz RN

## 2020-09-21 ENCOUNTER — TELEPHONE (OUTPATIENT)
Dept: MEDSURG UNIT | Facility: HOSPITAL | Age: 45
End: 2020-09-21

## 2020-09-22 LAB
BACTERIA SPEC AEROBE CULT: NORMAL
BACTERIA SPEC AEROBE CULT: NORMAL

## 2020-09-23 ENCOUNTER — READMISSION MANAGEMENT (OUTPATIENT)
Dept: CALL CENTER | Facility: HOSPITAL | Age: 45
End: 2020-09-23

## 2020-09-23 NOTE — OUTREACH NOTE
COPD/PN Week 1 Survey      Responses   [facility] patient discharged from?  [facility]   COVID-19 Test Status  Negative   Does the patient have one of the following disease processes/diagnoses(primary or secondary)?  COPD/Pneumonia   Is there a successful TCM telephone encounter documented?  No   Was the primary reason for admission:  COPD exacerbation   Week 1 attempt successful?  No   Unsuccessful attempts  Attempt 1          Karla De Santiago RN

## 2020-09-24 ENCOUNTER — READMISSION MANAGEMENT (OUTPATIENT)
Dept: CALL CENTER | Facility: HOSPITAL | Age: 45
End: 2020-09-24

## 2020-09-24 NOTE — OUTREACH NOTE
COPD/PN Week 1 Survey      Responses   [facility] patient discharged from?  [facility]   COVID-19 Test Status  Negative   Does the patient have one of the following disease processes/diagnoses(primary or secondary)?  COPD/Pneumonia   Is there a successful TCM telephone encounter documented?  No   Was the primary reason for admission:  COPD exacerbation   Week 1 attempt successful?  No   Unsuccessful attempts  Attempt 2          Karla De Santiago RN

## 2020-09-29 ENCOUNTER — READMISSION MANAGEMENT (OUTPATIENT)
Dept: CALL CENTER | Facility: HOSPITAL | Age: 45
End: 2020-09-29

## 2020-09-29 NOTE — OUTREACH NOTE
COPD/PN Week 2 Survey      Responses   [facility] patient discharged from?  [facility]   COVID-19 Test Status  Negative   Does the patient have one of the following disease processes/diagnoses(primary or secondary)?  COPD/Pneumonia   Was the primary reason for admission:  COPD exacerbation   Week 2 attempt successful?  No   Unsuccessful attempts  Attempt 1          Hilary Platt RN

## 2020-10-01 ENCOUNTER — READMISSION MANAGEMENT (OUTPATIENT)
Dept: CALL CENTER | Facility: HOSPITAL | Age: 45
End: 2020-10-01

## 2020-10-01 NOTE — OUTREACH NOTE
COPD/PN Week 2 Survey      Responses   [facility] patient discharged from?  [facility]   Does the patient have one of the following disease processes/diagnoses(primary or secondary)?  COPD/Pneumonia   Week 2 attempt successful?  No   Unsuccessful attempts  Attempt 2          Mary Ann Gomez RN

## 2020-10-12 ENCOUNTER — READMISSION MANAGEMENT (OUTPATIENT)
Dept: CALL CENTER | Facility: HOSPITAL | Age: 45
End: 2020-10-12

## 2020-10-12 NOTE — OUTREACH NOTE
COPD/PN Week 3 Survey      Responses   [facility] patient discharged from?  [facility]   Does the patient have one of the following disease processes/diagnoses(primary or secondary)?  COPD/Pneumonia   Was the primary reason for admission:  COPD exacerbation   Week 3 attempt successful?  Yes   Call start time  0843   Call end time  0848   Discharge diagnosis  COPD   Meds reviewed with patient/caregiver?  Yes   Is the patient having any side effects they believe may be caused by any medication additions or changes?  No   Is the patient taking all medications as directed (includes completed medication regime)?  Yes   Medication comments  Coumadin increased 5 mg daily   Does the patient have a primary care provider?   Yes   Does the patient have an appointment with their PCP or specialist within 7 days of discharge?  Yes   Has the patient kept scheduled appointments due by today?  Yes   DME comments  Ha s nebulizer, no oxygen   Pulse Ox monitoring  None   Psychosocial issues?  No   Did the patient receive a copy of their discharge instructions?  Yes   Nursing interventions  Reviewed instructions with patient   What is the patient's perception of their health status since discharge?  Improving   Are the patient's immunizations up to date?   Yes   If the patient is a current smoker, are they able to teach back resources for cessation?  Not a smoker   Is the patient/caregiver able to teach back the hierarchy of who to call/visit for symptoms/problems? PCP, Specialist, Home health nurse, Urgent Care, ED, 911  Yes   Is the patient able to teach back COPD zones?  Yes   Nursing interventions  Education provided on various zones   Patient reports what zone on this call?  Green Zone   Green Zone  Reports doing well   Week 3 call completed?  Yes          Karla De Santiago RN

## 2020-10-23 ENCOUNTER — READMISSION MANAGEMENT (OUTPATIENT)
Dept: CALL CENTER | Facility: HOSPITAL | Age: 45
End: 2020-10-23

## 2020-10-23 NOTE — OUTREACH NOTE
COPD/PN Week 4 Survey      Responses   [facility] patient discharged from?  [facility]   Does the patient have one of the following disease processes/diagnoses(primary or secondary)?  COPD/Pneumonia   Was the primary reason for admission:  COPD exacerbation   Week 4 attempt successful?  No          Hilary Platt RN

## 2021-03-11 ENCOUNTER — HOSPITAL ENCOUNTER (EMERGENCY)
Facility: HOSPITAL | Age: 46
Discharge: HOME | End: 2021-03-11
Attending: FAMILY MEDICINE | Admitting: EMERGENCY MEDICINE

## 2021-03-11 VITALS
RESPIRATION RATE: 20 BRPM | BODY MASS INDEX: 30.61 KG/M2 | HEIGHT: 67 IN | OXYGEN SATURATION: 98 % | HEART RATE: 78 BPM | DIASTOLIC BLOOD PRESSURE: 71 MMHG | TEMPERATURE: 98.3 F | SYSTOLIC BLOOD PRESSURE: 133 MMHG | WEIGHT: 195 LBS

## 2021-03-11 DIAGNOSIS — U07.1 COVID-19: Primary | ICD-10-CM

## 2021-03-11 LAB
FLUAV RNA RESP QL NAA+PROBE: NOT DETECTED
FLUBV RNA RESP QL NAA+PROBE: NOT DETECTED
SARS-COV-2 RNA RESP QL NAA+PROBE: DETECTED

## 2021-03-11 PROCEDURE — C9803 HOPD COVID-19 SPEC COLLECT: HCPCS

## 2021-03-11 PROCEDURE — 87636 SARSCOV2 & INF A&B AMP PRB: CPT | Performed by: PHYSICIAN ASSISTANT

## 2021-03-11 PROCEDURE — 99283 EMERGENCY DEPT VISIT LOW MDM: CPT

## 2021-03-11 NOTE — ED PROVIDER NOTES
Subjective   Patient is a 45-year-old male who presents to the ED with recent exposure to COVID-19.  He states he has had several family members that have tested positive recently.  He is currently asymptomatic but would like to be swabbed.  He denies chest pain, shortness of breath, fever, chills, nausea, vomiting, headache, dizziness, sore throat, abdominal pain, or dysuria.      History provided by:  Patient   used: No        Review of Systems   Constitutional: Negative.  Negative for chills, diaphoresis, fatigue and fever.   HENT: Negative.  Negative for congestion, drooling, ear discharge, ear pain, facial swelling, postnasal drip, rhinorrhea, sinus pressure, sinus pain, sneezing, sore throat, tinnitus and trouble swallowing.    Eyes: Negative.  Negative for photophobia, pain, discharge, redness and itching.   Respiratory: Negative.  Negative for cough, shortness of breath and wheezing.    Cardiovascular: Negative.  Negative for chest pain.   Gastrointestinal: Negative.  Negative for abdominal distention, abdominal pain, diarrhea, nausea and vomiting.   Endocrine: Negative.    Genitourinary: Negative.  Negative for difficulty urinating, dysuria, flank pain, frequency and urgency.   Musculoskeletal: Negative.  Negative for arthralgias, back pain, gait problem, joint swelling, myalgias, neck pain and neck stiffness.   Skin: Negative.    Neurological: Negative.  Negative for dizziness, syncope, facial asymmetry, weakness, light-headedness, numbness and headaches.   Psychiatric/Behavioral: Negative.  Negative for confusion.   All other systems reviewed and are negative.      Past Medical History:   Diagnosis Date   • Anxiety    • Asthma    • Back pain    • Chronic anticoagulation 2/2/2020   • COPD (chronic obstructive pulmonary disease) (CMS/Formerly [facility])    • Emphysema lung (CMS/[facility]ly [facility])    • Gastritis    • GERD (gastroesophageal reflux disease)    • Headache    • Heart valve disease 2018    valve replac.,  "prosthetic valve   • Hx of aortic valve replacement, mechanical 11/28/2018   • Hyperglycemia 2/2/2020   • Hypertension    • Migraine    • Substance abuse (CMS/HCC)        Allergies   Allergen Reactions   • Aspirin Anaphylaxis     Patient said, \"it chokes him up.\" patient said, \"I got really short of breath and started to have an asthma attack.\"       Past Surgical History:   Procedure Laterality Date   • CARDIAC CATHETERIZATION N/A 10/23/2017    Procedure: Left Heart Cath;  Surgeon: Rodolfo Núñez MD;  Location:  [facility];  Service:    • CARDIAC VALVE REPLACEMENT  2018    prosthetic valve   • DENTAL PROCEDURE     • LIVER SURGERY      tumor removed from liver   • OTHER SURGICAL HISTORY      \"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\"   • THORACIC AORTIC ANEURYSM ASCENDING/ARCH REPAIR N/A 1/17/2018    Procedure: MEDIAN STERNOTOMY, AORTIC VALVE CONDUIT, BENTAL, TRANSESOPHAGEAL ECHOCARDIOGRAM WITH ANESTHESIA;  Surgeon: Aaron Lucas MD;  Location:  [facility];  Service:        Family History   Problem Relation Age of Onset   • COPD Mother        Social History     Socioeconomic History   • Marital status: Single     Spouse name: Not on file   • Number of children: 0   • Years of education: Not on file   • Highest education level: Not on file   Tobacco Use   • Smoking status: Former Smoker     Packs/day: 1.00     Years: 4.00     Pack years: 4.00   • Smokeless tobacco: Current User     Types: Snuff   Substance and Sexual Activity   • Alcohol use: No     Comment: occassional    • Drug use: No   • Sexual activity: Defer           Objective   Physical Exam  Vitals and nursing note reviewed.   Constitutional:       General: He is not in acute distress.     Appearance: He is well-developed. He is not diaphoretic.   HENT:      Head: Normocephalic and atraumatic.      Right Ear: External ear normal.      Left Ear: External ear normal.      Nose: Nose normal.      Mouth/Throat:      Mouth: Mucous " membranes are moist.      Pharynx: Oropharynx is clear.   Eyes:      Extraocular Movements: Extraocular movements intact.      Conjunctiva/sclera: Conjunctivae normal.      Pupils: Pupils are equal, round, and reactive to light.   Neck:      Vascular: No JVD.      Trachea: No tracheal deviation.   Cardiovascular:      Rate and Rhythm: Normal rate and regular rhythm.      Heart sounds: Normal heart sounds. No murmur.   Pulmonary:      Effort: Pulmonary effort is normal. No respiratory distress.      Breath sounds: Normal breath sounds. No wheezing.   Abdominal:      General: Bowel sounds are normal.      Palpations: Abdomen is soft.      Tenderness: There is no abdominal tenderness.   Musculoskeletal:         General: No deformity. Normal range of motion.      Cervical back: Normal range of motion and neck supple.   Skin:     General: Skin is warm and dry.      Coloration: Skin is not pale.      Findings: No erythema or rash.   Neurological:      Mental Status: He is alert and oriented to person, place, and time.      Cranial Nerves: No cranial nerve deficit.   Psychiatric:         Behavior: Behavior normal.         Thought Content: Thought content normal.         Procedures           ED Course  ED Course as of Mar 11 1919   Thu Mar 11, 2021   1916 Patient continues to deny fever, chills, nausea, vomiting, cough, headache, sore throat, body aches, shortness of breath, or chest pain.    []   1918 Discussed plan of care with patient.  Advised if he develops any symptoms to return to the ED.  We discussed home quarantine and follow-up with PCP.    []      ED Course User Index  [] Bernice Kilpatrick PA-C                                           [facility]    Final diagnoses:   COVID-19            Bernice Kilpatrick PA-C  03/11/21 1919

## 2021-03-12 ENCOUNTER — PATIENT OUTREACH (OUTPATIENT)
Dept: CASE MANAGEMENT | Facility: OTHER | Age: 46
End: 2021-03-12
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
## 2021-03-15 ENCOUNTER — BULK ORDERING (OUTPATIENT)
Dept: CASE MANAGEMENT | Facility: OTHER | Age: 46
End: 2021-03-15

## 2021-03-15 DIAGNOSIS — Z23 IMMUNIZATION DUE: ICD-10-CM

## 2021-03-21 ENCOUNTER — APPOINTMENT (OUTPATIENT)
Dept: GENERAL RADIOLOGY | Facility: HOSPITAL | Age: 46
End: 2021-03-21

## 2021-03-21 ENCOUNTER — HOSPITAL ENCOUNTER (EMERGENCY)
Facility: HOSPITAL | Age: 46
Discharge: HOME | End: 2021-03-21
Attending: STUDENT IN AN ORGANIZED HEALTH CARE EDUCATION/TRAINING PROGRAM | Admitting: STUDENT IN AN ORGANIZED HEALTH CARE EDUCATION/TRAINING PROGRAM

## 2021-03-21 VITALS
BODY MASS INDEX: 30.61 KG/M2 | DIASTOLIC BLOOD PRESSURE: 81 MMHG | WEIGHT: 195 LBS | HEIGHT: 67 IN | SYSTOLIC BLOOD PRESSURE: 136 MMHG | TEMPERATURE: 98.6 F | OXYGEN SATURATION: 98 % | HEART RATE: 88 BPM | RESPIRATION RATE: 18 BRPM

## 2021-03-21 DIAGNOSIS — M25.461 EFFUSION OF RIGHT KNEE JOINT: Primary | ICD-10-CM

## 2021-03-21 PROCEDURE — 73630 X-RAY EXAM OF FOOT: CPT

## 2021-03-21 PROCEDURE — 99283 EMERGENCY DEPT VISIT LOW MDM: CPT

## 2021-03-21 PROCEDURE — 73590 X-RAY EXAM OF LOWER LEG: CPT | Performed by: RADIOLOGY

## 2021-03-21 PROCEDURE — 73630 X-RAY EXAM OF FOOT: CPT | Performed by: RADIOLOGY

## 2021-03-21 PROCEDURE — 73590 X-RAY EXAM OF LOWER LEG: CPT

## 2021-03-21 PROCEDURE — 73562 X-RAY EXAM OF KNEE 3: CPT

## 2021-03-21 PROCEDURE — 73562 X-RAY EXAM OF KNEE 3: CPT | Performed by: RADIOLOGY

## 2021-03-21 RX ORDER — ACETAMINOPHEN 500 MG
1000 TABLET ORAL ONCE
Status: COMPLETED | OUTPATIENT
Start: 2021-03-21 | End: 2021-03-21

## 2021-03-21 RX ADMIN — ACETAMINOPHEN 1000 MG: 500 TABLET ORAL at 14:24
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
## 2021-03-21 NOTE — ED NOTES
Patients stated that he tested positive for Covid 19 on March 11th. He is wanting to be swabbed again to see if it comes back negative.      Sherron Scott  03/21/21 1122

## 2021-04-09 ENCOUNTER — APPOINTMENT (OUTPATIENT)
Dept: GENERAL RADIOLOGY | Facility: HOSPITAL | Age: 46
End: 2021-04-09

## 2021-04-09 ENCOUNTER — HOSPITAL ENCOUNTER (EMERGENCY)
Facility: HOSPITAL | Age: 46
Discharge: HOME | End: 2021-04-09
Attending: EMERGENCY MEDICINE | Admitting: EMERGENCY MEDICINE

## 2021-04-09 VITALS
WEIGHT: 195 LBS | HEIGHT: 67 IN | HEART RATE: 85 BPM | DIASTOLIC BLOOD PRESSURE: 78 MMHG | BODY MASS INDEX: 30.61 KG/M2 | TEMPERATURE: 98.2 F | OXYGEN SATURATION: 98 % | SYSTOLIC BLOOD PRESSURE: 109 MMHG | RESPIRATION RATE: 16 BRPM

## 2021-04-09 DIAGNOSIS — R07.9 NONSPECIFIC CHEST PAIN: ICD-10-CM

## 2021-04-09 DIAGNOSIS — R00.2 PALPITATIONS: Primary | ICD-10-CM

## 2021-04-09 LAB
ALBUMIN SERPL-MCNC: 3.99 G/DL (ref 3.5–5.2)
ALBUMIN/GLOB SERPL: 1.8 G/DL
ALP SERPL-CCNC: 83 U/L (ref 39–117)
ALT SERPL W P-5'-P-CCNC: 27 U/L (ref 1–41)
ANION GAP SERPL CALCULATED.3IONS-SCNC: 7.6 MMOL/L (ref 5–15)
APTT PPP: 35.9 SECONDS (ref 25.6–35.3)
AST SERPL-CCNC: 21 U/L (ref 1–40)
BASOPHILS # BLD AUTO: 0.08 10*3/MM3 (ref 0–0.2)
BASOPHILS NFR BLD AUTO: 1.2 % (ref 0–1.5)
BILIRUB SERPL-MCNC: 0.3 MG/DL (ref 0–1.2)
BUN SERPL-MCNC: 20 MG/DL (ref 6–20)
BUN/CREAT SERPL: 16.9 (ref 7–25)
CALCIUM SPEC-SCNC: 9.3 MG/DL (ref 8.6–10.5)
CHLORIDE SERPL-SCNC: 102 MMOL/L (ref 98–107)
CK SERPL-CCNC: 127 U/L (ref 20–200)
CO2 SERPL-SCNC: 27.4 MMOL/L (ref 22–29)
CREAT SERPL-MCNC: 1.18 MG/DL (ref 0.76–1.27)
CRP SERPL-MCNC: <0.3 MG/DL (ref 0–0.5)
D DIMER PPP FEU-MCNC: 0.44 MCGFEU/ML (ref 0–0.5)
DEPRECATED RDW RBC AUTO: 50.5 FL (ref 37–54)
EOSINOPHIL # BLD AUTO: 0.28 10*3/MM3 (ref 0–0.4)
EOSINOPHIL NFR BLD AUTO: 4.1 % (ref 0.3–6.2)
ERYTHROCYTE [DISTWIDTH] IN BLOOD BY AUTOMATED COUNT: 15.9 % (ref 12.3–15.4)
ERYTHROCYTE [SEDIMENTATION RATE] IN BLOOD: 2 MM/HR (ref 0–15)
ETHANOL BLD-MCNC: <10 MG/DL (ref 0–10)
ETHANOL UR QL: <0.01 %
GFR SERPL CREATININE-BSD FRML MDRD: 67 ML/MIN/1.73
GLOBULIN UR ELPH-MCNC: 2.2 GM/DL
GLUCOSE SERPL-MCNC: 107 MG/DL (ref 65–99)
HCT VFR BLD AUTO: 41.7 % (ref 37.5–51)
HGB BLD-MCNC: 13.1 G/DL (ref 13–17.7)
HOLD SPECIMEN: NORMAL
HOLD SPECIMEN: NORMAL
IMM GRANULOCYTES # BLD AUTO: 0.04 10*3/MM3 (ref 0–0.05)
IMM GRANULOCYTES NFR BLD AUTO: 0.6 % (ref 0–0.5)
INR PPP: 1.93 (ref 0.9–1.1)
LIPASE SERPL-CCNC: 20 U/L (ref 13–60)
LIPASE SERPL-CCNC: 20 U/L (ref 13–60)
LYMPHOCYTES # BLD AUTO: 2.49 10*3/MM3 (ref 0.7–3.1)
LYMPHOCYTES NFR BLD AUTO: 36.7 % (ref 19.6–45.3)
MAGNESIUM SERPL-MCNC: 2.3 MG/DL (ref 1.6–2.6)
MCH RBC QN AUTO: 27.3 PG (ref 26.6–33)
MCHC RBC AUTO-ENTMCNC: 31.4 G/DL (ref 31.5–35.7)
MCV RBC AUTO: 86.9 FL (ref 79–97)
MONOCYTES # BLD AUTO: 0.76 10*3/MM3 (ref 0.1–0.9)
MONOCYTES NFR BLD AUTO: 11.2 % (ref 5–12)
NEUTROPHILS NFR BLD AUTO: 3.14 10*3/MM3 (ref 1.7–7)
NEUTROPHILS NFR BLD AUTO: 46.2 % (ref 42.7–76)
NRBC BLD AUTO-RTO: 0 /100 WBC (ref 0–0.2)
NT-PROBNP SERPL-MCNC: 486.3 PG/ML (ref 0–450)
NT-PROBNP SERPL-MCNC: 536.9 PG/ML (ref 0–450)
PLATELET # BLD AUTO: 282 10*3/MM3 (ref 140–450)
PMV BLD AUTO: 11 FL (ref 6–12)
POTASSIUM SERPL-SCNC: 4 MMOL/L (ref 3.5–5.2)
PROT SERPL-MCNC: 6.2 G/DL (ref 6–8.5)
PROTHROMBIN TIME: 21.9 SECONDS (ref 11.9–14.1)
RBC # BLD AUTO: 4.8 10*6/MM3 (ref 4.14–5.8)
SODIUM SERPL-SCNC: 137 MMOL/L (ref 136–145)
T4 FREE SERPL-MCNC: 1.04 NG/DL (ref 0.93–1.7)
TROPONIN T SERPL-MCNC: <0.01 NG/ML (ref 0–0.03)
TROPONIN T SERPL-MCNC: <0.01 NG/ML (ref 0–0.03)
TSH SERPL DL<=0.05 MIU/L-ACNC: 4.18 UIU/ML (ref 0.27–4.2)
WBC # BLD AUTO: 6.79 10*3/MM3 (ref 3.4–10.8)
WHOLE BLOOD HOLD SPECIMEN: NORMAL
WHOLE BLOOD HOLD SPECIMEN: NORMAL

## 2021-04-09 PROCEDURE — 82550 ASSAY OF CK (CPK): CPT | Performed by: EMERGENCY MEDICINE

## 2021-04-09 PROCEDURE — 83690 ASSAY OF LIPASE: CPT | Performed by: EMERGENCY MEDICINE

## 2021-04-09 PROCEDURE — 99284 EMERGENCY DEPT VISIT MOD MDM: CPT

## 2021-04-09 PROCEDURE — 93005 ELECTROCARDIOGRAM TRACING: CPT | Performed by: EMERGENCY MEDICINE

## 2021-04-09 PROCEDURE — 84439 ASSAY OF FREE THYROXINE: CPT | Performed by: EMERGENCY MEDICINE

## 2021-04-09 PROCEDURE — 84443 ASSAY THYROID STIM HORMONE: CPT | Performed by: EMERGENCY MEDICINE

## 2021-04-09 PROCEDURE — 71045 X-RAY EXAM CHEST 1 VIEW: CPT

## 2021-04-09 PROCEDURE — 85610 PROTHROMBIN TIME: CPT | Performed by: EMERGENCY MEDICINE

## 2021-04-09 PROCEDURE — 83735 ASSAY OF MAGNESIUM: CPT | Performed by: EMERGENCY MEDICINE

## 2021-04-09 PROCEDURE — 93010 ELECTROCARDIOGRAM REPORT: CPT | Performed by: INTERNAL MEDICINE

## 2021-04-09 PROCEDURE — 82077 ASSAY SPEC XCP UR&BREATH IA: CPT | Performed by: EMERGENCY MEDICINE

## 2021-04-09 PROCEDURE — 85025 COMPLETE CBC W/AUTO DIFF WBC: CPT | Performed by: EMERGENCY MEDICINE

## 2021-04-09 PROCEDURE — 36415 COLL VENOUS BLD VENIPUNCTURE: CPT

## 2021-04-09 PROCEDURE — 84484 ASSAY OF TROPONIN QUANT: CPT | Performed by: EMERGENCY MEDICINE

## 2021-04-09 PROCEDURE — 85652 RBC SED RATE AUTOMATED: CPT | Performed by: EMERGENCY MEDICINE

## 2021-04-09 PROCEDURE — 83880 ASSAY OF NATRIURETIC PEPTIDE: CPT | Performed by: EMERGENCY MEDICINE

## 2021-04-09 PROCEDURE — 86140 C-REACTIVE PROTEIN: CPT | Performed by: EMERGENCY MEDICINE

## 2021-04-09 PROCEDURE — 85730 THROMBOPLASTIN TIME PARTIAL: CPT | Performed by: EMERGENCY MEDICINE

## 2021-04-09 PROCEDURE — 80053 COMPREHEN METABOLIC PANEL: CPT | Performed by: EMERGENCY MEDICINE

## 2021-04-09 PROCEDURE — 85379 FIBRIN DEGRADATION QUANT: CPT | Performed by: EMERGENCY MEDICINE

## 2021-04-09 RX ORDER — SODIUM CHLORIDE 0.9 % (FLUSH) 0.9 %
10 SYRINGE (ML) INJECTION AS NEEDED
Status: DISCONTINUED | OUTPATIENT
Start: 2021-04-09 | End: 2021-04-09 | Stop reason: HOSPADM

## 2021-04-09 RX ADMIN — SODIUM CHLORIDE 500 ML: 9 INJECTION, SOLUTION INTRAVENOUS at 02:55

## 2021-04-09 NOTE — ED PROVIDER NOTES
Subjective     History provided by:  Patient   used: No    Palpitations  Palpitations quality:  Fast  Onset quality:  Sudden  Timing:  Sporadic  Progression:  Unchanged  Chronicity:  New  Context: not anxiety, not appetite suppressants, not blood loss, not bronchodilators, not caffeine, not dehydration, not exercise, not hyperventilation, not illicit drugs, not nicotine and not stimulant use    Relieved by:  Nothing  Worsened by:  Nothing  Ineffective treatments:  None tried  Associated symptoms: no back pain, no chest pain, no chest pressure, no cough, no diaphoresis, no dizziness, no hemoptysis, no leg pain, no lower extremity edema, no malaise/fatigue, no nausea, no near-syncope, no numbness, no orthopnea, no PND, no shortness of breath, no syncope, no vomiting and no weakness    Risk factors: no diabetes mellitus, no heart disease, no hx of atrial fibrillation, no hx of DVT, no hx of PE, no hx of thyroid disease, no hypercoagulable state, no hyperthyroidism, no OTC sinus medications and no stress        Review of Systems   Constitutional: Negative for activity change, appetite change, chills, diaphoresis, fatigue, fever and malaise/fatigue.   HENT: Negative for congestion, ear pain and sore throat.    Eyes: Negative for redness.   Respiratory: Negative for cough, hemoptysis, chest tightness, shortness of breath and wheezing.    Cardiovascular: Positive for palpitations. Negative for chest pain, orthopnea, leg swelling, syncope, PND and near-syncope.   Gastrointestinal: Negative for abdominal pain, diarrhea, nausea and vomiting.   Genitourinary: Negative for dysuria and urgency.   Musculoskeletal: Negative for arthralgias, back pain, myalgias and neck pain.   Skin: Negative for pallor, rash and wound.   Neurological: Negative for dizziness, speech difficulty, weakness, numbness and headaches.   Psychiatric/Behavioral: Negative for agitation, behavioral problems, confusion and decreased  "concentration.   All other systems reviewed and are negative.      Past Medical History:   Diagnosis Date   • Anxiety    • Asthma    • Back pain    • Chronic anticoagulation 2/2/2020   • COPD (chronic obstructive pulmonary disease) (CMS/[facility])    • Emphysema lung (CMS/[facility])    • Gastritis    • GERD (gastroesophageal reflux disease)    • Headache    • Heart valve disease 2018    valve replac., prosthetic valve   • Hx of aortic valve replacement, mechanical 11/28/2018   • Hyperglycemia 2/2/2020   • Hypertension    • Migraine    • Substance abuse (CMS/[facility])        Allergies   Allergen Reactions   • Aspirin Anaphylaxis     Patient said, \"it chokes him up.\" patient said, \"I got really short of breath and started to have an asthma attack.\"       Past Surgical History:   Procedure Laterality Date   • CARDIAC CATHETERIZATION N/A 10/23/2017    Procedure: Left Heart Cath;  Surgeon: Rodolfo Núñez MD;  Location:  [facility];  Service:    • CARDIAC VALVE REPLACEMENT  2018    prosthetic valve   • DENTAL PROCEDURE     • LIVER SURGERY      tumor removed from liver   • OTHER SURGICAL HISTORY      \"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\"   • THORACIC AORTIC ANEURYSM ASCENDING/ARCH REPAIR N/A 1/17/2018    Procedure: MEDIAN STERNOTOMY, AORTIC VALVE CONDUIT, BENTAL, TRANSESOPHAGEAL ECHOCARDIOGRAM WITH ANESTHESIA;  Surgeon: Aaron Lucas MD;  Location: [facility] OR;  Service:        Family History   Problem Relation Age of Onset   • COPD Mother        Social History     Socioeconomic History   • Marital status: Single     Spouse name: Not on file   • Number of children: 0   • Years of education: Not on file   • Highest education level: Not on file   Tobacco Use   • Smoking status: Former Smoker     Packs/day: 1.00     Years: 4.00     Pack years: 4.00   • Smokeless tobacco: Current User     Types: Snuff   Substance and Sexual Activity   • Alcohol use: No     Comment: occassional    • Drug use: No   • Sexual " activity: Defer           Objective   Physical Exam  Vitals and nursing note reviewed.   Constitutional:       General: He is not in acute distress.     Appearance: Normal appearance. He is well-developed. He is not toxic-appearing or diaphoretic.   HENT:      Head: Normocephalic and atraumatic.      Right Ear: External ear normal.      Left Ear: External ear normal.      Nose: Nose normal.      Mouth/Throat:      Pharynx: No oropharyngeal exudate.      Tonsils: No tonsillar exudate.   Eyes:      General: Lids are normal.      Conjunctiva/sclera: Conjunctivae normal.      Pupils: Pupils are equal, round, and reactive to light.   Neck:      Thyroid: No thyromegaly.   Cardiovascular:      Rate and Rhythm: Normal rate and regular rhythm.      Pulses: Normal pulses.      Heart sounds: Normal heart sounds, S1 normal and S2 normal.   Pulmonary:      Effort: Pulmonary effort is normal. No tachypnea or respiratory distress.      Breath sounds: Normal breath sounds. No decreased breath sounds, wheezing or rales.   Chest:      Chest wall: No tenderness.   Abdominal:      General: Bowel sounds are normal. There is no distension.      Palpations: Abdomen is soft.      Tenderness: There is no abdominal tenderness. There is no guarding or rebound.   Musculoskeletal:         General: No tenderness or deformity. Normal range of motion.      Cervical back: Full passive range of motion without pain, normal range of motion and neck supple.   Lymphadenopathy:      Cervical: No cervical adenopathy.   Skin:     General: Skin is warm and dry.      Coloration: Skin is not pale.      Findings: No erythema or rash.   Neurological:      Mental Status: He is alert and oriented to person, place, and time.      GCS: GCS eye subscore is 4. GCS verbal subscore is 5. GCS motor subscore is 6.      Cranial Nerves: No cranial nerve deficit.      Sensory: No sensory deficit.   Psychiatric:         Speech: Speech normal.         Behavior: Behavior  normal.         Thought Content: Thought content normal.         Judgment: Judgment normal.         Procedures           ED Course  ED Course as of Apr 09 0556 Fri Apr 09, 2021   0210 Patient reports he is allergic to aspirin.  Has anaphylactic reaction to aspirin.    [ES]   0252 Patient refused nitroglycerin at this time.    [ES]   0514 IMPRESSION:  No acute cardiopulmonary findings.   XR Chest 1 View [ES]   0514 Normal sinus rhythm  Possible Left atrial enlargement  Nonspecific ST and T wave abnormality  Abnormal ECG  When compared with ECG of 18-SEP-2020 22:23,  premature atrial complexes are no longer present  Vent. rate has increased BY 33 BPM  Vent. rate 84 BPM  ID interval 194 ms  QRS duration 96 ms  QT/QTc 384/453 ms  P-R-T axes 71 74 54   ECG 12 Lead [ES]      ED Course User Index  [ES] Clint Santo MD                                           MDM  Number of Diagnoses or Management Options     Amount and/or Complexity of Data Reviewed  Clinical lab tests: reviewed and ordered  Tests in the radiology section of CPT®: reviewed and ordered  Tests in the medicine section of CPT®: reviewed and ordered  Review and summarize past medical records: yes  Independent visualization of images, tracings, or specimens: yes    Risk of Complications, Morbidity, and/or Mortality  Presenting problems: moderate  Diagnostic procedures: moderate  Management options: moderate    Patient Progress  Patient progress: stable      Final diagnoses:   Palpitations   Nonspecific chest pain       ED Disposition  ED Disposition     ED Disposition Condition Comment    Discharge Stable           Ming Stewart MD  [address] [facility]  [place] [address]  [phone]    Schedule an appointment as soon as possible for a visit in 1 day  REEVALUATE         Medication List      No changes were made to your prescriptions during this visit.          Clint Santo MD  04/09/21 2281

## 2021-04-13 LAB
QT INTERVAL: 384 MS
QTC INTERVAL: 453 MS

## 2021-08-16 ENCOUNTER — TRANSCRIBE ORDERS (OUTPATIENT)
Dept: ADMINISTRATIVE | Facility: HOSPITAL | Age: 46
End: 2021-08-16

## 2021-08-16 DIAGNOSIS — I73.9 PAD (PERIPHERAL ARTERY DISEASE) (HCC): Primary | ICD-10-CM

## 2021-09-17 ENCOUNTER — APPOINTMENT (OUTPATIENT)
Dept: GENERAL RADIOLOGY | Facility: HOSPITAL | Age: 46
End: 2021-09-17

## 2021-09-17 ENCOUNTER — HOSPITAL ENCOUNTER (EMERGENCY)
Facility: HOSPITAL | Age: 46
Discharge: HOME | End: 2021-09-17
Attending: STUDENT IN AN ORGANIZED HEALTH CARE EDUCATION/TRAINING PROGRAM | Admitting: STUDENT IN AN ORGANIZED HEALTH CARE EDUCATION/TRAINING PROGRAM

## 2021-09-17 VITALS
WEIGHT: 181 LBS | SYSTOLIC BLOOD PRESSURE: 122 MMHG | DIASTOLIC BLOOD PRESSURE: 69 MMHG | OXYGEN SATURATION: 100 % | RESPIRATION RATE: 16 BRPM | BODY MASS INDEX: 28.41 KG/M2 | TEMPERATURE: 98.6 F | HEIGHT: 67 IN | HEART RATE: 95 BPM

## 2021-09-17 DIAGNOSIS — J45.901 EXACERBATION OF ASTHMA, UNSPECIFIED ASTHMA SEVERITY, UNSPECIFIED WHETHER PERSISTENT: Primary | ICD-10-CM

## 2021-09-17 LAB
A-A DO2: 26.3 MMHG (ref 0–300)
ALBUMIN SERPL-MCNC: 4.23 G/DL (ref 3.5–5.2)
ALBUMIN/GLOB SERPL: 2 G/DL
ALP SERPL-CCNC: 86 U/L (ref 39–117)
ALT SERPL W P-5'-P-CCNC: 28 U/L (ref 1–41)
ANION GAP SERPL CALCULATED.3IONS-SCNC: 13.7 MMOL/L (ref 5–15)
ARTERIAL PATENCY WRIST A: POSITIVE
AST SERPL-CCNC: 20 U/L (ref 1–40)
ATMOSPHERIC PRESS: 731 MMHG
BASE EXCESS BLDA CALC-SCNC: 1.9 MMOL/L (ref 0–2)
BASOPHILS # BLD AUTO: 0.08 10*3/MM3 (ref 0–0.2)
BASOPHILS NFR BLD AUTO: 0.9 % (ref 0–1.5)
BDY SITE: ABNORMAL
BILIRUB SERPL-MCNC: 0.3 MG/DL (ref 0–1.2)
BODY TEMPERATURE: 0 C
BUN SERPL-MCNC: 12 MG/DL (ref 6–20)
BUN/CREAT SERPL: 12.9 (ref 7–25)
CALCIUM SPEC-SCNC: 9.1 MG/DL (ref 8.6–10.5)
CHLORIDE SERPL-SCNC: 107 MMOL/L (ref 98–107)
CO2 BLDA-SCNC: 28.4 MMOL/L (ref 22–33)
CO2 SERPL-SCNC: 23.3 MMOL/L (ref 22–29)
COHGB MFR BLD: 0.7 % (ref 0–5)
CREAT SERPL-MCNC: 0.93 MG/DL (ref 0.76–1.27)
CRP SERPL-MCNC: <0.3 MG/DL (ref 0–0.5)
D-LACTATE SERPL-SCNC: 2 MMOL/L (ref 0.5–2)
DEPRECATED RDW RBC AUTO: 51.2 FL (ref 37–54)
EOSINOPHIL # BLD AUTO: 0.43 10*3/MM3 (ref 0–0.4)
EOSINOPHIL NFR BLD AUTO: 4.8 % (ref 0.3–6.2)
ERYTHROCYTE [DISTWIDTH] IN BLOOD BY AUTOMATED COUNT: 17.2 % (ref 12.3–15.4)
FERRITIN SERPL-MCNC: 111.3 NG/ML (ref 30–400)
FLUAV RNA RESP QL NAA+PROBE: NOT DETECTED
FLUBV RNA RESP QL NAA+PROBE: NOT DETECTED
GFR SERPL CREATININE-BSD FRML MDRD: 88 ML/MIN/1.73
GLOBULIN UR ELPH-MCNC: 2.1 GM/DL
GLUCOSE SERPL-MCNC: 117 MG/DL (ref 65–99)
HCO3 BLDA-SCNC: 27.1 MMOL/L (ref 20–26)
HCT VFR BLD AUTO: 47.8 % (ref 37.5–51)
HCT VFR BLD CALC: 46.7 % (ref 38–51)
HGB BLD-MCNC: 15 G/DL (ref 13–17.7)
HGB BLDA-MCNC: 15.3 G/DL (ref 14–18)
HOLD SPECIMEN: NORMAL
HOLD SPECIMEN: NORMAL
IMM GRANULOCYTES # BLD AUTO: 0.06 10*3/MM3 (ref 0–0.05)
IMM GRANULOCYTES NFR BLD AUTO: 0.7 % (ref 0–0.5)
INHALED O2 CONCENTRATION: 21 %
INR PPP: 2.14 (ref 0.9–1.1)
LDH SERPL-CCNC: 396 U/L (ref 135–225)
LYMPHOCYTES # BLD AUTO: 2.65 10*3/MM3 (ref 0.7–3.1)
LYMPHOCYTES NFR BLD AUTO: 29.8 % (ref 19.6–45.3)
Lab: ABNORMAL
MCH RBC QN AUTO: 26.5 PG (ref 26.6–33)
MCHC RBC AUTO-ENTMCNC: 31.4 G/DL (ref 31.5–35.7)
MCV RBC AUTO: 84.3 FL (ref 79–97)
METHGB BLD QL: 0.3 % (ref 0–3)
MODALITY: ABNORMAL
MONOCYTES # BLD AUTO: 0.66 10*3/MM3 (ref 0.1–0.9)
MONOCYTES NFR BLD AUTO: 7.4 % (ref 5–12)
NEUTROPHILS NFR BLD AUTO: 5.02 10*3/MM3 (ref 1.7–7)
NEUTROPHILS NFR BLD AUTO: 56.4 % (ref 42.7–76)
NOTE: ABNORMAL
NRBC BLD AUTO-RTO: 0 /100 WBC (ref 0–0.2)
NT-PROBNP SERPL-MCNC: 465.4 PG/ML (ref 0–450)
OXYHGB MFR BLDV: 93.5 % (ref 94–99)
PCO2 BLDA: 43 MM HG (ref 35–45)
PCO2 TEMP ADJ BLD: ABNORMAL MM[HG]
PH BLDA: 7.41 PH UNITS (ref 7.35–7.45)
PH, TEMP CORRECTED: ABNORMAL
PLATELET # BLD AUTO: 250 10*3/MM3 (ref 140–450)
PMV BLD AUTO: 11.2 FL (ref 6–12)
PO2 BLDA: 68.8 MM HG (ref 83–108)
PO2 TEMP ADJ BLD: ABNORMAL MM[HG]
POTASSIUM SERPL-SCNC: 3.8 MMOL/L (ref 3.5–5.2)
PROCALCITONIN SERPL-MCNC: 0.08 NG/ML (ref 0–0.25)
PROT SERPL-MCNC: 6.3 G/DL (ref 6–8.5)
PROTHROMBIN TIME: 24.3 SECONDS (ref 12.8–14.5)
QT INTERVAL: 362 MS
QT INTERVAL: 370 MS
QTC INTERVAL: 472 MS
QTC INTERVAL: 476 MS
RBC # BLD AUTO: 5.67 10*6/MM3 (ref 4.14–5.8)
SAO2 % BLDCOA: 94.4 % (ref 94–99)
SARS-COV-2 RNA RESP QL NAA+PROBE: NOT DETECTED
SODIUM SERPL-SCNC: 144 MMOL/L (ref 136–145)
VENTILATOR MODE: ABNORMAL
WBC # BLD AUTO: 8.9 10*3/MM3 (ref 3.4–10.8)
WHOLE BLOOD HOLD SPECIMEN: NORMAL
WHOLE BLOOD HOLD SPECIMEN: NORMAL

## 2021-09-17 PROCEDURE — 99283 EMERGENCY DEPT VISIT LOW MDM: CPT

## 2021-09-17 PROCEDURE — 85610 PROTHROMBIN TIME: CPT | Performed by: PHYSICIAN ASSISTANT

## 2021-09-17 PROCEDURE — 83605 ASSAY OF LACTIC ACID: CPT | Performed by: PHYSICIAN ASSISTANT

## 2021-09-17 PROCEDURE — 36600 WITHDRAWAL OF ARTERIAL BLOOD: CPT

## 2021-09-17 PROCEDURE — 80053 COMPREHEN METABOLIC PANEL: CPT | Performed by: PHYSICIAN ASSISTANT

## 2021-09-17 PROCEDURE — 86140 C-REACTIVE PROTEIN: CPT | Performed by: PHYSICIAN ASSISTANT

## 2021-09-17 PROCEDURE — 83880 ASSAY OF NATRIURETIC PEPTIDE: CPT | Performed by: PHYSICIAN ASSISTANT

## 2021-09-17 PROCEDURE — 25010000002 METHYLPREDNISOLONE PER 40 MG: Performed by: PHYSICIAN ASSISTANT

## 2021-09-17 PROCEDURE — 71045 X-RAY EXAM CHEST 1 VIEW: CPT

## 2021-09-17 PROCEDURE — 71045 X-RAY EXAM CHEST 1 VIEW: CPT | Performed by: RADIOLOGY

## 2021-09-17 PROCEDURE — 84145 PROCALCITONIN (PCT): CPT | Performed by: PHYSICIAN ASSISTANT

## 2021-09-17 PROCEDURE — 87636 SARSCOV2 & INF A&B AMP PRB: CPT | Performed by: PHYSICIAN ASSISTANT

## 2021-09-17 PROCEDURE — 93010 ELECTROCARDIOGRAM REPORT: CPT | Performed by: INTERNAL MEDICINE

## 2021-09-17 PROCEDURE — 82728 ASSAY OF FERRITIN: CPT | Performed by: PHYSICIAN ASSISTANT

## 2021-09-17 PROCEDURE — 83615 LACTATE (LD) (LDH) ENZYME: CPT | Performed by: PHYSICIAN ASSISTANT

## 2021-09-17 PROCEDURE — 85025 COMPLETE CBC W/AUTO DIFF WBC: CPT | Performed by: PHYSICIAN ASSISTANT

## 2021-09-17 PROCEDURE — 96374 THER/PROPH/DIAG INJ IV PUSH: CPT

## 2021-09-17 PROCEDURE — 93005 ELECTROCARDIOGRAM TRACING: CPT | Performed by: PHYSICIAN ASSISTANT

## 2021-09-17 PROCEDURE — 82805 BLOOD GASES W/O2 SATURATION: CPT

## 2021-09-17 PROCEDURE — 82375 ASSAY CARBOXYHB QUANT: CPT

## 2021-09-17 PROCEDURE — 93005 ELECTROCARDIOGRAM TRACING: CPT | Performed by: STUDENT IN AN ORGANIZED HEALTH CARE EDUCATION/TRAINING PROGRAM

## 2021-09-17 PROCEDURE — 83050 HGB METHEMOGLOBIN QUAN: CPT

## 2021-09-17 RX ORDER — SODIUM CHLORIDE 0.9 % (FLUSH) 0.9 %
10 SYRINGE (ML) INJECTION AS NEEDED
Status: DISCONTINUED | OUTPATIENT
Start: 2021-09-17 | End: 2021-09-17 | Stop reason: HOSPADM

## 2021-09-17 RX ORDER — METHYLPREDNISOLONE 4 MG/1
TABLET ORAL
Qty: 21 TABLET | Refills: 0 | Status: SHIPPED | OUTPATIENT
Start: 2021-09-17 | End: 2021-12-07 | Stop reason: ALTCHOICE

## 2021-09-17 RX ORDER — METHYLPREDNISOLONE SODIUM SUCCINATE 40 MG/ML
80 INJECTION, POWDER, LYOPHILIZED, FOR SOLUTION INTRAMUSCULAR; INTRAVENOUS ONCE
Status: COMPLETED | OUTPATIENT
Start: 2021-09-17 | End: 2021-09-17

## 2021-09-17 RX ADMIN — METHYLPREDNISOLONE SODIUM SUCCINATE 80 MG: 40 INJECTION, POWDER, FOR SOLUTION INTRAMUSCULAR; INTRAVENOUS at 11:44

## 2021-09-17 NOTE — ED PROVIDER NOTES
"Subjective   46-year-old white male presents secondary to shortness of breath and wheezing.  He states this started when he woke up this morning.  Patient has a long history of asthma.  He also has a history of valvular heart disease.  Patient is status post valve replacement.  He is on Coumadin.  Patient denies any recent fever.  He states that he had Covid back in March.  He is unvaccinated.  He states he went to bed feeling okay.  He denies any other complaints at this time.  He received a DuoNeb treatment in route.  Apparently his initial oxygen saturation was low upon presentation by EMS.          Review of Systems   Constitutional: Negative.  Negative for fever.   HENT: Negative.    Respiratory: Positive for cough, shortness of breath and wheezing.    Cardiovascular: Negative.  Negative for chest pain.   Gastrointestinal: Negative.  Negative for abdominal pain.   Endocrine: Negative.    Genitourinary: Negative.  Negative for dysuria.   Skin: Negative.    Neurological: Negative.    Psychiatric/Behavioral: Negative.    All other systems reviewed and are negative.      Past Medical History:   Diagnosis Date   • Anxiety    • Asthma    • Back pain    • Chronic anticoagulation 2/2/2020   • COPD (chronic obstructive pulmonary disease) (CMS/[facility])    • Emphysema lung (CMS/[facility])    • Gastritis    • GERD (gastroesophageal reflux disease)    • Headache    • Heart valve disease 2018    valve replac., prosthetic valve   • Hx of aortic valve replacement, mechanical 11/28/2018   • Hyperglycemia 2/2/2020   • Hypertension    • Migraine    • Substance abuse (CMS/[facility])        Allergies   Allergen Reactions   • Aspirin Anaphylaxis     Patient said, \"it chokes him up.\" patient said, \"I got really short of breath and started to have an asthma attack.\"       Past Surgical History:   Procedure Laterality Date   • CARDIAC CATHETERIZATION N/A 10/23/2017    Procedure: Left Heart Cath;  Surgeon: Rodolfo Núñez MD;  Location: [facility]" "[facility]ATH INVASIVE LOCATION;  Service:    • CARDIAC VALVE REPLACEMENT  2018    prosthetic valve   • DENTAL PROCEDURE     • LIVER SURGERY      tumor removed from liver   • OTHER SURGICAL HISTORY      \"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\"   • THORACIC AORTIC ANEURYSM ASCENDING/ARCH REPAIR N/A 1/17/2018    Procedure: MEDIAN STERNOTOMY, AORTIC VALVE CONDUIT, BENTAL, TRANSESOPHAGEAL ECHOCARDIOGRAM WITH ANESTHESIA;  Surgeon: Aaron Lucas MD;  Location: [facility] OR;  Service:        Family History   Problem Relation Age of Onset   • COPD Mother        Social History     Socioeconomic History   • Marital status: Single     Spouse name: Not on file   • Number of children: 0   • Years of education: Not on file   • Highest education level: Not on file   Tobacco Use   • Smoking status: Former Smoker     Packs/day: 1.00     Years: 4.00     Pack years: 4.00   • Smokeless tobacco: Current User     Types: Snuff   Substance and Sexual Activity   • Alcohol use: No     Comment: occassional    • Drug use: No   • Sexual activity: Defer           Objective   Physical Exam  Vitals and nursing note reviewed.   Constitutional:       General: He is not in acute distress.     Appearance: He is well-developed. He is not diaphoretic.   HENT:      Head: Normocephalic and atraumatic.      Right Ear: External ear normal.      Left Ear: External ear normal.      Nose: Nose normal.   Eyes:      Conjunctiva/sclera: Conjunctivae normal.      Pupils: Pupils are equal, round, and reactive to light.   Neck:      Vascular: No JVD.      Trachea: No tracheal deviation.   Cardiovascular:      Rate and Rhythm: Normal rate and regular rhythm.      Heart sounds: Normal heart sounds. No murmur heard.     Pulmonary:      Effort: Pulmonary effort is normal. No respiratory distress.      Breath sounds: Wheezing present.   Abdominal:      General: Bowel sounds are normal.      Palpations: Abdomen is soft.      Tenderness: There is no abdominal tenderness. "   Musculoskeletal:         General: No deformity. Normal range of motion.      Cervical back: Normal range of motion and neck supple.   Skin:     General: Skin is warm and dry.      Coloration: Skin is not pale.      Findings: No erythema or rash.   Neurological:      Mental Status: He is alert and oriented to person, place, and time.      Cranial Nerves: No cranial nerve deficit.   Psychiatric:         Behavior: Behavior normal.         Thought Content: Thought content normal.         Procedures           ED Course  ED Course as of Sep 17 1413   Fri Sep 17, 2021   1103 EKG noted normal sinus rhythm.  T wave perversion in the anterior leads.  Rate 98 bpm.   ms.   ms.  No acute ST elevations    [SF]   1221 Sinus tachycardia noted.  Rate of 104 bpm.  No acute ST abnormalities noted.  QRS 96 ms.   ms.    [SF]   1318 Patient feeling much better at disposition.  Oxygen level is normal.  Lungs much improved.  He feels that he is ready to go home.    [JI]      ED Course User Index  [JI] Joey Green PA  [SF] Ricardo Vegas, DO                                           MDM  Number of Diagnoses or Management Options  Exacerbation of asthma, unspecified asthma severity, unspecified whether persistent: new and requires workup     Amount and/or Complexity of Data Reviewed  Clinical lab tests: reviewed and ordered  Tests in the radiology section of CPT®: reviewed and ordered  Tests in the medicine section of CPT®: reviewed and ordered        Final diagnoses:   Exacerbation of asthma, unspecified asthma severity, unspecified whether persistent       ED Disposition  ED Disposition     ED Disposition Condition Comment    Discharge            Macho Jose MD  [address] [facility] [address] [phone]    Schedule an appointment as soon as possible for a visit            Medication List      New Prescriptions    methylPREDNISolone 4 MG dose pack  Commonly known as: MEDROL  Take as directed  on package instructions.           Where to Get Your Medications      You can get these medications from any pharmacy    Bring a paper prescription for each of these medications  · methylPREDNISolone 4 MG dose pack          Joey Green PA  09/17/21 4590

## 2021-09-20 ENCOUNTER — APPOINTMENT (OUTPATIENT)
Dept: GENERAL RADIOLOGY | Facility: HOSPITAL | Age: 46
End: 2021-09-20

## 2021-09-20 ENCOUNTER — APPOINTMENT (OUTPATIENT)
Dept: CARDIOLOGY | Facility: HOSPITAL | Age: 46
End: 2021-09-20

## 2021-09-20 ENCOUNTER — HOSPITAL ENCOUNTER (INPATIENT)
Facility: HOSPITAL | Age: 46
LOS: 1 days | Discharge: HOME | End: 2021-09-21
Attending: EMERGENCY MEDICINE | Admitting: INTERNAL MEDICINE

## 2021-09-20 DIAGNOSIS — Z79.01 CHRONIC ANTICOAGULATION: ICD-10-CM

## 2021-09-20 DIAGNOSIS — I21.11 MYOCARDIAL INFARCTION INVOLVING RIGHT CORONARY ARTERY, UNSPECIFIED MI TYPE (HCC): Primary | ICD-10-CM

## 2021-09-20 LAB
ALBUMIN SERPL-MCNC: 4.53 G/DL (ref 3.5–5.2)
ALBUMIN/GLOB SERPL: 1.6 G/DL
ALP SERPL-CCNC: 95 U/L (ref 39–117)
ALT SERPL W P-5'-P-CCNC: 28 U/L (ref 1–41)
ANION GAP SERPL CALCULATED.3IONS-SCNC: 9.6 MMOL/L (ref 5–15)
APTT PPP: 39.5 SECONDS (ref 25.5–35.4)
AST SERPL-CCNC: 21 U/L (ref 1–40)
BASOPHILS # BLD AUTO: 0.09 10*3/MM3 (ref 0–0.2)
BASOPHILS NFR BLD AUTO: 1 % (ref 0–1.5)
BH CV ECHO MEAS - % IVS THICK: 0.93 %
BH CV ECHO MEAS - % LVPW THICK: 8.8 %
BH CV ECHO MEAS - AO MAX PG (FULL): 0.69 MMHG
BH CV ECHO MEAS - AO MAX PG: 6.4 MMHG
BH CV ECHO MEAS - AO MEAN PG (FULL): 1 MMHG
BH CV ECHO MEAS - AO MEAN PG: 3 MMHG
BH CV ECHO MEAS - AO ROOT AREA (BSA CORRECTED): 1.9
BH CV ECHO MEAS - AO ROOT AREA: 10.5 CM^2
BH CV ECHO MEAS - AO ROOT DIAM: 3.7 CM
BH CV ECHO MEAS - AO V2 MAX: 126 CM/SEC
BH CV ECHO MEAS - AO V2 MEAN: 75.6 CM/SEC
BH CV ECHO MEAS - AO V2 VTI: 20.6 CM
BH CV ECHO MEAS - AVA(I,A): 5.7 CM^2
BH CV ECHO MEAS - AVA(I,D): 5.7 CM^2
BH CV ECHO MEAS - AVA(V,A): 5 CM^2
BH CV ECHO MEAS - AVA(V,D): 5 CM^2
BH CV ECHO MEAS - BSA(HAYCOCK): 2 M^2
BH CV ECHO MEAS - BSA: 1.9 M^2
BH CV ECHO MEAS - BZI_BMI: 28.3 KILOGRAMS/M^2
BH CV ECHO MEAS - BZI_METRIC_HEIGHT: 170.2 CM
BH CV ECHO MEAS - BZI_METRIC_WEIGHT: 82.1 KG
BH CV ECHO MEAS - EDV(CUBED): 137.8 ML
BH CV ECHO MEAS - EDV(MOD-SP4): 59.4 ML
BH CV ECHO MEAS - EDV(TEICH): 127.5 ML
BH CV ECHO MEAS - EF(CUBED): 40.1 %
BH CV ECHO MEAS - EF(MOD-SP4): 34.5 %
BH CV ECHO MEAS - EF(TEICH): 32.9 %
BH CV ECHO MEAS - ESV(CUBED): 82.6 ML
BH CV ECHO MEAS - ESV(MOD-SP4): 38.9 ML
BH CV ECHO MEAS - ESV(TEICH): 85.6 ML
BH CV ECHO MEAS - FS: 15.7 %
BH CV ECHO MEAS - IVS/LVPW: 0.95
BH CV ECHO MEAS - IVSD: 1.1 CM
BH CV ECHO MEAS - IVSS: 1.1 CM
BH CV ECHO MEAS - LA DIMENSION: 3.5 CM
BH CV ECHO MEAS - LA/AO: 0.95
BH CV ECHO MEAS - LV DIASTOLIC VOL/BSA (35-75): 30.6 ML/M^2
BH CV ECHO MEAS - LV MASS(C)D: 218.3 GRAMS
BH CV ECHO MEAS - LV MASS(C)DI: 112.7 GRAMS/M^2
BH CV ECHO MEAS - LV MASS(C)S: 178.2 GRAMS
BH CV ECHO MEAS - LV MASS(C)SI: 92 GRAMS/M^2
BH CV ECHO MEAS - LV MAX PG: 5.7 MMHG
BH CV ECHO MEAS - LV MEAN PG: 2 MMHG
BH CV ECHO MEAS - LV SYSTOLIC VOL/BSA (12-30): 20.1 ML/M^2
BH CV ECHO MEAS - LV V1 MAX: 119 CM/SEC
BH CV ECHO MEAS - LV V1 MEAN: 70.7 CM/SEC
BH CV ECHO MEAS - LV V1 VTI: 22.3 CM
BH CV ECHO MEAS - LVIDD: 5.2 CM
BH CV ECHO MEAS - LVIDS: 4.4 CM
BH CV ECHO MEAS - LVLD AP4: 5.6 CM
BH CV ECHO MEAS - LVLS AP4: 5.3 CM
BH CV ECHO MEAS - LVOT AREA (M): 5.3 CM^2
BH CV ECHO MEAS - LVOT AREA: 5.3 CM^2
BH CV ECHO MEAS - LVOT DIAM: 2.6 CM
BH CV ECHO MEAS - LVPWD: 1.1 CM
BH CV ECHO MEAS - LVPWS: 1.2 CM
BH CV ECHO MEAS - MV A MAX VEL: 53.6 CM/SEC
BH CV ECHO MEAS - MV E MAX VEL: 84.8 CM/SEC
BH CV ECHO MEAS - MV E/A: 1.6
BH CV ECHO MEAS - PA ACC TIME: 0.12 SEC
BH CV ECHO MEAS - PA PR(ACCEL): 23.7 MMHG
BH CV ECHO MEAS - SI(AO): 111.2 ML/M^2
BH CV ECHO MEAS - SI(CUBED): 28.5 ML/M^2
BH CV ECHO MEAS - SI(LVOT): 61.1 ML/M^2
BH CV ECHO MEAS - SI(MOD-SP4): 10.6 ML/M^2
BH CV ECHO MEAS - SI(TEICH): 21.6 ML/M^2
BH CV ECHO MEAS - SV(AO): 215.5 ML
BH CV ECHO MEAS - SV(CUBED): 55.2 ML
BH CV ECHO MEAS - SV(LVOT): 118.4 ML
BH CV ECHO MEAS - SV(MOD-SP4): 20.5 ML
BH CV ECHO MEAS - SV(TEICH): 41.9 ML
BILIRUB SERPL-MCNC: 0.4 MG/DL (ref 0–1.2)
BUN SERPL-MCNC: 14 MG/DL (ref 6–20)
BUN/CREAT SERPL: 14.6 (ref 7–25)
CALCIUM SPEC-SCNC: 9.6 MG/DL (ref 8.6–10.5)
CHLORIDE SERPL-SCNC: 99 MMOL/L (ref 98–107)
CO2 SERPL-SCNC: 27.4 MMOL/L (ref 22–29)
CREAT SERPL-MCNC: 0.96 MG/DL (ref 0.76–1.27)
DEPRECATED RDW RBC AUTO: 51.5 FL (ref 37–54)
EOSINOPHIL # BLD AUTO: 0.46 10*3/MM3 (ref 0–0.4)
EOSINOPHIL NFR BLD AUTO: 5 % (ref 0.3–6.2)
ERYTHROCYTE [DISTWIDTH] IN BLOOD BY AUTOMATED COUNT: 17.6 % (ref 12.3–15.4)
FLUAV SUBTYP SPEC NAA+PROBE: NOT DETECTED
FLUBV RNA ISLT QL NAA+PROBE: NOT DETECTED
GFR SERPL CREATININE-BSD FRML MDRD: 85 ML/MIN/1.73
GLOBULIN UR ELPH-MCNC: 2.9 GM/DL
GLUCOSE SERPL-MCNC: 102 MG/DL (ref 65–99)
HCT VFR BLD AUTO: 49.3 % (ref 37.5–51)
HGB BLD-MCNC: 15.5 G/DL (ref 13–17.7)
HOLD SPECIMEN: NORMAL
HOLD SPECIMEN: NORMAL
IMM GRANULOCYTES # BLD AUTO: 0.06 10*3/MM3 (ref 0–0.05)
IMM GRANULOCYTES NFR BLD AUTO: 0.7 % (ref 0–0.5)
INR PPP: 1.97 (ref 0.9–1.1)
LYMPHOCYTES # BLD AUTO: 2.93 10*3/MM3 (ref 0.7–3.1)
LYMPHOCYTES NFR BLD AUTO: 32 % (ref 19.6–45.3)
MAXIMAL PREDICTED HEART RATE: 175 BPM
MCH RBC QN AUTO: 26.4 PG (ref 26.6–33)
MCHC RBC AUTO-ENTMCNC: 31.4 G/DL (ref 31.5–35.7)
MCV RBC AUTO: 84 FL (ref 79–97)
MONOCYTES # BLD AUTO: 0.8 10*3/MM3 (ref 0.1–0.9)
MONOCYTES NFR BLD AUTO: 8.7 % (ref 5–12)
NEUTROPHILS NFR BLD AUTO: 4.83 10*3/MM3 (ref 1.7–7)
NEUTROPHILS NFR BLD AUTO: 52.6 % (ref 42.7–76)
NRBC BLD AUTO-RTO: 0 /100 WBC (ref 0–0.2)
PLATELET # BLD AUTO: 271 10*3/MM3 (ref 140–450)
PMV BLD AUTO: 10 FL (ref 6–12)
POTASSIUM SERPL-SCNC: 3.6 MMOL/L (ref 3.5–5.2)
PROT SERPL-MCNC: 7.4 G/DL (ref 6–8.5)
PROTHROMBIN TIME: 22.8 SECONDS (ref 12.8–14.5)
QT INTERVAL: 382 MS
QTC INTERVAL: 448 MS
RBC # BLD AUTO: 5.87 10*6/MM3 (ref 4.14–5.8)
SARS-COV-2 RNA PNL SPEC NAA+PROBE: NOT DETECTED
SODIUM SERPL-SCNC: 136 MMOL/L (ref 136–145)
STRESS TARGET HR: 149 BPM
TROPONIN T SERPL-MCNC: 1.05 NG/ML (ref 0–0.03)
TROPONIN T SERPL-MCNC: <0.01 NG/ML (ref 0–0.03)
WBC # BLD AUTO: 9.17 10*3/MM3 (ref 3.4–10.8)
WHOLE BLOOD HOLD SPECIMEN: NORMAL
WHOLE BLOOD HOLD SPECIMEN: NORMAL

## 2021-09-20 PROCEDURE — B2111ZZ FLUOROSCOPY OF MULTIPLE CORONARY ARTERIES USING LOW OSMOLAR CONTRAST: ICD-10-PCS | Performed by: INTERNAL MEDICINE

## 2021-09-20 PROCEDURE — 93306 TTE W/DOPPLER COMPLETE: CPT

## 2021-09-20 PROCEDURE — 93454 CORONARY ARTERY ANGIO S&I: CPT | Performed by: INTERNAL MEDICINE

## 2021-09-20 PROCEDURE — 99223 1ST HOSP IP/OBS HIGH 75: CPT | Performed by: INTERNAL MEDICINE

## 2021-09-20 PROCEDURE — B2151ZZ FLUOROSCOPY OF LEFT HEART USING LOW OSMOLAR CONTRAST: ICD-10-PCS | Performed by: INTERNAL MEDICINE

## 2021-09-20 PROCEDURE — 80053 COMPREHEN METABOLIC PANEL: CPT | Performed by: EMERGENCY MEDICINE

## 2021-09-20 PROCEDURE — 94640 AIRWAY INHALATION TREATMENT: CPT

## 2021-09-20 PROCEDURE — 87636 SARSCOV2 & INF A&B AMP PRB: CPT | Performed by: EMERGENCY MEDICINE

## 2021-09-20 PROCEDURE — 71045 X-RAY EXAM CHEST 1 VIEW: CPT

## 2021-09-20 PROCEDURE — 99284 EMERGENCY DEPT VISIT MOD MDM: CPT

## 2021-09-20 PROCEDURE — 94799 UNLISTED PULMONARY SVC/PX: CPT

## 2021-09-20 PROCEDURE — 25010000002 MIDAZOLAM PER 1 MG: Performed by: INTERNAL MEDICINE

## 2021-09-20 PROCEDURE — 84484 ASSAY OF TROPONIN QUANT: CPT | Performed by: EMERGENCY MEDICINE

## 2021-09-20 PROCEDURE — 85025 COMPLETE CBC W/AUTO DIFF WBC: CPT | Performed by: EMERGENCY MEDICINE

## 2021-09-20 PROCEDURE — 93306 TTE W/DOPPLER COMPLETE: CPT | Performed by: INTERNAL MEDICINE

## 2021-09-20 PROCEDURE — C1894 INTRO/SHEATH, NON-LASER: HCPCS | Performed by: INTERNAL MEDICINE

## 2021-09-20 PROCEDURE — C1769 GUIDE WIRE: HCPCS | Performed by: INTERNAL MEDICINE

## 2021-09-20 PROCEDURE — 25010000002 HEPARIN (PORCINE) PER 1000 UNITS: Performed by: EMERGENCY MEDICINE

## 2021-09-20 PROCEDURE — 85610 PROTHROMBIN TIME: CPT | Performed by: EMERGENCY MEDICINE

## 2021-09-20 PROCEDURE — 25010000002 FENTANYL CITRATE (PF) 50 MCG/ML SOLUTION: Performed by: INTERNAL MEDICINE

## 2021-09-20 PROCEDURE — 85730 THROMBOPLASTIN TIME PARTIAL: CPT | Performed by: EMERGENCY MEDICINE

## 2021-09-20 PROCEDURE — 0 IOPAMIDOL PER 1 ML: Performed by: INTERNAL MEDICINE

## 2021-09-20 PROCEDURE — 93005 ELECTROCARDIOGRAM TRACING: CPT | Performed by: EMERGENCY MEDICINE

## 2021-09-20 PROCEDURE — 93010 ELECTROCARDIOGRAM REPORT: CPT | Performed by: INTERNAL MEDICINE

## 2021-09-20 PROCEDURE — 4A023N7 MEASUREMENT OF CARDIAC SAMPLING AND PRESSURE, LEFT HEART, PERCUTANEOUS APPROACH: ICD-10-PCS | Performed by: INTERNAL MEDICINE

## 2021-09-20 RX ORDER — FAMOTIDINE 20 MG/1
20 TABLET, FILM COATED ORAL 2 TIMES DAILY
Status: DISCONTINUED | OUTPATIENT
Start: 2021-09-20 | End: 2021-09-21 | Stop reason: HOSPADM

## 2021-09-20 RX ORDER — HEPARIN SODIUM 5000 [USP'U]/ML
4000 INJECTION, SOLUTION INTRAVENOUS; SUBCUTANEOUS ONCE
Status: COMPLETED | OUTPATIENT
Start: 2021-09-20 | End: 2021-09-20

## 2021-09-20 RX ORDER — MONTELUKAST SODIUM 10 MG/1
10 TABLET ORAL NIGHTLY
Status: DISCONTINUED | OUTPATIENT
Start: 2021-09-20 | End: 2021-09-21 | Stop reason: HOSPADM

## 2021-09-20 RX ORDER — SODIUM CHLORIDE 9 MG/ML
100 INJECTION, SOLUTION INTRAVENOUS CONTINUOUS
Status: DISCONTINUED | OUTPATIENT
Start: 2021-09-20 | End: 2021-09-21 | Stop reason: HOSPADM

## 2021-09-20 RX ORDER — METOPROLOL SUCCINATE 25 MG/1
12.5 TABLET, EXTENDED RELEASE ORAL
Status: DISCONTINUED | OUTPATIENT
Start: 2021-09-20 | End: 2021-09-21 | Stop reason: HOSPADM

## 2021-09-20 RX ORDER — CLOPIDOGREL BISULFATE 75 MG/1
75 TABLET ORAL DAILY
Status: DISCONTINUED | OUTPATIENT
Start: 2021-09-21 | End: 2021-09-21 | Stop reason: HOSPADM

## 2021-09-20 RX ORDER — WARFARIN SODIUM 2.5 MG/1
2.5 TABLET ORAL DAILY
Status: DISCONTINUED | OUTPATIENT
Start: 2021-09-20 | End: 2021-09-20

## 2021-09-20 RX ORDER — WARFARIN SODIUM 1 MG/1
1 TABLET ORAL DAILY PRN
Status: DISCONTINUED | OUTPATIENT
Start: 2021-09-20 | End: 2021-09-20

## 2021-09-20 RX ORDER — LIDOCAINE HYDROCHLORIDE 20 MG/ML
INJECTION, SOLUTION INFILTRATION; PERINEURAL AS NEEDED
Status: DISCONTINUED | OUTPATIENT
Start: 2021-09-20 | End: 2021-09-20 | Stop reason: HOSPADM

## 2021-09-20 RX ORDER — LOSARTAN POTASSIUM 25 MG/1
25 TABLET ORAL EVERY EVENING
Status: DISCONTINUED | OUTPATIENT
Start: 2021-09-20 | End: 2021-09-21 | Stop reason: HOSPADM

## 2021-09-20 RX ORDER — SODIUM CHLORIDE 9 MG/ML
INJECTION, SOLUTION INTRAVENOUS CONTINUOUS PRN
Status: COMPLETED | OUTPATIENT
Start: 2021-09-20 | End: 2021-09-20

## 2021-09-20 RX ORDER — WARFARIN SODIUM 3 MG/1
6 TABLET ORAL EVERY EVENING
Status: CANCELLED | OUTPATIENT
Start: 2021-09-20

## 2021-09-20 RX ORDER — WARFARIN SODIUM 6 MG/1
6 TABLET ORAL EVERY EVENING
COMMUNITY

## 2021-09-20 RX ORDER — ATORVASTATIN CALCIUM 40 MG/1
40 TABLET, FILM COATED ORAL NIGHTLY
Status: DISCONTINUED | OUTPATIENT
Start: 2021-09-20 | End: 2021-09-21 | Stop reason: HOSPADM

## 2021-09-20 RX ORDER — MIDAZOLAM HYDROCHLORIDE 1 MG/ML
INJECTION INTRAMUSCULAR; INTRAVENOUS AS NEEDED
Status: DISCONTINUED | OUTPATIENT
Start: 2021-09-20 | End: 2021-09-20 | Stop reason: HOSPADM

## 2021-09-20 RX ORDER — FENTANYL CITRATE 50 UG/ML
INJECTION, SOLUTION INTRAMUSCULAR; INTRAVENOUS AS NEEDED
Status: DISCONTINUED | OUTPATIENT
Start: 2021-09-20 | End: 2021-09-20 | Stop reason: HOSPADM

## 2021-09-20 RX ORDER — ACETAMINOPHEN 325 MG/1
650 TABLET ORAL EVERY 4 HOURS PRN
Status: DISCONTINUED | OUTPATIENT
Start: 2021-09-20 | End: 2021-09-21 | Stop reason: HOSPADM

## 2021-09-20 RX ADMIN — WARFARIN 4 MG: 3 TABLET ORAL at 20:17

## 2021-09-20 RX ADMIN — FAMOTIDINE 20 MG: 20 TABLET, FILM COATED ORAL at 20:17

## 2021-09-20 RX ADMIN — TICAGRELOR 180 MG: 90 TABLET ORAL at 06:28

## 2021-09-20 RX ADMIN — IPRATROPIUM BROMIDE 0.5 MG: 0.5 SOLUTION RESPIRATORY (INHALATION) at 09:46

## 2021-09-20 RX ADMIN — HEPARIN SODIUM 4000 UNITS: 5000 INJECTION INTRAVENOUS; SUBCUTANEOUS at 06:28

## 2021-09-20 RX ADMIN — LOSARTAN POTASSIUM 25 MG: 25 TABLET, FILM COATED ORAL at 16:37

## 2021-09-20 RX ADMIN — WARFARIN 2.5 MG: 2.5 TABLET ORAL at 09:25

## 2021-09-20 RX ADMIN — ATORVASTATIN CALCIUM 40 MG: 40 TABLET, FILM COATED ORAL at 20:17

## 2021-09-20 RX ADMIN — MONTELUKAST SODIUM 10 MG: 10 TABLET, COATED ORAL at 20:17

## 2021-09-20 RX ADMIN — METOPROLOL SUCCINATE 12.5 MG: 25 TABLET, EXTENDED RELEASE ORAL at 09:27

## 2021-09-20 RX ADMIN — IPRATROPIUM BROMIDE 0.5 MG: 0.5 SOLUTION RESPIRATORY (INHALATION) at 16:46

## 2021-09-20 RX ADMIN — FAMOTIDINE 20 MG: 20 TABLET, FILM COATED ORAL at 09:25

## 2021-09-20 RX ADMIN — SODIUM CHLORIDE 100 ML/HR: 9 INJECTION, SOLUTION INTRAVENOUS at 08:36

## 2021-09-20 NOTE — ED NOTES
MEDICAL SCREENING:    Reason for Visit: Chest Pain    Patient initially seen in triage.  The patient was advised further evaluation and diagnostic testing will be needed, some of the treatment and testing will be initiated in the lobby in order to begin the process.  The patient will be returned to the waiting area for the time being and possibly be re-assessed by a subsequent ED provider.  The patient will be brought back to the treatment area in as timely manner as possible.       Clint Santo MD  09/20/21 0600

## 2021-09-20 NOTE — ED PROVIDER NOTES
Subjective     History provided by:  Patient   used: No    Chest Pain  Pain location:  Substernal area  Pain quality: aching, dull and pressure    Pain radiates to:  Does not radiate  Pain severity:  Mild  Onset quality:  Gradual  Timing:  Constant  Progression:  Worsening  Chronicity:  New  Context: at rest    Context: not breathing, not drug use, not eating, not intercourse, not lifting, not movement, not raising an arm, not stress and not trauma    Relieved by:  Nothing  Worsened by:  Nothing  Ineffective treatments:  None tried  Associated symptoms: no abdominal pain, no AICD problem, no altered mental status, no anorexia, no anxiety, no back pain, no claudication, no cough, no diaphoresis, no dizziness, no dysphagia, no fatigue, no fever, no headache, no heartburn, no lower extremity edema, no nausea, no near-syncope, no numbness, no orthopnea, no palpitations, no PND, no shortness of breath, no syncope, no vomiting and no weakness    Risk factors: high cholesterol, hypertension and male sex    Risk factors: no aortic disease, no coronary artery disease, no diabetes mellitus, no Kim-Danlos syndrome, no immobilization, no Marfan's syndrome, not obese, not pregnant, no prior DVT/PE, no smoking and no surgery        Review of Systems   Constitutional: Negative for activity change, appetite change, chills, diaphoresis, fatigue and fever.   HENT: Negative for congestion, ear pain, sore throat and trouble swallowing.    Eyes: Negative for redness.   Respiratory: Negative for cough, chest tightness, shortness of breath and wheezing.    Cardiovascular: Positive for chest pain. Negative for palpitations, orthopnea, claudication, leg swelling, syncope, PND and near-syncope.   Gastrointestinal: Negative for abdominal pain, anorexia, diarrhea, heartburn, nausea and vomiting.   Genitourinary: Negative for dysuria and urgency.   Musculoskeletal: Negative for arthralgias, back pain, myalgias and neck  "pain.   Skin: Negative for pallor, rash and wound.   Neurological: Negative for dizziness, speech difficulty, weakness, numbness and headaches.   Psychiatric/Behavioral: Negative for agitation, behavioral problems, confusion and decreased concentration.   All other systems reviewed and are negative.      Past Medical History:   Diagnosis Date   • Anxiety    • Asthma    • Back pain    • Chronic anticoagulation 2/2/2020   • COPD (chronic obstructive pulmonary disease) (CMS/[facility])    • Emphysema lung (CMS/[facility])    • Gastritis    • GERD (gastroesophageal reflux disease)    • Headache    • Heart valve disease 2018    valve replac., prosthetic valve   • Hx of aortic valve replacement, mechanical 11/28/2018   • Hyperglycemia 2/2/2020   • Hypertension    • Migraine    • Substance abuse (CMS/[facility])        Allergies   Allergen Reactions   • Aspirin Anaphylaxis     Patient said, \"it chokes him up.\" patient said, \"I got really short of breath and started to have an asthma attack.\"       Past Surgical History:   Procedure Laterality Date   • CARDIAC CATHETERIZATION N/A 10/23/2017    Procedure: Left Heart Cath;  Surgeon: Rodolfo Núñez MD;  Location: [facility] CATH INVASIVE LOCATION;  Service:    • CARDIAC VALVE REPLACEMENT  2018    prosthetic valve   • DENTAL PROCEDURE     • LIVER SURGERY      tumor removed from liver   • OTHER SURGICAL HISTORY      \"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\"   • THORACIC AORTIC ANEURYSM ASCENDING/ARCH REPAIR N/A 1/17/2018    Procedure: MEDIAN STERNOTOMY, AORTIC VALVE CONDUIT, BENTAL, TRANSESOPHAGEAL ECHOCARDIOGRAM WITH ANESTHESIA;  Surgeon: Aaron Lucas MD;  Location: [facility] OR;  Service:        Family History   Problem Relation Age of Onset   • COPD Mother        Social History     Socioeconomic History   • Marital status: Single     Spouse name: Not on file   • Number of children: 0   • Years of education: Not on file   • Highest education level: Not on file   Tobacco Use   • Smoking " status: Former Smoker     Packs/day: 1.00     Years: 4.00     Pack years: 4.00   • Smokeless tobacco: Current User     Types: Snuff   Substance and Sexual Activity   • Alcohol use: No     Comment: occassional    • Drug use: No   • Sexual activity: Defer           Objective   Physical Exam  Vitals and nursing note reviewed.   Constitutional:       General: He is not in acute distress.     Appearance: Normal appearance. He is well-developed. He is not toxic-appearing or diaphoretic.   HENT:      Head: Normocephalic and atraumatic.      Right Ear: External ear normal.      Left Ear: External ear normal.      Nose: Nose normal.      Mouth/Throat:      Pharynx: No oropharyngeal exudate.      Tonsils: No tonsillar exudate.   Eyes:      General: Lids are normal.      Conjunctiva/sclera: Conjunctivae normal.      Pupils: Pupils are equal, round, and reactive to light.   Neck:      Thyroid: No thyromegaly.   Cardiovascular:      Rate and Rhythm: Normal rate and regular rhythm.      Pulses: Normal pulses.      Heart sounds: Normal heart sounds, S1 normal and S2 normal.   Pulmonary:      Effort: Pulmonary effort is normal. No tachypnea or respiratory distress.      Breath sounds: Normal breath sounds. No decreased breath sounds, wheezing or rales.   Chest:      Chest wall: No tenderness.   Abdominal:      General: Bowel sounds are normal. There is no distension.      Palpations: Abdomen is soft.      Tenderness: There is no abdominal tenderness. There is no guarding or rebound.   Musculoskeletal:         General: No tenderness or deformity. Normal range of motion.      Cervical back: Full passive range of motion without pain, normal range of motion and neck supple.   Lymphadenopathy:      Cervical: No cervical adenopathy.   Skin:     General: Skin is warm and dry.      Coloration: Skin is not pale.      Findings: No erythema or rash.   Neurological:      Mental Status: He is alert and oriented to person, place, and time.       GCS: GCS eye subscore is 4. GCS verbal subscore is 5. GCS motor subscore is 6.      Cranial Nerves: No cranial nerve deficit.      Sensory: No sensory deficit.   Psychiatric:         Speech: Speech normal.         Behavior: Behavior normal.         Thought Content: Thought content normal.         Judgment: Judgment normal.         Procedures           ED Course  ED Course as of Sep 20 0623   Mon Sep 20, 2021   0620 Normal sinus rhythm  Possible Left atrial enlargement  ST elevation, consider inferior injury or acute infarct  ** ** ACUTE MI / STEMI ** **  Consider right ventricular involvement in acute inferior infarct  Abnormal ECG  When compared with ECG of 17-SEP-2021 11:48,  ST elevation now present in Inferior leads  Vent. rate 83 BPM  GA interval 180 ms  QRS duration 92 ms  QT/QTcB 382/448 ms  P-R-T axes 75 73 87   ECG 12 Lead [ES]   0620 Patient presented in the emergency department with chest pain that started approximately 1 hour ago, EKG obtained in the [facility].  Initiated 1 push in the emergency department, and Dr. Grant Interventional cardiologist will take the patient to the Cath Lab for further evaluation and possible intervention.    [ES]      ED Course User Index  [ES] Clint Santo MD                                           MDM  Number of Diagnoses or Management Options     Amount and/or Complexity of Data Reviewed  Clinical lab tests: ordered and reviewed  Tests in the radiology section of CPT®: ordered and reviewed  Tests in the medicine section of CPT®: reviewed and ordered  Review and summarize past medical records: yes  Discuss the patient with other providers: yes  Independent visualization of images, tracings, or specimens: yes    Risk of Complications, Morbidity, and/or Mortality  Presenting problems: high  Diagnostic procedures: high  Management options: high    Critical Care  Total time providing critical care: 30-74 minutes    Patient Progress  Patient progress: other  (comment) (CRITICAL.)      Final diagnoses:   Myocardial infarction involving right coronary artery, unspecified MI type (CMS/[facility])       ED Disposition  ED Disposition     ED Disposition Condition Comment    Send to Cath Lab            No follow-up provider specified.       Medication List      No changes were made to your prescriptions during this visit.          Clint Santo MD  09/20/21 0643

## 2021-09-20 NOTE — PHARMACY PATIENT ASSISTANCE
Pharmacy was consulted to evaluate possible new home medication, ticagrelor. According to the patient's pharmacy, there would be a 0.00 co-pay for Brilinta 90mg BID. No issues identified at this time.       Thank you,    EBONY MOLINA, Pharmacy Intern  09/20/21  09:44 EDT

## 2021-09-20 NOTE — NURSING NOTE
Pt resting in bed, watching television. No signs or symptoms of distress noted. No complaints at this time. Will continue with plan of care.

## 2021-09-20 NOTE — NURSING NOTE
TR Band removed, cleaned site and bandage applied to site. Wrist support brace secured back on right arm and wrist

## 2021-09-20 NOTE — H&P
"Patient Identification:  Name:  Filemon Jj  Age:  46 y.o.  Sex:  male  :  1975  MRN:  [id number]   Visit Number:  [id number]  Primary Care Physician:  Macho Jose MD    Chief complaint:   Chest pain   History of presenting illness:    Patient is a 46-year-old gentleman with a known history of ascending aortic aneurysm status post Bentall procedure in 2017 along with mechanical aortic valve replacement with a 27 mm valve, he had normal coronaries prior to his aneurysm repair, presented to the ER today with acute onset of chest pain about 2 to 3 hours prior to presentation, on presentation his initial EKG showed inferior ST elevation with reciprocal changes consistent with acute injury pattern.  His INR was 1.97 which is subtherapeutic for his mechanical aortic valve though he said he has been compliant with his Coumadin.    ROS: All systems reviewed and negative except as mentioned above.     ---------------------------------------------------------------------------------------------------------------------   Past Medical History:   Diagnosis Date   • Anxiety    • Asthma    • Back pain    • Chronic anticoagulation 2020   • COPD (chronic obstructive pulmonary disease) (CMS/HCC)    • Emphysema lung (CMS/formerly [facility])    • Gastritis    • GERD (gastroesophageal reflux disease)    • Headache    • Heart valve disease 2018    valve replac., prosthetic valve   • Hx of aortic valve replacement, mechanical 2018   • Hyperglycemia 2020   • Hypertension    • Migraine    • Substance abuse (CMS/HCC)      Past Surgical History:   Procedure Laterality Date   • CARDIAC CATHETERIZATION N/A 10/23/2017    Procedure: Left Heart Cath;  Surgeon: Rodolfo Núñez MD;  Location: [facility] CATH INVASIVE LOCATION;  Service:    • CARDIAC VALVE REPLACEMENT  2018    prosthetic valve   • DENTAL PROCEDURE     • LIVER SURGERY      tumor removed from liver   • OTHER SURGICAL HISTORY      \"tumor removed from heart when " "2-3 months old\"   • THORACIC AORTIC ANEURYSM ASCENDING/ARCH REPAIR N/A 1/17/2018    Procedure: MEDIAN STERNOTOMY, AORTIC VALVE CONDUIT, BENTAL, TRANSESOPHAGEAL ECHOCARDIOGRAM WITH ANESTHESIA;  Surgeon: Aaron Lucas MD;  Location: [facility];  Service:      Family History   Problem Relation Age of Onset   • COPD Mother      Social History     Socioeconomic History   • Marital status: Single     Spouse name: Not on file   • Number of children: 0   • Years of education: Not on file   • Highest education level: Not on file   Tobacco Use   • Smoking status: Former Smoker     Packs/day: 1.00     Years: 4.00     Pack years: 4.00   • Smokeless tobacco: Current User     Types: Snuff   Substance and Sexual Activity   • Alcohol use: No     Comment: occassional    • Drug use: No   • Sexual activity: Defer     ---------------------------------------------------------------------------------------------------------------------   Allergies:  Aspirin  ---------------------------------------------------------------------------------------------------------------------   Prior to Admission Medications     Prescriptions Last Dose Informant Patient Reported? Taking?    albuterol sulfate  (90 Base) MCG/ACT inhaler  Pharmacy No No    Inhale 2 puffs Every 6 (Six) Hours As Needed for Wheezing.    atorvastatin (LIPITOR) 40 MG tablet   No No    Take 1 tablet by mouth Every Night.    famotidine (PEPCID) 20 MG tablet  Pharmacy Yes No    Take 20 mg by mouth 2 (Two) Times a Day.    losartan (Cozaar) 25 MG tablet   No No    Take 1 tablet by mouth Every Evening.    methylPREDNISolone (MEDROL) 4 MG dose pack   No No    Take as directed on package instructions.    metoprolol succinate XL (TOPROL-XL) 25 MG 24 hr tablet   No No    Take 1/2 tablet by mouth Daily for 30 days.    montelukast (SINGULAIR) 10 MG tablet  Pharmacy Yes No    Take 10 mg by mouth Every Night.    tiotropium bromide monohydrate (Spiriva Respimat) 2.5 MCG/ACT aerosol " solution inhaler   No No    Inhale 2 puffs by mouth Daily.    warfarin (COUMADIN) 1 MG tablet   Yes No    Take 1 mg by mouth Daily As Needed (when out of 5 mg warfarin).    warfarin (COUMADIN) 5 MG tablet   No No    Take 0.5 tablets by mouth Daily.        Hospital Scheduled Meds:    No current facility-administered medications for this encounter.    ---------------------------------------------------------------------------------------------------------------------   Vital Signs:  Temp:  [98.3 °F (36.8 °C)] 98.3 °F (36.8 °C)  Heart Rate:  [76-87] 77  Resp:  [18-20] 20  BP: (130-148)/() 148/79      09/20/21  0603   Weight: 82.1 kg (181 lb)     Body mass index is 28.35 kg/m².  ---------------------------------------------------------------------------------------------------------------------   Physical Exam:  Constitutional:  Well-developed and well-nourished.  No respiratory distress.      HENT:  Head: Normocephalic and atraumatic.  Mouth:  Moist mucous membranes.    Eyes:  Conjunctivae and EOM are normal.  Pupils are equal, round, and reactive to light.  No scleral icterus.  Neck:  Neck supple.  No JVD present.    Cardiovascular:  Normal rate, regular rhythm and normal heart sounds with no murmur.  Pulmonary/Chest:  No respiratory distress, no wheezes, no crackles, with normal breath sounds and good air movement.  Abdominal:  Soft.  Bowel sounds are normal.  No distension and no tenderness.   Musculoskeletal:  No edema, no tenderness, and no deformity.  No red or swollen joints anywhere.    Neurological:  Alert and oriented to person, place, and time.  No cranial nerve deficit.  No tongue deviation.  No facial droop.  No slurred speech.   Skin:  Skin is warm and dry.  No rash noted.  No pallor.   Psychiatric:  Normal mood and affect.  Behavior is normal.  Judgment and thought content normal.   Peripheral vascular:  No edema and strong pulses on all 4  extremities.  ---------------------------------------------------------------------------------------------------------------------  EKG: Sinus, inferior ST elevation MI  Telemetry: Sinus  I have personally looked at both the EKG and the telemetry strips.  ---------------------------------------------------------------------------------------------------------------------   Results from last 7 days   Lab Units 09/20/21 0615 09/17/21  1126   CRP mg/dL  --  <0.30   LACTATE mmol/L  --  2.0   WBC 10*3/mm3 9.17 8.90   HEMOGLOBIN g/dL 15.5 15.0   HEMATOCRIT % 49.3 47.8   MCV fL 84.0 84.3   MCHC g/dL 31.4* 31.4*   PLATELETS 10*3/mm3 271 250   INR  1.97* 2.14*     Results from last 7 days   Lab Units 09/17/21  1050   PH, ARTERIAL pH units 7.407   PO2 ART mm Hg 68.8*   PCO2, ARTERIAL mm Hg 43.0   HCO3 ART mmol/L 27.1*     Results from last 7 days   Lab Units 09/20/21 0615 09/17/21  1126   SODIUM mmol/L 136 144   POTASSIUM mmol/L 3.6 3.8   CHLORIDE mmol/L 99 107   CO2 mmol/L 27.4 23.3   BUN mg/dL 14 12   CREATININE mg/dL 0.96 0.93   EGFR IF NONAFRICN AM mL/min/1.73 85 88   CALCIUM mg/dL 9.6 9.1   GLUCOSE mg/dL 102* 117*   ALBUMIN g/dL 4.53 4.23   BILIRUBIN mg/dL 0.4 0.3   ALK PHOS U/L 95 86   AST (SGOT) U/L 21 20   ALT (SGPT) U/L 28 28   Estimated Creatinine Clearance: 99.6 mL/min (by C-G formula based on SCr of 0.96 mg/dL).  No results found for: AMMONIA  Results from last 7 days   Lab Units 09/20/21  0615   TROPONIN T ng/mL <0.010     Results from last 7 days   Lab Units 09/17/21  1126   PROBNP pg/mL 465.4*     Lab Results   Component Value Date    HGBA1C 6.20 (H) 09/18/2020     Lab Results   Component Value Date    TSH 4.180 04/09/2021    FREET4 1.04 04/09/2021     No results found for: PREGTESTUR, PREGSERUM, HCG, HCGQUANT  Pain Management Panel     Pain Management Panel Latest Ref Rng & Units 9/17/2020 2/2/2020    AMPHETAMINES SCREEN, URINE Negative Negative Negative    BARBITURATES SCREEN Negative Negative Negative     BENZODIAZEPINE SCREEN, URINE Negative Negative Negative    BUPRENORPHINEUR Negative Negative Negative    COCAINE SCREEN, URINE Negative Negative Negative    METHADONE SCREEN, URINE Negative Negative Negative    METHAMPHETAMINEUR Negative - -        No results found for: BLOODCX  No results found for: URINECX  No results found for: WOUNDCX  No results found for: STOOLCX      ---------------------------------------------------------------------------------------------------------------------  Imaging Results (Last 7 Days)     ** No results found for the last 168 hours. **          I have personally reviewed the radiology images and read the final radiology report.  ---------------------------------------------------------------------------------------------------------------------  Assessment and Plan:   Acute inferior STEMI  Ascending aortic aneurysm and moderate AAA s/p repair with a Bentall procedure and a mechanical aortic valve in 2007.,  Patient has been on Coumadin his INR has been subtherapeutic on presentation today at 1.9.  Hypertension  Dyslipidemia    We will proceed with emergent coronary angiogram  Patient already received aspirin, heparin and Brilinta bolus in the ER per ACS protocol.  I have explained the risks associated with the procedure to the patient including but not limited to an allergic reaction to the contrast material or medications used during the procedure bleeding, infection, and bruising at the catheter insertion site blood clots, which may trigger heart attack, stroke,   damage to the artery where the catheter was inserted, or damage to the arteries as the catheter travels through your body, irregular heart rhythm arrhythmias, kidney damage caused by the contrast material.                Delvin Subramaniyam, MD, [facility]  Interventional Cardiology      09/20/21  07:00 EDT

## 2021-09-20 NOTE — PLAN OF CARE
Goal Outcome Evaluation:   Pt resting in bed, watching television. No signs or symptoms of distress noted. No complaints at this time. Will continue with plan of care.

## 2021-09-20 NOTE — ED NOTES
Patient prepped for cath lab at this time. Patient placed on Zols monitor pads and physiological monitoring.      Norbert Hatch RN  09/20/21 0107

## 2021-09-21 VITALS
WEIGHT: 181 LBS | RESPIRATION RATE: 18 BRPM | HEART RATE: 85 BPM | BODY MASS INDEX: 28.41 KG/M2 | DIASTOLIC BLOOD PRESSURE: 78 MMHG | TEMPERATURE: 98.1 F | SYSTOLIC BLOOD PRESSURE: 126 MMHG | HEIGHT: 67 IN | OXYGEN SATURATION: 98 %

## 2021-09-21 LAB
ANION GAP SERPL CALCULATED.3IONS-SCNC: 9.5 MMOL/L (ref 5–15)
BUN SERPL-MCNC: 17 MG/DL (ref 6–20)
BUN/CREAT SERPL: 14.5 (ref 7–25)
CALCIUM SPEC-SCNC: 9 MG/DL (ref 8.6–10.5)
CHLORIDE SERPL-SCNC: 102 MMOL/L (ref 98–107)
CHOLEST SERPL-MCNC: 150 MG/DL (ref 0–200)
CO2 SERPL-SCNC: 25.5 MMOL/L (ref 22–29)
CREAT SERPL-MCNC: 1.17 MG/DL (ref 0.76–1.27)
DEPRECATED RDW RBC AUTO: 51.7 FL (ref 37–54)
ERYTHROCYTE [DISTWIDTH] IN BLOOD BY AUTOMATED COUNT: 16.6 % (ref 12.3–15.4)
GFR SERPL CREATININE-BSD FRML MDRD: 67 ML/MIN/1.73
GLUCOSE SERPL-MCNC: 101 MG/DL (ref 65–99)
HBA1C MFR BLD: 6 % (ref 4.8–5.6)
HCT VFR BLD AUTO: 45.6 % (ref 37.5–51)
HDLC SERPL-MCNC: 33 MG/DL (ref 40–60)
HGB BLD-MCNC: 14.2 G/DL (ref 13–17.7)
INR PPP: 2.01 (ref 0.9–1.1)
LDLC SERPL CALC-MCNC: 94 MG/DL (ref 0–100)
LDLC/HDLC SERPL: 2.76 {RATIO}
MCH RBC QN AUTO: 26.4 PG (ref 26.6–33)
MCHC RBC AUTO-ENTMCNC: 31.1 G/DL (ref 31.5–35.7)
MCV RBC AUTO: 84.9 FL (ref 79–97)
PLATELET # BLD AUTO: 247 10*3/MM3 (ref 140–450)
PMV BLD AUTO: 11 FL (ref 6–12)
POTASSIUM SERPL-SCNC: 3.9 MMOL/L (ref 3.5–5.2)
PROTHROMBIN TIME: 23.2 SECONDS (ref 12.8–14.5)
RBC # BLD AUTO: 5.37 10*6/MM3 (ref 4.14–5.8)
SODIUM SERPL-SCNC: 137 MMOL/L (ref 136–145)
TRIGL SERPL-MCNC: 129 MG/DL (ref 0–150)
TROPONIN T SERPL-MCNC: 1.93 NG/ML (ref 0–0.03)
VLDLC SERPL-MCNC: 23 MG/DL (ref 5–40)
WBC # BLD AUTO: 9.18 10*3/MM3 (ref 3.4–10.8)

## 2021-09-21 PROCEDURE — 80048 BASIC METABOLIC PNL TOTAL CA: CPT | Performed by: INTERNAL MEDICINE

## 2021-09-21 PROCEDURE — 94799 UNLISTED PULMONARY SVC/PX: CPT

## 2021-09-21 PROCEDURE — 85610 PROTHROMBIN TIME: CPT | Performed by: INTERNAL MEDICINE

## 2021-09-21 PROCEDURE — 84484 ASSAY OF TROPONIN QUANT: CPT | Performed by: INTERNAL MEDICINE

## 2021-09-21 PROCEDURE — 85027 COMPLETE CBC AUTOMATED: CPT | Performed by: INTERNAL MEDICINE

## 2021-09-21 PROCEDURE — 80061 LIPID PANEL: CPT | Performed by: INTERNAL MEDICINE

## 2021-09-21 PROCEDURE — 99239 HOSP IP/OBS DSCHRG MGMT >30: CPT | Performed by: INTERNAL MEDICINE

## 2021-09-21 PROCEDURE — 93005 ELECTROCARDIOGRAM TRACING: CPT | Performed by: INTERNAL MEDICINE

## 2021-09-21 PROCEDURE — 83036 HEMOGLOBIN GLYCOSYLATED A1C: CPT | Performed by: INTERNAL MEDICINE

## 2021-09-21 PROCEDURE — 93010 ELECTROCARDIOGRAM REPORT: CPT | Performed by: INTERNAL MEDICINE

## 2021-09-21 RX ORDER — CLOPIDOGREL BISULFATE 75 MG/1
75 TABLET ORAL DAILY
Qty: 30 TABLET | Refills: 11 | Status: SHIPPED | OUTPATIENT
Start: 2021-09-22

## 2021-09-21 RX ORDER — IPRATROPIUM BROMIDE AND ALBUTEROL SULFATE 2.5; .5 MG/3ML; MG/3ML
3 SOLUTION RESPIRATORY (INHALATION)
Status: DISCONTINUED | OUTPATIENT
Start: 2021-09-21 | End: 2021-09-21

## 2021-09-21 RX ORDER — METOPROLOL SUCCINATE 25 MG/1
12.5 TABLET, EXTENDED RELEASE ORAL
Qty: 30 TABLET | Refills: 11 | Status: SHIPPED | OUTPATIENT
Start: 2021-09-21 | End: 2021-12-20

## 2021-09-21 RX ADMIN — METOPROLOL SUCCINATE 12.5 MG: 25 TABLET, EXTENDED RELEASE ORAL at 08:15

## 2021-09-21 RX ADMIN — FAMOTIDINE 20 MG: 20 TABLET, FILM COATED ORAL at 08:15

## 2021-09-21 RX ADMIN — IPRATROPIUM BROMIDE 0.5 MG: 0.5 SOLUTION RESPIRATORY (INHALATION) at 02:59

## 2021-09-21 RX ADMIN — IPRATROPIUM BROMIDE 0.5 MG: 0.5 SOLUTION RESPIRATORY (INHALATION) at 06:36

## 2021-09-21 RX ADMIN — CLOPIDOGREL 75 MG: 75 TABLET, FILM COATED ORAL at 08:16

## 2021-09-21 NOTE — DISCHARGE INSTR - APPOINTMENTS
Pt  Has  An  Apt regina Boss for  Sept 24  At  10 :15  And  Will have  A  Pt  With  inr  And  Apt  With  Dr queen  For  Oct 4  At 1:15

## 2021-09-21 NOTE — PLAN OF CARE
Goal Outcome Evaluation:  Plan of Care Reviewed With: patient           Outcome Summary: Pt denies chest pain this shift. Pt has been requesting breathing treatments this shift. Trop 1.930. Dr. Ruiz aware. VSS. No s/s of acute distress noted. Will cont to follow POC.

## 2021-09-21 NOTE — PAYOR COMM NOTE
"[facility]  NPI:[id number]    Utilization Review  Contact: Diana Youngblood RN  Phone: [phone]  Fax:[phone]    INITIATE INPATIENT AUTHORIZATION       I21.11    Moustapha Escamilla (46 y.o. Male)     Date of Birth Social Security Number Address Home Phone MRN    1975  [address]  [facility] [address] [phone] [id number]    [employer] Marital Status          Non-Hindu Single       Admission Date Admission Type Admitting Provider Attending Provider Department, Room/Bed    21 Emergency Delvin Carson MD Subramaniyam, Prem Srinivas, MD [address], [address]    Discharge Date Discharge Disposition Discharge Destination                       Attending Provider: Delvin Carson MD    Allergies: Aspirin    Isolation: None   Infection: None   Code Status: Prior    Ht: 170.2 cm (67\")   Wt: 82.1 kg (181 lb)    Admission Cmt: None   Principal Problem: None                Active Insurance as of 2021     Primary Coverage     Payor Plan Insurance Group Employer/Plan Group    [facility]      Payor Plan Address Payor Plan Phone Number Payor Plan Fax Number Effective Dates    [address] [address]   2014 - None Entered    [place] [address]       Subscriber Name Subscriber Birth Date Member ID       MOUSTAPHA ESCAMILLA 1975 [id number]                 Emergency Contacts      (Rel.) Home Phone Work Phone Mobile Phone    Alexandra Pena (Mother) [phone] -- --               History & Physical      Delvin Carson MD at 21 0700          Patient Identification:  Name:  Moustapha Escamilla  Age:  46 y.o.  Sex:  male  :  1975  MRN:  [id number]   Visit Number:  [id number]  Primary Care Physician:  Macho Jose MD    Chief complaint:   Chest pain   History of presenting illness:    Patient is a 46-year-old gentleman with a known history of ascending aortic aneurysm " "status post Bentall procedure in 2017 along with mechanical aortic valve replacement with a 27 mm valve, he had normal coronaries prior to his aneurysm repair, presented to the ER today with acute onset of chest pain about 2 to 3 hours prior to presentation, on presentation his initial EKG showed inferior ST elevation with reciprocal changes consistent with acute injury pattern.  His INR was 1.97 which is subtherapeutic for his mechanical aortic valve though he said he has been compliant with his Coumadin.    ROS: All systems reviewed and negative except as mentioned above.     ---------------------------------------------------------------------------------------------------------------------   Past Medical History:   Diagnosis Date   • Anxiety    • Asthma    • Back pain    • Chronic anticoagulation 2/2/2020   • COPD (chronic obstructive pulmonary disease) (CMS/[facility])    • Emphysema lung (CMS/[facility])    • Gastritis    • GERD (gastroesophageal reflux disease)    • Headache    • Heart valve disease 2018    valve replac., prosthetic valve   • Hx of aortic valve replacement, mechanical 11/28/2018   • Hyperglycemia 2/2/2020   • Hypertension    • Migraine    • Substance abuse (CMS/[facility])      Past Surgical History:   Procedure Laterality Date   • CARDIAC CATHETERIZATION N/A 10/23/2017    Procedure: Left Heart Cath;  Surgeon: Rodolfo Núñez MD;  Location: [facility] CATH INVASIVE LOCATION;  Service:    • CARDIAC VALVE REPLACEMENT  2018    prosthetic valve   • DENTAL PROCEDURE     • LIVER SURGERY      tumor removed from liver   • OTHER SURGICAL HISTORY      \"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\"   • THORACIC AORTIC ANEURYSM ASCENDING/ARCH REPAIR N/A 1/17/2018    Procedure: MEDIAN STERNOTOMY, AORTIC VALVE CONDUIT, BENTAL, TRANSESOPHAGEAL ECHOCARDIOGRAM WITH ANESTHESIA;  Surgeon: Aaron Lucas MD;  Location: [facility] OR;  Service:      Family History   Problem Relation Age of Onset   • COPD Mother      Social History "     Socioeconomic History   • Marital status: Single     Spouse name: Not on file   • Number of children: 0   • Years of education: Not on file   • Highest education level: Not on file   Tobacco Use   • Smoking status: Former Smoker     Packs/day: 1.00     Years: 4.00     Pack years: 4.00   • Smokeless tobacco: Current User     Types: Snuff   Substance and Sexual Activity   • Alcohol use: No     Comment: occassional    • Drug use: No   • Sexual activity: Defer     ---------------------------------------------------------------------------------------------------------------------   Allergies:  Aspirin  ---------------------------------------------------------------------------------------------------------------------   Prior to Admission Medications     Prescriptions Last Dose Informant Patient Reported? Taking?    albuterol sulfate  (90 Base) MCG/ACT inhaler  Pharmacy No No    Inhale 2 puffs Every 6 (Six) Hours As Needed for Wheezing.    atorvastatin (LIPITOR) 40 MG tablet   No No    Take 1 tablet by mouth Every Night.    famotidine (PEPCID) 20 MG tablet  Pharmacy Yes No    Take 20 mg by mouth 2 (Two) Times a Day.    losartan (Cozaar) 25 MG tablet   No No    Take 1 tablet by mouth Every Evening.    methylPREDNISolone (MEDROL) 4 MG dose pack   No No    Take as directed on package instructions.    metoprolol succinate XL (TOPROL-XL) 25 MG 24 hr tablet   No No    Take 1/2 tablet by mouth Daily for 30 days.    montelukast (SINGULAIR) 10 MG tablet  Pharmacy Yes No    Take 10 mg by mouth Every Night.    tiotropium bromide monohydrate (Spiriva Respimat) 2.5 MCG/ACT aerosol solution inhaler   No No    Inhale 2 puffs by mouth Daily.    warfarin (COUMADIN) 1 MG tablet   Yes No    Take 1 mg by mouth Daily As Needed (when out of 5 mg warfarin).    warfarin (COUMADIN) 5 MG tablet   No No    Take 0.5 tablets by mouth Daily.        Hospital Scheduled Meds:    No current facility-administered medications for this  encounter.    ---------------------------------------------------------------------------------------------------------------------   Vital Signs:  Temp:  [98.3 °F (36.8 °C)] 98.3 °F (36.8 °C)  Heart Rate:  [76-87] 77  Resp:  [18-20] 20  BP: (130-148)/() 148/79      09/20/21  0603   Weight: 82.1 kg (181 lb)     Body mass index is 28.35 kg/m².  ---------------------------------------------------------------------------------------------------------------------   Physical Exam:  Constitutional:  Well-developed and well-nourished.  No respiratory distress.      HENT:  Head: Normocephalic and atraumatic.  Mouth:  Moist mucous membranes.    Eyes:  Conjunctivae and EOM are normal.  Pupils are equal, round, and reactive to light.  No scleral icterus.  Neck:  Neck supple.  No JVD present.    Cardiovascular:  Normal rate, regular rhythm and normal heart sounds with no murmur.  Pulmonary/Chest:  No respiratory distress, no wheezes, no crackles, with normal breath sounds and good air movement.  Abdominal:  Soft.  Bowel sounds are normal.  No distension and no tenderness.   Musculoskeletal:  No edema, no tenderness, and no deformity.  No red or swollen joints anywhere.    Neurological:  Alert and oriented to person, place, and time.  No cranial nerve deficit.  No tongue deviation.  No facial droop.  No slurred speech.   Skin:  Skin is warm and dry.  No rash noted.  No pallor.   Psychiatric:  Normal mood and affect.  Behavior is normal.  Judgment and thought content normal.   Peripheral vascular:  No edema and strong pulses on all 4 extremities.  ---------------------------------------------------------------------------------------------------------------------  EKG: Sinus, inferior ST elevation MI  Telemetry: Sinus  I have personally looked at both the EKG and the telemetry strips.  ---------------------------------------------------------------------------------------------------------------------   Results from last 7  days   Lab Units 09/20/21  0615 09/17/21  1126   CRP mg/dL  --  <0.30   LACTATE mmol/L  --  2.0   WBC 10*3/mm3 9.17 8.90   HEMOGLOBIN g/dL 15.5 15.0   HEMATOCRIT % 49.3 47.8   MCV fL 84.0 84.3   MCHC g/dL 31.4* 31.4*   PLATELETS 10*3/mm3 271 250   INR  1.97* 2.14*     Results from last 7 days   Lab Units 09/17/21  1050   PH, ARTERIAL pH units 7.407   PO2 ART mm Hg 68.8*   PCO2, ARTERIAL mm Hg 43.0   HCO3 ART mmol/L 27.1*     Results from last 7 days   Lab Units 09/20/21  0615 09/17/21  1126   SODIUM mmol/L 136 144   POTASSIUM mmol/L 3.6 3.8   CHLORIDE mmol/L 99 107   CO2 mmol/L 27.4 23.3   BUN mg/dL 14 12   CREATININE mg/dL 0.96 0.93   EGFR IF NONAFRICN AM mL/min/1.73 85 88   CALCIUM mg/dL 9.6 9.1   GLUCOSE mg/dL 102* 117*   ALBUMIN g/dL 4.53 4.23   BILIRUBIN mg/dL 0.4 0.3   ALK PHOS U/L 95 86   AST (SGOT) U/L 21 20   ALT (SGPT) U/L 28 28   Estimated Creatinine Clearance: 99.6 mL/min (by C-G formula based on SCr of 0.96 mg/dL).  No results found for: AMMONIA  Results from last 7 days   Lab Units 09/20/21  0615   TROPONIN T ng/mL <0.010     Results from last 7 days   Lab Units 09/17/21  1126   PROBNP pg/mL 465.4*     Lab Results   Component Value Date    HGBA1C 6.20 (H) 09/18/2020     Lab Results   Component Value Date    TSH 4.180 04/09/2021    FREET4 1.04 04/09/2021     No results found for: PREGTESTUR, PREGSERUM, HCG, HCGQUANT  Pain Management Panel     Pain Management Panel Latest Ref Rng & Units 9/17/2020 2/2/2020    AMPHETAMINES SCREEN, URINE Negative Negative Negative    BARBITURATES SCREEN Negative Negative Negative    BENZODIAZEPINE SCREEN, URINE Negative Negative Negative    BUPRENORPHINEUR Negative Negative Negative    COCAINE SCREEN, URINE Negative Negative Negative    METHADONE SCREEN, URINE Negative Negative Negative    METHAMPHETAMINEUR Negative - -        No results found for: BLOODCX  No results found for: URINECX  No results found for: WOUNDCX  No results found for: STOOLCX       ---------------------------------------------------------------------------------------------------------------------  Imaging Results (Last 7 Days)     ** No results found for the last 168 hours. **          I have personally reviewed the radiology images and read the final radiology report.  ---------------------------------------------------------------------------------------------------------------------  Assessment and Plan:   Acute inferior STEMI  Ascending aortic aneurysm and moderate AAA s/p repair with a Bentall procedure and a mechanical aortic valve in 2007.,  Patient has been on Coumadin his INR has been subtherapeutic on presentation today at 1.9.  Hypertension  Dyslipidemia    We will proceed with emergent coronary angiogram  Patient already received aspirin, heparin and Brilinta bolus in the ER per ACS protocol.  I have explained the risks associated with the procedure to the patient including but not limited to an allergic reaction to the contrast material or medications used during the procedure bleeding, infection, and bruising at the catheter insertion site blood clots, which may trigger heart attack, stroke,   damage to the artery where the catheter was inserted, or damage to the arteries as the catheter travels through your body, irregular heart rhythm arrhythmias, kidney damage caused by the contrast material.                Delvin Carson MD, [facility]  Interventional Cardiology      09/20/21  07:00 EDT      Electronically signed by Delvin Carson MD at 09/20/21 0703          Emergency Department Notes      Clint Santo MD at 09/20/21 0600                 MEDICAL SCREENING:    Reason for Visit: Chest Pain    Patient initially seen in triage.  The patient was advised further evaluation and diagnostic testing will be needed, some of the treatment and testing will be initiated in the lobby in order to begin the process.  The patient will be returned to the waiting  area for the time being and possibly be re-assessed by a subsequent ED provider.  The patient will be brought back to the treatment area in as timely manner as possible.       Clint Santo MD  09/20/21 0600      Electronically signed by Clint Santo MD at 09/20/21 0600     Norbert Hatch RN at 09/20/21 0615        Pt placed on Zolls and O2 at this time.      Norbert Hatch RN  09/20/21 0621      Electronically signed by Norbert Hatch RN at 09/20/21 0621     Clint Santo MD at 09/20/21 0618          Subjective     History provided by:  Patient   used: No    Chest Pain  Pain location:  Substernal area  Pain quality: aching, dull and pressure    Pain radiates to:  Does not radiate  Pain severity:  Mild  Onset quality:  Gradual  Timing:  Constant  Progression:  Worsening  Chronicity:  New  Context: at rest    Context: not breathing, not drug use, not eating, not intercourse, not lifting, not movement, not raising an arm, not stress and not trauma    Relieved by:  Nothing  Worsened by:  Nothing  Ineffective treatments:  None tried  Associated symptoms: no abdominal pain, no AICD problem, no altered mental status, no anorexia, no anxiety, no back pain, no claudication, no cough, no diaphoresis, no dizziness, no dysphagia, no fatigue, no fever, no headache, no heartburn, no lower extremity edema, no nausea, no near-syncope, no numbness, no orthopnea, no palpitations, no PND, no shortness of breath, no syncope, no vomiting and no weakness    Risk factors: high cholesterol, hypertension and male sex    Risk factors: no aortic disease, no coronary artery disease, no diabetes mellitus, no Kmi-Danlos syndrome, no immobilization, no Marfan's syndrome, not obese, not pregnant, no prior DVT/PE, no smoking and no surgery        Review of Systems   Constitutional: Negative for activity change, appetite change, chills, diaphoresis, fatigue and fever.   HENT:  "Negative for congestion, ear pain, sore throat and trouble swallowing.    Eyes: Negative for redness.   Respiratory: Negative for cough, chest tightness, shortness of breath and wheezing.    Cardiovascular: Positive for chest pain. Negative for palpitations, orthopnea, claudication, leg swelling, syncope, PND and near-syncope.   Gastrointestinal: Negative for abdominal pain, anorexia, diarrhea, heartburn, nausea and vomiting.   Genitourinary: Negative for dysuria and urgency.   Musculoskeletal: Negative for arthralgias, back pain, myalgias and neck pain.   Skin: Negative for pallor, rash and wound.   Neurological: Negative for dizziness, speech difficulty, weakness, numbness and headaches.   Psychiatric/Behavioral: Negative for agitation, behavioral problems, confusion and decreased concentration.   All other systems reviewed and are negative.      Past Medical History:   Diagnosis Date   • Anxiety    • Asthma    • Back pain    • Chronic anticoagulation 2/2/2020   • COPD (chronic obstructive pulmonary disease) (CMS/[facility])    • Emphysema lung (CMS/[facility])    • Gastritis    • GERD (gastroesophageal reflux disease)    • Headache    • Heart valve disease 2018    valve replac., prosthetic valve   • Hx of aortic valve replacement, mechanical 11/28/2018   • Hyperglycemia 2/2/2020   • Hypertension    • Migraine    • Substance abuse (CMS/[facility])        Allergies   Allergen Reactions   • Aspirin Anaphylaxis     Patient said, \"it chokes him up.\" patient said, \"I got really short of breath and started to have an asthma attack.\"       Past Surgical History:   Procedure Laterality Date   • CARDIAC CATHETERIZATION N/A 10/23/2017    Procedure: Left Heart Cath;  Surgeon: Rodolfo Núñez MD;  Location: [facility] CATH INVASIVE LOCATION;  Service:    • CARDIAC VALVE REPLACEMENT  2018    prosthetic valve   • DENTAL PROCEDURE     • LIVER SURGERY      tumor removed from liver   • OTHER SURGICAL HISTORY      \"tumor removed from heart when " "2-3 months old\"   • THORACIC AORTIC ANEURYSM ASCENDING/ARCH REPAIR N/A 1/17/2018    Procedure: MEDIAN STERNOTOMY, AORTIC VALVE CONDUIT, BENTAL, TRANSESOPHAGEAL ECHOCARDIOGRAM WITH ANESTHESIA;  Surgeon: Aaron Lucas MD;  Location: [facility];  Service:        Family History   Problem Relation Age of Onset   • COPD Mother        Social History     Socioeconomic History   • Marital status: Single     Spouse name: Not on file   • Number of children: 0   • Years of education: Not on file   • Highest education level: Not on file   Tobacco Use   • Smoking status: Former Smoker     Packs/day: 1.00     Years: 4.00     Pack years: 4.00   • Smokeless tobacco: Current User     Types: Snuff   Substance and Sexual Activity   • Alcohol use: No     Comment: occassional    • Drug use: No   • Sexual activity: Defer           Objective   Physical Exam  Vitals and nursing note reviewed.   Constitutional:       General: He is not in acute distress.     Appearance: Normal appearance. He is well-developed. He is not toxic-appearing or diaphoretic.   HENT:      Head: Normocephalic and atraumatic.      Right Ear: External ear normal.      Left Ear: External ear normal.      Nose: Nose normal.      Mouth/Throat:      Pharynx: No oropharyngeal exudate.      Tonsils: No tonsillar exudate.   Eyes:      General: Lids are normal.      Conjunctiva/sclera: Conjunctivae normal.      Pupils: Pupils are equal, round, and reactive to light.   Neck:      Thyroid: No thyromegaly.   Cardiovascular:      Rate and Rhythm: Normal rate and regular rhythm.      Pulses: Normal pulses.      Heart sounds: Normal heart sounds, S1 normal and S2 normal.   Pulmonary:      Effort: Pulmonary effort is normal. No tachypnea or respiratory distress.      Breath sounds: Normal breath sounds. No decreased breath sounds, wheezing or rales.   Chest:      Chest wall: No tenderness.   Abdominal:      General: Bowel sounds are normal. There is no distension.      Palpations: " Abdomen is soft.      Tenderness: There is no abdominal tenderness. There is no guarding or rebound.   Musculoskeletal:         General: No tenderness or deformity. Normal range of motion.      Cervical back: Full passive range of motion without pain, normal range of motion and neck supple.   Lymphadenopathy:      Cervical: No cervical adenopathy.   Skin:     General: Skin is warm and dry.      Coloration: Skin is not pale.      Findings: No erythema or rash.   Neurological:      Mental Status: He is alert and oriented to person, place, and time.      GCS: GCS eye subscore is 4. GCS verbal subscore is 5. GCS motor subscore is 6.      Cranial Nerves: No cranial nerve deficit.      Sensory: No sensory deficit.   Psychiatric:         Speech: Speech normal.         Behavior: Behavior normal.         Thought Content: Thought content normal.         Judgment: Judgment normal.         Procedures          ED Course  ED Course as of Sep 20 0623   Mon Sep 20, 2021   0620 Normal sinus rhythm  Possible Left atrial enlargement  ST elevation, consider inferior injury or acute infarct  ** ** ACUTE MI / STEMI ** **  Consider right ventricular involvement in acute inferior infarct  Abnormal ECG  When compared with ECG of 17-SEP-2021 11:48,  ST elevation now present in Inferior leads  Vent. rate 83 BPM  MD interval 180 ms  QRS duration 92 ms  QT/QTcB 382/448 ms  P-R-T axes 75 73 87   ECG 12 Lead [ES]   0620 Patient presented in the emergency department with chest pain that started approximately 1 hour ago, EKG obtained in the [facility].  Initiated 1 push in the emergency department, and Dr. Grant Interventional cardiologist will take the patient to the Cath Lab for further evaluation and possible intervention.    [ES]      ED Course User Index  [ES] Clint Santo MD                                           MDM  Number of Diagnoses or Management Options     Amount and/or Complexity of Data Reviewed  Clinical lab  tests: ordered and reviewed  Tests in the radiology section of CPT®: ordered and reviewed  Tests in the medicine section of CPT®: reviewed and ordered  Review and summarize past medical records: yes  Discuss the patient with other providers: yes  Independent visualization of images, tracings, or specimens: yes    Risk of Complications, Morbidity, and/or Mortality  Presenting problems: high  Diagnostic procedures: high  Management options: high    Critical Care  Total time providing critical care: 30-74 minutes    Patient Progress  Patient progress: other (comment) (CRITICAL.)      Final diagnoses:   Myocardial infarction involving right coronary artery, unspecified MI type (CMS/[facility])       ED Disposition  ED Disposition     ED Disposition Condition Comment    Send to Cath Lab            No follow-up provider specified.       Medication List      No changes were made to your prescriptions during this visit.          Clint Santo MD  09/20/21 0623      Electronically signed by Clint Santo MD at 09/20/21 0623     Norbert Hatch RN at 09/20/21 0625        Patient prepped for cath lab at this time. Patient placed on Zols monitor pads and physiological monitoring.      Norbert Hatch RN  09/20/21 0626      Electronically signed by Norbert Hatch RN at 09/20/21 0626     Norbert Hatch RN at 09/20/21 0642        Dr. Ruiz at bedside at this time.      Norbert Hatch RN  09/20/21 0644      Electronically signed by Norbert Hatch RN at 09/20/21 0644     Norbert Hatch, RN at 09/20/21 0654        Called [facility] at this time, states Cath lab team is ready.      Norbert Hatch RN  09/21/21 0258      Electronically signed by Norbert Hatch RN at 09/21/21 0258     Norbert Hatch, RN at 09/20/21 0656        Patient taken to Cath lab at this time. Breathing remains even and unlabored. Patient declines chest pain at this time. IV's remain patent and intact. Belongings left in cath lab with  patient. Original EKG and report given to cath lab team. Patient was transported via stretcher.     Norbert Hatch RN  09/21/21 0258      Electronically signed by Norbert Htach RN at 09/21/21 0258         Facility-Administered Medications as of 9/21/2021   Medication Dose Route Frequency Provider Last Rate Last Admin   • acetaminophen (TYLENOL) tablet 650 mg  650 mg Oral Q4H PRN SubramaniyamDelvin MD       • atorvastatin (LIPITOR) tablet 40 mg  40 mg Oral Nightly Subramaniyam, Delvin Hickey MD   40 mg at 09/20/21 2017   • clopidogrel (PLAVIX) tablet 75 mg  75 mg Oral Daily SubramraissayaDelvin avila MD       • famotidine (PEPCID) tablet 20 mg  20 mg Oral BID Delvin Carson MD   20 mg at 09/20/21 2017   • [COMPLETED] heparin (porcine) 5000 UNIT/ML injection 4,000 Units  4,000 Units Intravenous Once Clint Santo MD   4,000 Units at 09/20/21 0628   • ipratropium (ATROVENT) nebulizer solution 0.5 mg  0.5 mg Nebulization 4x Daily - RT SubramaniyamDelvin MD   0.5 mg at 09/21/21 0636   • losartan (COZAAR) tablet 25 mg  25 mg Oral Q PM SubramaniyamDelvin MD   25 mg at 09/20/21 1637   • metoprolol succinate XL (TOPROL-XL) 24 hr tablet 12.5 mg  12.5 mg Oral Q24H SubramaniyamDelvin MD   12.5 mg at 09/20/21 0927   • montelukast (SINGULAIR) tablet 10 mg  10 mg Oral Nightly SubramaniyamDelvin MD   10 mg at 09/20/21 2017   • Pharmacy to dose warfarin   Does not apply Continuous PRN NoheliaramaniyamDelvin MD       • [COMPLETED] sodium chloride 0.9 % infusion    Continuous PRN SubramaniyamDelvin MD 75 mL/hr at 09/20/21 0705 75 mL/hr at 09/20/21 0705   • sodium chloride 0.9 % infusion  100 mL/hr Intravenous Continuous Subramaniyam, Delvin Hickey  mL/hr at 09/20/21 0836 100 mL/hr at 09/20/21 0836   • [COMPLETED] ticagrelor (BRILINTA) tablet 180 mg  180 mg Oral Once Clint Santo MD   180 mg at 09/20/21 0628   •  [COMPLETED] warfarin (COUMADIN) tablet 4 mg  4 mg Oral Once Delvin Carson MD   4 mg at 09/20/21 2017

## 2021-09-21 NOTE — CASE MANAGEMENT/SOCIAL WORK
Discharge Planning Assessment  [facility]     Patient Name: Filemon Jj  MRN: [id number]  Today's Date: 9/21/2021    Admit Date: 9/20/2021    Discharge Needs Assessment     Row Name 09/21/21 1015       Living Environment    Lives With  alone    Current Living Arrangements  home/apartment/condo    Primary Care Provided by  self    Provides Primary Care For  no one    Family Caregiver if Needed  none    Able to Return to Prior Arrangements  yes       Resource/Environmental Concerns    Resource/Environmental Concerns  none    Transportation Concerns  other (see comments) Pt has a scooter and rides to MD office and will transport himself home.       Transition Planning    Patient/Family Anticipates Transition to  home    Patient/Family Anticipated Services at Transition  none    Transportation Anticipated  other (see comments) Pt has his scooter her and plans to drive himself home. Pt has discussed this with Dr. Ruiz and he has approved.       Discharge Needs Assessment    Readmission Within the Last 30 Days  no previous admission in last 30 days    Equipment Currently Used at Home  nebulizer;walker, standard;crutches DME via [employer]    Concerns to be Addressed  denies needs/concerns at this time    Anticipated Changes Related to Illness  none    Equipment Needed After Discharge  none        Discharge Plan     Row Name 09/21/21 1019       Plan    Plan  CM spoke with pt. via telephone. Pt lives at home [address] in [place] and plans to return home at d/c. Pt has a walker, nebulizer and crutches via [employer]. Pt does not have HH. PCP is Dr. [place] and he gets Rx filled at [facility]. Pt has [employer] insurance and stated it does not cover his blue inhaler and he can't afford it $56. Encouraged pt to ask PCP for a sample. Pt gets INR checked weekly to bi-weekly at PCP office. Pt plans to transport self home on his scooter and Dr. Ruiz has approved of this.    Patient/Family in Agreement with Plan   yes    Plan Comments  STEMI.  9/21: Admit tele, c/o chest pain, EKG with ST elevation, mechanical valve and AAA repair on Coumadin INR 1.97, heart cath. Added Plavix for 1 yr with Coumadin. INR 2.5-3. Medical mgmt. INR 2.01 today. O2@1L sat 99%.    Final Discharge Disposition Code  01 - home or self-care    Final Note  Pt is being d/c home. Encouraged pt to use 24 hr RN call line with any questions after d/c. Pt verbalized understanding.     Zainab Alejandro, RN

## 2021-09-21 NOTE — ED NOTES
Called [facility] at this time, [facility] Cath lab team is ready.      Norbert Hatch, RN  09/21/21 0258

## 2021-09-21 NOTE — DISCHARGE SUMMARY
Patient Identification:  Name:  Filemon Jj  Age:  46 y.o.  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  S/p mechanical aortic valve replacement and aortic root repair with Bentall procedure for severe ascending aortic aneurysm.  Asthma      CONSULTS   None    PROCEDURES PERFORMED  Coronary angiogram    HOSPITAL COURSE  Patient is a 46 y.o. male presented to [facility] complaining of acute onset of substernal chest pain.  Please see the admitting history and physical for further details.  His initial presenting EKG showed mild ST elevation in inferior leads and reciprocal changes in V1 to V2 concerning for acute posterior MI and he was taken to the Cath Lab emergently and found to have a distal OM 2 lesion which had an appearance of type II spontaneous coronary artery dissection, since it was a small caliber vessel with RADHA-3 flow distal to the lesion and a very small portion of myocardium at risk I decided to just medically manage given there is no other PCI option patient.  Patient did not have any significant artery stenosis in LCA or RCA.  Patient did fine post procedure with no chest pain, his echo showed EF of about 45 to 50% with mild posterior basal hypokinesia which appeared to be similar to the previous.,  His echo showed that his mechanical aortic valve was in good position with no elevated velocities with no significant aortic insufficiency.  He was started on Plavix and will continue along with Coumadin because he did had a troponin elevation, he will also be initiated on the low-dose beta-blocker along with his home dose of lisinopril and statins on  discharge.  He will follow-up with his primary cardiology office in a week time.  I recommended him to keep his INR between 2.5-3.    VITAL SIGNS:      09/20/21  0603   Weight: 82.1 kg (181 lb)     Body mass index is 28.35 kg/m².    PHYSICAL EXAM:  Constitutional:  Well-developed and well-nourished.  No respiratory distress.      HENT:  Head: Normocephalic and atraumatic.  Mouth:  Moist mucous membranes.    Eyes:  Conjunctivae and EOM are normal.  Pupils are equal, round, and reactive to light.  No scleral icterus.  Neck:  Neck supple.  No JVD present.    Cardiovascular:  Normal rate, regular rhythm and normal heart sounds with no murmur.  Pulmonary/Chest:  No respiratory distress, no wheezes, no crackles, with normal breath sounds and good air movement.  Abdominal:  Soft.  Bowel sounds are normal.  No distension and no tenderness.   Musculoskeletal:  No edema, no tenderness, and no deformity.  No red or swollen joints anywhere.    Neurological:  Alert and oriented to person, place, and time.  No cranial nerve deficit.  No tongue deviation.  No facial droop.  No slurred speech.   Skin:  Skin is warm and dry.  No rash noted.  No pallor.   Psychiatric:  Normal mood and affect.  Behavior is normal.  Judgment and thought content normal.   Peripheral vascular:  No edema and strong pulses on all 4 extremities.    DISCHARGE DISPOSITION   Stable    DISCHARGE MEDICATIONS:     Discharge Medications      New Medications      Instructions Start Date   clopidogrel 75 MG tablet  Commonly known as: PLAVIX   75 mg, Oral, Daily   Start Date: September 22, 2021        Continue These Medications      Instructions Start Date   albuterol sulfate  (90 Base) MCG/ACT inhaler  Commonly known as: PROVENTIL HFA;VENTOLIN HFA;PROAIR HFA   2 puffs, Inhalation, Every 6 Hours PRN      atorvastatin 40 MG tablet  Commonly known as: LIPITOR   40 mg, Oral, Nightly      famotidine 20 MG tablet  Commonly known as: PEPCID   20 mg, Oral, 2 Times  Daily      losartan 25 MG tablet  Commonly known as: Cozaar   25 mg, Oral, Every Evening      methylPREDNISolone 4 MG dose pack  Commonly known as: MEDROL   Take as directed on package instructions.      metoprolol succinate XL 25 MG 24 hr tablet  Commonly known as: TOPROL-XL   Take 1/2 tablet by mouth Daily for 30 days.      montelukast 10 MG tablet  Commonly known as: SINGULAIR   10 mg, Oral, Nightly      warfarin 6 MG tablet  Commonly known as: COUMADIN   6 mg, Oral, Every Evening             Home meds reviewed- continue all other home meds              Follow-up Information     Macho [place] MD .    Specialty: Internal Medicine  Contact information:  [address]  [place] [address]  [phone]             Len Velázquez, PAAfsanehC Follow up in 1 week(s).    Specialties: Physician Assistant, Cardiology  Contact information:  [address] [place] [address]  [phone]                    Delvin Ruperto Carson MD  09/21/21  10:03 EDT    Please note that this discharge summary required more than 30 minutes to complete.

## 2021-09-21 NOTE — ED NOTES
Patient taken to Cath lab at this time. Breathing remains even and unlabored. Patient declines chest pain at this time. IV's remain patent and intact. Belongings left in cath lab with patient. Original EKG and report given to cath lab team. Patient was transported via stretcher.     Norebrt Hatch RN  09/21/21 0258

## 2021-09-22 ENCOUNTER — READMISSION MANAGEMENT (OUTPATIENT)
Dept: CALL CENTER | Facility: HOSPITAL | Age: 46
End: 2021-09-22

## 2021-09-22 NOTE — OUTREACH NOTE
Prep Survey      Responses   [facility] from?  [place]   Is LACE score < 7 ?  No   Emergency Room discharge w/ pulse ox?  No   Eligibility  Readm Mgmt   Discharge diagnosis  STEMI   Does the patient have one of the following disease processes/diagnoses(primary or secondary)?  Acute MI (STEMI,NSTEMI)   Does the patient have Home health ordered?  No   Is there a DME ordered?  No   Comments regarding appointments  Appts needed   Medication alerts for this patient  Plavix   Prep survey completed?  Yes          Adela Leigh RN

## 2021-09-23 LAB
QT INTERVAL: 418 MS
QTC INTERVAL: 466 MS

## 2021-09-27 ENCOUNTER — READMISSION MANAGEMENT (OUTPATIENT)
Dept: CALL CENTER | Facility: HOSPITAL | Age: 46
End: 2021-09-27

## 2021-09-27 NOTE — OUTREACH NOTE
AMI Week 1 Survey      Responses   [facility] patient discharged from?  [place]   Does the patient have one of the following disease processes/diagnoses(primary or secondary)?  Acute MI (STEMI,NSTEMI)   Week 1 attempt successful?  No   Unsuccessful attempts  Attempt 1          Char Gordillo RN

## 2021-09-29 ENCOUNTER — READMISSION MANAGEMENT (OUTPATIENT)
Dept: CALL CENTER | Facility: HOSPITAL | Age: 46
End: 2021-09-29

## 2021-09-29 NOTE — OUTREACH NOTE
AMI Week 1 Survey      Responses   [facility] patient discharged from?  [place]   Does the patient have one of the following disease processes/diagnoses(primary or secondary)?  Acute MI (STEMI,NSTEMI)   Week 1 attempt successful?  No   Unsuccessful attempts  Attempt 2          Sandra Braun RN

## 2021-10-01 ENCOUNTER — READMISSION MANAGEMENT (OUTPATIENT)
Dept: CALL CENTER | Facility: HOSPITAL | Age: 46
End: 2021-10-01

## 2021-10-01 NOTE — OUTREACH NOTE
AMI Week 1 Survey      Responses   [facility] facility patient discharged from?  [place]   Does the patient have one of the following disease processes/diagnoses(primary or secondary)?  Acute MI (STEMI,NSTEMI)   Week 1 attempt successful?  No   Unsuccessful attempts  Attempt 3          Renate Carmichael RN

## 2021-10-11 ENCOUNTER — READMISSION MANAGEMENT (OUTPATIENT)
Dept: CALL CENTER | Facility: HOSPITAL | Age: 46
End: 2021-10-11

## 2021-10-11 NOTE — OUTREACH NOTE
AMI Week 2 Survey      Responses   [facility] patient discharged from? [place]   Does the patient have one of the following disease processes/diagnoses(primary or secondary)? Acute MI (STEMI,NSTEMI)   Week 2 attempt successful? No   Unsuccessful attempts Attempt 1          Sabine Leiva RN

## 2021-10-13 ENCOUNTER — READMISSION MANAGEMENT (OUTPATIENT)
Dept: CALL CENTER | Facility: HOSPITAL | Age: 46
End: 2021-10-13

## 2021-10-13 NOTE — OUTREACH NOTE
AMI Week 2 Survey      Responses   [facility] patient discharged from? [place]   Does the patient have one of the following disease processes/diagnoses(primary or secondary)? Acute MI (STEMI,NSTEMI)   Week 2 attempt successful? No   Revoke Phone number issues  [Patient phone # gives recording of not reachable. ]          Maris Metz RN

## 2021-11-23 ENCOUNTER — DOCUMENTATION (OUTPATIENT)
Dept: CARDIAC REHAB | Facility: HOSPITAL | Age: 46
End: 2021-11-23

## 2021-11-23 NOTE — PROGRESS NOTES
Patient called back and stated his transportation is his scooter and he is still having trouble with his legs and heart so he thinks he will see the doctor and talk to him first and maybe do the program later. He stated he will let us know.

## 2021-11-23 NOTE — PROGRESS NOTES
[facility] is still on Covid 19 restrictions. We are calling patients as we get spaces open . Sorry for the inconvenience  .     [facility] has a space open at this time and we tried to contact patient to see if he would be interested.  His phone was not working so I called the home number and his mother said he was not there right now but when he came home she would tell him to call us back if he is interested in participating in the program

## 2021-11-25 ENCOUNTER — HOSPITAL ENCOUNTER (EMERGENCY)
Facility: HOSPITAL | Age: 46
Discharge: HOME | End: 2021-11-25
Admitting: EMERGENCY MEDICINE

## 2021-11-25 ENCOUNTER — APPOINTMENT (OUTPATIENT)
Dept: GENERAL RADIOLOGY | Facility: HOSPITAL | Age: 46
End: 2021-11-25

## 2021-11-25 ENCOUNTER — APPOINTMENT (OUTPATIENT)
Dept: CT IMAGING | Facility: HOSPITAL | Age: 46
End: 2021-11-25

## 2021-11-25 VITALS
BODY MASS INDEX: 31.71 KG/M2 | HEIGHT: 63 IN | RESPIRATION RATE: 20 BRPM | TEMPERATURE: 98.9 F | WEIGHT: 179 LBS | SYSTOLIC BLOOD PRESSURE: 114 MMHG | OXYGEN SATURATION: 98 % | DIASTOLIC BLOOD PRESSURE: 72 MMHG | HEART RATE: 80 BPM

## 2021-11-25 DIAGNOSIS — S80.02XA CONTUSION OF LEFT KNEE, INITIAL ENCOUNTER: Primary | ICD-10-CM

## 2021-11-25 DIAGNOSIS — S39.012A STRAIN OF LUMBAR REGION, INITIAL ENCOUNTER: ICD-10-CM

## 2021-11-25 PROCEDURE — 73564 X-RAY EXAM KNEE 4 OR MORE: CPT

## 2021-11-25 PROCEDURE — 99283 EMERGENCY DEPT VISIT LOW MDM: CPT

## 2021-11-25 PROCEDURE — 72131 CT LUMBAR SPINE W/O DYE: CPT
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
## 2021-11-29 ENCOUNTER — APPOINTMENT (OUTPATIENT)
Dept: GENERAL RADIOLOGY | Facility: HOSPITAL | Age: 46
End: 2021-11-29

## 2021-11-29 ENCOUNTER — HOSPITAL ENCOUNTER (EMERGENCY)
Facility: HOSPITAL | Age: 46
Discharge: HOME | End: 2021-11-29
Attending: EMERGENCY MEDICINE | Admitting: EMERGENCY MEDICINE

## 2021-11-29 VITALS
RESPIRATION RATE: 16 BRPM | WEIGHT: 179 LBS | SYSTOLIC BLOOD PRESSURE: 122 MMHG | OXYGEN SATURATION: 95 % | HEART RATE: 98 BPM | BODY MASS INDEX: 31.71 KG/M2 | DIASTOLIC BLOOD PRESSURE: 61 MMHG | TEMPERATURE: 97.8 F | HEIGHT: 63 IN

## 2021-11-29 DIAGNOSIS — M25.562 ACUTE PAIN OF LEFT KNEE: Primary | ICD-10-CM

## 2021-11-29 PROCEDURE — 73562 X-RAY EXAM OF KNEE 3: CPT | Performed by: RADIOLOGY

## 2021-11-29 PROCEDURE — 99283 EMERGENCY DEPT VISIT LOW MDM: CPT

## 2021-11-29 PROCEDURE — 73562 X-RAY EXAM OF KNEE 3: CPT

## 2021-12-07 ENCOUNTER — OFFICE VISIT (OUTPATIENT)
Dept: ORTHOPEDIC SURGERY | Facility: CLINIC | Age: 46
End: 2021-12-07

## 2021-12-07 VITALS
WEIGHT: 179 LBS | BODY MASS INDEX: 31.71 KG/M2 | SYSTOLIC BLOOD PRESSURE: 125 MMHG | HEIGHT: 63 IN | DIASTOLIC BLOOD PRESSURE: 74 MMHG | HEART RATE: 85 BPM
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
## 2021-12-07 DIAGNOSIS — M25.562 ACUTE PAIN OF LEFT KNEE: Primary | ICD-10-CM

## 2021-12-07 PROCEDURE — 99203 OFFICE O/P NEW LOW 30 MIN: CPT | Performed by: PHYSICIAN ASSISTANT
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
## 2021-12-15 ENCOUNTER — PATIENT ROUNDING (BHMG ONLY) (OUTPATIENT)
Dept: ORTHOPEDIC SURGERY | Facility: CLINIC | Age: 46
End: 2021-12-15

## 2021-12-15 NOTE — PROGRESS NOTES
December 15, 2021    Hello, may I speak with Filemon Jj?    My name is Marielle [place]ardi     I am  with [facility] [place]  [facility]  [id number]  [place] [address] [phone].    Before we get started may I verify your date of birth? 1975    I am calling to officially welcome you to our practice and ask about your recent visit. Is this a good time to talk?  Yes    Tell me about your visit with us. What things went well?  Great experience!       We're always looking for ways to make our patients' experiences even better. Do you have recommendations on ways we may improve?  No changes.    Overall were you satisfied with your first visit to our practice?  Yes.  Loved Ward and the staff.  Everyone was very nice.        I appreciate you taking the time to speak with me today. Is there anything else I can do for you? No.      Thank you, and have a great day.

## 2022-02-02 ENCOUNTER — TELEPHONE (OUTPATIENT)
Dept: ORTHOPEDIC SURGERY | Facility: CLINIC | Age: 47
End: 2022-02-02

## 2022-02-02 NOTE — TELEPHONE ENCOUNTER
Called [facility] for patient to return call. Checking to make sure the patient had the CT done and trying to schedule him to come in for review. Will call [facility] in [place] to get the report.

## 2022-02-27 ENCOUNTER — HOSPITAL ENCOUNTER (EMERGENCY)
Facility: HOSPITAL | Age: 47
Discharge: HOME | End: 2022-02-27
Attending: STUDENT IN AN ORGANIZED HEALTH CARE EDUCATION/TRAINING PROGRAM | Admitting: STUDENT IN AN ORGANIZED HEALTH CARE EDUCATION/TRAINING PROGRAM

## 2022-02-27 VITALS
OXYGEN SATURATION: 100 % | HEIGHT: 65 IN | RESPIRATION RATE: 16 BRPM | WEIGHT: 187 LBS | DIASTOLIC BLOOD PRESSURE: 78 MMHG | HEART RATE: 79 BPM | TEMPERATURE: 97.8 F | BODY MASS INDEX: 31.16 KG/M2 | SYSTOLIC BLOOD PRESSURE: 144 MMHG

## 2022-02-27 DIAGNOSIS — Z20.822 EXPOSURE TO COVID-19 VIRUS: Primary | ICD-10-CM

## 2022-02-27 DIAGNOSIS — Z20.822 LAB TEST NEGATIVE FOR COVID-19 VIRUS: ICD-10-CM

## 2022-02-27 LAB
FLUAV SUBTYP SPEC NAA+PROBE: NOT DETECTED
FLUBV RNA ISLT QL NAA+PROBE: NOT DETECTED
SARS-COV-2 RNA PNL SPEC NAA+PROBE: NOT DETECTED

## 2022-02-27 PROCEDURE — C9803 HOPD COVID-19 SPEC COLLECT: HCPCS

## 2022-02-27 PROCEDURE — 87636 SARSCOV2 & INF A&B AMP PRB: CPT | Performed by: NURSE PRACTITIONER

## 2022-02-27 PROCEDURE — 99283 EMERGENCY DEPT VISIT LOW MDM: CPT

## 2022-03-07 ENCOUNTER — TRANSCRIBE ORDERS (OUTPATIENT)
Dept: ADMINISTRATIVE | Facility: HOSPITAL | Age: 47
End: 2022-03-07

## 2022-03-07 DIAGNOSIS — M25.562 LEFT KNEE PAIN, UNSPECIFIED CHRONICITY: Primary | ICD-10-CM

## 2022-04-07 ENCOUNTER — HOSPITAL ENCOUNTER (OUTPATIENT)
Dept: MRI IMAGING | Facility: HOSPITAL | Age: 47
Discharge: HOME | End: 2022-04-07

## 2022-04-07 DIAGNOSIS — M25.562 LEFT KNEE PAIN, UNSPECIFIED CHRONICITY: ICD-10-CM

## 2022-04-09 ENCOUNTER — APPOINTMENT (OUTPATIENT)
Dept: GENERAL RADIOLOGY | Facility: HOSPITAL | Age: 47
End: 2022-04-09

## 2022-04-09 ENCOUNTER — HOSPITAL ENCOUNTER (EMERGENCY)
Facility: HOSPITAL | Age: 47
Discharge: HOME | End: 2022-04-09
Attending: STUDENT IN AN ORGANIZED HEALTH CARE EDUCATION/TRAINING PROGRAM | Admitting: STUDENT IN AN ORGANIZED HEALTH CARE EDUCATION/TRAINING PROGRAM

## 2022-04-09 VITALS
BODY MASS INDEX: 31.16 KG/M2 | TEMPERATURE: 98.6 F | SYSTOLIC BLOOD PRESSURE: 104 MMHG | OXYGEN SATURATION: 95 % | DIASTOLIC BLOOD PRESSURE: 62 MMHG | WEIGHT: 187 LBS | RESPIRATION RATE: 18 BRPM | HEART RATE: 101 BPM | HEIGHT: 65 IN

## 2022-04-09 DIAGNOSIS — J44.1 COPD EXACERBATION: Primary | ICD-10-CM

## 2022-04-09 LAB
ALBUMIN SERPL-MCNC: 4.23 G/DL (ref 3.5–5.2)
ALBUMIN/GLOB SERPL: 1.6 G/DL
ALP SERPL-CCNC: 76 U/L (ref 39–117)
ALT SERPL W P-5'-P-CCNC: 24 U/L (ref 1–41)
ANION GAP SERPL CALCULATED.3IONS-SCNC: 7.4 MMOL/L (ref 5–15)
AST SERPL-CCNC: 20 U/L (ref 1–40)
BASOPHILS # BLD AUTO: 0.07 10*3/MM3 (ref 0–0.2)
BASOPHILS NFR BLD AUTO: 1 % (ref 0–1.5)
BILIRUB SERPL-MCNC: 0.4 MG/DL (ref 0–1.2)
BUN SERPL-MCNC: 10 MG/DL (ref 6–20)
BUN/CREAT SERPL: 9.6 (ref 7–25)
CALCIUM SPEC-SCNC: 9.2 MG/DL (ref 8.6–10.5)
CHLORIDE SERPL-SCNC: 101 MMOL/L (ref 98–107)
CO2 SERPL-SCNC: 26.6 MMOL/L (ref 22–29)
CREAT SERPL-MCNC: 1.04 MG/DL (ref 0.76–1.27)
DEPRECATED RDW RBC AUTO: 50.3 FL (ref 37–54)
EGFRCR SERPLBLD CKD-EPI 2021: 90.2 ML/MIN/1.73
EOSINOPHIL # BLD AUTO: 0.31 10*3/MM3 (ref 0–0.4)
EOSINOPHIL NFR BLD AUTO: 4.3 % (ref 0.3–6.2)
ERYTHROCYTE [DISTWIDTH] IN BLOOD BY AUTOMATED COUNT: 15.8 % (ref 12.3–15.4)
FLUAV RNA RESP QL NAA+PROBE: NOT DETECTED
FLUBV RNA RESP QL NAA+PROBE: NOT DETECTED
GLOBULIN UR ELPH-MCNC: 2.7 GM/DL
GLUCOSE SERPL-MCNC: 105 MG/DL (ref 65–99)
HCT VFR BLD AUTO: 47.5 % (ref 37.5–51)
HGB BLD-MCNC: 15 G/DL (ref 13–17.7)
IMM GRANULOCYTES # BLD AUTO: 0.06 10*3/MM3 (ref 0–0.05)
IMM GRANULOCYTES NFR BLD AUTO: 0.8 % (ref 0–0.5)
INR PPP: 2.31 (ref 0.9–1.1)
LYMPHOCYTES # BLD AUTO: 1.22 10*3/MM3 (ref 0.7–3.1)
LYMPHOCYTES NFR BLD AUTO: 16.7 % (ref 19.6–45.3)
MCH RBC QN AUTO: 27.5 PG (ref 26.6–33)
MCHC RBC AUTO-ENTMCNC: 31.6 G/DL (ref 31.5–35.7)
MCV RBC AUTO: 87.2 FL (ref 79–97)
MONOCYTES # BLD AUTO: 0.91 10*3/MM3 (ref 0.1–0.9)
MONOCYTES NFR BLD AUTO: 12.5 % (ref 5–12)
NEUTROPHILS NFR BLD AUTO: 4.72 10*3/MM3 (ref 1.7–7)
NEUTROPHILS NFR BLD AUTO: 64.7 % (ref 42.7–76)
NRBC BLD AUTO-RTO: 0 /100 WBC (ref 0–0.2)
PLATELET # BLD AUTO: 258 10*3/MM3 (ref 140–450)
PMV BLD AUTO: 9.7 FL (ref 6–12)
POTASSIUM SERPL-SCNC: 4.2 MMOL/L (ref 3.5–5.2)
PROT SERPL-MCNC: 6.9 G/DL (ref 6–8.5)
PROTHROMBIN TIME: 26 SECONDS (ref 12.1–14.7)
RBC # BLD AUTO: 5.45 10*6/MM3 (ref 4.14–5.8)
SARS-COV-2 RNA RESP QL NAA+PROBE: NOT DETECTED
SODIUM SERPL-SCNC: 135 MMOL/L (ref 136–145)
WBC NRBC COR # BLD: 7.29 10*3/MM3 (ref 3.4–10.8)

## 2022-04-09 PROCEDURE — 85025 COMPLETE CBC W/AUTO DIFF WBC: CPT | Performed by: STUDENT IN AN ORGANIZED HEALTH CARE EDUCATION/TRAINING PROGRAM

## 2022-04-09 PROCEDURE — 96375 TX/PRO/DX INJ NEW DRUG ADDON: CPT

## 2022-04-09 PROCEDURE — 96374 THER/PROPH/DIAG INJ IV PUSH: CPT

## 2022-04-09 PROCEDURE — 99283 EMERGENCY DEPT VISIT LOW MDM: CPT

## 2022-04-09 PROCEDURE — 25010000002 METHYLPREDNISOLONE PER 125 MG: Performed by: STUDENT IN AN ORGANIZED HEALTH CARE EDUCATION/TRAINING PROGRAM

## 2022-04-09 PROCEDURE — 99284 EMERGENCY DEPT VISIT MOD MDM: CPT

## 2022-04-09 PROCEDURE — 85610 PROTHROMBIN TIME: CPT | Performed by: STUDENT IN AN ORGANIZED HEALTH CARE EDUCATION/TRAINING PROGRAM

## 2022-04-09 PROCEDURE — 71045 X-RAY EXAM CHEST 1 VIEW: CPT

## 2022-04-09 PROCEDURE — 80053 COMPREHEN METABOLIC PANEL: CPT | Performed by: STUDENT IN AN ORGANIZED HEALTH CARE EDUCATION/TRAINING PROGRAM

## 2022-04-09 PROCEDURE — 87636 SARSCOV2 & INF A&B AMP PRB: CPT | Performed by: STUDENT IN AN ORGANIZED HEALTH CARE EDUCATION/TRAINING PROGRAM

## 2022-04-09 RX ORDER — DOXYCYCLINE 100 MG/1
100 CAPSULE ORAL ONCE
Status: COMPLETED | OUTPATIENT
Start: 2022-04-09 | End: 2022-04-09

## 2022-04-09 RX ORDER — PREDNISONE 50 MG/1
50 TABLET ORAL DAILY
Qty: 5 TABLET | Refills: 0 | Status: SHIPPED | OUTPATIENT
Start: 2022-04-09

## 2022-04-09 RX ORDER — METHYLPREDNISOLONE SODIUM SUCCINATE 125 MG/2ML
125 INJECTION, POWDER, LYOPHILIZED, FOR SOLUTION INTRAMUSCULAR; INTRAVENOUS ONCE
Status: COMPLETED | OUTPATIENT
Start: 2022-04-09 | End: 2022-04-09

## 2022-04-09 RX ORDER — SODIUM CHLORIDE 0.9 % (FLUSH) 0.9 %
10 SYRINGE (ML) INJECTION AS NEEDED
Status: DISCONTINUED | OUTPATIENT
Start: 2022-04-09 | End: 2022-04-09 | Stop reason: HOSPADM

## 2022-04-09 RX ORDER — DOXYCYCLINE 100 MG/1
100 CAPSULE ORAL 2 TIMES DAILY
Qty: 13 CAPSULE | Refills: 0 | Status: SHIPPED | OUTPATIENT
Start: 2022-04-09

## 2022-04-09 RX ADMIN — METHYLPREDNISOLONE SODIUM SUCCINATE 125 MG: 125 INJECTION, POWDER, FOR SOLUTION INTRAMUSCULAR; INTRAVENOUS at 09:49

## 2022-04-09 RX ADMIN — DOXYCYCLINE 100 MG: 100 CAPSULE ORAL at 11:08

## 2022-04-09 NOTE — ED NOTES
Discharge instructions reviewed with patient, patient instructed to return to ED if symptoms worsen or if any new problems arise. Patient verbalizes understanding of discharge instructions, patient ambulatory out of ED. No acute distress noted.

## 2022-04-11 ENCOUNTER — APPOINTMENT (OUTPATIENT)
Dept: GENERAL RADIOLOGY | Facility: HOSPITAL | Age: 47
End: 2022-04-11

## 2022-04-11 ENCOUNTER — HOSPITAL ENCOUNTER (EMERGENCY)
Facility: HOSPITAL | Age: 47
Discharge: HOME | End: 2022-04-11
Attending: EMERGENCY MEDICINE | Admitting: EMERGENCY MEDICINE

## 2022-04-11 VITALS
BODY MASS INDEX: 31.16 KG/M2 | TEMPERATURE: 97.1 F | OXYGEN SATURATION: 97 % | HEIGHT: 65 IN | WEIGHT: 187 LBS | DIASTOLIC BLOOD PRESSURE: 75 MMHG | RESPIRATION RATE: 20 BRPM | HEART RATE: 84 BPM | SYSTOLIC BLOOD PRESSURE: 119 MMHG

## 2022-04-11 DIAGNOSIS — J44.1 COPD EXACERBATION: Primary | ICD-10-CM

## 2022-04-11 LAB
ALBUMIN SERPL-MCNC: 3.79 G/DL (ref 3.5–5.2)
ALBUMIN/GLOB SERPL: 1.6 G/DL
ALP SERPL-CCNC: 62 U/L (ref 39–117)
ALT SERPL W P-5'-P-CCNC: 24 U/L (ref 1–41)
ANION GAP SERPL CALCULATED.3IONS-SCNC: 10.5 MMOL/L (ref 5–15)
APTT PPP: 43.1 SECONDS (ref 26.5–34.5)
AST SERPL-CCNC: 17 U/L (ref 1–40)
BASOPHILS # BLD AUTO: 0.06 10*3/MM3 (ref 0–0.2)
BASOPHILS NFR BLD AUTO: 0.8 % (ref 0–1.5)
BILIRUB SERPL-MCNC: 0.2 MG/DL (ref 0–1.2)
BUN SERPL-MCNC: 14 MG/DL (ref 6–20)
BUN/CREAT SERPL: 14.9 (ref 7–25)
CALCIUM SPEC-SCNC: 8.4 MG/DL (ref 8.6–10.5)
CHLORIDE SERPL-SCNC: 107 MMOL/L (ref 98–107)
CO2 SERPL-SCNC: 23.5 MMOL/L (ref 22–29)
CREAT SERPL-MCNC: 0.94 MG/DL (ref 0.76–1.27)
DEPRECATED RDW RBC AUTO: 52.2 FL (ref 37–54)
EGFRCR SERPLBLD CKD-EPI 2021: 101.9 ML/MIN/1.73
EOSINOPHIL # BLD AUTO: 0.09 10*3/MM3 (ref 0–0.4)
EOSINOPHIL NFR BLD AUTO: 1.1 % (ref 0.3–6.2)
ERYTHROCYTE [DISTWIDTH] IN BLOOD BY AUTOMATED COUNT: 16.1 % (ref 12.3–15.4)
FLUAV RNA RESP QL NAA+PROBE: NOT DETECTED
FLUBV RNA RESP QL NAA+PROBE: NOT DETECTED
GLOBULIN UR ELPH-MCNC: 2.4 GM/DL
GLUCOSE SERPL-MCNC: 111 MG/DL (ref 65–99)
HCT VFR BLD AUTO: 42.4 % (ref 37.5–51)
HGB BLD-MCNC: 13.3 G/DL (ref 13–17.7)
HOLD SPECIMEN: NORMAL
HOLD SPECIMEN: NORMAL
IMM GRANULOCYTES # BLD AUTO: 0.06 10*3/MM3 (ref 0–0.05)
IMM GRANULOCYTES NFR BLD AUTO: 0.8 % (ref 0–0.5)
INR PPP: 2.64 (ref 0.9–1.1)
LYMPHOCYTES # BLD AUTO: 2.06 10*3/MM3 (ref 0.7–3.1)
LYMPHOCYTES NFR BLD AUTO: 26.1 % (ref 19.6–45.3)
MCH RBC QN AUTO: 27.5 PG (ref 26.6–33)
MCHC RBC AUTO-ENTMCNC: 31.4 G/DL (ref 31.5–35.7)
MCV RBC AUTO: 87.6 FL (ref 79–97)
MONOCYTES # BLD AUTO: 1.1 10*3/MM3 (ref 0.1–0.9)
MONOCYTES NFR BLD AUTO: 14 % (ref 5–12)
NEUTROPHILS NFR BLD AUTO: 4.51 10*3/MM3 (ref 1.7–7)
NEUTROPHILS NFR BLD AUTO: 57.2 % (ref 42.7–76)
NRBC BLD AUTO-RTO: 0 /100 WBC (ref 0–0.2)
PLATELET # BLD AUTO: 258 10*3/MM3 (ref 140–450)
PMV BLD AUTO: 9.8 FL (ref 6–12)
POTASSIUM SERPL-SCNC: 4 MMOL/L (ref 3.5–5.2)
PROT SERPL-MCNC: 6.2 G/DL (ref 6–8.5)
PROTHROMBIN TIME: 29 SECONDS (ref 12.1–14.7)
QT INTERVAL: 362 MS
QTC INTERVAL: 464 MS
RBC # BLD AUTO: 4.84 10*6/MM3 (ref 4.14–5.8)
SARS-COV-2 RNA RESP QL NAA+PROBE: NOT DETECTED
SODIUM SERPL-SCNC: 141 MMOL/L (ref 136–145)
TROPONIN T SERPL-MCNC: <0.01 NG/ML (ref 0–0.03)
WBC NRBC COR # BLD: 7.88 10*3/MM3 (ref 3.4–10.8)
WHOLE BLOOD HOLD SPECIMEN: NORMAL
WHOLE BLOOD HOLD SPECIMEN: NORMAL

## 2022-04-11 PROCEDURE — 96374 THER/PROPH/DIAG INJ IV PUSH: CPT

## 2022-04-11 PROCEDURE — 87636 SARSCOV2 & INF A&B AMP PRB: CPT | Performed by: EMERGENCY MEDICINE

## 2022-04-11 PROCEDURE — 94799 UNLISTED PULMONARY SVC/PX: CPT

## 2022-04-11 PROCEDURE — 36415 COLL VENOUS BLD VENIPUNCTURE: CPT

## 2022-04-11 PROCEDURE — 84484 ASSAY OF TROPONIN QUANT: CPT | Performed by: EMERGENCY MEDICINE

## 2022-04-11 PROCEDURE — 80053 COMPREHEN METABOLIC PANEL: CPT | Performed by: EMERGENCY MEDICINE

## 2022-04-11 PROCEDURE — 85025 COMPLETE CBC W/AUTO DIFF WBC: CPT | Performed by: EMERGENCY MEDICINE

## 2022-04-11 PROCEDURE — 94640 AIRWAY INHALATION TREATMENT: CPT

## 2022-04-11 PROCEDURE — 93005 ELECTROCARDIOGRAM TRACING: CPT | Performed by: EMERGENCY MEDICINE

## 2022-04-11 PROCEDURE — 25010000002 METHYLPREDNISOLONE PER 125 MG: Performed by: EMERGENCY MEDICINE

## 2022-04-11 PROCEDURE — 85730 THROMBOPLASTIN TIME PARTIAL: CPT | Performed by: EMERGENCY MEDICINE

## 2022-04-11 PROCEDURE — 99284 EMERGENCY DEPT VISIT MOD MDM: CPT

## 2022-04-11 PROCEDURE — 71045 X-RAY EXAM CHEST 1 VIEW: CPT

## 2022-04-11 PROCEDURE — 85610 PROTHROMBIN TIME: CPT | Performed by: EMERGENCY MEDICINE

## 2022-04-11 RX ORDER — IPRATROPIUM BROMIDE AND ALBUTEROL SULFATE 2.5; .5 MG/3ML; MG/3ML
3 SOLUTION RESPIRATORY (INHALATION) ONCE
Status: COMPLETED | OUTPATIENT
Start: 2022-04-11 | End: 2022-04-11

## 2022-04-11 RX ORDER — PREDNISONE 20 MG/1
60 TABLET ORAL DAILY
Qty: 12 TABLET | Refills: 0 | Status: SHIPPED | OUTPATIENT
Start: 2022-04-11 | End: 2022-04-15

## 2022-04-11 RX ORDER — ALBUTEROL SULFATE 90 UG/1
2 AEROSOL, METERED RESPIRATORY (INHALATION) EVERY 4 HOURS PRN
Qty: 18 G | Refills: 0 | Status: SHIPPED | OUTPATIENT
Start: 2022-04-11

## 2022-04-11 RX ORDER — METHYLPREDNISOLONE SODIUM SUCCINATE 125 MG/2ML
125 INJECTION, POWDER, LYOPHILIZED, FOR SOLUTION INTRAMUSCULAR; INTRAVENOUS ONCE
Status: COMPLETED | OUTPATIENT
Start: 2022-04-11 | End: 2022-04-11

## 2022-04-11 RX ADMIN — IPRATROPIUM BROMIDE AND ALBUTEROL SULFATE 3 ML: .5; 3 SOLUTION RESPIRATORY (INHALATION) at 04:21

## 2022-04-11 RX ADMIN — METHYLPREDNISOLONE SODIUM SUCCINATE 125 MG: 125 INJECTION, POWDER, FOR SOLUTION INTRAMUSCULAR; INTRAVENOUS at 03:55

## 2022-04-15 ENCOUNTER — TRANSCRIBE ORDERS (OUTPATIENT)
Dept: ADMINISTRATIVE | Facility: HOSPITAL | Age: 47
End: 2022-04-15

## 2022-04-15 DIAGNOSIS — Z86.79 PERSONAL HISTORY OF UNSPECIFIED CIRCULATORY DISEASE: Primary | ICD-10-CM

## 2022-04-15 DIAGNOSIS — Z86.79 H/O THORACIC AORTIC ANEURYSM REPAIR: ICD-10-CM

## 2022-04-15 DIAGNOSIS — Z98.890 H/O THORACIC AORTIC ANEURYSM REPAIR: ICD-10-CM

## 2022-04-23 NOTE — ED PROVIDER NOTES
"Subjective   46-year-old male past medical history of COPD who called the ambulance for having shortness of breath.  States he was taking his breathing treatments at home prior to coming in which made him feel slightly better but coming in for further evaluation.  He has not been sick recently no sick contacts.  This feels typical of his normal COPD exacerbation.          Review of Systems   Constitutional: Negative for activity change, appetite change, chills, diaphoresis, fatigue, fever and unexpected weight change.   HENT: Negative for congestion and sore throat.    Eyes: Negative for discharge, itching and visual disturbance.   Respiratory: Positive for shortness of breath.    Cardiovascular: Negative for chest pain.   Gastrointestinal: Negative for abdominal distention, abdominal pain, constipation, diarrhea, nausea, rectal pain and vomiting.   Genitourinary: Negative for decreased urine volume, dysuria and testicular pain.   Musculoskeletal: Negative for arthralgias, myalgias and neck stiffness.   Skin: Negative for rash.   Neurological: Negative for dizziness.   Hematological: Negative for adenopathy.   Psychiatric/Behavioral: Negative for agitation.   All other systems reviewed and are negative.      Past Medical History:   Diagnosis Date   • Anxiety    • Asthma    • Back pain    • Chronic anticoagulation 2/2/2020   • COPD (chronic obstructive pulmonary disease) (HCC)    • Emphysema lung (HCC)    • Gastritis    • GERD (gastroesophageal reflux disease)    • Headache    • Heart valve disease 2018    valve replac., prosthetic valve   • Hx of aortic valve replacement, mechanical 11/28/2018   • Hyperglycemia 2/2/2020   • Hypertension    • Migraine    • Substance abuse (HCC)        Allergies   Allergen Reactions   • Aspirin Anaphylaxis     Patient said, \"it chokes him up.\" patient said, \"I got really short of breath and started to have an asthma attack.\"       Past Surgical History:   Procedure Laterality Date   • " "CARDIAC CATHETERIZATION N/A 10/23/2017    Procedure: Left Heart Cath;  Surgeon: Rodolfo Núñez MD;  Location:  [facility];  Service:    • CARDIAC CATHETERIZATION N/A 9/20/2021    Procedure: Left Heart Cath;  Surgeon: eDlvin Carson MD;  Location:  [facility];  Service: Cardiovascular;  Laterality: N/A;   • CARDIAC VALVE REPLACEMENT  2018    prosthetic valve   • DENTAL PROCEDURE     • LIVER SURGERY      tumor removed from liver   • OTHER SURGICAL HISTORY      \"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\"   • THORACIC AORTIC ANEURYSM ASCENDING/ARCH REPAIR N/A 1/17/2018    Procedure: MEDIAN STERNOTOMY, AORTIC VALVE CONDUIT, BENTAL, TRANSESOPHAGEAL ECHOCARDIOGRAM WITH ANESTHESIA;  Surgeon: Aaron Lucas MD;  Location:  [facility];  Service:        Family History   Problem Relation Age of Onset   • COPD Mother        Social History     Socioeconomic History   • Marital status: Single   • Number of children: 0   Tobacco Use   • Smoking status: Former Smoker     Packs/day: 1.00     Years: 4.00     Pack years: 4.00   • Smokeless tobacco: Current User     Types: Snuff   Vaping Use   • Vaping Use: Never used   Substance and Sexual Activity   • Alcohol use: No     Comment: occassional    • Drug use: No   • Sexual activity: Defer           Objective   Physical Exam  Vitals and nursing note reviewed. Exam conducted with a chaperone present.   Constitutional:       General: He is not in acute distress.     Appearance: He is well-developed. He is not ill-appearing or toxic-appearing.      Interventions: He is not intubated.  HENT:      Head: Normocephalic and atraumatic.      Mouth/Throat:      Mouth: Mucous membranes are moist.      Pharynx: Oropharynx is clear.   Eyes:      Extraocular Movements: Extraocular movements intact.      Pupils: Pupils are equal, round, and reactive to light.   Cardiovascular:      Rate and Rhythm: Normal rate and regular rhythm.      Heart " sounds: Normal heart sounds. No murmur heard.    No friction rub. No gallop.   Pulmonary:      Effort: Pulmonary effort is normal. No tachypnea, bradypnea, accessory muscle usage or respiratory distress. He is not intubated.      Breath sounds: No stridor. Wheezing present. No decreased breath sounds, rhonchi or rales.      Comments: Trace wheezing bilaterally  Chest:      Chest wall: No mass, deformity, tenderness, crepitus or edema. There is no dullness to percussion.   Abdominal:      General: Abdomen is flat. Bowel sounds are normal. There is no distension.      Palpations: Abdomen is soft.      Tenderness: There is no abdominal tenderness.      Hernia: No hernia is present.   Skin:     General: Skin is dry.      Capillary Refill: Capillary refill takes less than 2 seconds.      Coloration: Skin is not cyanotic.      Findings: No rash.   Neurological:      General: No focal deficit present.      Mental Status: He is alert.   Psychiatric:         Mood and Affect: Mood normal.         Behavior: Behavior normal.         Procedures           ED Course                                                 MDM  Number of Diagnoses or Management Options  COPD exacerbation (HCC)  Diagnosis management comments: Patient feels better after breathing treatments, no white count, no fever, normal chest x-ray, will refill his medications sent him home with prednisone he will return if any worsens otherwise to follow-up with his PCP.       Amount and/or Complexity of Data Reviewed  Clinical lab tests: reviewed  Tests in the radiology section of CPT®: reviewed  Tests in the medicine section of CPT®: reviewed    Patient Progress  Patient progress: improved      Final diagnoses:   COPD exacerbation (HCC)       ED Disposition  ED Disposition     ED Disposition   Discharge    Condition   Stable    Comment   --             Macho Jose MD  [address] [facility] [address] [phone]    Schedule an appointment as soon as  possible for a visit       [facility] Emergency Department  [address] [facility] [id number] [phone]  Go to   If symptoms worsen         Medication List      Changed    * albuterol sulfate  (90 Base) MCG/ACT inhaler  Commonly known as: PROVENTIL HFA;VENTOLIN HFA;PROAIR HFA  Inhale 2 puffs Every 6 (Six) Hours As Needed for Wheezing.  What changed: Another medication with the same name was added. Make sure you understand how and when to take each.     * albuterol sulfate  (90 Base) MCG/ACT inhaler  Commonly known as: PROVENTIL HFA;VENTOLIN HFA;PROAIR HFA  Inhale 2 puffs Every 4 (Four) Hours As Needed for Wheezing.  What changed: You were already taking a medication with the same name, and this prescription was added. Make sure you understand how and when to take each.         * This list has 2 medication(s) that are the same as other medications prescribed for you. Read the directions carefully, and ask your doctor or other care provider to review them with you.            ASK your doctor about these medications    predniSONE 20 MG tablet  Commonly known as: DELTASONE  Take 3 tablets by mouth Daily for 4 days.  Ask about: Should I take this medication?           Where to Get Your Medications      These medications were sent to [address] - [phone] [place] [phone]   [address] [place] [id number]    Phone: [phone]   · albuterol sulfate  (90 Base) MCG/ACT inhaler  · predniSONE 20 MG tablet          Aaron Jacinto MD  04/23/22 1932

## 2022-05-13 ENCOUNTER — HOSPITAL ENCOUNTER (OUTPATIENT)
Dept: CT IMAGING | Facility: HOSPITAL | Age: 47
Discharge: HOME | End: 2022-05-13

## 2022-05-13 DIAGNOSIS — Z86.79 PERSONAL HISTORY OF UNSPECIFIED CIRCULATORY DISEASE: ICD-10-CM

## 2022-05-13 DIAGNOSIS — Z98.890 H/O THORACIC AORTIC ANEURYSM REPAIR: ICD-10-CM

## 2022-05-13 DIAGNOSIS — Z86.79 H/O THORACIC AORTIC ANEURYSM REPAIR: ICD-10-CM

## 2022-05-13 PROCEDURE — 25010000002 IOPAMIDOL 61 % SOLUTION: Performed by: INTERNAL MEDICINE

## 2022-05-13 PROCEDURE — 71275 CT ANGIOGRAPHY CHEST: CPT

## 2022-05-13 PROCEDURE — 74174 CTA ABD&PLVS W/CONTRAST: CPT | Performed by: RADIOLOGY

## 2022-05-13 PROCEDURE — 74174 CTA ABD&PLVS W/CONTRAST: CPT

## 2022-05-13 PROCEDURE — 71275 CT ANGIOGRAPHY CHEST: CPT | Performed by: RADIOLOGY

## 2022-05-13 RX ADMIN — IOPAMIDOL 80 ML: 612 INJECTION, SOLUTION INTRAVENOUS at 10:18

## 2022-07-27 ENCOUNTER — HOSPITAL ENCOUNTER (EMERGENCY)
Facility: HOSPITAL | Age: 47
Discharge: HOME | End: 2022-07-27
Admitting: EMERGENCY MEDICINE

## 2022-07-27 ENCOUNTER — APPOINTMENT (OUTPATIENT)
Dept: GENERAL RADIOLOGY | Facility: HOSPITAL | Age: 47
End: 2022-07-27

## 2022-07-27 VITALS
HEART RATE: 81 BPM | SYSTOLIC BLOOD PRESSURE: 126 MMHG | OXYGEN SATURATION: 100 % | HEIGHT: 67 IN | RESPIRATION RATE: 16 BRPM | BODY MASS INDEX: 30.29 KG/M2 | DIASTOLIC BLOOD PRESSURE: 84 MMHG | WEIGHT: 193 LBS | TEMPERATURE: 97.8 F

## 2022-07-27 DIAGNOSIS — R07.9 CHEST PAIN, UNSPECIFIED TYPE: Primary | ICD-10-CM

## 2022-07-27 LAB
ALBUMIN SERPL-MCNC: 4.59 G/DL (ref 3.5–5.2)
ALBUMIN/GLOB SERPL: 2.2 G/DL
ALP SERPL-CCNC: 66 U/L (ref 39–117)
ALT SERPL W P-5'-P-CCNC: 21 U/L (ref 1–41)
ANION GAP SERPL CALCULATED.3IONS-SCNC: 10.4 MMOL/L (ref 5–15)
AST SERPL-CCNC: 16 U/L (ref 1–40)
BASOPHILS # BLD AUTO: 0.06 10*3/MM3 (ref 0–0.2)
BASOPHILS NFR BLD AUTO: 0.9 % (ref 0–1.5)
BILIRUB SERPL-MCNC: 0.3 MG/DL (ref 0–1.2)
BUN SERPL-MCNC: 13 MG/DL (ref 6–20)
BUN/CREAT SERPL: 14.6 (ref 7–25)
CALCIUM SPEC-SCNC: 9.2 MG/DL (ref 8.6–10.5)
CHLORIDE SERPL-SCNC: 107 MMOL/L (ref 98–107)
CO2 SERPL-SCNC: 21.6 MMOL/L (ref 22–29)
CREAT SERPL-MCNC: 0.89 MG/DL (ref 0.76–1.27)
DEPRECATED RDW RBC AUTO: 48.7 FL (ref 37–54)
EGFRCR SERPLBLD CKD-EPI 2021: 107 ML/MIN/1.73
EOSINOPHIL # BLD AUTO: 0.28 10*3/MM3 (ref 0–0.4)
EOSINOPHIL NFR BLD AUTO: 4.3 % (ref 0.3–6.2)
ERYTHROCYTE [DISTWIDTH] IN BLOOD BY AUTOMATED COUNT: 15.6 % (ref 12.3–15.4)
GLOBULIN UR ELPH-MCNC: 2.1 GM/DL
GLUCOSE SERPL-MCNC: 93 MG/DL (ref 65–99)
HCT VFR BLD AUTO: 45.3 % (ref 37.5–51)
HGB BLD-MCNC: 14.7 G/DL (ref 13–17.7)
HOLD SPECIMEN: NORMAL
HOLD SPECIMEN: NORMAL
IMM GRANULOCYTES # BLD AUTO: 0.04 10*3/MM3 (ref 0–0.05)
IMM GRANULOCYTES NFR BLD AUTO: 0.6 % (ref 0–0.5)
INR PPP: 2.48 (ref 0.9–1.1)
LYMPHOCYTES # BLD AUTO: 1.92 10*3/MM3 (ref 0.7–3.1)
LYMPHOCYTES NFR BLD AUTO: 29.3 % (ref 19.6–45.3)
MCH RBC QN AUTO: 27.9 PG (ref 26.6–33)
MCHC RBC AUTO-ENTMCNC: 32.5 G/DL (ref 31.5–35.7)
MCV RBC AUTO: 86 FL (ref 79–97)
MONOCYTES # BLD AUTO: 0.6 10*3/MM3 (ref 0.1–0.9)
MONOCYTES NFR BLD AUTO: 9.2 % (ref 5–12)
NEUTROPHILS NFR BLD AUTO: 3.65 10*3/MM3 (ref 1.7–7)
NEUTROPHILS NFR BLD AUTO: 55.7 % (ref 42.7–76)
NRBC BLD AUTO-RTO: 0 /100 WBC (ref 0–0.2)
PLATELET # BLD AUTO: 221 10*3/MM3 (ref 140–450)
PMV BLD AUTO: 10.7 FL (ref 6–12)
POTASSIUM SERPL-SCNC: 3.9 MMOL/L (ref 3.5–5.2)
PROT SERPL-MCNC: 6.7 G/DL (ref 6–8.5)
PROTHROMBIN TIME: 27.6 SECONDS (ref 12.1–14.7)
RBC # BLD AUTO: 5.27 10*6/MM3 (ref 4.14–5.8)
SODIUM SERPL-SCNC: 139 MMOL/L (ref 136–145)
TROPONIN T SERPL-MCNC: <0.01 NG/ML (ref 0–0.03)
TROPONIN T SERPL-MCNC: <0.01 NG/ML (ref 0–0.03)
WBC NRBC COR # BLD: 6.55 10*3/MM3 (ref 3.4–10.8)
WHOLE BLOOD HOLD COAG: NORMAL
WHOLE BLOOD HOLD SPECIMEN: NORMAL

## 2022-07-27 PROCEDURE — 84484 ASSAY OF TROPONIN QUANT: CPT | Performed by: EMERGENCY MEDICINE

## 2022-07-27 PROCEDURE — 36415 COLL VENOUS BLD VENIPUNCTURE: CPT

## 2022-07-27 PROCEDURE — 93010 ELECTROCARDIOGRAM REPORT: CPT | Performed by: INTERNAL MEDICINE

## 2022-07-27 PROCEDURE — 93005 ELECTROCARDIOGRAM TRACING: CPT | Performed by: EMERGENCY MEDICINE

## 2022-07-27 PROCEDURE — 80053 COMPREHEN METABOLIC PANEL: CPT | Performed by: EMERGENCY MEDICINE

## 2022-07-27 PROCEDURE — 85610 PROTHROMBIN TIME: CPT | Performed by: PHYSICIAN ASSISTANT

## 2022-07-27 PROCEDURE — 71046 X-RAY EXAM CHEST 2 VIEWS: CPT

## 2022-07-27 PROCEDURE — 99283 EMERGENCY DEPT VISIT LOW MDM: CPT

## 2022-07-27 PROCEDURE — 85025 COMPLETE CBC W/AUTO DIFF WBC: CPT | Performed by: EMERGENCY MEDICINE

## 2022-07-27 RX ORDER — SODIUM CHLORIDE 0.9 % (FLUSH) 0.9 %
10 SYRINGE (ML) INJECTION AS NEEDED
Status: DISCONTINUED | OUTPATIENT
Start: 2022-07-27 | End: 2022-07-27 | Stop reason: HOSPADM

## 2022-07-28 ENCOUNTER — TRANSCRIBE ORDERS (OUTPATIENT)
Dept: ADMINISTRATIVE | Facility: HOSPITAL | Age: 47
End: 2022-07-28

## 2022-07-28 DIAGNOSIS — R07.9 CHEST PAIN, UNSPECIFIED TYPE: Primary | ICD-10-CM

## 2022-07-28 LAB
QT INTERVAL: 384 MS
QTC INTERVAL: 420 MS

## 2022-08-04 ENCOUNTER — HOSPITAL ENCOUNTER (OUTPATIENT)
Dept: CARDIOLOGY | Facility: HOSPITAL | Age: 47
Discharge: HOME | End: 2022-08-04
Admitting: INTERNAL MEDICINE

## 2022-08-04 DIAGNOSIS — R07.9 CHEST PAIN, UNSPECIFIED TYPE: ICD-10-CM

## 2022-08-04 PROCEDURE — 93017 CV STRESS TEST TRACING ONLY: CPT

## 2022-08-04 PROCEDURE — 93018 CV STRESS TEST I&R ONLY: CPT | Performed by: INTERNAL MEDICINE

## 2022-08-05 LAB
BH CV STRESS BP STAGE 1: NORMAL
BH CV STRESS DURATION MIN STAGE 1: 3
BH CV STRESS DURATION MIN STAGE 2: 0
BH CV STRESS DURATION SEC STAGE 1: 0
BH CV STRESS DURATION SEC STAGE 2: 9
BH CV STRESS GRADE STAGE 1: 10
BH CV STRESS GRADE STAGE 2: 12
BH CV STRESS HR STAGE 1: 118
BH CV STRESS HR STAGE 2: 122
BH CV STRESS METS STAGE 1: 5
BH CV STRESS METS STAGE 2: 7.5
BH CV STRESS PROTOCOL 1: NORMAL
BH CV STRESS RECOVERY BP: NORMAL MMHG
BH CV STRESS RECOVERY HR: 94 BPM
BH CV STRESS SPEED STAGE 1: 1.7
BH CV STRESS SPEED STAGE 2: 2.5
BH CV STRESS STAGE 1: 1
BH CV STRESS STAGE 2: 2
MAXIMAL PREDICTED HEART RATE: 174 BPM
PERCENT MAX PREDICTED HR: 70.11 %
STRESS BASELINE BP: NORMAL MMHG
STRESS BASELINE HR: 96 BPM
STRESS PERCENT HR: 82 %
STRESS POST ESTIMATED WORKLOAD: 4.9 METS
STRESS POST EXERCISE DUR MIN: 3 MIN
STRESS POST EXERCISE DUR SEC: 9 SEC
STRESS POST PEAK BP: NORMAL MMHG
STRESS POST PEAK HR: 122 BPM
STRESS TARGET HR: 148 BPM

## 2022-08-16 ENCOUNTER — TELEPHONE (OUTPATIENT)
Dept: CARDIOLOGY | Facility: CLINIC | Age: 47
End: 2022-08-16

## 2022-08-16 DIAGNOSIS — R07.89 OTHER CHEST PAIN: Primary | ICD-10-CM

## 2022-08-16 NOTE — TELEPHONE ENCOUNTER
----- Message from Delvin Carson MD sent at 8/15/2022  1:30 PM EDT -----  Regarding: FW:   Order a lexiscan Cardiolite stress test for him...  ----- Message -----  From: Min Garcia MD  Sent: 8/5/2022   1:44 PM EDT  To: Delvin Carson MD

## 2022-08-16 NOTE — TELEPHONE ENCOUNTER
Contacted patient to make him aware that a hospital staff member would be calling him the schedule lexiscan per Dr Ruiz request as patient was unable to complete treadmill stress test .

## 2022-09-28 ENCOUNTER — APPOINTMENT (OUTPATIENT)
Dept: NUCLEAR MEDICINE | Facility: HOSPITAL | Age: 47
End: 2022-09-28

## 2022-09-28 ENCOUNTER — APPOINTMENT (OUTPATIENT)
Dept: CARDIOLOGY | Facility: HOSPITAL | Age: 47
End: 2022-09-28

## 2022-10-26 ENCOUNTER — HOSPITAL ENCOUNTER (EMERGENCY)
Facility: HOSPITAL | Age: 47
Discharge: HOME | End: 2022-10-26
Attending: STUDENT IN AN ORGANIZED HEALTH CARE EDUCATION/TRAINING PROGRAM | Admitting: STUDENT IN AN ORGANIZED HEALTH CARE EDUCATION/TRAINING PROGRAM

## 2022-10-26 ENCOUNTER — APPOINTMENT (OUTPATIENT)
Dept: GENERAL RADIOLOGY | Facility: HOSPITAL | Age: 47
End: 2022-10-26

## 2022-10-26 VITALS
TEMPERATURE: 98.9 F | BODY MASS INDEX: 33.68 KG/M2 | RESPIRATION RATE: 20 BRPM | HEART RATE: 71 BPM | SYSTOLIC BLOOD PRESSURE: 116 MMHG | WEIGHT: 183 LBS | DIASTOLIC BLOOD PRESSURE: 60 MMHG | HEIGHT: 62 IN | OXYGEN SATURATION: 98 %

## 2022-10-26 DIAGNOSIS — J44.1 COPD EXACERBATION: Primary | ICD-10-CM

## 2022-10-26 LAB
ALBUMIN SERPL-MCNC: 4.37 G/DL (ref 3.5–5.2)
ALBUMIN/GLOB SERPL: 2 G/DL
ALP SERPL-CCNC: 58 U/L (ref 39–117)
ALT SERPL W P-5'-P-CCNC: 15 U/L (ref 1–41)
ANION GAP SERPL CALCULATED.3IONS-SCNC: 7.5 MMOL/L (ref 5–15)
AST SERPL-CCNC: 16 U/L (ref 1–40)
BASOPHILS # BLD AUTO: 0.07 10*3/MM3 (ref 0–0.2)
BASOPHILS NFR BLD AUTO: 1.1 % (ref 0–1.5)
BILIRUB SERPL-MCNC: 0.2 MG/DL (ref 0–1.2)
BUN SERPL-MCNC: 15 MG/DL (ref 6–20)
BUN/CREAT SERPL: 14.6 (ref 7–25)
CALCIUM SPEC-SCNC: 9 MG/DL (ref 8.6–10.5)
CHLORIDE SERPL-SCNC: 105 MMOL/L (ref 98–107)
CO2 SERPL-SCNC: 28.5 MMOL/L (ref 22–29)
CREAT SERPL-MCNC: 1.03 MG/DL (ref 0.76–1.27)
CRP SERPL-MCNC: 0.4 MG/DL (ref 0–0.5)
DEPRECATED RDW RBC AUTO: 50.4 FL (ref 37–54)
EGFRCR SERPLBLD CKD-EPI 2021: 90.7 ML/MIN/1.73
EOSINOPHIL # BLD AUTO: 0.46 10*3/MM3 (ref 0–0.4)
EOSINOPHIL NFR BLD AUTO: 7.2 % (ref 0.3–6.2)
ERYTHROCYTE [DISTWIDTH] IN BLOOD BY AUTOMATED COUNT: 16.1 % (ref 12.3–15.4)
FLUAV RNA RESP QL NAA+PROBE: NOT DETECTED
FLUBV RNA RESP QL NAA+PROBE: NOT DETECTED
GLOBULIN UR ELPH-MCNC: 2.2 GM/DL
GLUCOSE SERPL-MCNC: 112 MG/DL (ref 65–99)
HCT VFR BLD AUTO: 42.7 % (ref 37.5–51)
HGB BLD-MCNC: 13.7 G/DL (ref 13–17.7)
IMM GRANULOCYTES # BLD AUTO: 0.02 10*3/MM3 (ref 0–0.05)
IMM GRANULOCYTES NFR BLD AUTO: 0.3 % (ref 0–0.5)
LYMPHOCYTES # BLD AUTO: 2.01 10*3/MM3 (ref 0.7–3.1)
LYMPHOCYTES NFR BLD AUTO: 31.7 % (ref 19.6–45.3)
MAGNESIUM SERPL-MCNC: 2.4 MG/DL (ref 1.6–2.6)
MCH RBC QN AUTO: 27.7 PG (ref 26.6–33)
MCHC RBC AUTO-ENTMCNC: 32.1 G/DL (ref 31.5–35.7)
MCV RBC AUTO: 86.3 FL (ref 79–97)
MONOCYTES # BLD AUTO: 0.92 10*3/MM3 (ref 0.1–0.9)
MONOCYTES NFR BLD AUTO: 14.5 % (ref 5–12)
NEUTROPHILS NFR BLD AUTO: 2.87 10*3/MM3 (ref 1.7–7)
NEUTROPHILS NFR BLD AUTO: 45.2 % (ref 42.7–76)
NRBC BLD AUTO-RTO: 0 /100 WBC (ref 0–0.2)
PLATELET # BLD AUTO: 215 10*3/MM3 (ref 140–450)
PMV BLD AUTO: 10.3 FL (ref 6–12)
POTASSIUM SERPL-SCNC: 4.3 MMOL/L (ref 3.5–5.2)
PROT SERPL-MCNC: 6.6 G/DL (ref 6–8.5)
QT INTERVAL: 394 MS
QTC INTERVAL: 431 MS
RBC # BLD AUTO: 4.95 10*6/MM3 (ref 4.14–5.8)
SARS-COV-2 RNA RESP QL NAA+PROBE: NOT DETECTED
SODIUM SERPL-SCNC: 141 MMOL/L (ref 136–145)
WBC NRBC COR # BLD: 6.35 10*3/MM3 (ref 3.4–10.8)

## 2022-10-26 PROCEDURE — 96374 THER/PROPH/DIAG INJ IV PUSH: CPT

## 2022-10-26 PROCEDURE — 71046 X-RAY EXAM CHEST 2 VIEWS: CPT

## 2022-10-26 PROCEDURE — 94799 UNLISTED PULMONARY SVC/PX: CPT

## 2022-10-26 PROCEDURE — 93005 ELECTROCARDIOGRAM TRACING: CPT | Performed by: STUDENT IN AN ORGANIZED HEALTH CARE EDUCATION/TRAINING PROGRAM

## 2022-10-26 PROCEDURE — 80053 COMPREHEN METABOLIC PANEL: CPT | Performed by: STUDENT IN AN ORGANIZED HEALTH CARE EDUCATION/TRAINING PROGRAM

## 2022-10-26 PROCEDURE — 86140 C-REACTIVE PROTEIN: CPT | Performed by: STUDENT IN AN ORGANIZED HEALTH CARE EDUCATION/TRAINING PROGRAM

## 2022-10-26 PROCEDURE — 87636 SARSCOV2 & INF A&B AMP PRB: CPT | Performed by: STUDENT IN AN ORGANIZED HEALTH CARE EDUCATION/TRAINING PROGRAM

## 2022-10-26 PROCEDURE — 83735 ASSAY OF MAGNESIUM: CPT | Performed by: STUDENT IN AN ORGANIZED HEALTH CARE EDUCATION/TRAINING PROGRAM

## 2022-10-26 PROCEDURE — 99283 EMERGENCY DEPT VISIT LOW MDM: CPT

## 2022-10-26 PROCEDURE — 94640 AIRWAY INHALATION TREATMENT: CPT

## 2022-10-26 PROCEDURE — 85025 COMPLETE CBC W/AUTO DIFF WBC: CPT | Performed by: STUDENT IN AN ORGANIZED HEALTH CARE EDUCATION/TRAINING PROGRAM

## 2022-10-26 PROCEDURE — 25010000002 METHYLPREDNISOLONE PER 125 MG: Performed by: PHYSICIAN ASSISTANT

## 2022-10-26 RX ORDER — SODIUM CHLORIDE 0.9 % (FLUSH) 0.9 %
10 SYRINGE (ML) INJECTION AS NEEDED
Status: DISCONTINUED | OUTPATIENT
Start: 2022-10-26 | End: 2022-10-26 | Stop reason: HOSPADM

## 2022-10-26 RX ORDER — METHYLPREDNISOLONE SODIUM SUCCINATE 125 MG/2ML
125 INJECTION, POWDER, LYOPHILIZED, FOR SOLUTION INTRAMUSCULAR; INTRAVENOUS ONCE
Status: COMPLETED | OUTPATIENT
Start: 2022-10-26 | End: 2022-10-26

## 2022-10-26 RX ORDER — IPRATROPIUM BROMIDE AND ALBUTEROL SULFATE 2.5; .5 MG/3ML; MG/3ML
3 SOLUTION RESPIRATORY (INHALATION) ONCE
Status: COMPLETED | OUTPATIENT
Start: 2022-10-26 | End: 2022-10-26

## 2022-10-26 RX ADMIN — IPRATROPIUM BROMIDE AND ALBUTEROL SULFATE 3 ML: .5; 2.5 SOLUTION RESPIRATORY (INHALATION) at 04:07

## 2022-10-26 RX ADMIN — METHYLPREDNISOLONE SODIUM SUCCINATE 125 MG: 125 INJECTION, POWDER, FOR SOLUTION INTRAMUSCULAR; INTRAVENOUS at 03:21

## 2022-11-20 ENCOUNTER — HOSPITAL ENCOUNTER (EMERGENCY)
Facility: HOSPITAL | Age: 47
Discharge: HOME | End: 2022-11-20
Attending: STUDENT IN AN ORGANIZED HEALTH CARE EDUCATION/TRAINING PROGRAM | Admitting: STUDENT IN AN ORGANIZED HEALTH CARE EDUCATION/TRAINING PROGRAM

## 2022-11-20 ENCOUNTER — APPOINTMENT (OUTPATIENT)
Dept: GENERAL RADIOLOGY | Facility: HOSPITAL | Age: 47
End: 2022-11-20

## 2022-11-20 VITALS
SYSTOLIC BLOOD PRESSURE: 114 MMHG | HEIGHT: 64 IN | WEIGHT: 189 LBS | HEART RATE: 87 BPM | RESPIRATION RATE: 17 BRPM | DIASTOLIC BLOOD PRESSURE: 68 MMHG | TEMPERATURE: 98.2 F | OXYGEN SATURATION: 97 % | BODY MASS INDEX: 32.27 KG/M2

## 2022-11-20 DIAGNOSIS — J44.1 COPD EXACERBATION: Primary | ICD-10-CM

## 2022-11-20 DIAGNOSIS — J10.1 INFLUENZA A: ICD-10-CM

## 2022-11-20 LAB
ALBUMIN SERPL-MCNC: 4.24 G/DL (ref 3.5–5.2)
ALBUMIN/GLOB SERPL: 1.7 G/DL
ALP SERPL-CCNC: 50 U/L (ref 39–117)
ALT SERPL W P-5'-P-CCNC: 22 U/L (ref 1–41)
ANION GAP SERPL CALCULATED.3IONS-SCNC: 12.2 MMOL/L (ref 5–15)
AST SERPL-CCNC: 22 U/L (ref 1–40)
BASOPHILS # BLD AUTO: 0.03 10*3/MM3 (ref 0–0.2)
BASOPHILS NFR BLD AUTO: 0.6 % (ref 0–1.5)
BILIRUB SERPL-MCNC: 0.4 MG/DL (ref 0–1.2)
BUN SERPL-MCNC: 13 MG/DL (ref 6–20)
BUN/CREAT SERPL: 13.7 (ref 7–25)
CALCIUM SPEC-SCNC: 8.9 MG/DL (ref 8.6–10.5)
CHLORIDE SERPL-SCNC: 99 MMOL/L (ref 98–107)
CO2 SERPL-SCNC: 25.8 MMOL/L (ref 22–29)
CREAT SERPL-MCNC: 0.95 MG/DL (ref 0.76–1.27)
DEPRECATED RDW RBC AUTO: 54.2 FL (ref 37–54)
EGFRCR SERPLBLD CKD-EPI 2021: 100 ML/MIN/1.73
EOSINOPHIL # BLD AUTO: 0.03 10*3/MM3 (ref 0–0.4)
EOSINOPHIL NFR BLD AUTO: 0.6 % (ref 0.3–6.2)
ERYTHROCYTE [DISTWIDTH] IN BLOOD BY AUTOMATED COUNT: 16.9 % (ref 12.3–15.4)
FLUAV RNA RESP QL NAA+PROBE: DETECTED
FLUBV RNA RESP QL NAA+PROBE: NOT DETECTED
GLOBULIN UR ELPH-MCNC: 2.6 GM/DL
GLUCOSE SERPL-MCNC: 100 MG/DL (ref 65–99)
HCT VFR BLD AUTO: 44.8 % (ref 37.5–51)
HGB BLD-MCNC: 14.2 G/DL (ref 13–17.7)
IMM GRANULOCYTES # BLD AUTO: 0.03 10*3/MM3 (ref 0–0.05)
IMM GRANULOCYTES NFR BLD AUTO: 0.6 % (ref 0–0.5)
LYMPHOCYTES # BLD AUTO: 0.74 10*3/MM3 (ref 0.7–3.1)
LYMPHOCYTES NFR BLD AUTO: 13.9 % (ref 19.6–45.3)
MCH RBC QN AUTO: 27.4 PG (ref 26.6–33)
MCHC RBC AUTO-ENTMCNC: 31.7 G/DL (ref 31.5–35.7)
MCV RBC AUTO: 86.5 FL (ref 79–97)
MONOCYTES # BLD AUTO: 0.84 10*3/MM3 (ref 0.1–0.9)
MONOCYTES NFR BLD AUTO: 15.8 % (ref 5–12)
NEUTROPHILS NFR BLD AUTO: 3.64 10*3/MM3 (ref 1.7–7)
NEUTROPHILS NFR BLD AUTO: 68.5 % (ref 42.7–76)
NRBC BLD AUTO-RTO: 0 /100 WBC (ref 0–0.2)
NT-PROBNP SERPL-MCNC: 1678 PG/ML (ref 0–450)
PLATELET # BLD AUTO: 180 10*3/MM3 (ref 140–450)
PMV BLD AUTO: 10.9 FL (ref 6–12)
POTASSIUM SERPL-SCNC: 3.7 MMOL/L (ref 3.5–5.2)
PROT SERPL-MCNC: 6.8 G/DL (ref 6–8.5)
RBC # BLD AUTO: 5.18 10*6/MM3 (ref 4.14–5.8)
SARS-COV-2 RNA RESP QL NAA+PROBE: NOT DETECTED
SODIUM SERPL-SCNC: 137 MMOL/L (ref 136–145)
TROPONIN T SERPL-MCNC: <0.01 NG/ML (ref 0–0.03)
WBC NRBC COR # BLD: 5.31 10*3/MM3 (ref 3.4–10.8)

## 2022-11-20 PROCEDURE — 93005 ELECTROCARDIOGRAM TRACING: CPT | Performed by: STUDENT IN AN ORGANIZED HEALTH CARE EDUCATION/TRAINING PROGRAM

## 2022-11-20 PROCEDURE — 99284 EMERGENCY DEPT VISIT MOD MDM: CPT

## 2022-11-20 PROCEDURE — 94640 AIRWAY INHALATION TREATMENT: CPT

## 2022-11-20 PROCEDURE — 94799 UNLISTED PULMONARY SVC/PX: CPT

## 2022-11-20 PROCEDURE — 96375 TX/PRO/DX INJ NEW DRUG ADDON: CPT

## 2022-11-20 PROCEDURE — 25010000002 FUROSEMIDE PER 20 MG: Performed by: STUDENT IN AN ORGANIZED HEALTH CARE EDUCATION/TRAINING PROGRAM

## 2022-11-20 PROCEDURE — 85025 COMPLETE CBC W/AUTO DIFF WBC: CPT | Performed by: STUDENT IN AN ORGANIZED HEALTH CARE EDUCATION/TRAINING PROGRAM

## 2022-11-20 PROCEDURE — 84484 ASSAY OF TROPONIN QUANT: CPT | Performed by: STUDENT IN AN ORGANIZED HEALTH CARE EDUCATION/TRAINING PROGRAM

## 2022-11-20 PROCEDURE — 25010000002 METHYLPREDNISOLONE PER 125 MG: Performed by: STUDENT IN AN ORGANIZED HEALTH CARE EDUCATION/TRAINING PROGRAM

## 2022-11-20 PROCEDURE — 71046 X-RAY EXAM CHEST 2 VIEWS: CPT

## 2022-11-20 PROCEDURE — 96374 THER/PROPH/DIAG INJ IV PUSH: CPT

## 2022-11-20 PROCEDURE — 87636 SARSCOV2 & INF A&B AMP PRB: CPT | Performed by: STUDENT IN AN ORGANIZED HEALTH CARE EDUCATION/TRAINING PROGRAM

## 2022-11-20 PROCEDURE — 80053 COMPREHEN METABOLIC PANEL: CPT | Performed by: STUDENT IN AN ORGANIZED HEALTH CARE EDUCATION/TRAINING PROGRAM

## 2022-11-20 PROCEDURE — 83880 ASSAY OF NATRIURETIC PEPTIDE: CPT | Performed by: STUDENT IN AN ORGANIZED HEALTH CARE EDUCATION/TRAINING PROGRAM

## 2022-11-20 RX ORDER — IPRATROPIUM BROMIDE AND ALBUTEROL SULFATE 2.5; .5 MG/3ML; MG/3ML
3 SOLUTION RESPIRATORY (INHALATION) ONCE
Status: COMPLETED | OUTPATIENT
Start: 2022-11-20 | End: 2022-11-20

## 2022-11-20 RX ORDER — OSELTAMIVIR PHOSPHATE 75 MG/1
75 CAPSULE ORAL ONCE
Status: COMPLETED | OUTPATIENT
Start: 2022-11-20 | End: 2022-11-20

## 2022-11-20 RX ORDER — DOXYCYCLINE 100 MG/1
100 CAPSULE ORAL 2 TIMES DAILY
Qty: 13 CAPSULE | Refills: 0 | Status: SHIPPED | OUTPATIENT
Start: 2022-11-20

## 2022-11-20 RX ORDER — OSELTAMIVIR PHOSPHATE 75 MG/1
75 CAPSULE ORAL 2 TIMES DAILY
Qty: 9 CAPSULE | Refills: 0 | Status: SHIPPED | OUTPATIENT
Start: 2022-11-20

## 2022-11-20 RX ORDER — DOXYCYCLINE 100 MG/1
100 CAPSULE ORAL ONCE
Status: COMPLETED | OUTPATIENT
Start: 2022-11-20 | End: 2022-11-20

## 2022-11-20 RX ORDER — FUROSEMIDE 10 MG/ML
40 INJECTION INTRAMUSCULAR; INTRAVENOUS ONCE
Status: COMPLETED | OUTPATIENT
Start: 2022-11-20 | End: 2022-11-20

## 2022-11-20 RX ORDER — METHYLPREDNISOLONE SODIUM SUCCINATE 125 MG/2ML
125 INJECTION, POWDER, LYOPHILIZED, FOR SOLUTION INTRAMUSCULAR; INTRAVENOUS ONCE
Status: COMPLETED | OUTPATIENT
Start: 2022-11-20 | End: 2022-11-20

## 2022-11-20 RX ORDER — PREDNISONE 50 MG/1
50 TABLET ORAL DAILY
Qty: 5 TABLET | Refills: 0 | Status: SHIPPED | OUTPATIENT
Start: 2022-11-20

## 2022-11-20 RX ADMIN — DOXYCYCLINE 100 MG: 100 CAPSULE ORAL at 21:23

## 2022-11-20 RX ADMIN — METHYLPREDNISOLONE SODIUM SUCCINATE 125 MG: 125 INJECTION, POWDER, FOR SOLUTION INTRAMUSCULAR; INTRAVENOUS at 21:01

## 2022-11-20 RX ADMIN — SODIUM CHLORIDE 1000 ML: 9 INJECTION, SOLUTION INTRAVENOUS at 21:02

## 2022-11-20 RX ADMIN — OSELTAMIVIR PHOSPHATE 75 MG: 75 CAPSULE ORAL at 21:23

## 2022-11-20 RX ADMIN — FUROSEMIDE 40 MG: 10 INJECTION, SOLUTION INTRAVENOUS at 21:23

## 2022-11-20 RX ADMIN — IPRATROPIUM BROMIDE AND ALBUTEROL SULFATE 3 ML: .5; 2.5 SOLUTION RESPIRATORY (INHALATION) at 20:58

## 2022-11-20 NOTE — ED PROVIDER NOTES
MEDICAL SCREENING:    Reason for Visit: Dyspnea, headache     Patient initially seen in triage.  The patient was advised further evaluation and diagnostic testing will be needed, some of the treatment and testing will be initiated in the lobby in order to begin the process.  The patient will be returned to the waiting area for the time being and possibly be re-assessed by a subsequent ED provider.  The patient will be brought back to the treatment area in as timely manner as possible.       Joleen De La Cruz MD  11/20/22 [id number]

## 2022-11-21 NOTE — ED PROVIDER NOTES
"Subjective   History of Present Illness  47-year-old male with a past medical history of COPD, asthma, hypertension, and substance abuse presents to the ER with primary complaint of increasing shortness of breath, cough, and pleuritic chest pain.  Patient denies left-sided chest pain.  Denied obvious fever.  Symptoms are worsened with exertion.  Confirmed slight wheezing.  No significant sputum production or hemoptysis.  No obvious alleviating factors.  Vitals stable.  Afebrile        Review of Systems   Constitutional: Positive for fatigue.   Respiratory: Positive for cough, shortness of breath and wheezing.        Past Medical History:   Diagnosis Date   • Anxiety    • Asthma    • Back pain    • Chronic anticoagulation 2/2/2020   • COPD (chronic obstructive pulmonary disease) (HCC)    • Emphysema lung (HCC)    • Gastritis    • GERD (gastroesophageal reflux disease)    • Headache    • Heart valve disease 2018    valve replac., prosthetic valve   • Hx of aortic valve replacement, mechanical 11/28/2018   • Hyperglycemia 2/2/2020   • Hypertension    • Migraine    • Substance abuse (HCC)        Allergies   Allergen Reactions   • Aspirin Anaphylaxis     Patient said, \"it chokes him up.\" patient said, \"I got really short of breath and started to have an asthma attack.\"       Past Surgical History:   Procedure Laterality Date   • CARDIAC CATHETERIZATION N/A 10/23/2017    Procedure: Left Heart Cath;  Surgeon: Rodolfo Núñez MD;  Location:  [facility];  Service:    • CARDIAC CATHETERIZATION N/A 9/20/2021    Procedure: Left Heart Cath;  Surgeon: Delvin Carson MD;  Location:  [facility];  Service: Cardiovascular;  Laterality: N/A;   • CARDIAC VALVE REPLACEMENT  2018    prosthetic valve   • DENTAL PROCEDURE     • LIVER SURGERY      tumor removed from liver   • OTHER SURGICAL HISTORY      \"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\"   • THORACIC AORTIC ANEURYSM " ASCENDING/ARCH REPAIR N/A 1/17/2018    Procedure: MEDIAN STERNOTOMY, AORTIC VALVE CONDUIT, BENTAL, TRANSESOPHAGEAL ECHOCARDIOGRAM WITH ANESTHESIA;  Surgeon: Aaron Lucas MD;  Location: [facility];  Service:        Family History   Problem Relation Age of Onset   • COPD Mother        Social History     Socioeconomic History   • Marital status: Single   • Number of children: 0   Tobacco Use   • Smoking status: Former     Packs/day: 1.00     Years: 4.00     Pack years: 4.00     Types: Cigarettes   • Smokeless tobacco: Current     Types: Snuff   Vaping Use   • Vaping Use: Never used   Substance and Sexual Activity   • Alcohol use: No     Comment: occassional    • Drug use: No   • Sexual activity: Defer           Objective   Physical Exam  Constitutional:       General: He is not in acute distress.     Appearance: He is well-developed. He is not ill-appearing.   HENT:      Head: Normocephalic and atraumatic.   Eyes:      Extraocular Movements: Extraocular movements intact.      Pupils: Pupils are equal, round, and reactive to light.   Neck:      Vascular: No JVD.   Cardiovascular:      Rate and Rhythm: Normal rate and regular rhythm.      Heart sounds: Normal heart sounds. No murmur heard.  Pulmonary:      Effort: No tachypnea, accessory muscle usage or respiratory distress.      Breath sounds: No stridor. Examination of the right-upper field reveals wheezing. Examination of the left-upper field reveals wheezing. Examination of the right-middle field reveals wheezing. Examination of the left-middle field reveals wheezing. Wheezing present. No decreased breath sounds, rhonchi or rales.   Chest:      Chest wall: No deformity, tenderness or crepitus.   Abdominal:      General: Bowel sounds are normal.      Palpations: Abdomen is soft.      Tenderness: There is no abdominal tenderness. There is no guarding or rebound.   Musculoskeletal:         General: Normal range of motion.      Cervical back: Normal range of motion and  neck supple.      Right lower leg: No tenderness. No edema.      Left lower leg: No tenderness. No edema.   Lymphadenopathy:      Cervical: No cervical adenopathy.   Skin:     General: Skin is warm and dry.      Coloration: Skin is not cyanotic.      Findings: No ecchymosis or erythema.   Neurological:      General: No focal deficit present.      Mental Status: He is alert and oriented to person, place, and time.      Cranial Nerves: No cranial nerve deficit.      Motor: No weakness.   Psychiatric:         Mood and Affect: Mood normal. Mood is not anxious.         Behavior: Behavior normal. Behavior is not agitated.         Procedures           ED Course  ED Course as of 11/20/22 2117   Sun Nov 20, 2022 2009 Sinus rhythm HR 94 BPM. MD 180ms, QTC 475ms. Significant artifact in most leads given respiratory status, difficult to ascertain if underlying flutter. Ordered repeat ECG for better clarity.  [LS]   2023 Repeat ECG much improved. No St segment elevation, reciprocal changes or other acute signs of ischemia.  [LS]   2112 CBC and CMP are unremarkable.  Slightly elevated BNP noted.  No congestive heart failure concerns clinically.  Lasix given.  Troponin within normal limits.  Influenza A+.  Tamiflu given.  Chest x-ray unremarkable.  EKG reviewed twice by previous provider and unremarkable.  Patient will continue Tamiflu and prednisone at discharge.  Work up and results were discussed throughly with the patient.  The patient will be discharged for further monitoring and management with their PCP.  Red flags, warning signs, worsening symptoms, and when to return to the ER discussed with and understood by the patient.  Patient will follow up with their PCP in a timely manner.  Vitals stable at discharge. [SF]      ED Course User Index  [LS] Joleen De La Cruz MD  [SF] Ricardo Vegas DO                                           [facility]    Final diagnoses:   COPD exacerbation (HCC)   Influenza A       ED  Disposition  ED Disposition     ED Disposition   Discharge    Condition   Stable    Comment   --             Macho Jose MD  [address] [facility] [address]  [facility] [address]  [phone]    In 1 week      [facility] Emergency Department  [address] [facility] [address] [phone]    If symptoms worsen         Medication List      New Prescriptions    oseltamivir 75 MG capsule  Commonly known as: TAMIFLU  Take 1 capsule by mouth 2 (Two) Times a Day.        Changed    * doxycycline 100 MG capsule  Commonly known as: MONODOX  Take 1 capsule by mouth 2 (Two) Times a Day.  What changed: Another medication with the same name was added. Make sure you understand how and when to take each.     * doxycycline 100 MG capsule  Commonly known as: MONODOX  Take 1 capsule by mouth 2 (Two) Times a Day.  What changed: You were already taking a medication with the same name, and this prescription was added. Make sure you understand how and when to take each.     * predniSONE 50 MG tablet  Commonly known as: DELTASONE  Take 1 tablet by mouth Daily.  What changed: Another medication with the same name was added. Make sure you understand how and when to take each.     * predniSONE 50 MG tablet  Commonly known as: DELTASONE  Take 1 tablet by mouth Daily.  What changed: You were already taking a medication with the same name, and this prescription was added. Make sure you understand how and when to take each.         * This list has 4 medication(s) that are the same as other medications prescribed for you. Read the directions carefully, and ask your doctor or other care provider to review them with you.               Where to Get Your Medications      These medications were sent to [facility], [place], [place] - [address] [place] [address] - [phone] [place] [phone]   [address] [place] [address]    Phone: [phone]   · doxycycline 100 MG capsule  · oseltamivir 75 MG capsule  · predniSONE 50 MG  tablet          Ricardo Vegas, DO  11/20/22 2113

## 2022-11-22 LAB
QT INTERVAL: 356 MS
QT INTERVAL: 380 MS
QTC INTERVAL: 442 MS
QTC INTERVAL: 475 MS

## 2023-10-02 PROCEDURE — 99284 EMERGENCY DEPT VISIT MOD MDM: CPT

## 2023-10-03 ENCOUNTER — APPOINTMENT (OUTPATIENT)
Dept: GENERAL RADIOLOGY | Facility: HOSPITAL | Age: 48
End: 2023-10-03
Payer: COMMERCIAL

## 2023-10-03 ENCOUNTER — HOSPITAL ENCOUNTER (EMERGENCY)
Facility: HOSPITAL | Age: 48
Discharge: HOME | End: 2023-10-03
Attending: STUDENT IN AN ORGANIZED HEALTH CARE EDUCATION/TRAINING PROGRAM | Admitting: STUDENT IN AN ORGANIZED HEALTH CARE EDUCATION/TRAINING PROGRAM
Payer: COMMERCIAL

## 2023-10-03 ENCOUNTER — APPOINTMENT (OUTPATIENT)
Dept: CT IMAGING | Facility: HOSPITAL | Age: 48
End: 2023-10-03
Payer: COMMERCIAL

## 2023-10-03 VITALS
TEMPERATURE: 98 F | RESPIRATION RATE: 17 BRPM | HEART RATE: 83 BPM | DIASTOLIC BLOOD PRESSURE: 78 MMHG | BODY MASS INDEX: 29.98 KG/M2 | SYSTOLIC BLOOD PRESSURE: 124 MMHG | OXYGEN SATURATION: 99 % | HEIGHT: 67 IN | WEIGHT: 191 LBS
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
## 2023-10-03 DIAGNOSIS — S80.211A ABRASION OF RIGHT KNEE, INITIAL ENCOUNTER: Primary | ICD-10-CM

## 2023-10-03 PROCEDURE — 73130 X-RAY EXAM OF HAND: CPT

## 2023-10-03 PROCEDURE — 73562 X-RAY EXAM OF KNEE 3: CPT

## 2023-10-03 PROCEDURE — 70450 CT HEAD/BRAIN W/O DYE: CPT

## 2023-10-03 RX ORDER — ACETAMINOPHEN AND CODEINE PHOSPHATE 300; 30 MG/1; MG/1
1 TABLET ORAL ONCE
Status: COMPLETED | OUTPATIENT
Start: 2023-10-03 | End: 2023-10-03

## 2023-10-03 RX ADMIN — ACETAMINOPHEN AND CODEINE PHOSPHATE 1 TABLET: 300; 30 TABLET ORAL at 04:59
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
## 2023-10-20 ENCOUNTER — HOSPITAL ENCOUNTER (OUTPATIENT)
Dept: GENERAL RADIOLOGY | Facility: HOSPITAL | Age: 48
Discharge: HOME | End: 2023-10-20
Payer: COMMERCIAL

## 2023-10-20 ENCOUNTER — TRANSCRIBE ORDERS (OUTPATIENT)
Dept: ADMINISTRATIVE | Facility: HOSPITAL | Age: 48
End: 2023-10-20
Payer: COMMERCIAL

## 2023-10-20 DIAGNOSIS — M25.511 PAIN IN JOINT OF RIGHT SHOULDER: ICD-10-CM

## 2023-10-20 DIAGNOSIS — R07.89 MUSCULOSKELETAL CHEST PAIN: Primary | ICD-10-CM

## 2023-10-20 DIAGNOSIS — R07.89 MUSCULOSKELETAL CHEST PAIN: ICD-10-CM

## 2023-10-20 PROCEDURE — 71100 X-RAY EXAM RIBS UNI 2 VIEWS: CPT

## 2023-10-20 PROCEDURE — 71046 X-RAY EXAM CHEST 2 VIEWS: CPT

## 2023-10-20 PROCEDURE — 73030 X-RAY EXAM OF SHOULDER: CPT

## 2024-01-04 VITALS
HEIGHT: 67 IN | RESPIRATION RATE: 18 BRPM | TEMPERATURE: 98.2 F | HEART RATE: 76 BPM | BODY MASS INDEX: 29.03 KG/M2 | WEIGHT: 185 LBS | DIASTOLIC BLOOD PRESSURE: 68 MMHG | OXYGEN SATURATION: 98 % | SYSTOLIC BLOOD PRESSURE: 160 MMHG

## 2024-01-04 PROCEDURE — 99284 EMERGENCY DEPT VISIT MOD MDM: CPT

## 2024-01-05 ENCOUNTER — HOSPITAL ENCOUNTER (EMERGENCY)
Facility: HOSPITAL | Age: 49
Discharge: HOME | End: 2024-01-05
Attending: STUDENT IN AN ORGANIZED HEALTH CARE EDUCATION/TRAINING PROGRAM
Payer: COMMERCIAL

## 2024-01-05 ENCOUNTER — APPOINTMENT (OUTPATIENT)
Dept: GENERAL RADIOLOGY | Facility: HOSPITAL | Age: 49
End: 2024-01-05
Payer: COMMERCIAL

## 2024-01-05 DIAGNOSIS — R79.1 SUBTHERAPEUTIC INTERNATIONAL NORMALIZED RATIO (INR): Primary | ICD-10-CM

## 2024-01-05 DIAGNOSIS — Z20.822 EXPOSURE TO COVID-19 VIRUS: ICD-10-CM

## 2024-01-05 LAB
ALBUMIN SERPL-MCNC: 4.5 G/DL (ref 3.5–5.2)
ALBUMIN/GLOB SERPL: 1.8 G/DL
ALP SERPL-CCNC: 63 U/L (ref 39–117)
ALT SERPL W P-5'-P-CCNC: 44 U/L (ref 1–41)
ANION GAP SERPL CALCULATED.3IONS-SCNC: 7.4 MMOL/L (ref 5–15)
AST SERPL-CCNC: 25 U/L (ref 1–40)
BASOPHILS # BLD AUTO: 0.07 10*3/MM3 (ref 0–0.2)
BASOPHILS NFR BLD AUTO: 0.7 % (ref 0–1.5)
BILIRUB SERPL-MCNC: 0.4 MG/DL (ref 0–1.2)
BUN SERPL-MCNC: 11 MG/DL (ref 6–20)
BUN/CREAT SERPL: 10.3 (ref 7–25)
CALCIUM SPEC-SCNC: 9.4 MG/DL (ref 8.6–10.5)
CHLORIDE SERPL-SCNC: 106 MMOL/L (ref 98–107)
CO2 SERPL-SCNC: 26.6 MMOL/L (ref 22–29)
CREAT SERPL-MCNC: 1.07 MG/DL (ref 0.76–1.27)
DEPRECATED RDW RBC AUTO: 48.8 FL (ref 37–54)
EGFRCR SERPLBLD CKD-EPI 2021: 86.1 ML/MIN/1.73
EOSINOPHIL # BLD AUTO: 0.15 10*3/MM3 (ref 0–0.4)
EOSINOPHIL NFR BLD AUTO: 1.5 % (ref 0.3–6.2)
ERYTHROCYTE [DISTWIDTH] IN BLOOD BY AUTOMATED COUNT: 15.4 % (ref 12.3–15.4)
FLUAV RNA RESP QL NAA+PROBE: NOT DETECTED
FLUBV RNA RESP QL NAA+PROBE: NOT DETECTED
GLOBULIN UR ELPH-MCNC: 2.5 GM/DL
GLUCOSE SERPL-MCNC: 108 MG/DL (ref 65–99)
HCT VFR BLD AUTO: 46.7 % (ref 37.5–51)
HGB BLD-MCNC: 15.2 G/DL (ref 13–17.7)
HOLD SPECIMEN: NORMAL
HOLD SPECIMEN: NORMAL
IMM GRANULOCYTES # BLD AUTO: 0.07 10*3/MM3 (ref 0–0.05)
IMM GRANULOCYTES NFR BLD AUTO: 0.7 % (ref 0–0.5)
INR PPP: 1.83 (ref 0.9–1.1)
LYMPHOCYTES # BLD AUTO: 2.28 10*3/MM3 (ref 0.7–3.1)
LYMPHOCYTES NFR BLD AUTO: 23.2 % (ref 19.6–45.3)
MCH RBC QN AUTO: 28 PG (ref 26.6–33)
MCHC RBC AUTO-ENTMCNC: 32.5 G/DL (ref 31.5–35.7)
MCV RBC AUTO: 86 FL (ref 79–97)
MONOCYTES # BLD AUTO: 0.7 10*3/MM3 (ref 0.1–0.9)
MONOCYTES NFR BLD AUTO: 7.1 % (ref 5–12)
NEUTROPHILS NFR BLD AUTO: 6.55 10*3/MM3 (ref 1.7–7)
NEUTROPHILS NFR BLD AUTO: 66.8 % (ref 42.7–76)
NRBC BLD AUTO-RTO: 0 /100 WBC (ref 0–0.2)
PLATELET # BLD AUTO: 243 10*3/MM3 (ref 140–450)
PMV BLD AUTO: 10.6 FL (ref 6–12)
POTASSIUM SERPL-SCNC: 4.4 MMOL/L (ref 3.5–5.2)
PROT SERPL-MCNC: 7 G/DL (ref 6–8.5)
PROTHROMBIN TIME: 21.8 SECONDS (ref 12.1–14.7)
QT INTERVAL: 416 MS
QTC INTERVAL: 461 MS
RBC # BLD AUTO: 5.43 10*6/MM3 (ref 4.14–5.8)
SARS-COV-2 RNA RESP QL NAA+PROBE: NOT DETECTED
SODIUM SERPL-SCNC: 140 MMOL/L (ref 136–145)
TROPONIN T SERPL HS-MCNC: 8 NG/L
WBC NRBC COR # BLD AUTO: 9.82 10*3/MM3 (ref 3.4–10.8)
WHOLE BLOOD HOLD COAG: NORMAL
WHOLE BLOOD HOLD SPECIMEN: NORMAL

## 2024-01-05 PROCEDURE — 25010000002 ENOXAPARIN PER 10 MG: Performed by: EMERGENCY MEDICINE

## 2024-01-05 PROCEDURE — 87636 SARSCOV2 & INF A&B AMP PRB: CPT | Performed by: STUDENT IN AN ORGANIZED HEALTH CARE EDUCATION/TRAINING PROGRAM

## 2024-01-05 PROCEDURE — 85025 COMPLETE CBC W/AUTO DIFF WBC: CPT | Performed by: STUDENT IN AN ORGANIZED HEALTH CARE EDUCATION/TRAINING PROGRAM

## 2024-01-05 PROCEDURE — 85610 PROTHROMBIN TIME: CPT | Performed by: STUDENT IN AN ORGANIZED HEALTH CARE EDUCATION/TRAINING PROGRAM

## 2024-01-05 PROCEDURE — 71045 X-RAY EXAM CHEST 1 VIEW: CPT | Performed by: RADIOLOGY

## 2024-01-05 PROCEDURE — 71045 X-RAY EXAM CHEST 1 VIEW: CPT

## 2024-01-05 PROCEDURE — 93005 ELECTROCARDIOGRAM TRACING: CPT | Performed by: STUDENT IN AN ORGANIZED HEALTH CARE EDUCATION/TRAINING PROGRAM

## 2024-01-05 PROCEDURE — 84484 ASSAY OF TROPONIN QUANT: CPT | Performed by: STUDENT IN AN ORGANIZED HEALTH CARE EDUCATION/TRAINING PROGRAM

## 2024-01-05 PROCEDURE — 80053 COMPREHEN METABOLIC PANEL: CPT | Performed by: STUDENT IN AN ORGANIZED HEALTH CARE EDUCATION/TRAINING PROGRAM

## 2024-01-05 PROCEDURE — 96372 THER/PROPH/DIAG INJ SC/IM: CPT

## 2024-01-05 RX ORDER — ENOXAPARIN SODIUM 100 MG/ML
80 INJECTION SUBCUTANEOUS EVERY 12 HOURS
Qty: 11.2 ML | Refills: 0 | Status: SHIPPED | OUTPATIENT
Start: 2024-01-05 | End: 2024-01-12

## 2024-01-05 RX ORDER — SODIUM CHLORIDE 0.9 % (FLUSH) 0.9 %
10 SYRINGE (ML) INJECTION AS NEEDED
Status: DISCONTINUED | OUTPATIENT
Start: 2024-01-05 | End: 2024-01-05 | Stop reason: HOSPADM

## 2024-01-05 RX ORDER — ENOXAPARIN SODIUM 100 MG/ML
1 INJECTION SUBCUTANEOUS ONCE
Status: COMPLETED | OUTPATIENT
Start: 2024-01-05 | End: 2024-01-05

## 2024-01-05 RX ORDER — WARFARIN SODIUM 3 MG/1
6 TABLET ORAL
Qty: 2 TABLET | Refills: 0 | Status: COMPLETED | OUTPATIENT
Start: 2024-01-05 | End: 2024-01-05

## 2024-01-05 RX ADMIN — ENOXAPARIN SODIUM 80 MG: 80 INJECTION SUBCUTANEOUS at 04:08

## 2024-01-05 RX ADMIN — WARFARIN SODIUM 6 MG: 3 TABLET ORAL at 04:08

## 2024-01-05 NOTE — ED PROVIDER NOTES
"Subjective   History of Present Illness  48-year-old male with past medical history of COPD and mechanical heart valve that requires warfarin for anticoagulation presents to the ER due to concerns for palpitations after using a home inhaler.  Denied chest pain.  Denied cough or congestion.  Patient requesting COVID-19 screening.  No obvious nausea or diaphoresis.  Vital stable.  Afebrile      Review of Systems   Cardiovascular:  Positive for palpitations.   All other systems reviewed and are negative.      Past Medical History:   Diagnosis Date    Anxiety     Asthma     Back pain     Chronic anticoagulation 2/2/2020    COPD (chronic obstructive pulmonary disease)     Emphysema lung     Gastritis     GERD (gastroesophageal reflux disease)     Headache     Heart valve disease 2018    valve replac., prosthetic valve    Hx of aortic valve replacement, mechanical 11/28/2018    Hyperglycemia 2/2/2020    Hypertension     Migraine     Substance abuse        Allergies   Allergen Reactions    Aspirin Anaphylaxis     Patient said, \"it chokes him up.\" patient said, \"I got really short of breath and started to have an asthma attack.\"       Past Surgical History:   Procedure Laterality Date    CARDIAC CATHETERIZATION N/A 10/23/2017    Procedure: Left Heart Cath;  Surgeon: Rodolfo Núñez MD;  Location:  [facility];  Service:     CARDIAC CATHETERIZATION N/A 9/20/2021    Procedure: Left Heart Cath;  Surgeon: Delvin Carson MD;  Location:  [facility];  Service: Cardiovascular;  Laterality: N/A;    CARDIAC VALVE REPLACEMENT  2018    prosthetic valve    DENTAL PROCEDURE      LIVER SURGERY      tumor removed from liver    OTHER SURGICAL HISTORY      \"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\"    THORACIC AORTIC ANEURYSM ASCENDING/ARCH REPAIR N/A 1/17/2018    Procedure: MEDIAN STERNOTOMY, AORTIC VALVE CONDUIT, BENTAL, TRANSESOPHAGEAL ECHOCARDIOGRAM WITH ANESTHESIA;  Surgeon: " Aaron Lucas MD;  Location: [facility];  Service:        Family History   Problem Relation Age of Onset    COPD Mother        Social History     Socioeconomic History    Marital status: Single    Number of children: 0   Tobacco Use    Smoking status: Former     Packs/day: 1.00     Years: 4.00     Additional pack years: 0.00     Total pack years: 4.00     Types: Cigarettes    Smokeless tobacco: Current     Types: Snuff   Vaping Use    Vaping Use: Never used   Substance and Sexual Activity    Alcohol use: No     Comment: occassional     Drug use: No    Sexual activity: Defer           Objective   Physical Exam  Constitutional:       General: He is not in acute distress.     Appearance: He is well-developed. He is not ill-appearing.   HENT:      Head: Normocephalic and atraumatic.   Eyes:      Extraocular Movements: Extraocular movements intact.      Pupils: Pupils are equal, round, and reactive to light.   Neck:      Vascular: No JVD.   Cardiovascular:      Rate and Rhythm: Normal rate and regular rhythm.      Heart sounds: Normal heart sounds. No murmur heard.     Comments: Audible click of mechanical heart valve noted.  Pulmonary:      Effort: No tachypnea, accessory muscle usage or respiratory distress.      Breath sounds: Normal breath sounds. No stridor. No decreased breath sounds, wheezing, rhonchi or rales.   Chest:      Chest wall: No deformity, tenderness or crepitus.   Abdominal:      General: Bowel sounds are normal.      Palpations: Abdomen is soft.      Tenderness: There is no abdominal tenderness. There is no guarding or rebound.   Musculoskeletal:         General: Normal range of motion.      Cervical back: Normal range of motion and neck supple.      Right lower leg: No tenderness. No edema.      Left lower leg: No tenderness. No edema.   Lymphadenopathy:      Cervical: No cervical adenopathy.   Skin:     General: Skin is warm and dry.      Coloration: Skin is not cyanotic.      Findings: No  ecchymosis or erythema.   Neurological:      General: No focal deficit present.      Mental Status: He is alert and oriented to person, place, and time.      Cranial Nerves: No cranial nerve deficit.      Motor: No weakness.   Psychiatric:         Mood and Affect: Mood normal. Mood is not anxious.         Behavior: Behavior normal. Behavior is not agitated.         Procedures  XR Chest 1 View   Final Result   No acute cardiopulmonary process.           This report was finalized on 1/5/2024 2:59 AM by Alex Pallas, DO.            Results for orders placed or performed during the hospital encounter of 01/05/24   COVID-19 and FLU A/B PCR, 1 HR TAT - Swab, Nasopharynx    Specimen: Nasopharynx; Swab   Result Value Ref Range    COVID19 Not Detected Not Detected - Ref. Range    Influenza A PCR Not Detected Not Detected    Influenza B PCR Not Detected Not Detected   Comprehensive Metabolic Panel    Specimen: Blood   Result Value Ref Range    Glucose 108 (H) 65 - 99 mg/dL    BUN 11 6 - 20 mg/dL    Creatinine 1.07 0.76 - 1.27 mg/dL    Sodium 140 136 - 145 mmol/L    Potassium 4.4 3.5 - 5.2 mmol/L    Chloride 106 98 - 107 mmol/L    CO2 26.6 22.0 - 29.0 mmol/L    Calcium 9.4 8.6 - 10.5 mg/dL    Total Protein 7.0 6.0 - 8.5 g/dL    Albumin 4.5 3.5 - 5.2 g/dL    ALT (SGPT) 44 (H) 1 - 41 U/L    AST (SGOT) 25 1 - 40 U/L    Alkaline Phosphatase 63 39 - 117 U/L    Total Bilirubin 0.4 0.0 - 1.2 mg/dL    Globulin 2.5 gm/dL    A/G Ratio 1.8 g/dL    BUN/Creatinine Ratio 10.3 7.0 - 25.0    Anion Gap 7.4 5.0 - 15.0 mmol/L    eGFR 86.1 >60.0 mL/min/1.73   High Sensitivity Troponin T    Specimen: Blood   Result Value Ref Range    HS Troponin T 8 <22 ng/L   Protime-INR    Specimen: Blood   Result Value Ref Range    Protime 21.8 (H) 12.1 - 14.7 Seconds    INR 1.83 (H) 0.90 - 1.10   CBC Auto Differential    Specimen: Blood   Result Value Ref Range    WBC 9.82 3.40 - 10.80 10*3/mm3    RBC 5.43 4.14 - 5.80 10*6/mm3    Hemoglobin 15.2 13.0 - 17.7  g/dL    Hematocrit 46.7 37.5 - 51.0 %    MCV 86.0 79.0 - 97.0 fL    MCH 28.0 26.6 - 33.0 pg    MCHC 32.5 31.5 - 35.7 g/dL    RDW 15.4 12.3 - 15.4 %    RDW-SD 48.8 37.0 - 54.0 fl    MPV 10.6 6.0 - 12.0 fL    Platelets 243 140 - 450 10*3/mm3    Neutrophil % 66.8 42.7 - 76.0 %    Lymphocyte % 23.2 19.6 - 45.3 %    Monocyte % 7.1 5.0 - 12.0 %    Eosinophil % 1.5 0.3 - 6.2 %    Basophil % 0.7 0.0 - 1.5 %    Immature Grans % 0.7 (H) 0.0 - 0.5 %    Neutrophils, Absolute 6.55 1.70 - 7.00 10*3/mm3    Lymphocytes, Absolute 2.28 0.70 - 3.10 10*3/mm3    Monocytes, Absolute 0.70 0.10 - 0.90 10*3/mm3    Eosinophils, Absolute 0.15 0.00 - 0.40 10*3/mm3    Basophils, Absolute 0.07 0.00 - 0.20 10*3/mm3    Immature Grans, Absolute 0.07 (H) 0.00 - 0.05 10*3/mm3    nRBC 0.0 0.0 - 0.2 /100 WBC   ECG 12 Lead Chest Pain   Result Value Ref Range    QT Interval 416 ms    QTC Interval 461 ms   Green Top (Gel)   Result Value Ref Range    Extra Tube Hold for add-ons.    Lavender Top   Result Value Ref Range    Extra Tube hold for add-on    Gold Top - SST   Result Value Ref Range    Extra Tube Hold for add-ons.    Light Blue Top   Result Value Ref Range    Extra Tube Hold for add-ons.                 ED Course  ED Course as of 01/05/24 0359   Thu Jan 04, 2024   6935 EKG shows sinus rhythm, rate 74.  OR interval 190, QRS duration 94, QTc 461 ms.  Nonspecific ST T changes.  No evidence for STEMI.  Electronically signed by Obie Paez MD, 01/04/24, 11:46 PM EST.   [CM]   Fri Jan 05, 2024   0141 Care of this patient was transferred to the next attending physician at shift change.  Complete discussion of presentation, labs, imaging, care, and expected course occurred during transition of providers.  Vitals stable at transfer of care.    Electronically signed by Ricardo Vegas DO, 01/05/24, 1:42 AM EST.   [SF]   3374 I assumed patient's care from Dr. Vegas at the end of his shift.  Please see his documentation above.  Awaiting lab work and  chest x-ray results. [CM]   0357 Patient's emergency department stay has been uneventful.  He has shown no signs of distress.  We discussed his test results and his plan of care.  He voices understanding and agreement. [CM]      ED Course User Index  [CM] Obie Paez MD  [SF] Ricardo Vegas, DO                                             Medical Decision Making  Amount and/or Complexity of Data Reviewed  Labs: ordered. Decision-making details documented in ED Course.  Radiology: ordered. Decision-making details documented in ED Course.  ECG/medicine tests: ordered. Decision-making details documented in ED Course.    Risk  Prescription drug management.        Final diagnoses:   Subtherapeutic international normalized ratio (INR)   Exposure to COVID-19 virus       ED Disposition  ED Disposition       ED Disposition   Discharge    Condition   Stable    Comment   --               Coumadin clinic    Go to   Today    Macho Jose MD  [address] [id number]  [phone]    Go to   1 to 2 days    [facility] EMERGENCY DEPARTMENT  [address] [place] [id number] [phone]  Go to   If symptoms worsen         Medication List        New Prescriptions      Enoxaparin Sodium 80 MG/0.8ML solution prefilled syringe syringe  Commonly known as: LOVENOX  Inject 0.8 mL under the skin into the appropriate area as directed Every 12 (Twelve) Hours for 7 days. Until the Coumadin clinic tells you that you are therapeutic on your Coumadin            Stop      doxycycline 100 MG capsule  Commonly known as: MONODOX     oseltamivir 75 MG capsule  Commonly known as: TAMIFLU     predniSONE 50 MG tablet  Commonly known as: DELTASONE               Where to Get Your Medications        These medications were sent to [address] [phone] [facility] [phone] [address] [address]      Phone: [phone]   Enoxaparin Sodium 80  MG/0.8ML solution prefilled syringe syringe       Please note that portions of this note were completed with a voice recognition program.        Obie Paez MD  01/05/24 0351

## 2024-01-05 NOTE — ED NOTES
MEDICAL SCREENING:    Reason for Visit: States exposed to COVID, wants tested    Patient initially seen in triage.  The patient was advised further evaluation and diagnostic testing will be needed, some of the treatment and testing will be initiated in the lobby in order to begin the process.  The patient will be returned to the waiting area for the time being and possibly be re-assessed by a subsequent ED provider.  The patient will be brought back to the treatment area in as timely manner as possible.        Obie Paez MD  01/05/24 0000

## 2024-01-05 NOTE — DISCHARGE INSTRUCTIONS
It is very important that you take the Lovenox injections every 12 hours, in addition to Coumadin, until your Coumadin level becomes therapeutic.  Follow-up in the Coumadin clinic tomorrow.  See Dr. Jose in the office in 1 to 2 days.  Return to the emergency department right away if symptoms worsen/any problems.

## 2024-03-11 PROCEDURE — 99284 EMERGENCY DEPT VISIT MOD MDM: CPT

## 2024-03-11 PROCEDURE — 36415 COLL VENOUS BLD VENIPUNCTURE: CPT

## 2024-03-12 ENCOUNTER — HOSPITAL ENCOUNTER (EMERGENCY)
Facility: HOSPITAL | Age: 49
Discharge: HOME | End: 2024-03-12
Attending: STUDENT IN AN ORGANIZED HEALTH CARE EDUCATION/TRAINING PROGRAM | Admitting: STUDENT IN AN ORGANIZED HEALTH CARE EDUCATION/TRAINING PROGRAM
Payer: COMMERCIAL

## 2024-03-12 ENCOUNTER — APPOINTMENT (OUTPATIENT)
Dept: CT IMAGING | Facility: HOSPITAL | Age: 49
End: 2024-03-12
Payer: COMMERCIAL

## 2024-03-12 VITALS
TEMPERATURE: 98.1 F | RESPIRATION RATE: 18 BRPM | OXYGEN SATURATION: 97 % | BODY MASS INDEX: 29.03 KG/M2 | DIASTOLIC BLOOD PRESSURE: 69 MMHG | SYSTOLIC BLOOD PRESSURE: 136 MMHG | HEART RATE: 80 BPM | WEIGHT: 185 LBS | HEIGHT: 67 IN

## 2024-03-12 DIAGNOSIS — J32.9 CHRONIC SINUSITIS, UNSPECIFIED LOCATION: ICD-10-CM

## 2024-03-12 DIAGNOSIS — G44.329 CHRONIC POST-TRAUMATIC HEADACHE, NOT INTRACTABLE: Primary | ICD-10-CM

## 2024-03-12 DIAGNOSIS — Z79.01 CHRONIC ANTICOAGULATION: ICD-10-CM

## 2024-03-12 LAB
ALBUMIN SERPL-MCNC: 4.4 G/DL (ref 3.5–5.2)
ALBUMIN/GLOB SERPL: 1.9 G/DL
ALP SERPL-CCNC: 59 U/L (ref 39–117)
ALT SERPL W P-5'-P-CCNC: 30 U/L (ref 1–41)
ANION GAP SERPL CALCULATED.3IONS-SCNC: 9.3 MMOL/L (ref 5–15)
AST SERPL-CCNC: 23 U/L (ref 1–40)
BASOPHILS # BLD AUTO: 0.09 10*3/MM3 (ref 0–0.2)
BASOPHILS NFR BLD AUTO: 1 % (ref 0–1.5)
BILIRUB SERPL-MCNC: 0.3 MG/DL (ref 0–1.2)
BUN SERPL-MCNC: 15 MG/DL (ref 6–20)
BUN/CREAT SERPL: 17.9 (ref 7–25)
CALCIUM SPEC-SCNC: 9.1 MG/DL (ref 8.6–10.5)
CHLORIDE SERPL-SCNC: 102 MMOL/L (ref 98–107)
CO2 SERPL-SCNC: 24.7 MMOL/L (ref 22–29)
CREAT SERPL-MCNC: 0.84 MG/DL (ref 0.76–1.27)
DEPRECATED RDW RBC AUTO: 51.8 FL (ref 37–54)
EGFRCR SERPLBLD CKD-EPI 2021: 108.2 ML/MIN/1.73
EOSINOPHIL # BLD AUTO: 0.31 10*3/MM3 (ref 0–0.4)
EOSINOPHIL NFR BLD AUTO: 3.3 % (ref 0.3–6.2)
ERYTHROCYTE [DISTWIDTH] IN BLOOD BY AUTOMATED COUNT: 15.9 % (ref 12.3–15.4)
ERYTHROCYTE [SEDIMENTATION RATE] IN BLOOD: <1 MM/HR (ref 0–15)
FLUAV RNA RESP QL NAA+PROBE: NOT DETECTED
FLUBV RNA RESP QL NAA+PROBE: NOT DETECTED
GLOBULIN UR ELPH-MCNC: 2.3 GM/DL
GLUCOSE SERPL-MCNC: 109 MG/DL (ref 65–99)
HCT VFR BLD AUTO: 45.2 % (ref 37.5–51)
HGB BLD-MCNC: 14.7 G/DL (ref 13–17.7)
IMM GRANULOCYTES # BLD AUTO: 0.07 10*3/MM3 (ref 0–0.05)
IMM GRANULOCYTES NFR BLD AUTO: 0.7 % (ref 0–0.5)
INR PPP: 2.36 (ref 0.9–1.1)
LYMPHOCYTES # BLD AUTO: 2.98 10*3/MM3 (ref 0.7–3.1)
LYMPHOCYTES NFR BLD AUTO: 31.6 % (ref 19.6–45.3)
MCH RBC QN AUTO: 28.7 PG (ref 26.6–33)
MCHC RBC AUTO-ENTMCNC: 32.5 G/DL (ref 31.5–35.7)
MCV RBC AUTO: 88.1 FL (ref 79–97)
MONOCYTES # BLD AUTO: 0.94 10*3/MM3 (ref 0.1–0.9)
MONOCYTES NFR BLD AUTO: 10 % (ref 5–12)
NEUTROPHILS NFR BLD AUTO: 5.05 10*3/MM3 (ref 1.7–7)
NEUTROPHILS NFR BLD AUTO: 53.4 % (ref 42.7–76)
NRBC BLD AUTO-RTO: 0 /100 WBC (ref 0–0.2)
PLATELET # BLD AUTO: 247 10*3/MM3 (ref 140–450)
PMV BLD AUTO: 10.7 FL (ref 6–12)
POTASSIUM SERPL-SCNC: 4 MMOL/L (ref 3.5–5.2)
PROT SERPL-MCNC: 6.7 G/DL (ref 6–8.5)
PROTHROMBIN TIME: 26.6 SECONDS (ref 12.1–14.7)
RBC # BLD AUTO: 5.13 10*6/MM3 (ref 4.14–5.8)
SARS-COV-2 RNA RESP QL NAA+PROBE: NOT DETECTED
SODIUM SERPL-SCNC: 136 MMOL/L (ref 136–145)
WBC NRBC COR # BLD AUTO: 9.44 10*3/MM3 (ref 3.4–10.8)

## 2024-03-12 PROCEDURE — 80053 COMPREHEN METABOLIC PANEL: CPT | Performed by: PHYSICIAN ASSISTANT

## 2024-03-12 PROCEDURE — 85652 RBC SED RATE AUTOMATED: CPT | Performed by: PHYSICIAN ASSISTANT

## 2024-03-12 PROCEDURE — 85025 COMPLETE CBC W/AUTO DIFF WBC: CPT | Performed by: PHYSICIAN ASSISTANT

## 2024-03-12 PROCEDURE — 36415 COLL VENOUS BLD VENIPUNCTURE: CPT

## 2024-03-12 PROCEDURE — 70450 CT HEAD/BRAIN W/O DYE: CPT

## 2024-03-12 PROCEDURE — 85610 PROTHROMBIN TIME: CPT | Performed by: PHYSICIAN ASSISTANT

## 2024-03-12 PROCEDURE — 87636 SARSCOV2 & INF A&B AMP PRB: CPT | Performed by: PHYSICIAN ASSISTANT

## 2024-03-12 PROCEDURE — 70450 CT HEAD/BRAIN W/O DYE: CPT | Performed by: RADIOLOGY

## 2024-03-12 RX ORDER — AMOXICILLIN AND CLAVULANATE POTASSIUM 875; 125 MG/1; MG/1
1 TABLET, FILM COATED ORAL 2 TIMES DAILY
Qty: 10 TABLET | Refills: 0 | Status: SHIPPED | OUTPATIENT
Start: 2024-03-12 | End: 2024-03-17
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
## 2024-03-12 NOTE — ED NOTES
MEDICAL SCREENING:    Reason for Visit: pt has headache, throbbing dull pain worse with coughing     Patient initially seen in triage.  The patient was advised further evaluation and diagnostic testing will be needed, some of the treatment and testing will be initiated in the lobby in order to begin the process.  The patient will be returned to the waiting area for the time being and possibly be re-assessed by a subsequent ED provider.  The patient will be brought back to the treatment area in as timely manner as possible.      Mónica Qiu PA  03/11/24 9593

## 2024-03-31 ENCOUNTER — APPOINTMENT (OUTPATIENT)
Dept: GENERAL RADIOLOGY | Facility: HOSPITAL | Age: 49
End: 2024-03-31
Payer: COMMERCIAL

## 2024-03-31 ENCOUNTER — HOSPITAL ENCOUNTER (EMERGENCY)
Facility: HOSPITAL | Age: 49
Discharge: HOME | End: 2024-03-31
Attending: EMERGENCY MEDICINE | Admitting: EMERGENCY MEDICINE
Payer: COMMERCIAL

## 2024-03-31 ENCOUNTER — HOSPITAL ENCOUNTER (EMERGENCY)
Facility: HOSPITAL | Age: 49
Discharge: HOME | End: 2024-03-31
Attending: STUDENT IN AN ORGANIZED HEALTH CARE EDUCATION/TRAINING PROGRAM | Admitting: STUDENT IN AN ORGANIZED HEALTH CARE EDUCATION/TRAINING PROGRAM
Payer: COMMERCIAL

## 2024-03-31 VITALS
HEIGHT: 67 IN | BODY MASS INDEX: 29.66 KG/M2 | TEMPERATURE: 97.8 F | OXYGEN SATURATION: 95 % | HEART RATE: 92 BPM | SYSTOLIC BLOOD PRESSURE: 141 MMHG | DIASTOLIC BLOOD PRESSURE: 77 MMHG | WEIGHT: 189 LBS | RESPIRATION RATE: 16 BRPM

## 2024-03-31 VITALS
BODY MASS INDEX: 29.66 KG/M2 | WEIGHT: 189 LBS | DIASTOLIC BLOOD PRESSURE: 66 MMHG | RESPIRATION RATE: 18 BRPM | HEIGHT: 67 IN | SYSTOLIC BLOOD PRESSURE: 144 MMHG | OXYGEN SATURATION: 98 % | HEART RATE: 79 BPM | TEMPERATURE: 97.9 F

## 2024-03-31 VITALS
RESPIRATION RATE: 20 BRPM | SYSTOLIC BLOOD PRESSURE: 186 MMHG | HEIGHT: 67 IN | HEART RATE: 90 BPM | TEMPERATURE: 98.1 F | OXYGEN SATURATION: 98 % | BODY MASS INDEX: 29.66 KG/M2 | WEIGHT: 189 LBS | DIASTOLIC BLOOD PRESSURE: 89 MMHG

## 2024-03-31 DIAGNOSIS — S43.401A SPRAIN OF RIGHT SHOULDER, UNSPECIFIED SHOULDER SPRAIN TYPE, INITIAL ENCOUNTER: Primary | ICD-10-CM

## 2024-03-31 DIAGNOSIS — M54.50 ACUTE LOW BACK PAIN, UNSPECIFIED BACK PAIN LATERALITY, UNSPECIFIED WHETHER SCIATICA PRESENT: Primary | ICD-10-CM

## 2024-03-31 DIAGNOSIS — G89.29 CHRONIC THORACIC BACK PAIN, UNSPECIFIED BACK PAIN LATERALITY: Primary | ICD-10-CM

## 2024-03-31 DIAGNOSIS — M54.6 CHRONIC THORACIC BACK PAIN, UNSPECIFIED BACK PAIN LATERALITY: Primary | ICD-10-CM

## 2024-03-31 LAB
ALBUMIN SERPL-MCNC: 4.6 G/DL (ref 3.5–5.2)
ALBUMIN/GLOB SERPL: 1.9 G/DL
ALP SERPL-CCNC: 59 U/L (ref 39–117)
ALT SERPL W P-5'-P-CCNC: 66 U/L (ref 1–41)
ANION GAP SERPL CALCULATED.3IONS-SCNC: 12.2 MMOL/L (ref 5–15)
AST SERPL-CCNC: 31 U/L (ref 1–40)
BASOPHILS # BLD AUTO: 0.09 10*3/MM3 (ref 0–0.2)
BASOPHILS NFR BLD AUTO: 0.9 % (ref 0–1.5)
BILIRUB SERPL-MCNC: 0.4 MG/DL (ref 0–1.2)
BUN SERPL-MCNC: 11 MG/DL (ref 6–20)
BUN/CREAT SERPL: 14.1 (ref 7–25)
CALCIUM SPEC-SCNC: 9.2 MG/DL (ref 8.6–10.5)
CHLORIDE SERPL-SCNC: 103 MMOL/L (ref 98–107)
CO2 SERPL-SCNC: 21.8 MMOL/L (ref 22–29)
CREAT SERPL-MCNC: 0.78 MG/DL (ref 0.76–1.27)
DEPRECATED RDW RBC AUTO: 55.1 FL (ref 37–54)
EGFRCR SERPLBLD CKD-EPI 2021: 110.7 ML/MIN/1.73
EOSINOPHIL # BLD AUTO: 0.17 10*3/MM3 (ref 0–0.4)
EOSINOPHIL NFR BLD AUTO: 1.6 % (ref 0.3–6.2)
ERYTHROCYTE [DISTWIDTH] IN BLOOD BY AUTOMATED COUNT: 16.5 % (ref 12.3–15.4)
GLOBULIN UR ELPH-MCNC: 2.4 GM/DL
GLUCOSE SERPL-MCNC: 97 MG/DL (ref 65–99)
HCT VFR BLD AUTO: 48.8 % (ref 37.5–51)
HGB BLD-MCNC: 15.2 G/DL (ref 13–17.7)
HOLD SPECIMEN: NORMAL
HOLD SPECIMEN: NORMAL
IMM GRANULOCYTES # BLD AUTO: 0.07 10*3/MM3 (ref 0–0.05)
IMM GRANULOCYTES NFR BLD AUTO: 0.7 % (ref 0–0.5)
INR PPP: 2.19 (ref 0.9–1.1)
LYMPHOCYTES # BLD AUTO: 2.15 10*3/MM3 (ref 0.7–3.1)
LYMPHOCYTES NFR BLD AUTO: 20.3 % (ref 19.6–45.3)
MCH RBC QN AUTO: 28.1 PG (ref 26.6–33)
MCHC RBC AUTO-ENTMCNC: 31.1 G/DL (ref 31.5–35.7)
MCV RBC AUTO: 90.4 FL (ref 79–97)
MONOCYTES # BLD AUTO: 0.92 10*3/MM3 (ref 0.1–0.9)
MONOCYTES NFR BLD AUTO: 8.7 % (ref 5–12)
NEUTROPHILS NFR BLD AUTO: 67.8 % (ref 42.7–76)
NEUTROPHILS NFR BLD AUTO: 7.17 10*3/MM3 (ref 1.7–7)
NRBC BLD AUTO-RTO: 0 /100 WBC (ref 0–0.2)
PLATELET # BLD AUTO: 242 10*3/MM3 (ref 140–450)
PMV BLD AUTO: 10.9 FL (ref 6–12)
POTASSIUM SERPL-SCNC: 4.5 MMOL/L (ref 3.5–5.2)
PROT SERPL-MCNC: 7 G/DL (ref 6–8.5)
PROTHROMBIN TIME: 25 SECONDS (ref 12.1–14.7)
RBC # BLD AUTO: 5.4 10*6/MM3 (ref 4.14–5.8)
SODIUM SERPL-SCNC: 137 MMOL/L (ref 136–145)
TROPONIN T SERPL HS-MCNC: <6 NG/L
WBC NRBC COR # BLD AUTO: 10.57 10*3/MM3 (ref 3.4–10.8)
WHOLE BLOOD HOLD COAG: NORMAL
WHOLE BLOOD HOLD SPECIMEN: NORMAL

## 2024-03-31 PROCEDURE — 84484 ASSAY OF TROPONIN QUANT: CPT | Performed by: STUDENT IN AN ORGANIZED HEALTH CARE EDUCATION/TRAINING PROGRAM

## 2024-03-31 PROCEDURE — 85610 PROTHROMBIN TIME: CPT | Performed by: STUDENT IN AN ORGANIZED HEALTH CARE EDUCATION/TRAINING PROGRAM

## 2024-03-31 PROCEDURE — 36415 COLL VENOUS BLD VENIPUNCTURE: CPT

## 2024-03-31 PROCEDURE — 25010000002 ORPHENADRINE CITRATE PER 60 MG: Performed by: NURSE PRACTITIONER

## 2024-03-31 PROCEDURE — 71045 X-RAY EXAM CHEST 1 VIEW: CPT | Performed by: RADIOLOGY

## 2024-03-31 PROCEDURE — 71045 X-RAY EXAM CHEST 1 VIEW: CPT

## 2024-03-31 PROCEDURE — 73010 X-RAY EXAM OF SHOULDER BLADE: CPT | Performed by: RADIOLOGY

## 2024-03-31 PROCEDURE — 63710000001 ONDANSETRON ODT 4 MG TABLET DISPERSIBLE: Performed by: NURSE PRACTITIONER

## 2024-03-31 PROCEDURE — 25010000002 MORPHINE PER 10 MG: Performed by: NURSE PRACTITIONER

## 2024-03-31 PROCEDURE — 80053 COMPREHEN METABOLIC PANEL: CPT | Performed by: STUDENT IN AN ORGANIZED HEALTH CARE EDUCATION/TRAINING PROGRAM

## 2024-03-31 PROCEDURE — 73030 X-RAY EXAM OF SHOULDER: CPT

## 2024-03-31 PROCEDURE — 72072 X-RAY EXAM THORAC SPINE 3VWS: CPT | Performed by: RADIOLOGY

## 2024-03-31 PROCEDURE — 99283 EMERGENCY DEPT VISIT LOW MDM: CPT

## 2024-03-31 PROCEDURE — 99284 EMERGENCY DEPT VISIT MOD MDM: CPT

## 2024-03-31 PROCEDURE — 96372 THER/PROPH/DIAG INJ SC/IM: CPT

## 2024-03-31 PROCEDURE — 73010 X-RAY EXAM OF SHOULDER BLADE: CPT

## 2024-03-31 PROCEDURE — 72072 X-RAY EXAM THORAC SPINE 3VWS: CPT

## 2024-03-31 PROCEDURE — 73030 X-RAY EXAM OF SHOULDER: CPT | Performed by: RADIOLOGY

## 2024-03-31 PROCEDURE — 85025 COMPLETE CBC W/AUTO DIFF WBC: CPT | Performed by: STUDENT IN AN ORGANIZED HEALTH CARE EDUCATION/TRAINING PROGRAM

## 2024-03-31 PROCEDURE — 93005 ELECTROCARDIOGRAM TRACING: CPT | Performed by: STUDENT IN AN ORGANIZED HEALTH CARE EDUCATION/TRAINING PROGRAM

## 2024-03-31 RX ORDER — SODIUM CHLORIDE 0.9 % (FLUSH) 0.9 %
10 SYRINGE (ML) INJECTION AS NEEDED
Status: DISCONTINUED | OUTPATIENT
Start: 2024-03-31 | End: 2024-03-31 | Stop reason: HOSPADM

## 2024-03-31 RX ORDER — ONDANSETRON 4 MG/1
4 TABLET, ORALLY DISINTEGRATING ORAL ONCE
Status: COMPLETED | OUTPATIENT
Start: 2024-03-31 | End: 2024-03-31

## 2024-03-31 RX ORDER — ACETAMINOPHEN 500 MG
1000 TABLET ORAL ONCE
Status: COMPLETED | OUTPATIENT
Start: 2024-03-31 | End: 2024-03-31

## 2024-03-31 RX ORDER — HYDROCODONE BITARTRATE AND ACETAMINOPHEN 5; 325 MG/1; MG/1
1 TABLET ORAL ONCE
Status: COMPLETED | OUTPATIENT
Start: 2024-03-31 | End: 2024-03-31

## 2024-03-31 RX ORDER — ORPHENADRINE CITRATE 30 MG/ML
60 INJECTION INTRAMUSCULAR; INTRAVENOUS ONCE
Status: DISCONTINUED | OUTPATIENT
Start: 2024-03-31 | End: 2024-03-31 | Stop reason: HOSPADM

## 2024-03-31 RX ORDER — CYCLOBENZAPRINE HCL 10 MG
10 TABLET ORAL 3 TIMES DAILY PRN
Qty: 10 TABLET | Refills: 0 | Status: SHIPPED | OUTPATIENT
Start: 2024-03-31

## 2024-03-31 RX ADMIN — ONDANSETRON 4 MG: 4 TABLET, ORALLY DISINTEGRATING ORAL at 13:00

## 2024-03-31 RX ADMIN — HYDROCODONE BITARTRATE AND ACETAMINOPHEN 1 TABLET: 5; 325 TABLET ORAL at 05:36

## 2024-03-31 RX ADMIN — ACETAMINOPHEN 1000 MG: 500 TABLET ORAL at 20:02

## 2024-03-31 RX ADMIN — MORPHINE SULFATE 4 MG: 4 INJECTION, SOLUTION INTRAMUSCULAR; INTRAVENOUS at 13:00
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
## 2024-03-31 NOTE — ED PROVIDER NOTES
"Subjective   History of Present Illness  Patient is a 48-year-old male presents today complaining of back pain.  Patient reports that he was seen and had x-rays and had a pain pill and states that he did work for some time states pain is returned.  Patient is unable to get a ride home.  Patient denies any alleviating factors.  Patient reports pain is worse with walking around.    Back Pain      Review of Systems   Constitutional: Negative.    HENT: Negative.     Eyes: Negative.    Respiratory: Negative.     Cardiovascular: Negative.    Gastrointestinal: Negative.    Endocrine: Negative.    Genitourinary: Negative.    Musculoskeletal:  Positive for back pain.   Skin: Negative.    Allergic/Immunologic: Negative.    Neurological: Negative.    Hematological: Negative.    Psychiatric/Behavioral: Negative.         Past Medical History:   Diagnosis Date    Anxiety     Asthma     Back pain     Chronic anticoagulation 2/2/2020    COPD (chronic obstructive pulmonary disease)     Emphysema lung     Gastritis     GERD (gastroesophageal reflux disease)     Headache     Heart valve disease 2018    valve replac., prosthetic valve    Hx of aortic valve replacement, mechanical 11/28/2018    Hyperglycemia 2/2/2020    Hypertension     Migraine     Substance abuse        Allergies   Allergen Reactions    Aspirin Anaphylaxis     Patient said, \"it chokes him up.\" patient said, \"I got really short of breath and started to have an asthma attack.\"       Past Surgical History:   Procedure Laterality Date    CARDIAC CATHETERIZATION N/A 10/23/2017    Procedure: Left Heart Cath;  Surgeon: Rodolfo Núñez MD;  Location:  [facility];  Service:     CARDIAC CATHETERIZATION N/A 9/20/2021    Procedure: Left Heart Cath;  Surgeon: Delvin Carson MD;  [facility]:  [facility];  Service: Cardiovascular;  Laterality: N/A;    CARDIAC VALVE REPLACEMENT  2018    prosthetic valve    DENTAL PROCEDURE   " "   LIVER SURGERY      tumor removed from liver    OTHER SURGICAL HISTORY      \"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\"    THORACIC AORTIC ANEURYSM ASCENDING/ARCH REPAIR N/A 1/17/2018    Procedure: MEDIAN STERNOTOMY, AORTIC VALVE CONDUIT, BENTAL, TRANSESOPHAGEAL ECHOCARDIOGRAM WITH ANESTHESIA;  Surgeon: Aaron Lucas MD;  Location: [facility];  Service:        Family History   Problem Relation Age of Onset    COPD Mother        Social History     Socioeconomic History    Marital status: Single    Number of children: 0   Tobacco Use    Smoking status: Former     Current packs/day: 1.00     Average packs/day: 1 pack/day for 4.0 years (4.0 ttl pk-yrs)     Types: Cigarettes    Smokeless tobacco: Current     Types: Snuff   Vaping Use    Vaping status: Never Used   Substance and Sexual Activity    Alcohol use: No     Comment: occassional     Drug use: No    Sexual activity: Defer           Objective   Physical Exam  Vitals and nursing note reviewed.   Constitutional:       Appearance: He is well-developed.   HENT:      Head: Normocephalic.      Right Ear: External ear normal.      Left Ear: External ear normal.   Eyes:      Conjunctiva/sclera: Conjunctivae normal.      Pupils: Pupils are equal, round, and reactive to light.   Cardiovascular:      Rate and Rhythm: Normal rate and regular rhythm.      Heart sounds: Normal heart sounds.   Pulmonary:      Effort: Pulmonary effort is normal.      Breath sounds: Normal breath sounds.   Abdominal:      General: Bowel sounds are normal.      Palpations: Abdomen is soft.   Musculoskeletal:         General: Normal range of motion.      Cervical back: Normal range of motion and neck supple.      Lumbar back: Tenderness present. Decreased range of motion.   Skin:     General: Skin is warm and dry.      Capillary Refill: Capillary refill takes less than 2 seconds.   Neurological:      Mental Status: He is alert and oriented to person, place, and time.   Psychiatric:         " Behavior: Behavior normal.         Thought Content: Thought content normal.         Procedures           ED Course                                             Medical Decision Making  Problems Addressed:  Acute low back pain, unspecified back pain laterality, unspecified whether sciatica present: complicated acute illness or injury    Risk  Prescription drug management.        Final diagnoses:   Acute low back pain, unspecified back pain laterality, unspecified whether sciatica present       ED Disposition  ED Disposition       ED Disposition   Discharge    Condition   Stable    Comment   --               Macho Jose MD  [address] [facility] [address]  [phone]    Schedule an appointment as soon as possible for a visit   For further evaluation         Medication List      No changes were made to your prescriptions during this visit.            Filemon Núñez, APRN  04/01/24 8078

## 2024-03-31 NOTE — ED TRIAGE NOTES
MEDICAL SCREENING:    Reason for Visit: Shoulder Pain    Patient initially seen in triage.  The patient was advised further evaluation and diagnostic testing will be needed, some of the treatment and testing will be initiated in the lobby in order to begin the process.  The patient will be returned to the waiting area for the time being and possibly be re-assessed by a subsequent ED provider.  The patient will be brought back to the treatment area in as timely manner as possible.

## 2024-04-01 ENCOUNTER — HOSPITAL ENCOUNTER (EMERGENCY)
Facility: HOSPITAL | Age: 49
Discharge: HOME | End: 2024-04-01
Attending: STUDENT IN AN ORGANIZED HEALTH CARE EDUCATION/TRAINING PROGRAM | Admitting: STUDENT IN AN ORGANIZED HEALTH CARE EDUCATION/TRAINING PROGRAM
Payer: COMMERCIAL

## 2024-04-01 VITALS
DIASTOLIC BLOOD PRESSURE: 77 MMHG | RESPIRATION RATE: 12 BRPM | BODY MASS INDEX: 29.65 KG/M2 | HEIGHT: 67 IN | WEIGHT: 188.93 LBS | HEART RATE: 87 BPM | OXYGEN SATURATION: 96 % | SYSTOLIC BLOOD PRESSURE: 127 MMHG | TEMPERATURE: 98.1 F

## 2024-04-01 DIAGNOSIS — M54.6 CHRONIC MIDLINE THORACIC BACK PAIN: Primary | ICD-10-CM

## 2024-04-01 DIAGNOSIS — G89.29 CHRONIC MIDLINE THORACIC BACK PAIN: Primary | ICD-10-CM

## 2024-04-01 PROCEDURE — 96372 THER/PROPH/DIAG INJ SC/IM: CPT

## 2024-04-01 PROCEDURE — 99283 EMERGENCY DEPT VISIT LOW MDM: CPT

## 2024-04-01 PROCEDURE — 25010000002 DEXAMETHASONE SODIUM PHOSPHATE 10 MG/ML SOLUTION: Performed by: PHYSICIAN ASSISTANT

## 2024-04-01 RX ORDER — ACETAMINOPHEN 500 MG
500 TABLET ORAL EVERY 6 HOURS PRN
Qty: 30 TABLET | Refills: 0 | Status: SHIPPED | OUTPATIENT
Start: 2024-04-01

## 2024-04-01 RX ORDER — ACETAMINOPHEN 500 MG
1000 TABLET ORAL ONCE
Status: COMPLETED | OUTPATIENT
Start: 2024-04-01 | End: 2024-04-01

## 2024-04-01 RX ORDER — DEXAMETHASONE SODIUM PHOSPHATE 10 MG/ML
10 INJECTION, SOLUTION INTRAMUSCULAR; INTRAVENOUS ONCE
Status: COMPLETED | OUTPATIENT
Start: 2024-04-01 | End: 2024-04-01

## 2024-04-01 RX ADMIN — ACETAMINOPHEN 1000 MG: 500 TABLET ORAL at 21:34

## 2024-04-01 RX ADMIN — DEXAMETHASONE SODIUM PHOSPHATE 10 MG: 10 INJECTION INTRAMUSCULAR; INTRAVENOUS at 21:34

## 2024-04-01 NOTE — ED PROVIDER NOTES
"Subjective   History of Present Illness  48-year-old male with past medical history of anxiety, COPD, mechanical aortic valve, and substance abuse presents to the ER due to concerns for increasing thoracic back pain.  Patient has been seen multiple times within the last 12 hours in this ER for multiple back pain complaints.  Patient has been treated multiple times.  Patient returns because he was unable to find a ride home.  Patient now complains that his thoracic back pain radiates into his chest.  Symptoms have been present for the past 2 to 3 months.  No obvious aggravating or alleviating factors.  No recent trauma or injury.  Vital stable.  Afebrile      Review of Systems   Cardiovascular:  Positive for chest pain.   Musculoskeletal:  Positive for back pain.   All other systems reviewed and are negative.      Past Medical History:   Diagnosis Date    Anxiety     Asthma     Back pain     Chronic anticoagulation 2/2/2020    COPD (chronic obstructive pulmonary disease)     Emphysema lung     Gastritis     GERD (gastroesophageal reflux disease)     Headache     Heart valve disease 2018    valve replac., prosthetic valve    Hx of aortic valve replacement, mechanical 11/28/2018    Hyperglycemia 2/2/2020    Hypertension     Migraine     Substance abuse        Allergies   Allergen Reactions    Aspirin Anaphylaxis     Patient said, \"it chokes him up.\" patient said, \"I got really short of breath and started to have an asthma attack.\"       Past Surgical History:   Procedure Laterality Date    CARDIAC CATHETERIZATION N/A 10/23/2017    Procedure: Left Heart Cath;  Surgeon: Rodolfo Núñez MD;  Location:  [facility];  Service:     CARDIAC CATHETERIZATION N/A 9/20/2021    Procedure: Left Heart Cath;  Surgeon: Delvin Carson MD;  [facility]:  [facility];  Service: Cardiovascular;  Laterality: N/A;    CARDIAC VALVE REPLACEMENT  2018    prosthetic valve    DENTAL " "PROCEDURE      LIVER SURGERY      tumor removed from liver    OTHER SURGICAL HISTORY      \"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\"    THORACIC AORTIC ANEURYSM ASCENDING/ARCH REPAIR N/A 1/17/2018    Procedure: MEDIAN STERNOTOMY, AORTIC VALVE CONDUIT, BENTAL, TRANSESOPHAGEAL ECHOCARDIOGRAM WITH ANESTHESIA;  Surgeon: Aaron Lucas MD;  Location: [facility];  Service:        Family History   Problem Relation Age of Onset    COPD Mother        Social History     Socioeconomic History    Marital status: Single    Number of children: 0   Tobacco Use    Smoking status: Former     Current packs/day: 1.00     Average packs/day: 1 pack/day for 4.0 years (4.0 ttl pk-yrs)     Types: Cigarettes    Smokeless tobacco: Current     Types: Snuff   Vaping Use    Vaping status: Never Used   Substance and Sexual Activity    Alcohol use: No     Comment: occassional     Drug use: No    Sexual activity: Defer           Objective   Physical Exam  Constitutional:       General: He is not in acute distress.     Appearance: He is well-developed. He is not ill-appearing.   HENT:      Head: Normocephalic and atraumatic.   Eyes:      Extraocular Movements: Extraocular movements intact.      Pupils: Pupils are equal, round, and reactive to light.   Neck:      Vascular: No JVD.   Cardiovascular:      Rate and Rhythm: Normal rate and regular rhythm.      Heart sounds: Normal heart sounds. No murmur heard.     Comments: Audible mechanical valve  Pulmonary:      Effort: No tachypnea, accessory muscle usage or respiratory distress.      Breath sounds: Normal breath sounds. No stridor. No decreased breath sounds, wheezing, rhonchi or rales.   Chest:      Chest wall: No deformity, tenderness or crepitus.   Abdominal:      General: Bowel sounds are normal.      Palpations: Abdomen is soft.      Tenderness: There is no abdominal tenderness. There is no guarding or rebound.   Musculoskeletal:         General: Normal range of motion.      Cervical back: " Normal range of motion and neck supple.      Right lower leg: No tenderness. No edema.      Left lower leg: No tenderness. No edema.   Lymphadenopathy:      Cervical: No cervical adenopathy.   Skin:     General: Skin is warm and dry.      Coloration: Skin is not cyanotic.      Findings: No ecchymosis or erythema.   Neurological:      General: No focal deficit present.      Mental Status: He is alert and oriented to person, place, and time.      Cranial Nerves: No cranial nerve deficit.      Motor: No weakness.   Psychiatric:         Mood and Affect: Mood normal. Mood is not anxious.         Behavior: Behavior normal. Behavior is not agitated.         Procedures           ED Course  ED Course as of 03/31/24 2010   Sun Mar 31, 2024   1748 EKG notes sinus rhythm.  82 bpm.  No acute ST elevation.  QTc 448.  Electronically signed by Ricardo Vegas DO, 03/31/24, 5:49 PM EDT.   [SF]      ED Course User Index  [SF] Ricardo Vegas DO                                   XR Spine Thoracic 3 View    Result Date: 3/31/2024  Impression:  1.  No acute fracture or dislocation. 2.  No foreign body.  This report was finalized on 3/31/2024 7:35 PM by Sonny Sanchez MD.      XR Chest 1 View    Result Date: 3/31/2024   1.  Enlarged heart size with prior sternotomy. 2.  No lobar consolidation or edema. 3.  No pleural effusion or pneumothorax. 4.  No fracture or foreign body.  This report was finalized on 3/31/2024 7:34 PM by Sonny Sanchez MD.      XR Scapula Right    Result Date: 3/31/2024   1.  No acute fracture or dislocation 2.  No foreign body.  This report was finalized on 3/31/2024 4:40 AM by Sonny Sanchez MD.      XR Shoulder 2+ View Right    Result Date: 3/31/2024   1.  No acute fracture or dislocation. 2.  No foreign body.  This report was finalized on 3/31/2024 4:39 AM by Sonny Sanchez MD.         Results for orders placed or performed during the hospital encounter of 03/31/24   Comprehensive Metabolic Panel    Specimen:  Arm, Right; Blood   Result Value Ref Range    Glucose 97 65 - 99 mg/dL    BUN 11 6 - 20 mg/dL    Creatinine 0.78 0.76 - 1.27 mg/dL    Sodium 137 136 - 145 mmol/L    Potassium 4.5 3.5 - 5.2 mmol/L    Chloride 103 98 - 107 mmol/L    CO2 21.8 (L) 22.0 - 29.0 mmol/L    Calcium 9.2 8.6 - 10.5 mg/dL    Total Protein 7.0 6.0 - 8.5 g/dL    Albumin 4.6 3.5 - 5.2 g/dL    ALT (SGPT) 66 (H) 1 - 41 U/L    AST (SGOT) 31 1 - 40 U/L    Alkaline Phosphatase 59 39 - 117 U/L    Total Bilirubin 0.4 0.0 - 1.2 mg/dL    Globulin 2.4 gm/dL    A/G Ratio 1.9 g/dL    BUN/Creatinine Ratio 14.1 7.0 - 25.0    Anion Gap 12.2 5.0 - 15.0 mmol/L    eGFR 110.7 >60.0 mL/min/1.73   High Sensitivity Troponin T    Specimen: Arm, Right; Blood   Result Value Ref Range    HS Troponin T <6 <22 ng/L   Protime-INR    Specimen: Arm, Right; Blood   Result Value Ref Range    Protime 25.0 (H) 12.1 - 14.7 Seconds    INR 2.19 (H) 0.90 - 1.10   CBC Auto Differential    Specimen: Arm, Right; Blood   Result Value Ref Range    WBC 10.57 3.40 - 10.80 10*3/mm3    RBC 5.40 4.14 - 5.80 10*6/mm3    Hemoglobin 15.2 13.0 - 17.7 g/dL    Hematocrit 48.8 37.5 - 51.0 %    MCV 90.4 79.0 - 97.0 fL    MCH 28.1 26.6 - 33.0 pg    MCHC 31.1 (L) 31.5 - 35.7 g/dL    RDW 16.5 (H) 12.3 - 15.4 %    RDW-SD 55.1 (H) 37.0 - 54.0 fl    MPV 10.9 6.0 - 12.0 fL    Platelets 242 140 - 450 10*3/mm3    Neutrophil % 67.8 42.7 - 76.0 %    Lymphocyte % 20.3 19.6 - 45.3 %    Monocyte % 8.7 5.0 - 12.0 %    Eosinophil % 1.6 0.3 - 6.2 %    Basophil % 0.9 0.0 - 1.5 %    Immature Grans % 0.7 (H) 0.0 - 0.5 %    Neutrophils, Absolute 7.17 (H) 1.70 - 7.00 10*3/mm3    Lymphocytes, Absolute 2.15 0.70 - 3.10 10*3/mm3    Monocytes, Absolute 0.92 (H) 0.10 - 0.90 10*3/mm3    Eosinophils, Absolute 0.17 0.00 - 0.40 10*3/mm3    Basophils, Absolute 0.09 0.00 - 0.20 10*3/mm3    Immature Grans, Absolute 0.07 (H) 0.00 - 0.05 10*3/mm3    nRBC 0.0 0.0 - 0.2 /100 WBC   Green Top (Gel)   Result Value Ref Range    Extra Tube Hold  for add-ons.    Lavender Top   Result Value Ref Range    Extra Tube hold for add-on    Gold Top - SST   Result Value Ref Range    Extra Tube Hold for add-ons.    Light Blue Top   Result Value Ref Range    Extra Tube Hold for add-ons.                Medical Decision Making  CBC and CMP are unremarkable.  Troponin unremarkable.  EKG listed.  Chest x-ray unremarkable.  Thoracic spine imaging unremarkable.    Given the patient's symptoms been present for the past 2 to 3 months, cardiac associated chest pain unlikely.  Symptoms are likely musculoskeletal in nature.  Patient has received opioids in the last 2 visits to this ER earlier today.  I informed him he would not be receiving any additional opiates.  Also the patient to follow-up with his PCP.  Work up and results were discussed throughly with the patient.  The patient will be discharged for further monitoring and management with their PCP.  Red flags, warning signs, worsening symptoms, and when to return to the ER discussed with and understood by the patient.  Patient will follow up with their PCP in a timely manner.  Vitals stable at discharge.    Amount and/or Complexity of Data Reviewed  Labs: ordered. Decision-making details documented in ED Course.  Radiology: ordered. Decision-making details documented in ED Course.  ECG/medicine tests: ordered.    Risk  OTC drugs.  Prescription drug management.        Final diagnoses:   Chronic thoracic back pain, unspecified back pain laterality       ED Disposition  ED Disposition       ED Disposition   Discharge    Condition   Stable    Comment   --               No follow-up provider specified.       Medication List      No changes were made to your prescriptions during this visit.            Ricardo Vegas DO  03/31/24 2011
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
## 2024-04-05 LAB
QT INTERVAL: 384 MS
QTC INTERVAL: 448 MS

## 2024-08-20 ENCOUNTER — OFFICE VISIT (OUTPATIENT)
Age: 49
End: 2024-08-20
Payer: COMMERCIAL

## 2024-08-20 VITALS
BODY MASS INDEX: 29.51 KG/M2 | SYSTOLIC BLOOD PRESSURE: 119 MMHG | HEART RATE: 85 BPM | HEIGHT: 67 IN | OXYGEN SATURATION: 99 % | DIASTOLIC BLOOD PRESSURE: 67 MMHG | WEIGHT: 188 LBS

## 2024-08-20 DIAGNOSIS — I25.10 CORONARY ARTERY DISEASE INVOLVING NATIVE CORONARY ARTERY OF NATIVE HEART WITHOUT ANGINA PECTORIS: ICD-10-CM

## 2024-08-20 DIAGNOSIS — Z95.2 AORTIC VALVE PROSTHESIS PRESENT: Primary | ICD-10-CM

## 2024-08-20 PROCEDURE — 3074F SYST BP LT 130 MM HG: CPT | Performed by: INTERNAL MEDICINE

## 2024-08-20 PROCEDURE — 99214 OFFICE O/P EST MOD 30 MIN: CPT | Performed by: INTERNAL MEDICINE

## 2024-08-20 PROCEDURE — 3078F DIAST BP <80 MM HG: CPT | Performed by: INTERNAL MEDICINE

## 2024-08-20 RX ORDER — ENOXAPARIN SODIUM 100 MG/ML
INJECTION SUBCUTANEOUS EVERY 12 HOURS SCHEDULED
COMMUNITY

## 2024-08-20 RX ORDER — PANTOPRAZOLE SODIUM 40 MG/1
40 TABLET, DELAYED RELEASE ORAL DAILY
COMMUNITY

## 2024-08-20 RX ORDER — WARFARIN SODIUM 5 MG/1
5 TABLET ORAL
COMMUNITY

## 2024-08-20 RX ORDER — METOPROLOL SUCCINATE 25 MG/1
25 TABLET, EXTENDED RELEASE ORAL DAILY
COMMUNITY

## 2024-08-20 RX ORDER — CLOPIDOGREL BISULFATE 75 MG/1
75 TABLET ORAL DAILY
Qty: 30 TABLET | Refills: 11 | Status: SHIPPED | OUTPATIENT
Start: 2024-08-20

## 2024-08-20 RX ORDER — ROSUVASTATIN CALCIUM 20 MG/1
20 TABLET, COATED ORAL DAILY
COMMUNITY

## 2024-08-20 RX ORDER — WARFARIN SODIUM 1 MG
1 TABLET ORAL
COMMUNITY

## 2024-08-20 RX ORDER — METHOCARBAMOL 500 MG/1
500 TABLET, FILM COATED ORAL 4 TIMES DAILY
COMMUNITY

## 2024-08-20 RX ORDER — LORATADINE 10 MG/1
CAPSULE, LIQUID FILLED ORAL
COMMUNITY

## 2024-08-20 NOTE — PROGRESS NOTES
Macho Jose MD  Filemon Jj  1975 08/20/2024    Patient Active Problem List   Diagnosis    Ascending aortic aneurysm    Aneurysm of aortic sinus of Valsalva without rupture    COPD (chronic obstructive pulmonary disease)    Essential hypertension    Hx of aortic valve replacement, mechanical    COPD with acute exacerbation    Obesity (BMI 30-39.9)    GERD without esophagitis    Anxiety disorder    Chronic anticoagulation    Hyperglycemia    Mixed hyperlipidemia    Chronic systolic congestive heart failure    Myocardial infarction involving right coronary artery       Dear Macho Jose MD:    Subjective     Filemon Jj is a 49 y.o. male with the problems as listed above, presents    Chief complaint: Follow-up of mechanical aortic valve and previous history of STEMI    History of Present Illness: Mr. Valentino Jj is a pleasant 49-year-old  male with history of ascending aortic aneurysm, status post aortic mechanical valve conduit (Bentall procedure) on 1/17/2018.  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  He was left on medical therapy.  He he has been lost for cardiology follow-up for the last 2 to 3 years.  He now presents to have a cardiology follow-up.  On further questioning he denies any complaints of chest pain.  He has dyspnea with moderate exertion with no PND, orthopnea or pedal edema.    Cardiac Catheterization/Vascular Study      Accession Number: [id number]   Date of Study: 9/20/21   Ordering Provider: Delvin Carson MD   Clinical Indications: STEMI        Coronary anatomy findings:     LM: Is a large calibre vessel , normal take off from left cusp, divides into LAD and Lcx.  The left main had no significant  stenosis.     LAD: The LAD had no significant stenosis other than minimal luminal irregularities.     LCX: Moderate calibre vessel, mild luminal irregularities in the proximal portion, there is a small to moderate caliber OM 1 artery followed by a large OM 2 artery, the distal portion of this OM 2 artery had a critical appearance of spontaneous coronary artery dissection with a diffuse long 95 to 99% stenosis, the vessel was too small the distal segment on the unable for any cutaneous intervention especially given the appearance of scad.     RCA: The right coronary artery is a large-caliber dominant artery, noted mild luminal irregularities with no significant stenosis in the entire RCA, the RPDA and PL branches appeared normal with no significant stenosis.     Left Ventriculography:     Did not cross the valve due to the presence of mechanical aortic valve.        Final Impression:  Patient's ST elevation is likely coming from the distal OM 2 branch of spontaneous coronary artery dissection lesion, since the vessel was too distal and very small caliber for any intervention will definitely medical management, patient is currently chest pain-free and his ST segment elevation has resolved.        Recommendations:  Would resume his Coumadin to keep his INR greater than 2.5-3.  He has been subtherapeutic in the past a week for INR labs in our labs.  He has been compliant with his Coumadin.  I will also initiate him on Plavix/Brilinta along with the Coumadin at least for 1 year.  We will also check an echocardiogram to assess for LV systolic function and rule out any significant aortic valve pathology, patient had an echo 2 days ago and was reported to have normal aortic valve function.        Delvin Carson MD, [facility]  Interventional Cardiology    09/20/21  07:33 EDT    Complete Transthoracic Echocardiogram with Complete Doppler and Color Flow    Accession Number: [id number]   Date of Study: 9/20/21   Ordering  "Provider: Delvin Carson MD   Clinical Indications: Evaluation of Ventricular Function after ACS        [facility]  Performing Staff   Cardiology: Delvin Carson MD    Tech: Ramon Carter          Clinical Indication    Evaluation of Ventricular Function after ACS     Interpretation Summary  Left ventricular ejection fraction appears to be 46 - 50% with mild posterobasal wall hypokinesia  The left ventricular cavity is borderline dilated.  There is a 27 mm mechanical aortic valve prosthesis present.The prosthetic aortic valve is grossly normal. Trace aortic valve regurgitation is present. The aortic valve peak and mean gradients are within defined limits.  There is no evidence of pericardial effusion  LV systolic function appears similar to previous study      Allergies   Allergen Reactions    Aspirin Anaphylaxis     Patient said, \"it chokes him up.\" patient said, \"I got really short of breath and started to have an asthma attack.\"   :    Current Outpatient Medications:     acetaminophen (TYLENOL) 500 MG tablet, Take 1 tablet by mouth Every 6 (Six) Hours As Needed for Mild Pain., Disp: 30 tablet, Rfl: 0    albuterol sulfate  (90 Base) MCG/ACT inhaler, Inhale 2 puffs Every 6 (Six) Hours As Needed for Wheezing., Disp: 18 g, Rfl: 0    albuterol sulfate  (90 Base) MCG/ACT inhaler, Inhale 2 puffs Every 4 (Four) Hours As Needed for Wheezing., Disp: 18 g, Rfl: 0    Ascorbic Acid (VITAMIN C PO), Take  by mouth., Disp: , Rfl:     clopidogrel (PLAVIX) 75 MG tablet, Take 1 tablet by mouth Daily., Disp: 30 tablet, Rfl: 11    Enoxaparin Sodium (LOVENOX) 80 MG/0.8ML solution prefilled syringe syringe, Inject  under the skin into the appropriate area as directed Every 12 (Twelve) Hours., Disp: , Rfl:     Loratadine 10 MG capsule, Take  by mouth., Disp: , Rfl:     losartan (Cozaar) 25 MG tablet, Take 1 tablet by mouth Every Evening., Disp: 30 tablet, Rfl: 3    methocarbamol " (ROBAXIN) 500 MG tablet, Take 1 tablet by mouth 4 (Four) Times a Day., Disp: , Rfl:     metoprolol succinate XL (TOPROL-XL) 25 MG 24 hr tablet, Take 1 tablet by mouth Daily., Disp: , Rfl:     montelukast (SINGULAIR) 10 MG tablet, Take 1 tablet by mouth Every Night., Disp: , Rfl:     Multiple Vitamins-Minerals (ZINC PO), Take  by mouth., Disp: , Rfl:     pantoprazole (PROTONIX) 40 MG EC tablet, Take 1 tablet by mouth Daily., Disp: , Rfl:     rosuvastatin (CRESTOR) 20 MG tablet, Take 1 tablet by mouth Daily., Disp: , Rfl:     warfarin (COUMADIN) 0.5 MG half tablet, Take 2 half tablet by mouth Daily., Disp: , Rfl:     warfarin (COUMADIN) 5 MG tablet, Take 1 tablet by mouth Daily., Disp: , Rfl:     warfarin (COUMADIN) 6 MG tablet, Take 1 tablet by mouth Every Evening., Disp: , Rfl:     metoprolol succinate XL (TOPROL-XL) 25 MG 24 hr tablet, Take 1/2 tablet by mouth Daily for 30 days., Disp: 30 tablet, Rfl: 11    The following portions of the patient's history were reviewed and updated as appropriate: allergies, current medications, past family history, past medical history, past social history, past surgical history and problem list.    Social History     Tobacco Use    Smoking status: Former     Current packs/day: 1.00     Average packs/day: 1 pack/day for 4.0 years (4.0 ttl pk-yrs)     Types: Cigarettes    Smokeless tobacco: Current     Types: Snuff   Vaping Use    Vaping status: Never Used   Substance Use Topics    Alcohol use: No     Comment: occassional     Drug use: No     Review of Systems   Constitutional: Negative for chills and fever.   HENT:  Negative for nosebleeds and sore throat.    Respiratory:  Positive for shortness of breath. Negative for cough, hemoptysis and wheezing.    Gastrointestinal:  Negative for abdominal pain, hematemesis, hematochezia, melena, nausea and vomiting.   Genitourinary:  Negative for dysuria and hematuria.   Neurological:  Negative for headaches.     Objective   Vitals:     "08/20/24 1432   BP: 119/67   BP Location: Left arm   Patient Position: Sitting   Cuff Size: Adult   Pulse: 85   SpO2: 99%   Weight: 85.3 kg (188 lb)   Height: 170.2 cm (67\")     Body mass index is 29.44 kg/m².    Constitutional:       Appearance: Well-developed.   Eyes:      Conjunctiva/sclera: Conjunctivae normal.   HENT:      Head: Normocephalic.   Neck:      Thyroid: No thyromegaly.      Vascular: No JVD.      Trachea: No tracheal deviation.   Pulmonary:      Effort: No respiratory distress.      Breath sounds: Normal breath sounds. No wheezing. No rales.   Cardiovascular:      PMI at left midclavicular line. Normal rate. Regular rhythm. Normal S1. Normal S2. Metallic click of S2 noted       Murmurs: There is no murmur.      No gallop.  No click. No rub.   Pulses:     Intact distal pulses.   Edema:     Peripheral edema absent.   Abdominal:      General: Bowel sounds are normal.      Palpations: Abdomen is soft. There is no abdominal mass.      Tenderness: There is no abdominal tenderness.   Musculoskeletal:      Cervical back: Normal range of motion and neck supple. Skin:     General: Skin is warm and dry.   Neurological:      Mental Status: Alert and oriented to person, place, and time.      Cranial Nerves: No cranial nerve deficit.       Lab Results   Component Value Date     03/31/2024    K 4.5 03/31/2024     03/31/2024    CO2 21.8 (L) 03/31/2024    BUN 11 03/31/2024    CREATININE 0.78 03/31/2024    GLUCOSE 97 03/31/2024    CALCIUM 9.2 03/31/2024    AST 31 03/31/2024    ALT 66 (H) 03/31/2024    ALKPHOS 59 03/31/2024    LABIL2 1.4 (L) 03/04/2016     Lab Results   Component Value Date    CKTOTAL 127 04/09/2021     Lab Results   Component Value Date    WBC 10.57 03/31/2024    HGB 15.2 03/31/2024    HCT 48.8 03/31/2024     03/31/2024     Lab Results   Component Value Date    INR 2.19 (H) 03/31/2024    INR 2.36 (H) 03/12/2024    INR 1.83 (H) 01/05/2024     Lab Results   Component Value Date    MG " 2.4 10/26/2022     Lab Results   Component Value Date    TSH 4.180 04/09/2021    TRIG 129 09/21/2021    HDL 33 (L) 09/21/2021    LDL 94 09/21/2021      Lab Results   Component Value Date    BNP 53.0 02/18/2019     Procedures      Assessment & Plan    Diagnosis Plan   1. Aortic valve prosthesis present (mechanical valve)  Adult Transthoracic Echo Complete w/ Color, Spectral and Contrast if necessary per protocol      2. Coronary artery disease involving native coronary artery of native heart without angina pectoris          Recommendations  Will obtain an echo Doppler study to evaluate the mechanical aortic valve and LV function and tailor further therapy accordingly.  Continue with warfarin to keep the INR between 2.5 and 3.  Continue with clopidogrel as tolerated.    Return in about 3 months (around 11/20/2024) for or sooner if needed.    As always, Macho Jose MD  I appreciate very much the opportunity to participate in the cardiovascular care of your patients. Please do not hesitate to call me with any questions with regards to Filemon Coxy's evaluation and management.       With Best Regards,        Osman Whiting MD, [facility]    Please note that portions of this note were completed with a voice recognition program.

## 2024-08-20 NOTE — LETTER
August 20, 2024     Macho Jose MD  [address] [facility] [place] [address]    Patient: Filemon Jj   YOB: 1975   Date of Visit: 8/20/2024     Dear Eduardo:       Thank you for referring Filemon Jj to me for evaluation. Below are the relevant portions of my assessment and plan of care.    If you have questions, please do not hesitate to call me. I look forward to following Filemon along with you.         Sincerely,        Osman Whiting MD        CC: No Recipients    Osman Whiting MD  08/20/24 1907  Sign when Signing Visit  Macho Jose MD  Filemon Jj  1975 08/20/2024    Patient Active Problem List   Diagnosis   • Ascending aortic aneurysm   • Aneurysm of aortic sinus of Valsalva without rupture   • COPD (chronic obstructive pulmonary disease)   • Essential hypertension   • Hx of aortic valve replacement, mechanical   • COPD with acute exacerbation   • Obesity (BMI 30-39.9)   • GERD without esophagitis   • Anxiety disorder   • Chronic anticoagulation   • Hyperglycemia   • Mixed hyperlipidemia   • Chronic systolic congestive heart failure   • Myocardial infarction involving right coronary artery       Dear Macho Jose MD:    Subjective     Filemon Jj is a 49 y.o. male with the problems as listed above, presents    Chief complaint: Follow-up of mechanical aortic valve and previous history of STEMI    History of Present Illness: Mr. Valentino Jj is a pleasant 49-year-old  male with history of ascending aortic aneurysm, status post aortic mechanical valve conduit (Bentall procedure) on 1/17/2018.  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  He was left on medical therapy.  He he has been lost for  cardiology follow-up for the last 2 to 3 years.  He now presents to have a cardiology follow-up.  On further questioning he denies any complaints of chest pain.  He has dyspnea with moderate exertion with no PND, orthopnea or pedal edema.    Cardiac Catheterization/Vascular Study      Accession Number: [id number]   Date of Study: 9/20/21   Ordering Provider: Delvin Carson MD   Clinical Indications: STEMI        Coronary anatomy findings:     LM: Is a large calibre vessel , normal take off from left cusp, divides into LAD and Lcx.  The left main had no significant stenosis.     LAD: The LAD had no significant stenosis other than minimal luminal irregularities.     LCX: Moderate calibre vessel, mild luminal irregularities in the proximal portion, there is a small to moderate caliber OM 1 artery followed by a large OM 2 artery, the distal portion of this OM 2 artery had a critical appearance of spontaneous coronary artery dissection with a diffuse long 95 to 99% stenosis, the vessel was too small the distal segment on the unable for any cutaneous intervention especially given the appearance of scad.     RCA: The right coronary artery is a large-caliber dominant artery, noted mild luminal irregularities with no significant stenosis in the entire RCA, the RPDA and PL branches appeared normal with no significant stenosis.     Left Ventriculography:     Did not cross the valve due to the presence of mechanical aortic valve.        Final Impression:  Patient's ST elevation is likely coming from the distal OM 2 branch of spontaneous coronary artery dissection lesion, since the vessel was too distal and very small caliber for any intervention will definitely medical management, patient is currently chest pain-free and his ST segment elevation has resolved.        Recommendations:  Would resume his Coumadin to keep his INR greater than 2.5-3.  He has been subtherapeutic in the past a week for INR labs in our  "labs.  He has been compliant with his Coumadin.  I will also initiate him on Plavix/Brilinta along with the Coumadin at least for 1 year.  We will also check an echocardiogram to assess for LV systolic function and rule out any significant aortic valve pathology, patient had an echo 2 days ago and was reported to have normal aortic valve function.        Delvin Carson MD, [facility]  Interventional Cardiology    09/20/21  07:33 EDT    Complete Transthoracic Echocardiogram with Complete Doppler and Color Flow    Accession Number: [id number]   Date of Study: 9/20/21   Ordering Provider: Delvin Carson MD   Clinical Indications: Evaluation of Ventricular Function after ACS        [facility]  Performing Staff   Cardiology: Delvin Carson MD    Tech: Ramon Carter          Clinical Indication    Evaluation of Ventricular Function after ACS     Interpretation Summary  Left ventricular ejection fraction appears to be 46 - 50% with mild posterobasal wall hypokinesia  The left ventricular cavity is borderline dilated.  There is a 27 mm mechanical aortic valve prosthesis present.The prosthetic aortic valve is grossly normal. Trace aortic valve regurgitation is present. The aortic valve peak and mean gradients are within defined limits.  There is no evidence of pericardial effusion  LV systolic function appears similar to previous study      Allergies   Allergen Reactions   • Aspirin Anaphylaxis     Patient said, \"it chokes him up.\" patient said, \"I got really short of breath and started to have an asthma attack.\"   :    Current Outpatient Medications:   •  acetaminophen (TYLENOL) 500 MG tablet, Take 1 tablet by mouth Every 6 (Six) Hours As Needed for Mild Pain., Disp: 30 tablet, Rfl: 0  •  albuterol sulfate  (90 Base) MCG/ACT inhaler, Inhale 2 puffs Every 6 (Six) Hours As Needed for Wheezing., Disp: 18 g, Rfl: 0  •  albuterol sulfate  (90 Base) MCG/ACT inhaler, Inhale 2 " puffs Every 4 (Four) Hours As Needed for Wheezing., Disp: 18 g, Rfl: 0  •  Ascorbic Acid (VITAMIN C PO), Take  by mouth., Disp: , Rfl:   •  clopidogrel (PLAVIX) 75 MG tablet, Take 1 tablet by mouth Daily., Disp: 30 tablet, Rfl: 11  •  Enoxaparin Sodium (LOVENOX) 80 MG/0.8ML solution prefilled syringe syringe, Inject  under the skin into the appropriate area as directed Every 12 (Twelve) Hours., Disp: , Rfl:   •  Loratadine 10 MG capsule, Take  by mouth., Disp: , Rfl:   •  losartan (Cozaar) 25 MG tablet, Take 1 tablet by mouth Every Evening., Disp: 30 tablet, Rfl: 3  •  methocarbamol (ROBAXIN) 500 MG tablet, Take 1 tablet by mouth 4 (Four) Times a Day., Disp: , Rfl:   •  metoprolol succinate XL (TOPROL-XL) 25 MG 24 hr tablet, Take 1 tablet by mouth Daily., Disp: , Rfl:   •  montelukast (SINGULAIR) 10 MG tablet, Take 1 tablet by mouth Every Night., Disp: , Rfl:   •  Multiple Vitamins-Minerals (ZINC PO), Take  by mouth., Disp: , Rfl:   •  pantoprazole (PROTONIX) 40 MG EC tablet, Take 1 tablet by mouth Daily., Disp: , Rfl:   •  rosuvastatin (CRESTOR) 20 MG tablet, Take 1 tablet by mouth Daily., Disp: , Rfl:   •  warfarin (COUMADIN) 0.5 MG half tablet, Take 2 half tablet by mouth Daily., Disp: , Rfl:   •  warfarin (COUMADIN) 5 MG tablet, Take 1 tablet by mouth Daily., Disp: , Rfl:   •  warfarin (COUMADIN) 6 MG tablet, Take 1 tablet by mouth Every Evening., Disp: , Rfl:   •  metoprolol succinate XL (TOPROL-XL) 25 MG 24 hr tablet, Take 1/2 tablet by mouth Daily for 30 days., Disp: 30 tablet, Rfl: 11    The following portions of the patient's history were reviewed and updated as appropriate: allergies, current medications, past family history, past medical history, past social history, past surgical history and problem list.    Social History     Tobacco Use   • Smoking status: Former     Current packs/day: 1.00     Average packs/day: 1 pack/day for 4.0 years (4.0 ttl pk-yrs)     Types: Cigarettes   • Smokeless tobacco:  "Current     Types: Snuff   Vaping Use   • Vaping status: Never Used   Substance Use Topics   • Alcohol use: No     Comment: occassional    • Drug use: No     Review of Systems   Constitutional: Negative for chills and fever.   HENT:  Negative for nosebleeds and sore throat.    Respiratory:  Positive for shortness of breath. Negative for cough, hemoptysis and wheezing.    Gastrointestinal:  Negative for abdominal pain, hematemesis, hematochezia, melena, nausea and vomiting.   Genitourinary:  Negative for dysuria and hematuria.   Neurological:  Negative for headaches.     Objective   Vitals:    08/20/24 1432   BP: 119/67   BP Location: Left arm   Patient Position: Sitting   Cuff Size: Adult   Pulse: 85   SpO2: 99%   Weight: 85.3 kg (188 lb)   Height: 170.2 cm (67\")     Body mass index is 29.44 kg/m².    Constitutional:       Appearance: Well-developed.   Eyes:      Conjunctiva/sclera: Conjunctivae normal.   HENT:      Head: Normocephalic.   Neck:      Thyroid: No thyromegaly.      Vascular: No JVD.      Trachea: No tracheal deviation.   Pulmonary:      Effort: No respiratory distress.      Breath sounds: Normal breath sounds. No wheezing. No rales.   Cardiovascular:      PMI at left midclavicular line. Normal rate. Regular rhythm. Normal S1. Normal S2. Metallic click of S2 noted       Murmurs: There is no murmur.      No gallop.  No click. No rub.   Pulses:     Intact distal pulses.   Edema:     Peripheral edema absent.   Abdominal:      General: Bowel sounds are normal.      Palpations: Abdomen is soft. There is no abdominal mass.      Tenderness: There is no abdominal tenderness.   Musculoskeletal:      Cervical back: Normal range of motion and neck supple. Skin:     General: Skin is warm and dry.   Neurological:      Mental Status: Alert and oriented to person, place, and time.      Cranial Nerves: No cranial nerve deficit.       Lab Results   Component Value Date     03/31/2024    K 4.5 03/31/2024     " 03/31/2024    CO2 21.8 (L) 03/31/2024    BUN 11 03/31/2024    CREATININE 0.78 03/31/2024    GLUCOSE 97 03/31/2024    CALCIUM 9.2 03/31/2024    AST 31 03/31/2024    ALT 66 (H) 03/31/2024    ALKPHOS 59 03/31/2024    LABIL2 1.4 (L) 03/04/2016     Lab Results   Component Value Date    CKTOTAL 127 04/09/2021     Lab Results   Component Value Date    WBC 10.57 03/31/2024    HGB 15.2 03/31/2024    HCT 48.8 03/31/2024     03/31/2024     Lab Results   Component Value Date    INR 2.19 (H) 03/31/2024    INR 2.36 (H) 03/12/2024    INR 1.83 (H) 01/05/2024     Lab Results   Component Value Date    MG 2.4 10/26/2022     Lab Results   Component Value Date    TSH 4.180 04/09/2021    TRIG 129 09/21/2021    HDL 33 (L) 09/21/2021    LDL 94 09/21/2021      Lab Results   Component Value Date    BNP 53.0 02/18/2019     Procedures      Assessment & Plan    Diagnosis Plan   1. Aortic valve prosthesis present (mechanical valve)  Adult Transthoracic Echo Complete w/ Color, Spectral and Contrast if necessary per protocol      2. Coronary artery disease involving native coronary artery of native heart without angina pectoris          Recommendations  Will obtain an echo Doppler study to evaluate the mechanical aortic valve and LV function and tailor further therapy accordingly.  Continue with warfarin to keep the INR between 2.5 and 3.  Continue with clopidogrel as tolerated.    Return in about 3 months (around 11/20/2024) for or sooner if needed.    As always, Eduardo I appreciate very much the opportunity to participate in the cardiovascular care of your patients. Please do not hesitate to call me with any questions with regards to Filemon Jj's evaluation and management.       With Best Regards,        Osman Whiting MD, [facility]    Please note that portions of this note were completed with a voice recognition program.

## 2024-09-11 ENCOUNTER — HOSPITAL ENCOUNTER (OUTPATIENT)
Facility: HOSPITAL | Age: 49
Discharge: HOME | End: 2024-09-11
Admitting: INTERNAL MEDICINE
Payer: COMMERCIAL

## 2024-09-11 DIAGNOSIS — Z95.2 AORTIC VALVE PROSTHESIS PRESENT: ICD-10-CM

## 2024-09-11 PROCEDURE — 93306 TTE W/DOPPLER COMPLETE: CPT

## 2024-09-14 LAB
BH CV ECHO MEAS - ACS: 1.5 CM
BH CV ECHO MEAS - AO MAX PG: 11.2 MMHG
BH CV ECHO MEAS - AO MEAN PG: 5.8 MMHG
BH CV ECHO MEAS - AO ROOT DIAM: 3.4 CM
BH CV ECHO MEAS - AO V2 MAX: 167 CM/SEC
BH CV ECHO MEAS - AO V2 VTI: 28.5 CM
BH CV ECHO MEAS - EDV(MOD-SP4): 86.6 ML
BH CV ECHO MEAS - EF(MOD-SP4): 49 %
BH CV ECHO MEAS - EPSS: 1.17 CM
BH CV ECHO MEAS - ESV(MOD-SP4): 44.2 ML
BH CV ECHO MEAS - IVS/LVPW: 1.03 CM
BH CV ECHO MEAS - IVSD: 1.3 CM
BH CV ECHO MEAS - LA DIMENSION: 4.5 CM
BH CV ECHO MEAS - LAT PEAK E' VEL: 14.6 CM/SEC
BH CV ECHO MEAS - LV DIASTOLIC VOL/BSA (35-75): 44 CM2
BH CV ECHO MEAS - LV MAX PG: 1.54 MMHG
BH CV ECHO MEAS - LV MEAN PG: 0.9 MMHG
BH CV ECHO MEAS - LV SYSTOLIC VOL/BSA (12-30): 22.5 CM2
BH CV ECHO MEAS - LV V1 MAX: 62 CM/SEC
BH CV ECHO MEAS - LV V1 VTI: 10.9 CM
BH CV ECHO MEAS - LVIDD: 5.1 CM
BH CV ECHO MEAS - LVIDS: 4 CM
BH CV ECHO MEAS - LVOT DIAM: 2.25 CM
BH CV ECHO MEAS - LVPWD: 1.26 CM
BH CV ECHO MEAS - MED PEAK E' VEL: 9.2 CM/SEC
BH CV ECHO MEAS - PA ACC TIME: 0.08 SEC
BH CV ECHO MEAS - RAP SYSTOLE: 10 MMHG
BH CV ECHO MEAS - RVSP: 13.7 MMHG
BH CV ECHO MEAS - SV(MOD-SP4): 42.4 ML
BH CV ECHO MEAS - SVI(MOD-SP4): 21.5 ML/M2
BH CV ECHO MEAS - TAPSE (>1.6): 1.75 CM
BH CV ECHO MEAS - TR MAX PG: 3.7 MMHG
BH CV ECHO MEAS - TR MAX VEL: 92.3 CM/SEC

## 2024-10-17 ENCOUNTER — HOSPITAL ENCOUNTER (EMERGENCY)
Facility: HOSPITAL | Age: 49
Discharge: HOME | End: 2024-10-17
Attending: STUDENT IN AN ORGANIZED HEALTH CARE EDUCATION/TRAINING PROGRAM
Payer: COMMERCIAL

## 2024-10-17 ENCOUNTER — APPOINTMENT (OUTPATIENT)
Dept: GENERAL RADIOLOGY | Facility: HOSPITAL | Age: 49
End: 2024-10-17
Payer: COMMERCIAL

## 2024-10-17 VITALS
RESPIRATION RATE: 17 BRPM | DIASTOLIC BLOOD PRESSURE: 66 MMHG | OXYGEN SATURATION: 99 % | HEIGHT: 67 IN | SYSTOLIC BLOOD PRESSURE: 115 MMHG | TEMPERATURE: 98.4 F | BODY MASS INDEX: 30.61 KG/M2 | HEART RATE: 91 BPM | WEIGHT: 195 LBS

## 2024-10-17 DIAGNOSIS — J44.1 COPD EXACERBATION: Primary | ICD-10-CM

## 2024-10-17 LAB
ALBUMIN SERPL-MCNC: 4.7 G/DL (ref 3.5–5.2)
ALBUMIN/GLOB SERPL: 1.9 G/DL
ALP SERPL-CCNC: 71 U/L (ref 39–117)
ALT SERPL W P-5'-P-CCNC: 28 U/L (ref 1–41)
ANION GAP SERPL CALCULATED.3IONS-SCNC: 10.2 MMOL/L (ref 5–15)
AST SERPL-CCNC: 19 U/L (ref 1–40)
BASOPHILS # BLD AUTO: 0.07 10*3/MM3 (ref 0–0.2)
BASOPHILS NFR BLD AUTO: 0.9 % (ref 0–1.5)
BILIRUB SERPL-MCNC: 0.4 MG/DL (ref 0–1.2)
BUN SERPL-MCNC: 15 MG/DL (ref 6–20)
BUN/CREAT SERPL: 15.3 (ref 7–25)
CALCIUM SPEC-SCNC: 9.8 MG/DL (ref 8.6–10.5)
CHLORIDE SERPL-SCNC: 106 MMOL/L (ref 98–107)
CO2 SERPL-SCNC: 25.8 MMOL/L (ref 22–29)
CREAT SERPL-MCNC: 0.98 MG/DL (ref 0.76–1.27)
DEPRECATED RDW RBC AUTO: 51.8 FL (ref 37–54)
EGFRCR SERPLBLD CKD-EPI 2021: 95.1 ML/MIN/1.73
EOSINOPHIL # BLD AUTO: 0.35 10*3/MM3 (ref 0–0.4)
EOSINOPHIL NFR BLD AUTO: 4.4 % (ref 0.3–6.2)
ERYTHROCYTE [DISTWIDTH] IN BLOOD BY AUTOMATED COUNT: 15.9 % (ref 12.3–15.4)
GLOBULIN UR ELPH-MCNC: 2.5 GM/DL
GLUCOSE SERPL-MCNC: 107 MG/DL (ref 65–99)
HCT VFR BLD AUTO: 53 % (ref 37.5–51)
HGB BLD-MCNC: 16.4 G/DL (ref 13–17.7)
HOLD SPECIMEN: NORMAL
HOLD SPECIMEN: NORMAL
IMM GRANULOCYTES # BLD AUTO: 0.03 10*3/MM3 (ref 0–0.05)
IMM GRANULOCYTES NFR BLD AUTO: 0.4 % (ref 0–0.5)
LYMPHOCYTES # BLD AUTO: 2.1 10*3/MM3 (ref 0.7–3.1)
LYMPHOCYTES NFR BLD AUTO: 26.1 % (ref 19.6–45.3)
MCH RBC QN AUTO: 27.6 PG (ref 26.6–33)
MCHC RBC AUTO-ENTMCNC: 30.9 G/DL (ref 31.5–35.7)
MCV RBC AUTO: 89.2 FL (ref 79–97)
MONOCYTES # BLD AUTO: 0.76 10*3/MM3 (ref 0.1–0.9)
MONOCYTES NFR BLD AUTO: 9.5 % (ref 5–12)
NEUTROPHILS NFR BLD AUTO: 4.73 10*3/MM3 (ref 1.7–7)
NEUTROPHILS NFR BLD AUTO: 58.7 % (ref 42.7–76)
NRBC BLD AUTO-RTO: 0 /100 WBC (ref 0–0.2)
NT-PROBNP SERPL-MCNC: 270.5 PG/ML (ref 0–450)
PLATELET # BLD AUTO: 245 10*3/MM3 (ref 140–450)
PMV BLD AUTO: 11.2 FL (ref 6–12)
POTASSIUM SERPL-SCNC: 4.6 MMOL/L (ref 3.5–5.2)
PROT SERPL-MCNC: 7.2 G/DL (ref 6–8.5)
QT INTERVAL: 348 MS
QTC INTERVAL: 453 MS
RBC # BLD AUTO: 5.94 10*6/MM3 (ref 4.14–5.8)
SODIUM SERPL-SCNC: 142 MMOL/L (ref 136–145)
TROPONIN T SERPL HS-MCNC: <6 NG/L
WBC NRBC COR # BLD AUTO: 8.04 10*3/MM3 (ref 3.4–10.8)
WHOLE BLOOD HOLD SPECIMEN: NORMAL

## 2024-10-17 PROCEDURE — 93005 ELECTROCARDIOGRAM TRACING: CPT | Performed by: STUDENT IN AN ORGANIZED HEALTH CARE EDUCATION/TRAINING PROGRAM

## 2024-10-17 PROCEDURE — 63710000001 PREDNISONE PER 1 MG: Performed by: STUDENT IN AN ORGANIZED HEALTH CARE EDUCATION/TRAINING PROGRAM

## 2024-10-17 PROCEDURE — 85025 COMPLETE CBC W/AUTO DIFF WBC: CPT | Performed by: STUDENT IN AN ORGANIZED HEALTH CARE EDUCATION/TRAINING PROGRAM

## 2024-10-17 PROCEDURE — 99284 EMERGENCY DEPT VISIT MOD MDM: CPT

## 2024-10-17 PROCEDURE — 71045 X-RAY EXAM CHEST 1 VIEW: CPT | Performed by: RADIOLOGY

## 2024-10-17 PROCEDURE — 94640 AIRWAY INHALATION TREATMENT: CPT

## 2024-10-17 PROCEDURE — 83880 ASSAY OF NATRIURETIC PEPTIDE: CPT | Performed by: STUDENT IN AN ORGANIZED HEALTH CARE EDUCATION/TRAINING PROGRAM

## 2024-10-17 PROCEDURE — 80053 COMPREHEN METABOLIC PANEL: CPT | Performed by: STUDENT IN AN ORGANIZED HEALTH CARE EDUCATION/TRAINING PROGRAM

## 2024-10-17 PROCEDURE — 93010 ELECTROCARDIOGRAM REPORT: CPT | Performed by: SPECIALIST

## 2024-10-17 PROCEDURE — 71045 X-RAY EXAM CHEST 1 VIEW: CPT

## 2024-10-17 PROCEDURE — 94799 UNLISTED PULMONARY SVC/PX: CPT

## 2024-10-17 PROCEDURE — 36415 COLL VENOUS BLD VENIPUNCTURE: CPT

## 2024-10-17 PROCEDURE — 84484 ASSAY OF TROPONIN QUANT: CPT | Performed by: STUDENT IN AN ORGANIZED HEALTH CARE EDUCATION/TRAINING PROGRAM

## 2024-10-17 RX ORDER — AZITHROMYCIN 250 MG/1
TABLET, FILM COATED ORAL
Qty: 6 TABLET | Refills: 0 | Status: SHIPPED | OUTPATIENT
Start: 2024-10-17

## 2024-10-17 RX ORDER — IPRATROPIUM BROMIDE AND ALBUTEROL SULFATE 2.5; .5 MG/3ML; MG/3ML
9 SOLUTION RESPIRATORY (INHALATION) ONCE
Status: COMPLETED | OUTPATIENT
Start: 2024-10-17 | End: 2024-10-17

## 2024-10-17 RX ORDER — PREDNISONE 20 MG/1
60 TABLET ORAL ONCE
Status: COMPLETED | OUTPATIENT
Start: 2024-10-17 | End: 2024-10-17

## 2024-10-17 RX ORDER — AZITHROMYCIN 250 MG/1
500 TABLET, FILM COATED ORAL ONCE
Status: COMPLETED | OUTPATIENT
Start: 2024-10-17 | End: 2024-10-17

## 2024-10-17 RX ORDER — PREDNISONE 50 MG/1
50 TABLET ORAL DAILY
Qty: 5 TABLET | Refills: 0 | Status: SHIPPED | OUTPATIENT
Start: 2024-10-17 | End: 2024-10-22

## 2024-10-17 RX ORDER — ALBUTEROL SULFATE 90 UG/1
2 INHALANT RESPIRATORY (INHALATION) EVERY 4 HOURS PRN
Qty: 18 G | Refills: 0 | Status: SHIPPED | OUTPATIENT
Start: 2024-10-17

## 2024-10-17 RX ORDER — SODIUM CHLORIDE 0.9 % (FLUSH) 0.9 %
10 SYRINGE (ML) INJECTION AS NEEDED
Status: DISCONTINUED | OUTPATIENT
Start: 2024-10-17 | End: 2024-10-17 | Stop reason: HOSPADM

## 2024-10-17 RX ADMIN — PREDNISONE 60 MG: 20 TABLET ORAL at 17:31

## 2024-10-17 RX ADMIN — IPRATROPIUM BROMIDE AND ALBUTEROL SULFATE 9 ML: 2.5; .5 SOLUTION RESPIRATORY (INHALATION) at 16:46

## 2024-10-17 RX ADMIN — AZITHROMYCIN DIHYDRATE 500 MG: 250 TABLET ORAL at 17:31

## 2024-10-17 NOTE — ED PROVIDER NOTES
EMERGENCY DEPARTMENT ENCOUNTER    Room Number:  114/14  Date seen:  10/17/2024  Time seen: 16:38 EDT  PCP: Macho Jose MD        HPI:    49-year-old male with history of aortic valve replacement on Plavix and Coumadin, asthma, COPD, emphysema presents to the emergency department with complaints of worsening shortness of breath and wheezing.  Patient attempting to use nebulizer treatments at home, but has worsening wheezing.  Patient reports slight productive cough.  No fever.    PAST MEDICAL HISTORY  Active Ambulatory Problems     Diagnosis Date Noted    Ascending aortic aneurysm 10/18/2017    Aneurysm of aortic sinus of Valsalva without rupture 01/16/2018    COPD (chronic obstructive pulmonary disease) 01/17/2018    Essential hypertension 01/17/2018    Hx of aortic valve replacement, mechanical 11/28/2018    COPD with acute exacerbation 02/02/2020    Obesity (BMI 30-39.9) 02/02/2020    GERD without esophagitis 02/02/2020    Anxiety disorder 02/02/2020    Chronic anticoagulation 02/02/2020    Hyperglycemia 02/02/2020    Mixed hyperlipidemia 02/02/2020    Chronic systolic congestive heart failure 03/02/2020    Myocardial infarction involving right coronary artery 09/20/2021     Resolved Ambulatory Problems     Diagnosis Date Noted    Asthma exacerbation in COPD 10/17/2017    Mental retardation 10/22/2017    Other chest pain 11/21/2017    Tobacco abuse 01/17/2018    Class 1 obesity due to excess calories with body mass index (BMI) of 30.0 to 30.9 in adult 05/22/2018    Former smoker 02/02/2020    Primary metabolic acidosis, with increased anion gap, compensated 02/02/2020    Medical non-compliance 02/02/2020    COPD exacerbation 09/17/2020    Acute exacerbation of COPD with asthma 09/18/2020     Past Medical History:   Diagnosis Date    Anxiety     Asthma     Back pain     Emphysema lung     Gastritis     GERD (gastroesophageal reflux disease)     Headache     Heart valve disease 2018    Hypertension      "Migraine     Substance abuse          PAST SURGICAL HISTORY  Past Surgical History:   Procedure Laterality Date    CARDIAC CATHETERIZATION N/A 10/23/2017    Procedure: Left Heart Cath;  Surgeon: Rodolfo Núñez MD;  Location:  [facility];  Service:     CARDIAC CATHETERIZATION N/A 9/20/2021    Procedure: Left Heart Cath;  Surgeon: Delvin Carson MD;  Location:  [facility];  Service: Cardiovascular;  Laterality: N/A;    CARDIAC VALVE REPLACEMENT  2018    prosthetic valve    DENTAL PROCEDURE      LIVER SURGERY      tumor removed from liver    OTHER SURGICAL HISTORY      \"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\"    THORACIC AORTIC ANEURYSM ASCENDING/ARCH REPAIR N/A 1/17/2018    Procedure: MEDIAN STERNOTOMY, AORTIC VALVE CONDUIT, BENTAL, TRANSESOPHAGEAL ECHOCARDIOGRAM WITH ANESTHESIA;  Surgeon: Aaron Lucas MD;  Location:  [facility];  Service:          FAMILY HISTORY  Family History   Problem Relation Age of Onset    COPD Mother          SOCIAL HISTORY  Social History     Socioeconomic History    Marital status: Single    Number of children: 0   Tobacco Use    Smoking status: Former     Current packs/day: 1.00     Average packs/day: 1 pack/day for 4.0 years (4.0 ttl pk-yrs)     Types: Cigarettes    Smokeless tobacco: Current     Types: Snuff   Vaping Use    Vaping status: Never Used   Substance and Sexual Activity    Alcohol use: No     Comment: occassional     Drug use: No    Sexual activity: Defer         ALLERGIES  Aspirin        REVIEW OF SYSTEMS    All systems reviewed and negative except for those discussed in HPI.       PHYSICAL EXAM  ED Triage Vitals [10/17/24 1535]   Temp Heart Rate Resp BP SpO2   98.4 °F (36.9 °C) 99 17 139/74 97 %      Temp src Heart Rate Source Patient Position BP Location FiO2 (%)   Oral Monitor Sitting Right arm --       Vital signs and nursing notes reviewed.  GENERAL: Nontoxic.  Resting comfortably, no acute distress.  CV: " Normal perfusion.  Audible/and palpable mechanical valve  RESPIRATORY: No respiratory distress.  Wheezing bilaterally.  No rales or rhonchi.  ABDOMEN: Soft.         LAB RESULTS  Recent Results (from the past 24 hours)   ECG 12 Lead ED Triage Standing Order; SOA    Collection Time: 10/17/24  3:30 PM   Result Value Ref Range    QT Interval 348 ms    QTC Interval 453 ms   Comprehensive Metabolic Panel    Collection Time: 10/17/24  3:57 PM    Specimen: Blood   Result Value Ref Range    Glucose 107 (H) 65 - 99 mg/dL    BUN 15 6 - 20 mg/dL    Creatinine 0.98 0.76 - 1.27 mg/dL    Sodium 142 136 - 145 mmol/L    Potassium 4.6 3.5 - 5.2 mmol/L    Chloride 106 98 - 107 mmol/L    CO2 25.8 22.0 - 29.0 mmol/L    Calcium 9.8 8.6 - 10.5 mg/dL    Total Protein 7.2 6.0 - 8.5 g/dL    Albumin 4.7 3.5 - 5.2 g/dL    ALT (SGPT) 28 1 - 41 U/L    AST (SGOT) 19 1 - 40 U/L    Alkaline Phosphatase 71 39 - 117 U/L    Total Bilirubin 0.4 0.0 - 1.2 mg/dL    Globulin 2.5 gm/dL    A/G Ratio 1.9 g/dL    BUN/Creatinine Ratio 15.3 7.0 - 25.0    Anion Gap 10.2 5.0 - 15.0 mmol/L    eGFR 95.1 >60.0 mL/min/1.73   BNP    Collection Time: 10/17/24  3:57 PM    Specimen: Blood   Result Value Ref Range    proBNP 270.5 0.0 - 450.0 pg/mL   Single High Sensitivity Troponin T    Collection Time: 10/17/24  3:57 PM    Specimen: Blood   Result Value Ref Range    HS Troponin T <6 <22 ng/L   Green Top (Gel)    Collection Time: 10/17/24  3:57 PM   Result Value Ref Range    Extra Tube Hold for add-ons.    Lavender Top    Collection Time: 10/17/24  3:57 PM   Result Value Ref Range    Extra Tube hold for add-on    Gold Top - SST    Collection Time: 10/17/24  3:57 PM   Result Value Ref Range    Extra Tube Hold for add-ons.    CBC Auto Differential    Collection Time: 10/17/24  3:57 PM    Specimen: Blood   Result Value Ref Range    WBC 8.04 3.40 - 10.80 10*3/mm3    RBC 5.94 (H) 4.14 - 5.80 10*6/mm3    Hemoglobin 16.4 13.0 - 17.7 g/dL    Hematocrit 53.0 (H) 37.5 - 51.0 %     MCV 89.2 79.0 - 97.0 fL    MCH 27.6 26.6 - 33.0 pg    MCHC 30.9 (L) 31.5 - 35.7 g/dL    RDW 15.9 (H) 12.3 - 15.4 %    RDW-SD 51.8 37.0 - 54.0 fl    MPV 11.2 6.0 - 12.0 fL    Platelets 245 140 - 450 10*3/mm3    Neutrophil % 58.7 42.7 - 76.0 %    Lymphocyte % 26.1 19.6 - 45.3 %    Monocyte % 9.5 5.0 - 12.0 %    Eosinophil % 4.4 0.3 - 6.2 %    Basophil % 0.9 0.0 - 1.5 %    Immature Grans % 0.4 0.0 - 0.5 %    Neutrophils, Absolute 4.73 1.70 - 7.00 10*3/mm3    Lymphocytes, Absolute 2.10 0.70 - 3.10 10*3/mm3    Monocytes, Absolute 0.76 0.10 - 0.90 10*3/mm3    Eosinophils, Absolute 0.35 0.00 - 0.40 10*3/mm3    Basophils, Absolute 0.07 0.00 - 0.20 10*3/mm3    Immature Grans, Absolute 0.03 0.00 - 0.05 10*3/mm3    nRBC 0.0 0.0 - 0.2 /100 WBC       Ordered the above labs and reviewed the results.        RADIOLOGY  XR Chest 1 View    Result Date: 10/17/2024  EXAM:   XR Chest, 1 View  EXAM DATE:   10/17/2024 4:09 PM  CLINICAL HISTORY:   [facility] Triage Protocol  TECHNIQUE:   Frontal view of the chest.  COMPARISON:   3/31/2024  FINDINGS:   LUNGS AND PLEURAL SPACES:  Unremarkable as visualized.  No consolidation.  No pneumothorax.   HEART:  Mild cardiomegaly.  Cardiac valve prosthesis.   MEDIASTINUM:  Unremarkable as visualized.  Normal mediastinal contour.   BONES/JOINTS:  Median sternotomy.  No acute fracture.        Mild cardiomegaly.   This report was finalized on 10/17/2024 4:34 PM by Dr. Emigdio Lewis MD.       I ordered the above noted radiological studies. Reviewed by me and discussed with radiologist.  See dictation for official radiology interpretation.        PROCEDURES  Procedures      MEDICATIONS GIVEN IN ER  Medications   sodium chloride 0.9 % flush 10 mL (has no administration in time range)   ipratropium-albuterol (DUO-NEB) nebulizer solution 9 mL (9 mL Nebulization Given 10/17/24 1646)   predniSONE (DELTASONE) tablet 60 mg (60 mg Oral Given 10/17/24 1731)   azithromycin (ZITHROMAX) tablet 500 mg (500 mg Oral Given  10/17/24 1731)         MEDICATIONS GIVEN IN ER    Medications   sodium chloride 0.9 % flush 10 mL (has no administration in time range)   ipratropium-albuterol (DUO-NEB) nebulizer solution 9 mL (9 mL Nebulization Given 10/17/24 1646)   predniSONE (DELTASONE) tablet 60 mg (60 mg Oral Given 10/17/24 1731)   azithromycin (ZITHROMAX) tablet 500 mg (500 mg Oral Given 10/17/24 1731)         PROGRESS, DATA ANALYSIS, CONSULTS, AND MEDICAL DECISION MAKING    All labs have been independently reviewed by me.  All radiology studies have been reviewed by me and discussed with radiologist dictating the report.   EKG's independently viewed and interpreted by me.  Discussion below represents my analysis of pertinent findings related to patient's condition, differential diagnosis, treatment plan and final disposition.      ED Course as of 10/17/24 1759   Thu Oct 17, 2024   1534 EKG interpretation: Sinus tachycardia 102 bpm QTc 453, nonspecific ST and T wave changes.  Electronically signed by Ramon Goodrich DO, 10/17/24, 3:35 PM EDT.   [GF]      ED Course User Index  [GF] Ramon Goodrich DO       AS OF 17:59 EDT VITALS:    BP - 115/79  HR - 95  TEMP - 98.4 °F (36.9 °C) (Oral)  02 SATS - 96%      MDM    Patient evaluated for shortness of breath.  Vital signs stable.  Exam shows bilateral wheezing.    Differential: Asthma exacerbation, COPD exacerbation, pneumonia, anemia, electrolyte abnormality, interstitial lung disease    EKG performed on 10/17/2024 at 1530 shows sinus tachycardia, rate of 102, intervals normal.  ST-T wave abnormality, no STEMI.  Electronically signed by Param Urrutia DO, 10/17/24, 5:54 PM EDT.    Chest x-ray shows mild cardiomegaly, otherwise unremarkable.    Labs show no significant leukocytosis or anemia, no significant electrolyte abnormalities or hepatic injury.  Troponin negative, BNP negative.    Patient feeling greatly improved with DuoNeb treatments, 60 mg p.o. prednisone and azithromycin as  patient has had sputum changes with COPD.    Patient given prescription for albuterol inhaler, prednisone, and azithromycin for COPD exacerbation    PCP follow-up.        DIAGNOSIS  Final diagnoses:   COPD exacerbation         DISPOSITION    ED Disposition       ED Disposition   Discharge    Condition   Stable    Comment   --                                    Param Urrutia DO  10/17/24 4502

## 2024-11-05 ENCOUNTER — HOSPITAL ENCOUNTER (EMERGENCY)
Facility: HOSPITAL | Age: 49
Discharge: HOME | End: 2024-11-05
Attending: STUDENT IN AN ORGANIZED HEALTH CARE EDUCATION/TRAINING PROGRAM | Admitting: STUDENT IN AN ORGANIZED HEALTH CARE EDUCATION/TRAINING PROGRAM
Payer: COMMERCIAL

## 2024-11-05 ENCOUNTER — APPOINTMENT (OUTPATIENT)
Dept: GENERAL RADIOLOGY | Facility: HOSPITAL | Age: 49
End: 2024-11-05
Payer: COMMERCIAL

## 2024-11-05 VITALS
DIASTOLIC BLOOD PRESSURE: 72 MMHG | SYSTOLIC BLOOD PRESSURE: 134 MMHG | RESPIRATION RATE: 16 BRPM | HEART RATE: 94 BPM | OXYGEN SATURATION: 97 % | BODY MASS INDEX: 30.61 KG/M2 | WEIGHT: 195 LBS | TEMPERATURE: 98.5 F | HEIGHT: 67 IN

## 2024-11-05 DIAGNOSIS — R06.02 SHORTNESS OF BREATH: Primary | ICD-10-CM

## 2024-11-05 LAB
ALBUMIN SERPL-MCNC: 4.1 G/DL (ref 3.5–5.2)
ALBUMIN/GLOB SERPL: 1.6 G/DL
ALP SERPL-CCNC: 60 U/L (ref 39–117)
ALT SERPL W P-5'-P-CCNC: 34 U/L (ref 1–41)
ANION GAP SERPL CALCULATED.3IONS-SCNC: 10.2 MMOL/L (ref 5–15)
AST SERPL-CCNC: 21 U/L (ref 1–40)
BASOPHILS # BLD AUTO: 0.07 10*3/MM3 (ref 0–0.2)
BASOPHILS NFR BLD AUTO: 0.8 % (ref 0–1.5)
BILIRUB SERPL-MCNC: 0.3 MG/DL (ref 0–1.2)
BUN SERPL-MCNC: 14 MG/DL (ref 6–20)
BUN/CREAT SERPL: 16.9 (ref 7–25)
CALCIUM SPEC-SCNC: 9.3 MG/DL (ref 8.6–10.5)
CHLORIDE SERPL-SCNC: 103 MMOL/L (ref 98–107)
CO2 SERPL-SCNC: 24.8 MMOL/L (ref 22–29)
CREAT SERPL-MCNC: 0.83 MG/DL (ref 0.76–1.27)
DEPRECATED RDW RBC AUTO: 55.6 FL (ref 37–54)
EGFRCR SERPLBLD CKD-EPI 2021: 108 ML/MIN/1.73
EOSINOPHIL # BLD AUTO: 0.43 10*3/MM3 (ref 0–0.4)
EOSINOPHIL NFR BLD AUTO: 4.7 % (ref 0.3–6.2)
ERYTHROCYTE [DISTWIDTH] IN BLOOD BY AUTOMATED COUNT: 16.6 % (ref 12.3–15.4)
FLUAV RNA RESP QL NAA+PROBE: NOT DETECTED
FLUBV RNA RESP QL NAA+PROBE: NOT DETECTED
GLOBULIN UR ELPH-MCNC: 2.5 GM/DL
GLUCOSE SERPL-MCNC: 153 MG/DL (ref 65–99)
HCT VFR BLD AUTO: 48.2 % (ref 37.5–51)
HGB BLD-MCNC: 15.2 G/DL (ref 13–17.7)
HOLD SPECIMEN: NORMAL
HOLD SPECIMEN: NORMAL
IMM GRANULOCYTES # BLD AUTO: 0.09 10*3/MM3 (ref 0–0.05)
IMM GRANULOCYTES NFR BLD AUTO: 1 % (ref 0–0.5)
LYMPHOCYTES # BLD AUTO: 2.24 10*3/MM3 (ref 0.7–3.1)
LYMPHOCYTES NFR BLD AUTO: 24.4 % (ref 19.6–45.3)
MCH RBC QN AUTO: 28.6 PG (ref 26.6–33)
MCHC RBC AUTO-ENTMCNC: 31.5 G/DL (ref 31.5–35.7)
MCV RBC AUTO: 90.6 FL (ref 79–97)
MONOCYTES # BLD AUTO: 0.79 10*3/MM3 (ref 0.1–0.9)
MONOCYTES NFR BLD AUTO: 8.6 % (ref 5–12)
NEUTROPHILS NFR BLD AUTO: 5.56 10*3/MM3 (ref 1.7–7)
NEUTROPHILS NFR BLD AUTO: 60.5 % (ref 42.7–76)
NRBC BLD AUTO-RTO: 0 /100 WBC (ref 0–0.2)
PLATELET # BLD AUTO: 220 10*3/MM3 (ref 140–450)
PMV BLD AUTO: 10.3 FL (ref 6–12)
POTASSIUM SERPL-SCNC: 3.9 MMOL/L (ref 3.5–5.2)
PROT SERPL-MCNC: 6.6 G/DL (ref 6–8.5)
RBC # BLD AUTO: 5.32 10*6/MM3 (ref 4.14–5.8)
SARS-COV-2 RNA RESP QL NAA+PROBE: NOT DETECTED
SODIUM SERPL-SCNC: 138 MMOL/L (ref 136–145)
TROPONIN T SERPL HS-MCNC: 6 NG/L
WBC NRBC COR # BLD AUTO: 9.18 10*3/MM3 (ref 3.4–10.8)
WHOLE BLOOD HOLD COAG: NORMAL
WHOLE BLOOD HOLD SPECIMEN: NORMAL

## 2024-11-05 PROCEDURE — 87636 SARSCOV2 & INF A&B AMP PRB: CPT | Performed by: STUDENT IN AN ORGANIZED HEALTH CARE EDUCATION/TRAINING PROGRAM

## 2024-11-05 PROCEDURE — 71046 X-RAY EXAM CHEST 2 VIEWS: CPT | Performed by: RADIOLOGY

## 2024-11-05 PROCEDURE — 71046 X-RAY EXAM CHEST 2 VIEWS: CPT

## 2024-11-05 PROCEDURE — 80053 COMPREHEN METABOLIC PANEL: CPT | Performed by: STUDENT IN AN ORGANIZED HEALTH CARE EDUCATION/TRAINING PROGRAM

## 2024-11-05 PROCEDURE — 93005 ELECTROCARDIOGRAM TRACING: CPT | Performed by: STUDENT IN AN ORGANIZED HEALTH CARE EDUCATION/TRAINING PROGRAM

## 2024-11-05 PROCEDURE — 99284 EMERGENCY DEPT VISIT MOD MDM: CPT

## 2024-11-05 PROCEDURE — 85025 COMPLETE CBC W/AUTO DIFF WBC: CPT | Performed by: STUDENT IN AN ORGANIZED HEALTH CARE EDUCATION/TRAINING PROGRAM

## 2024-11-05 PROCEDURE — 36415 COLL VENOUS BLD VENIPUNCTURE: CPT

## 2024-11-05 PROCEDURE — 84484 ASSAY OF TROPONIN QUANT: CPT | Performed by: STUDENT IN AN ORGANIZED HEALTH CARE EDUCATION/TRAINING PROGRAM

## 2024-11-05 RX ORDER — SODIUM CHLORIDE 0.9 % (FLUSH) 0.9 %
10 SYRINGE (ML) INJECTION AS NEEDED
Status: DISCONTINUED | OUTPATIENT
Start: 2024-11-05 | End: 2024-11-05 | Stop reason: HOSPADM

## 2024-11-05 NOTE — ED PROVIDER NOTES
"Subjective   History of Present Illness  49-year-old male presents emergency department with shortness of breath.  Patient states that he began having shortness of breath when he was riding his scooter, but his shortness of breath has subsided upon arrival to the emergency department.  He denies any fevers, chills, nausea, vomiting, shortness of breath, chest pain.        Review of Systems   All other systems reviewed and are negative.      Past Medical History:   Diagnosis Date    Anxiety     Asthma     Back pain     Chronic anticoagulation 2/2/2020    COPD (chronic obstructive pulmonary disease)     Emphysema lung     Gastritis     GERD (gastroesophageal reflux disease)     Headache     Heart valve disease 2018    valve replac., prosthetic valve    Hx of aortic valve replacement, mechanical 11/28/2018    Hyperglycemia 2/2/2020    Hypertension     Migraine     Substance abuse        Allergies   Allergen Reactions    Aspirin Anaphylaxis     Patient said, \"it chokes him up.\" patient said, \"I got really short of breath and started to have an asthma attack.\"       Past Surgical History:   Procedure Laterality Date    CARDIAC CATHETERIZATION N/A 10/23/2017    Procedure: Left Heart Cath;  Surgeon: Rodolfo Núñez MD;  Location:  [facility];  Service:     CARDIAC CATHETERIZATION N/A 9/20/2021    Procedure: Left Heart Cath;  Surgeon: Delvin Carson MD;  Location:  [facility];  Service: Cardiovascular;  Laterality: N/A;    CARDIAC VALVE REPLACEMENT  2018    prosthetic valve    DENTAL PROCEDURE      LIVER SURGERY      tumor removed from liver    OTHER SURGICAL HISTORY      \"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\"    THORACIC AORTIC ANEURYSM ASCENDING/ARCH REPAIR N/A 1/17/2018    Procedure: MEDIAN STERNOTOMY, AORTIC VALVE CONDUIT, BENTAL, TRANSESOPHAGEAL ECHOCARDIOGRAM WITH ANESTHESIA;  Surgeon: Aaron Lucas MD;  Location: [facility] OR;  Service:        Family " History   Problem Relation Age of Onset    COPD Mother        Social History     Socioeconomic History    Marital status: Single    Number of children: 0   Tobacco Use    Smoking status: Former     Current packs/day: 1.00     Average packs/day: 1 pack/day for 4.0 years (4.0 ttl pk-yrs)     Types: Cigarettes    Smokeless tobacco: Current     Types: Snuff   Vaping Use    Vaping status: Never Used   Substance and Sexual Activity    Alcohol use: No     Comment: occassional     Drug use: No    Sexual activity: Defer           Objective   Physical Exam  Constitutional:       Appearance: Normal appearance.   HENT:      Head: Normocephalic.      Mouth/Throat:      Mouth: Mucous membranes are moist.   Eyes:      Pupils: Pupils are equal, round, and reactive to light.   Cardiovascular:      Rate and Rhythm: Normal rate and regular rhythm.   Pulmonary:      Effort: Pulmonary effort is normal.      Breath sounds: Normal breath sounds.   Abdominal:      Palpations: Abdomen is soft.   Musculoskeletal:         General: Normal range of motion.      Cervical back: Normal range of motion.   Neurological:      General: No focal deficit present.      Mental Status: He is alert.   Psychiatric:         Mood and Affect: Mood normal.         Procedures           ED Course  ED Course as of 11/05/24 0216   Tue Nov 05, 2024   0139 EKG interpretation  Rate 96  KY interval 194  QRS ration 96  QTc 462  Normal sinus rhythm  No STEMI  Electronically signed by Quang Kurtz DO, 11/05/24, 1:40 AM EST.   [OI]      ED Course User Index  [OI] Quang Kurtz DO                                               Medical Decision Making  49-year-old male presents emergency department with shortness of breath.  Patient states that he began having shortness of breath when he was riding his scooter, but his shortness of breath has subsided upon arrival to the emergency department.  He denies any fevers, chills, nausea, vomiting, shortness of breath, chest pain.   Vital signs within normal limits on arrival.  Differential diagnosis includes CHF, pneumonia, costochondritis, asthma, bronchitis, among others.  Chest x-ray independently interpreted by me returned within normal limits.  EKG shows no signs of acute ischemia.  Labs overall within normal limits.  Patient asymptomatic upon my examination.  Patient advised to return to the emergency department for any worsening symptoms.  Upon reexamination he is in no acute distress, nontoxic-appearing, in stable condition.  Patient understands and agrees with our plan for discharge.  Electronically signed by Quang Kurtz DO, 11/05/24, 2:19 AM EST.      Problems Addressed:  Shortness of breath: complicated acute illness or injury    Amount and/or Complexity of Data Reviewed  Labs: ordered.  Radiology: ordered.  ECG/medicine tests: ordered.    Risk  Prescription drug management.        Final diagnoses:   Shortness of breath       ED Disposition  ED Disposition       ED Disposition   Discharge    Condition   Stable    Comment   --               Macho Jose MD  [address] [facility] [address]  [phone]    In 1 week           Medication List      No changes were made to your prescriptions during this visit.            Quang Kurtz DO  11/05/24 0219

## 2024-11-07 LAB — QT INTERVAL: 366 MS

## 2024-11-26 ENCOUNTER — OFFICE VISIT (OUTPATIENT)
Age: 49
End: 2024-11-26
Payer: COMMERCIAL

## 2024-11-26 VITALS
WEIGHT: 193 LBS | BODY MASS INDEX: 30.29 KG/M2 | SYSTOLIC BLOOD PRESSURE: 141 MMHG | DIASTOLIC BLOOD PRESSURE: 85 MMHG | HEIGHT: 67 IN | OXYGEN SATURATION: 96 % | HEART RATE: 85 BPM

## 2024-11-26 DIAGNOSIS — Z72.0 TOBACCO ABUSE: ICD-10-CM

## 2024-11-26 DIAGNOSIS — Z95.2 AORTIC VALVE PROSTHESIS PRESENT: Primary | ICD-10-CM

## 2024-11-26 DIAGNOSIS — R07.2 PRECORDIAL PAIN: ICD-10-CM

## 2024-11-26 RX ORDER — METOPROLOL SUCCINATE 50 MG/1
50 TABLET, EXTENDED RELEASE ORAL DAILY
Qty: 30 TABLET | Refills: 3 | Status: SHIPPED | COMMUNITY
Start: 2024-11-26

## 2024-11-26 RX ORDER — FLUTICASONE PROPIONATE AND SALMETEROL 500; 50 UG/1; UG/1
POWDER RESPIRATORY (INHALATION)
COMMUNITY

## 2024-11-26 RX ORDER — LOSARTAN POTASSIUM 25 MG/1
25 TABLET ORAL EVERY EVENING
Qty: 30 TABLET | Refills: 3 | Status: SHIPPED | OUTPATIENT
Start: 2024-11-26

## 2024-11-26 RX ORDER — CLOPIDOGREL BISULFATE 75 MG/1
75 TABLET ORAL DAILY
Qty: 30 TABLET | Refills: 5 | Status: SHIPPED | OUTPATIENT
Start: 2024-11-26

## 2024-11-26 RX ORDER — WARFARIN SODIUM 1 MG/1
1 TABLET ORAL
COMMUNITY

## 2024-11-26 RX ORDER — PREDNISONE 10 MG/1
10 TABLET ORAL DAILY
COMMUNITY

## 2024-11-26 RX ORDER — ROSUVASTATIN CALCIUM 20 MG/1
20 TABLET, COATED ORAL DAILY
Qty: 90 TABLET | Refills: 2 | Status: SHIPPED | OUTPATIENT
Start: 2024-11-26

## 2024-11-26 NOTE — LETTER
November 26, 2024     Macho Jose MD  [address] [address]    Patient: Filemon Jj   YOB: 1975   Date of Visit: 11/26/2024     Dear Macho Jose MD:       Thank you for referring Filemon Jj to me for evaluation. Below are the relevant portions of my assessment and plan of care.    If you have questions, please do not hesitate to call me. I look forward to following Filemon along with you.         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 male with the problems as listed above, presents    Chief complaint: Follow-up of recent echo Doppler study and complaining of left-sided chest pains.    History of Present Illness: Mr. Valentino Jj is a pleasant 49-year-old  male with history of ascending aortic aneurysm, status post repair with a Bentall procedure with mechanical aortic valve replacement on 1/17/2018.  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 He was recently seen in our office to establish his cardiac care and follow-up.  He had an echo Doppler study and  "is here to review the test results.  This revealed overall mildly depressed LV systolic function with adequate functioning of the mechanical aortic valve prosthesis.  On today's visit he does complain of some intermittent left-sided chest pains that seem to occur at random and resolve spontaneously.    Complete Transthoracic Echocardiogram with Complete Doppler and Color Flow    Accession Number: [id number]   Date of Study: 9/11/24   Ordering Provider: Osman Whiting MD   Clinical Indications: Valvular Function, Prosthetic Valve        Reading [facility]icians  Performing Staff   Cardiology: Osman Whiting MD    Tech: Neftali Cyr        Patient Hx Of Height, Weight, and Vitals    Height Weight BSA (Calculated - sq m) BMI (Calculated) Retired BMI (kg/m2) Pulse BP               ISCV PACS Images     Show images for Adult Transthoracic Echo Complete w/ Color, Spectral and Contrast if necessary per protocol   Clinical Indication    Valvular Function; Prosthetic Valve   Dx: Aortic valve prosthesis present (mechanical valve) [Z95.2 (ICD-10-CM)]     Interpretation Summary   •  Normal left ventricular cavity size and wall thickness noted.  •  The following left ventricular wall segments are hypokinetic: mid anterior, apical anterior, apical septal and mid inferoseptal.  •  Left ventricular ejection fraction appears to be 46 - 50%.  •  There is a mechanical aortic valve prosthesis present.  •  The aortic valve peak and mean gradients are within defined limits.  •  The mitral valve is structurally normal with no significant stenosis present. Trace to mild mitral valve regurgitation is present.  •  There is no evidence of pericardial effusion. .    Allergies   Allergen Reactions   • Aspirin Anaphylaxis     Patient said, \"it chokes him up.\" patient said, \"I got really short of breath and started to have an asthma attack.\"   :    Current Outpatient Medications:   •  acetaminophen (TYLENOL) 500 MG tablet, Take 1 tablet by mouth " Every 6 (Six) Hours As Needed for Mild Pain., Disp: 30 tablet, Rfl: 0  •  albuterol sulfate  (90 Base) MCG/ACT inhaler, Inhale 2 puffs Every 4 (Four) Hours As Needed for Wheezing., Disp: 18 g, Rfl: 0  •  Ascorbic Acid (VITAMIN C PO), Take  by mouth., Disp: , Rfl:   •  clopidogrel (PLAVIX) 75 MG tablet, Take 1 tablet by mouth Daily., Disp: 30 tablet, Rfl: 5  •  Enoxaparin Sodium (LOVENOX) 80 MG/0.8ML solution prefilled syringe syringe, Inject  under the skin into the appropriate area as directed Every 12 (Twelve) Hours., Disp: , Rfl:   •  Fluticasone-Salmeterol (ADVAIR/WIXELA) 500-50 MCG/ACT DISKUS, Inhale 2 (Two) Times a Day., Disp: , Rfl:   •  Loratadine 10 MG capsule, Take  by mouth., Disp: , Rfl:   •  losartan (Cozaar) 25 MG tablet, Take 1 tablet by mouth Every Evening., Disp: 30 tablet, Rfl: 3  •  metoprolol succinate XL (TOPROL-XL) 50 MG 24 hr tablet, Take 1 tablet by mouth Daily., Disp: 30 tablet, Rfl: 3  •  montelukast (SINGULAIR) 10 MG tablet, Take 1 tablet by mouth Every Night., Disp: , Rfl:   •  Multiple Vitamins-Minerals (ZINC PO), Take  by mouth., Disp: , Rfl:   •  pantoprazole (PROTONIX) 40 MG EC tablet, Take 1 tablet by mouth Daily., Disp: , Rfl:   •  predniSONE (DELTASONE) 10 MG tablet, Take 1 tablet by mouth Daily., Disp: , Rfl:   •  rosuvastatin (CRESTOR) 20 MG tablet, Take 1 tablet by mouth Daily., Disp: 90 tablet, Rfl: 2  •  warfarin (COUMADIN) 0.5 MG half tablet, Take 2 half tablet by mouth Daily., Disp: , Rfl:   •  warfarin (COUMADIN) 1 MG tablet, Take 1 tablet by mouth Daily., Disp: , Rfl:   •  warfarin (COUMADIN) 5 MG tablet, Take 1 tablet by mouth Daily., Disp: , Rfl:   •  warfarin (COUMADIN) 6 MG tablet, Take 1 tablet by mouth Every Evening., Disp: , Rfl:     The following portions of the patient's history were reviewed and updated as appropriate: allergies, current medications, past family history, past medical history, past social history, past surgical history and problem  "list.    Social History     Tobacco Use   • Smoking status: Former     Current packs/day: 1.00     Average packs/day: 1 pack/day for 4.0 years (4.0 ttl pk-yrs)     Types: Cigarettes   • Smokeless tobacco: Current     Types: Snuff   Vaping Use   • Vaping status: Never Used   Substance Use Topics   • Alcohol use: No     Comment: occassional    • Drug use: No     Review of Systems   Constitutional: Negative for chills and fever.   HENT:  Negative for nosebleeds and sore throat.    Cardiovascular:  Positive for chest pain.   Respiratory:  Negative for cough, hemoptysis and wheezing.    Gastrointestinal:  Negative for abdominal pain, hematemesis, hematochezia, melena, nausea and vomiting.   Genitourinary:  Negative for dysuria and hematuria.   Neurological:  Negative for headaches.     Objective   Vitals:    11/26/24 1040   BP: 141/85   BP Location: Right arm   Patient Position: Sitting   Cuff Size: Adult   Pulse: 85   SpO2: 96%   Weight: 87.5 kg (193 lb)   Height: 170.2 cm (67\")     Body mass index is 30.23 kg/m².    Constitutional:       Appearance: Well-developed.   Eyes:      Conjunctiva/sclera: Conjunctivae normal.   HENT:      Head: Normocephalic.   Neck:      Thyroid: No thyromegaly.      Vascular: No JVD.      Trachea: No tracheal deviation.   Pulmonary:      Effort: No respiratory distress.      Breath sounds: Normal breath sounds. No wheezing. No rales.   Cardiovascular:      PMI at left midclavicular line. Normal rate. Regular rhythm. Normal S1. S2. Loud metallic click of S2 noted.       Murmurs: There is no murmur.      No gallop.  No click. No rub.   Pulses:     Intact distal pulses.   Edema:     Peripheral edema absent.   Abdominal:      General: Bowel sounds are normal.      Palpations: Abdomen is soft. There is no abdominal mass.      Tenderness: There is no abdominal tenderness.   Musculoskeletal:      Cervical back: Normal range of motion and neck supple. Skin:     General: Skin is warm and dry. "   Neurological:      Mental Status: Alert and oriented to person, place, and time.      Cranial Nerves: No cranial nerve deficit.       Lab Results   Component Value Date     11/05/2024    K 3.9 11/05/2024     11/05/2024    CO2 24.8 11/05/2024    BUN 14 11/05/2024    CREATININE 0.83 11/05/2024    GLUCOSE 153 (H) 11/05/2024    CALCIUM 9.3 11/05/2024    AST 21 11/05/2024    ALT 34 11/05/2024    ALKPHOS 60 11/05/2024    LABIL2 1.4 (L) 03/04/2016     Lab Results   Component Value Date    CKTOTAL 127 04/09/2021     Lab Results   Component Value Date    WBC 9.18 11/05/2024    HGB 15.2 11/05/2024    HCT 48.2 11/05/2024     11/05/2024     Lab Results   Component Value Date    INR 2.19 (H) 03/31/2024    INR 2.36 (H) 03/12/2024    INR 1.83 (H) 01/05/2024       Assessment & Plan    Diagnosis Plan   1. Aortic valve prosthesis present (mechanical valve)        2. Precordial pain  Stress Test With Myocardial Perfusion (1 Day)      3. Tobacco abuse (chewing tobacco)          Recommendations  I have reviewed the echo Doppler results with the patient.  For his elevated blood pressures, will increase the dose of metoprolol succinate to 50 mg daily.  Will evaluate his chest pains further with a Lexiscan sestamibi study.  Continue with clopidogrel and rosuvastatin  I have also strongly emphasized about quitting to chew tobacco which could cause mouth cancer.  Patient expressed understanding.    Return in about 2 months (around 1/26/2025) for or sooner if needed.    As always, Eduardo I appreciate very much the opportunity to participate in the cardiovascular care of your patients. Please do not hesitate to call me with any questions with regards to Filemon ANGELA Jj's evaluation and management.       With Best Regards,        Osman Whiting MD, [facility]    Please note that portions of this note were completed with a voice recognition program.

## 2024-11-26 NOTE — PROGRESS NOTES
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 male with the problems as listed above, presents    Chief complaint: Follow-up of recent echo Doppler study and complaining of left-sided chest pains.    History of Present Illness: Mr. Valentino Jj is a pleasant 49-year-old  male with history of ascending aortic aneurysm, status post repair with a Bentall procedure with mechanical aortic valve replacement on 1/17/2018.  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 He was recently seen in our office to establish his cardiac care and follow-up.  He had an echo Doppler study and is here to review the test results.  This revealed overall mildly depressed LV systolic function with adequate functioning of the mechanical aortic valve prosthesis.  On today's visit he does complain of some intermittent left-sided chest pains that seem to occur at random and resolve spontaneously.    Complete Transthoracic Echocardiogram with Complete Doppler and Color Flow    Accession Number: [id number]   Date of Study: 9/11/24   Ordering Provider: Osman Whiting MD   Clinical Indications: Valvular Function, Prosthetic Valve        Reading Physicians  Performing Staff  "  Cardiology: Osman Whiting MD    Tech: Ger Neftali        Patient Hx Of Height, Weight, and Vitals    Height Weight BSA (Calculated - sq m) BMI (Calculated) Retired BMI (kg/m2) Pulse BP               [facility]     Show images for Adult Transthoracic Echo Complete w/ Color, Spectral and Contrast if necessary per protocol   Clinical Indication    Valvular Function; Prosthetic Valve   Dx: Aortic valve prosthesis present (mechanical valve) [Z95.2 (ICD-10-CM)]     Interpretation Summary     Normal left ventricular cavity size and wall thickness noted.    The following left ventricular wall segments are hypokinetic: mid anterior, apical anterior, apical septal and mid inferoseptal.    Left ventricular ejection fraction appears to be 46 - 50%.    There is a mechanical aortic valve prosthesis present.    The aortic valve peak and mean gradients are within defined limits.    The mitral valve is structurally normal with no significant stenosis present. Trace to mild mitral valve regurgitation is present.    There is no evidence of pericardial effusion. .    Allergies   Allergen Reactions    Aspirin Anaphylaxis     Patient said, \"it chokes him up.\" patient said, \"I got really short of breath and started to have an asthma attack.\"   :    Current Outpatient Medications:     acetaminophen (TYLENOL) 500 MG tablet, Take 1 tablet by mouth Every 6 (Six) Hours As Needed for Mild Pain., Disp: 30 tablet, Rfl: 0    albuterol sulfate  (90 Base) MCG/ACT inhaler, Inhale 2 puffs Every 4 (Four) Hours As Needed for Wheezing., Disp: 18 g, Rfl: 0    Ascorbic Acid (VITAMIN C PO), Take  by mouth., Disp: , Rfl:     clopidogrel (PLAVIX) 75 MG tablet, Take 1 tablet by mouth Daily., Disp: 30 tablet, Rfl: 5    Enoxaparin Sodium (LOVENOX) 80 MG/0.8ML solution prefilled syringe syringe, Inject  under the skin into the appropriate area as directed Every 12 (Twelve) Hours., Disp: , Rfl:     Fluticasone-Salmeterol (ADVAIR/WIXELA) 500-50 " MCG/ACT DISKUS, Inhale 2 (Two) Times a Day., Disp: , Rfl:     Loratadine 10 MG capsule, Take  by mouth., Disp: , Rfl:     losartan (Cozaar) 25 MG tablet, Take 1 tablet by mouth Every Evening., Disp: 30 tablet, Rfl: 3    metoprolol succinate XL (TOPROL-XL) 50 MG 24 hr tablet, Take 1 tablet by mouth Daily., Disp: 30 tablet, Rfl: 3    montelukast (SINGULAIR) 10 MG tablet, Take 1 tablet by mouth Every Night., Disp: , Rfl:     Multiple Vitamins-Minerals (ZINC PO), Take  by mouth., Disp: , Rfl:     pantoprazole (PROTONIX) 40 MG EC tablet, Take 1 tablet by mouth Daily., Disp: , Rfl:     predniSONE (DELTASONE) 10 MG tablet, Take 1 tablet by mouth Daily., Disp: , Rfl:     rosuvastatin (CRESTOR) 20 MG tablet, Take 1 tablet by mouth Daily., Disp: 90 tablet, Rfl: 2    warfarin (COUMADIN) 0.5 MG half tablet, Take 2 half tablet by mouth Daily., Disp: , Rfl:     warfarin (COUMADIN) 1 MG tablet, Take 1 tablet by mouth Daily., Disp: , Rfl:     warfarin (COUMADIN) 5 MG tablet, Take 1 tablet by mouth Daily., Disp: , Rfl:     warfarin (COUMADIN) 6 MG tablet, Take 1 tablet by mouth Every Evening., Disp: , Rfl:     The following portions of the patient's history were reviewed and updated as appropriate: allergies, current medications, past family history, past medical history, past social history, past surgical history and problem list.    Social History     Tobacco Use    Smoking status: Former     Current packs/day: 1.00     Average packs/day: 1 pack/day for 4.0 years (4.0 ttl pk-yrs)     Types: Cigarettes    Smokeless tobacco: Current     Types: Snuff   Vaping Use    Vaping status: Never Used   Substance Use Topics    Alcohol use: No     Comment: occassional     Drug use: No     Review of Systems   Constitutional: Negative for chills and fever.   HENT:  Negative for nosebleeds and sore throat.    Cardiovascular:  Positive for chest pain.   Respiratory:  Negative for cough, hemoptysis and wheezing.    Gastrointestinal:  Negative for  "abdominal pain, hematemesis, hematochezia, melena, nausea and vomiting.   Genitourinary:  Negative for dysuria and hematuria.   Neurological:  Negative for headaches.     Objective   Vitals:    11/26/24 1040   BP: 141/85   BP Location: Right arm   Patient Position: Sitting   Cuff Size: Adult   Pulse: 85   SpO2: 96%   Weight: 87.5 kg (193 lb)   Height: 170.2 cm (67\")     Body mass index is 30.23 kg/m².    Constitutional:       Appearance: Well-developed.   Eyes:      Conjunctiva/sclera: Conjunctivae normal.   HENT:      Head: Normocephalic.   Neck:      Thyroid: No thyromegaly.      Vascular: No JVD.      Trachea: No tracheal deviation.   Pulmonary:      Effort: No respiratory distress.      Breath sounds: Normal breath sounds. No wheezing. No rales.   Cardiovascular:      PMI at left midclavicular line. Normal rate. Regular rhythm. Normal S1. S2. Loud metallic click of S2 noted.       Murmurs: There is no murmur.      No gallop.  No click. No rub.   Pulses:     Intact distal pulses.   Edema:     Peripheral edema absent.   Abdominal:      General: Bowel sounds are normal.      Palpations: Abdomen is soft. There is no abdominal mass.      Tenderness: There is no abdominal tenderness.   Musculoskeletal:      Cervical back: Normal range of motion and neck supple. Skin:     General: Skin is warm and dry.   Neurological:      Mental Status: Alert and oriented to person, place, and time.      Cranial Nerves: No cranial nerve deficit.       Lab Results   Component Value Date     11/05/2024    K 3.9 11/05/2024     11/05/2024    CO2 24.8 11/05/2024    BUN 14 11/05/2024    CREATININE 0.83 11/05/2024    GLUCOSE 153 (H) 11/05/2024    CALCIUM 9.3 11/05/2024    AST 21 11/05/2024    ALT 34 11/05/2024    ALKPHOS 60 11/05/2024    LABIL2 1.4 (L) 03/04/2016     Lab Results   Component Value Date    CKTOTAL 127 04/09/2021     Lab Results   Component Value Date    WBC 9.18 11/05/2024    HGB 15.2 11/05/2024    HCT 48.2 " 11/05/2024     11/05/2024     Lab Results   Component Value Date    INR 2.19 (H) 03/31/2024    INR 2.36 (H) 03/12/2024    INR 1.83 (H) 01/05/2024       Assessment & Plan    Diagnosis Plan   1. Aortic valve prosthesis present (mechanical valve)        2. Precordial pain  Stress Test With Myocardial Perfusion (1 Day)      3. Tobacco abuse (chewing tobacco)          Recommendations  I have reviewed the echo Doppler results with the patient.  For his elevated blood pressures, will increase the dose of metoprolol succinate to 50 mg daily.  Will evaluate his chest pains further with a Lexiscan sestamibi study.  Continue with clopidogrel and rosuvastatin  I have also strongly emphasized about quitting to chew tobacco which could cause mouth cancer.  Patient expressed understanding.    Return in about 2 months (around 1/26/2025) for or sooner if needed.    As always, Macho Jose MD  I appreciate very much the opportunity to participate in the cardiovascular care of your patients. Please do not hesitate to call me with any questions with regards to Filemon Jj's evaluation and management.       With Best Regards,        Osman Whiting MD, FACC    Please note that portions of this note were completed with a voice recognition program.

## 2025-01-27 ENCOUNTER — TELEPHONE (OUTPATIENT)
Age: 50
End: 2025-01-27
Payer: COMMERCIAL

## 2025-01-27 NOTE — TELEPHONE ENCOUNTER
Called pt and advised him that he will need RS his apt with us due to not having stress test done.

## (undated) DEVICE — MODEL AT P54, P/N 700608-035KIT CONTENTS: HAND CONTROLLER, 3-WAY HIGH-PRESSURE STOPCOCK WITH ROTATING END AND PREMIUM HIGH-PRESSURE TUBING: Brand: ANGIOTOUCH® KIT

## (undated) DEVICE — ORGANIZER SUT GABBAY FRATER GFS10A PK/3

## (undated) DEVICE — KT INTRO SHEATH PERC W/FULL DRP 9F

## (undated) DEVICE — SOL NS 500ML

## (undated) DEVICE — SUT ETHIBOND 2/0 CV V5  30IN PXX52

## (undated) DEVICE — GLIDESHEATH SLENDER STAINLESS STEEL KIT: Brand: GLIDESHEATH SLENDER

## (undated) DEVICE — RADIFOCUS GLIDEWIRE: Brand: GLIDEWIRE

## (undated) DEVICE — TUBING, SUCTION, 1/4" X 10', STRAIGHT: Brand: MEDLINE

## (undated) DEVICE — CLTH CLENS READYCLEANSE PERI CARE PK/5

## (undated) DEVICE — FLTR HME STR UNIV W/SMPL PORT

## (undated) DEVICE — DR ROGERS OH: Brand: MEDLINE INDUSTRIES, INC.

## (undated) DEVICE — MODEL BT2000 P/N 700287-012KIT CONTENTS: MANIFOLD WITH SALINE AND CONTRAST PORTS, SALINE TUBING WITH SPIKE AND HAND SYRINGE, TRANSDUCER: Brand: BT2000 AUTOMATED MANIFOLD KIT

## (undated) DEVICE — ADULT DISPOSABLE SINGLE-PATIENT USE PULSE OXIMETER SENSOR: Brand: NONIN

## (undated) DEVICE — IRRIGATOR BULB ASEPTO 60CC STRL

## (undated) DEVICE — DRSNG SURESITE WNDW 4X4.5

## (undated) DEVICE — GLIDESHEATH BASIC HYDROPHILIC COATED INTRODUCER SHEATH: Brand: GLIDESHEATH

## (undated) DEVICE — EP LEFT SUBCLAVIAN SHIELD-YELLOW: Brand: RADPAD

## (undated) DEVICE — CAUTRY ACCUTEMP HI TEMP FINE TIP 2IN

## (undated) DEVICE — GLV SURG SENSICARE W/ALOE PF LF 7 STRL

## (undated) DEVICE — MEDI-VAC NON-CONDUCTIVE SUCTION TUBING: Brand: CARDINAL HEALTH

## (undated) DEVICE — CATH DIAG EXPO .056 AL1 6F 100CM

## (undated) DEVICE — CATH ART RADL 20GA 1 3/4IN LF

## (undated) DEVICE — SUT VIC PLS 0 CTX 36IN UD VCP978H

## (undated) DEVICE — AIRWY 90MM NO9

## (undated) DEVICE — GUIDE CATHETER: Brand: MACH1™

## (undated) DEVICE — RUNWAY RADL W/TOP PAD

## (undated) DEVICE — COVER,LIGHT HANDLE,FLX,1/PK: Brand: MEDLINE INDUSTRIES, INC.

## (undated) DEVICE — PRESSURE TUBING: Brand: TRUWAVE

## (undated) DEVICE — SKIN AFFIX SURG ADHESIVE 72/CS 0.55ML: Brand: MEDLINE

## (undated) DEVICE — 3M™ TEGADERM™ CHG DRESSING 25/CARTON 4 CARTONS/CASE 1658: Brand: TEGADERM™

## (undated) DEVICE — GW INQW FIX/CORE PTFE J/3MM .035 260CM

## (undated) DEVICE — SUCTION CANISTER, 2500CC, RIGID: Brand: DEROYAL

## (undated) DEVICE — INTRAOPERATIVE COVER KIT, 10 PACK: Brand: SITE-RITE

## (undated) DEVICE — DRP SLUSH MACH

## (undated) DEVICE — PRESSURE MONITORING SET: Brand: TRUWAVE, VAMP

## (undated) DEVICE — OASIS DRAIN, SINGLE, INLINE & ATS COMPATIBLE: Brand: OASIS

## (undated) DEVICE — SUT PROLN 3/0 SH D/A 36IN 8522H

## (undated) DEVICE — LN SMPL O2 NASL/ORL SMART/CAPNOLINE PLS A/

## (undated) DEVICE — ST EXT IV SMARTSITE 2VLV SP M LL 5ML IV1

## (undated) DEVICE — APPL CHLORAPREP W/TINT 26ML BLU

## (undated) DEVICE — Device

## (undated) DEVICE — ST INF PRI SMRTSTE 20DRP 2VLV 24ML 117

## (undated) DEVICE — JACKT CARD COOLING/RECIRION COILN I/O

## (undated) DEVICE — ST BLOOD ADMIN YTP 80IN

## (undated) DEVICE — MEDI-VAC YANKAUER SUCTION HANDLE W/BULBOUS TIP: Brand: CARDINAL HEALTH

## (undated) DEVICE — 12 FOOT DISPOSABLE EXTENSION CABLE WITH SAFE CONNECT / SCREW-DOWN

## (undated) DEVICE — MODEL AT P65, P/N 701554-001KIT CONTENTS: HAND CONTROLLER, 3-WAY HIGH-PRESSURE STOPCOCK WITH ROTATING END AND PREMIUM HIGH-PRESSURE TUBING: Brand: ANGIOTOUCH® KIT

## (undated) DEVICE — DEV COMP RAD PRELUDESYNC 24CM

## (undated) DEVICE — PRESSURE MONITORING ACCESSORY: Brand: TRUWAVE

## (undated) DEVICE — SUT MONOCRYL PLS ANTIB UND 3/0  PS1 27IN

## (undated) DEVICE — ST PRIM GRVTY NDLESS 3 INJ PORT 105IN

## (undated) DEVICE — SWAN-GANZ THERMODILUTION CATHETER: Brand: SWAN-GANZ

## (undated) DEVICE — SUT PROLN 4/0 RB1 36IN 4PK M8557

## (undated) DEVICE — 3M™ RANGER™ BLOOD/FLUID WARMING STANDARD FLOW SET, 24200, 10/CASE: Brand: 3M™ RANGER™

## (undated) DEVICE — Device: Brand: MEDEX

## (undated) DEVICE — PK CATH CARD 70

## (undated) DEVICE — DRAPE, RADIAL, STERILE: Brand: MEDLINE

## (undated) DEVICE — PK HEART OPN 10

## (undated) DEVICE — 3M™ BAIR HUGGER® CARDIAC ACCESS BLANKET, 5 PER CASE 63000: Brand: BAIR HUGGER™

## (undated) DEVICE — ANTIBACTERIAL UNDYED BRAIDED (POLYGLACTIN 910), SYNTHETIC ABSORBABLE SUTURE: Brand: COATED VICRYL

## (undated) DEVICE — TONSIL SPONGES: Brand: DEROYAL

## (undated) DEVICE — RADIFOCUS OPTITORQUE ANGIOGRAPHIC CATHETER: Brand: OPTITORQUE

## (undated) DEVICE — PK CATH CARD 10

## (undated) DEVICE — A2000 MULTI-USE SYRINGE KIT, P/N 701277-003KIT CONTENTS: 100ML CONTRAST RESERVOIR AND TUBING WITH CONTRAST SPIKE AND CLAMP: Brand: A2000 MULTI-USE SYRINGE KIT

## (undated) DEVICE — SOL NACL 0.9PCT 1000ML

## (undated) DEVICE — CATH DIAG EXPO M/ PK 6FR FL4/FR4 PIG 3PK

## (undated) DEVICE — SUT MNCRYL PLS ANTIB UD 4/0 PS2 18IN